# Patient Record
Sex: FEMALE | Race: WHITE
[De-identification: names, ages, dates, MRNs, and addresses within clinical notes are randomized per-mention and may not be internally consistent; named-entity substitution may affect disease eponyms.]

---

## 2021-02-01 ENCOUNTER — HOSPITAL ENCOUNTER (EMERGENCY)
Dept: HOSPITAL 97 - ER | Age: 76
Discharge: HOME | End: 2021-02-01
Payer: COMMERCIAL

## 2021-02-01 VITALS — TEMPERATURE: 99.6 F

## 2021-02-01 VITALS — OXYGEN SATURATION: 100 % | DIASTOLIC BLOOD PRESSURE: 95 MMHG | SYSTOLIC BLOOD PRESSURE: 160 MMHG

## 2021-02-01 DIAGNOSIS — M54.32: Primary | ICD-10-CM

## 2021-02-01 DIAGNOSIS — M54.31: ICD-10-CM

## 2021-02-01 PROCEDURE — 99283 EMERGENCY DEPT VISIT LOW MDM: CPT

## 2021-02-01 NOTE — ER
Nurse's Notes                                                                                     

 [facility]                                                                 

Name: Sydney Smith                                                                             

Age: 75 yrs                                                                                       

Sex: Female                                                                                       

: 1945                                                                                   

MRN: [id number]                                                                                   

Arrival Date: 2021                                                                          

Time: 10:47                                                                                       

Account#: [id number]                                                                            

Bed 25                                                                                            

Private MD:                                                                                       

Diagnosis: Sciatica, left side;Sciatica, right side                                               

                                                                                                  

Presentation:                                                                                     

                                                                                             

11:31 Chief complaint: Patient states: I got sciatica, both sides on lower back. Haven't      ca1 

      slept for days, hurt so back. Had this problem for a long time, but worse in the past       

      week. [employer]. lower back radiating to the side of the legs sometimes. Bilateral knee pain,     

      swelling on the R knee, been that way for quite sometime, had knee shots before. Denies     

      injury. Denies urinary S/S. Coronavirus screen: Client denies travel out of the [place] in     

      the last 14 days. At this time, the client does not indicate any symptoms associated        

      with coronavirus-19. Ebola Screen: Patient negative for fever greater than or equal to      

      101.5 degrees Fahrenheit, and additional compatible Ebola Virus Disease symptoms            

      Patient denies exposure to infectious person. Patient denies travel to an                   

      Ebola-affected area in the 21 days before illness onset. No symptoms or risks               

      identified at this time. Initial Sepsis Screen: Does the patient meet any 2 criteria?       

      No. Patient's initial sepsis screen is negative. Does the patient have a suspected          

      source of infection? No. Patient's initial sepsis screen is negative. Risk Assessment:      

      Do you want to hurt yourself or someone else? Patient reports no desire to harm self or     

      others. Onset of symptoms was 2021.                                            

11:31 Method Of Arrival: Wheelchair                                                           ca1 

11:31 Acuity: AYLA 3                                                                           ca1 

                                                                                                  

Historical:                                                                                       

- Allergies:                                                                                      

11:36 No Known Allergies;                                                                     ca1 

- PMHx:                                                                                           

11:36 Hypertension;                                                                           ca1 

- PSHx:                                                                                           

11:36 Knee surgery;                                                                           ca1 

                                                                                                  

- Immunization history:: Pneumococcal vaccine is up to date, Flu vaccine is not up to             

  date. COVID 1st shot.                                                                           

- Social history:: Smoking status: Patient denies any tobacco usage or history of.                

                                                                                                  

                                                                                                  

Screenin:08 Abuse screen: Denies threats or abuse. Nutritional screening: No deficits noted.        tw2 

      Tuberculosis screening: No symptoms or risk factors identified. Fall Risk None              

      identified.                                                                                 

                                                                                                  

Assessment:                                                                                       

13:35 General: Appears in no apparent distress. uncomfortable, obese, well groomed, Behavior  tw2 

      is calm, cooperative, appropriate for age. Pain: Complains of pain in left leg and          

      right leg and right low back and left low back. Neuro: Level of Consciousness is awake,     

      alert, obeys commands, Oriented to person, place, time, situation. Cardiovascular:          

      Patient's skin is warm and dry. Respiratory: Airway is patent Respiratory effort is         

      even, unlabored, Respiratory pattern is regular, symmetrical. Musculoskeletal:              

      Circulation, motion, and sensation intact. Range of motion: intact in all extremities,      

      Reports pain in back, right leg and left leg.                                               

14:07 Reassessment: Patient appears in no apparent distress at this time. No changes from     tw2 

      previously documented assessment. Patient and/or family updated on plan of care and         

      expected duration. Pain level reassessed. Patient is alert, oriented x 3, equal             

      unlabored respirations, skin warm/dry/pink.                                                 

                                                                                                  

Vital Signs:                                                                                      

11:31  / 61; Pulse 94; Resp 16 S; Temp 99.6(TE); Pulse Ox 98% on R/A; Weight 100.7 kg   ca1 

      (R); Height 5 ft. 4 in. (162.56 cm) (R); Pain 10/10;                                        

14:05  / 95; Pulse 82; Resp 17; Pulse Ox 100% on R/A;                                   tw2 

11:31 Body Mass Index 38.11 (100.70 kg, 162.56 cm)                                            ca1 

                                                                                                  

ED Course:                                                                                        

10:47 Patient arrived in ED.                                                                  ag5 

11:35 Triage completed.                                                                       ca1 

11:36 Arm band placed on right wrist.                                                         ca1 

13:30 Agustina Solorzano FNP-C is PHCP.                                                        kb  

13:31 Fadia Cruz MD is Attending Physician.                                           kb  

13:32 Bed in low position. Call light in reach. Pulse ox on. NIBP on.                         tw2 

13:36 Agustina Solorzano FNP-C is PHCP.                                                        kb  

13:36 Fadia Cruz MD is Attending Physician.                                           kb  

13:54 Asya White, RN is Primary Nurse.                                                        tw2 

14:08 No provider procedures requiring assistance completed. Patient did not have IV access   tw2 

      during this emergency room visit.                                                           

                                                                                                  

Administered Medications:                                                                         

14:00 Drug: Norco 10 mg-325 mg 1 tabs {Note: RASS 0.} Route: PO;                              tw2 

14:07 Follow up: Response: No adverse reaction; RASS: Alert and Calm (0)                      tw2 

14:00 Drug: predniSONE 40 mg Route: PO;                                                       tw2 

14:09 Follow up: Response: No adverse reaction                                                tw2 

                                                                                                  

                                                                                                  

Outcome:                                                                                          

13:50 Discharge ordered by MD.                                                                sandhya  

14:06 Discharged to home via wheelchair.                                                      tw2 

14:06 Condition: stable                                                                           

14:06 Discharge instructions given to patient, Instructed on discharge instructions, follow       

      up and referral plans. no drinking with medication, no driving heavy equipment,             

      medication usage, Demonstrated understanding of instructions, follow-up care,               

      medications, Prescriptions given X 2.                                                       

14:08 Patient left the ED.                                                                    tw2 

                                                                                                  

Signatures:                                                                                       

Agustina Solorzano, HOLLIP-C                 JESSICA-Asya Lindsay RN RN   tw2                                                  

Genesis Quinn RN                        RN   ca1                                                  

Ginger Crabtree                                5                                                  

                                                                                                  

**************************************************************************************************

## 2021-02-01 NOTE — EDPHYS
Physician Documentation                                                                           

 [facility]                                                                 

Name: Sydney Smith                                                                             

Age: 75 yrs                                                                                       

Sex: Female                                                                                       

: 1945                                                                                   

MRN: [id number]                                                                                   

Arrival Date: 2021                                                                          

Time: 10:47                                                                                       

Account#: [id number]                                                                            

Bed 25                                                                                            

Private MD:                                                                                       

ED Physician Fadia Cruz                                                                    

HPI:                                                                                              

                                                                                             

13:47 This 75 yrs old  Female presents to ER via Wheelchair with complaints of Low   kb  

      Back Pain, Knee Pain.                                                                       

13:47 The patient presents with pain that is acute. The symptoms are located in the left low  kb  

      back and right low back. The pain radiates to the right leg and left leg. The problem       

      was sustained from a chronic condition. Onset: The symptoms/episode began/occurred 4        

      day(s) ago. Modifying factors: The patient symptoms are alleviated by nothing, the          

      patient symptoms are aggravated by any movement. Associated signs and symptoms:             

      Pertinent positives: none. Severity of symptoms: At their worst the symptoms were           

      moderate, in the emergency department the symptoms are unchanged. The patient has           

      experienced similar episodes in the past, several times. The patient has not recently       

      seen a physician. Pt states "I've been having sciatic pain since Friday and it keeps        

      getting worse. I can't lay down." Pt reports she has had this pain several times in the     

      past. States pain sometimes travels down legs. No urinary problems, incontinence,           

      numbness, tingling. Denies injury or trauma.                                                

                                                                                                  

Historical:                                                                                       

- Allergies:                                                                                      

11:36 No Known Allergies;                                                                     ca1 

- PMHx:                                                                                           

11:36 Hypertension;                                                                           ca1 

- PSHx:                                                                                           

11:36 Knee surgery;                                                                           ca1 

                                                                                                  

- Immunization history:: Pneumococcal vaccine is up to date, Flu vaccine is not up to             

  date. COVID 1st shot.                                                                           

- Social history:: Smoking status: Patient denies any tobacco usage or history of.                

                                                                                                  

                                                                                                  

ROS:                                                                                              

13:49 Constitutional: Negative for fever, chills, and weight loss, Cardiovascular: Negative   kb  

      for chest pain, palpitations, and edema, Respiratory: Negative for shortness of breath,     

      cough, wheezing, and pleuritic chest pain, Abdomen/GI: Negative for abdominal pain,         

      nausea, vomiting, diarrhea, and constipation, MS/Extremity: Negative for injury and         

      deformity, Skin: Negative for injury, rash, and discoloration, Neuro: Negative for          

      headache, weakness, numbness, tingling, and seizure.                                        

13:49 Back: Positive for pain at rest, pain with movement, radiated pain, of the left low         

      back and right low back.                                                                    

                                                                                                  

Exam:                                                                                             

13:49 Constitutional:  This is a well developed, well nourished patient who is awake, alert,  kb  

      and in no acute distress. Head/Face:  Normocephalic, atraumatic. Chest/axilla:  Normal      

      chest wall appearance and motion.  Nontender with no deformity.  No lesions are             

      appreciated. Cardiovascular:  Regular rate and rhythm with a normal S1 and S2.  No          

      gallops, murmurs, or rubs.  Normal PMI, no JVD.  No pulse deficits. Respiratory:  Lungs     

      have equal breath sounds bilaterally, clear to auscultation and percussion.  No rales,      

      rhonchi or wheezes noted.  No increased work of breathing, no retractions or nasal          

      flaring. Abdomen/GI:  Soft, non-tender, with normal bowel sounds.  No distension or         

      tympany.  No guarding or rebound.  No evidence of tenderness throughout. Skin:  Warm,       

      dry with normal turgor.  Normal color with no rashes, no lesions, and no evidence of        

      cellulitis. MS/ Extremity:  Pulses equal, no cyanosis.  Neurovascular intact.  Full,        

      normal range of motion. Neuro:  Awake and alert, GCS 15, oriented to person, place,         

      time, and situation.  Cranial nerves II-XII grossly intact.  Motor strength 5/5 in all      

      extremities.  Sensory grossly intact.  Cerebellar exam normal.  Normal gait.                

13:49 Back: pain, that is moderate, of the  left low back and right low back, ROM is painful,     

      normal spinal alignment noted, CVA tenderness, is absent, vertebral tenderness, is not      

      appreciated.                                                                                

                                                                                                  

Vital Signs:                                                                                      

11:31  / 61; Pulse 94; Resp 16 S; Temp 99.6(TE); Pulse Ox 98% on R/A; Weight 100.7 kg   ca1 

      (R); Height 5 ft. 4 in. (162.56 cm) (R); Pain 10/10;                                        

14:05  / 95; Pulse 82; Resp 17; Pulse Ox 100% on R/A;                                   tw2 

11:31 Body Mass Index 38.11 (100.70 kg, 162.56 cm)                                            ca1 

                                                                                                  

MDM:                                                                                              

13:37 Patient medically screened.                                                             kb  

13:47 Data reviewed: vital signs, nurses notes. Data interpreted: Pulse oximetry: on room air kb  

      is 98 %. Interpretation: normal. Counseling: I had a detailed discussion with the           

      patient and/or guardian regarding: the historical points, exam findings, and any            

      diagnostic results supporting the discharge/admit diagnosis, the need for outpatient        

      follow up, a family practitioner, to return to the emergency department if symptoms         

      worsen or persist or if there are any questions or concerns that arise at home.             

                                                                                                  

Administered Medications:                                                                         

14:00 Drug: Norco 10 mg-325 mg 1 tabs {Note: RASS 0.} Route: PO;                              tw2 

14:07 Follow up: Response: No adverse reaction; RASS: Alert and Calm (0)                      tw2 

14:00 Drug: predniSONE 40 mg Route: PO;                                                       tw2 

14:09 Follow up: Response: No adverse reaction                                                tw2 

                                                                                                  

                                                                                                  

Disposition:                                                                                      

18:18 Co-signature as Attending Physician, Fadia Cruz MD.                               [facility] 

                                                                                                  

Disposition:                                                                                      

21 13:50 Discharged to Home. Impression: Sciatica, left side, Sciatica, right side.         

- Condition is Stable.                                                                            

- Discharge Instructions: Sciatica, Easy-to-Read, Back Exercises, Easy-to-Read.                   

- Prescriptions for Prednisone 20 mg Oral Tablet - take 1 tablet by ORAL route once               

  daily for 5 days; 5 tablet. Cyclobenzaprine 10 mg Oral Tablet - take 1 tablet by ORAL           

  route every 8 hours As needed; 21 tablet.                                                       

- Medication Reconciliation Form, Thank You Letter, Antibiotic Education, Prescription            

  Opioid Use form.                                                                                

- Follow up: Emergency Department; When: As needed; Reason: Worsening of condition.               

  Follow up: Private Physician; When: 2 - 3 days; Reason: Recheck today's complaints,             

  Continuance of care, Re-evaluation by your physician.                                           

                                                                                                  

                                                                                                  

                                                                                                  

Signatures:                                                                                       

Agustina Solorzano, JESSICA-C                 JESSICA-Asya Lindsay, KIM                          RN   tw2                                                  

Fadia Cruz MD MD   ma2                                                  

Genesis Quinn RN                        RN   ca1                                                  

                                                                                                  

Corrections: (The following items were deleted from the chart)                                    

13:51 13:47 Pt states "I've been having sciatic pain since Friday and it keeps getting worse. kb  

      I can't lay down." Pt reports she has had this pain several times in the past. States       

      pain sometimes travels down legs. No urinary problems, incontinence, numbness,              

      tingling. kb                                                                                

14:08 13:50 2021 13:50 Discharged to Home. Impression: Sciatica, left side; Sciatica,   tw2 

      right side. Condition is Stable. Forms are Medication Reconciliation Form, Thank You        

      Letter, Antibiotic Education, Prescription Opioid Use. Follow up: Emergency Department;     

      When: As needed; Reason: Worsening of condition. Follow up: Private Physician; When: 2      

      - 3 days; Reason: Recheck today's complaints, Continuance of care, Re-evaluation by         

      your physician. kb                                                                          

                                                                                                  

**************************************************************************************************

## 2021-02-17 ENCOUNTER — HOSPITAL ENCOUNTER (INPATIENT)
Dept: HOSPITAL 97 - ER | Age: 76
LOS: 9 days | Discharge: HOME | DRG: 871 | End: 2021-02-26
Attending: HOSPITALIST | Admitting: HOSPITALIST
Payer: COMMERCIAL

## 2021-02-17 VITALS — BODY MASS INDEX: 37 KG/M2

## 2021-02-17 DIAGNOSIS — E87.2: ICD-10-CM

## 2021-02-17 DIAGNOSIS — N30.00: ICD-10-CM

## 2021-02-17 DIAGNOSIS — K21.9: ICD-10-CM

## 2021-02-17 DIAGNOSIS — E87.6: ICD-10-CM

## 2021-02-17 DIAGNOSIS — E03.9: ICD-10-CM

## 2021-02-17 DIAGNOSIS — Z79.899: ICD-10-CM

## 2021-02-17 DIAGNOSIS — E86.9: ICD-10-CM

## 2021-02-17 DIAGNOSIS — Z79.890: ICD-10-CM

## 2021-02-17 DIAGNOSIS — D50.9: ICD-10-CM

## 2021-02-17 DIAGNOSIS — K80.10: ICD-10-CM

## 2021-02-17 DIAGNOSIS — E21.1: ICD-10-CM

## 2021-02-17 DIAGNOSIS — Z90.710: ICD-10-CM

## 2021-02-17 DIAGNOSIS — N17.9: ICD-10-CM

## 2021-02-17 DIAGNOSIS — E78.5: ICD-10-CM

## 2021-02-17 DIAGNOSIS — A41.51: Primary | ICD-10-CM

## 2021-02-17 DIAGNOSIS — E87.5: ICD-10-CM

## 2021-02-17 DIAGNOSIS — R73.9: ICD-10-CM

## 2021-02-17 DIAGNOSIS — N18.6: ICD-10-CM

## 2021-02-17 DIAGNOSIS — Z99.2: ICD-10-CM

## 2021-02-17 DIAGNOSIS — Z20.822: ICD-10-CM

## 2021-02-17 DIAGNOSIS — I25.10: ICD-10-CM

## 2021-02-17 DIAGNOSIS — Z88.5: ICD-10-CM

## 2021-02-17 DIAGNOSIS — M05.9: ICD-10-CM

## 2021-02-17 DIAGNOSIS — D63.8: ICD-10-CM

## 2021-02-17 DIAGNOSIS — D63.1: ICD-10-CM

## 2021-02-17 DIAGNOSIS — I12.0: ICD-10-CM

## 2021-02-17 DIAGNOSIS — T39.395A: ICD-10-CM

## 2021-02-17 LAB
ALBUMIN SERPL BCP-MCNC: 2.3 G/DL (ref 3.4–5)
ALP SERPL-CCNC: 1150 U/L (ref 45–117)
ALT SERPL W P-5'-P-CCNC: 20 U/L (ref 12–78)
AST SERPL W P-5'-P-CCNC: 22 U/L (ref 15–37)
BUN BLD-MCNC: 157 MG/DL (ref 7–18)
COHGB MFR BLDA: 0.8 % (ref 0–1.5)
GLUCOSE SERPLBLD-MCNC: 172 MG/DL (ref 74–106)
HCT VFR BLD CALC: 33.3 % (ref 36–45)
INR BLD: 1.25
LIPASE SERPL-CCNC: 545 U/L (ref 73–393)
LYMPHOCYTES # SPEC AUTO: 0.2 K/UL (ref 0.7–4.9)
MAGNESIUM SERPL-MCNC: 3.6 MG/DL (ref 1.8–2.4)
MORPHOLOGY BLD-IMP: (no result)
NT-PROBNP SERPL-MCNC: 4662 PG/ML (ref ?–450)
OXYHGB MFR BLDA: 95.7 % (ref 94–97)
PMV BLD: 9 FL (ref 7.6–11.3)
POTASSIUM SERPL-SCNC: 4.4 MMOL/L (ref 3.5–5.1)
RBC # BLD: 4.27 M/UL (ref 3.86–4.86)
SAO2 % BLDA: 97.7 % (ref 92–98.5)
TROPONIN (EMERG DEPT USE ONLY): < 0.02 NG/ML (ref 0–0.04)

## 2021-02-17 PROCEDURE — 88300 SURGICAL PATH GROSS: CPT

## 2021-02-17 PROCEDURE — 36415 COLL VENOUS BLD VENIPUNCTURE: CPT

## 2021-02-17 PROCEDURE — 97161 PT EVAL LOW COMPLEX 20 MIN: CPT

## 2021-02-17 PROCEDURE — 80048 BASIC METABOLIC PNL TOTAL CA: CPT

## 2021-02-17 PROCEDURE — 99285 EMERGENCY DEPT VISIT HI MDM: CPT

## 2021-02-17 PROCEDURE — 87340 HEPATITIS B SURFACE AG IA: CPT

## 2021-02-17 PROCEDURE — 82652 VIT D 1 25-DIHYDROXY: CPT

## 2021-02-17 PROCEDURE — 83520 IMMUNOASSAY QUANT NOS NONAB: CPT

## 2021-02-17 PROCEDURE — 86317 IMMUNOASSAY INFECTIOUS AGENT: CPT

## 2021-02-17 PROCEDURE — 80069 RENAL FUNCTION PANEL: CPT

## 2021-02-17 PROCEDURE — 87389 HIV-1 AG W/HIV-1&-2 AB AG IA: CPT

## 2021-02-17 PROCEDURE — 80076 HEPATIC FUNCTION PANEL: CPT

## 2021-02-17 PROCEDURE — 87088 URINE BACTERIA CULTURE: CPT

## 2021-02-17 PROCEDURE — 87205 SMEAR GRAM STAIN: CPT

## 2021-02-17 PROCEDURE — 83540 ASSAY OF IRON: CPT

## 2021-02-17 PROCEDURE — 86430 RHEUMATOID FACTOR TEST QUAL: CPT

## 2021-02-17 PROCEDURE — 86160 COMPLEMENT ANTIGEN: CPT

## 2021-02-17 PROCEDURE — 83605 ASSAY OF LACTIC ACID: CPT

## 2021-02-17 PROCEDURE — 84165 PROTEIN E-PHORESIS SERUM: CPT

## 2021-02-17 PROCEDURE — 82728 ASSAY OF FERRITIN: CPT

## 2021-02-17 PROCEDURE — 87077 CULTURE AEROBIC IDENTIFY: CPT

## 2021-02-17 PROCEDURE — 87086 URINE CULTURE/COLONY COUNT: CPT

## 2021-02-17 PROCEDURE — 81003 URINALYSIS AUTO W/O SCOPE: CPT

## 2021-02-17 PROCEDURE — 84100 ASSAY OF PHOSPHORUS: CPT

## 2021-02-17 PROCEDURE — 71250 CT THORAX DX C-: CPT

## 2021-02-17 PROCEDURE — 81001 URINALYSIS AUTO W/SCOPE: CPT

## 2021-02-17 PROCEDURE — 83880 ASSAY OF NATRIURETIC PEPTIDE: CPT

## 2021-02-17 PROCEDURE — 71045 X-RAY EXAM CHEST 1 VIEW: CPT

## 2021-02-17 PROCEDURE — 84550 ASSAY OF BLOOD/URIC ACID: CPT

## 2021-02-17 PROCEDURE — 85730 THROMBOPLASTIN TIME PARTIAL: CPT

## 2021-02-17 PROCEDURE — 90935 HEMODIALYSIS ONE EVALUATION: CPT

## 2021-02-17 PROCEDURE — 76000 FLUOROSCOPY <1 HR PHYS/QHP: CPT

## 2021-02-17 PROCEDURE — 86706 HEP B SURFACE ANTIBODY: CPT

## 2021-02-17 PROCEDURE — 97116 GAIT TRAINING THERAPY: CPT

## 2021-02-17 PROCEDURE — 84145 PROCALCITONIN (PCT): CPT

## 2021-02-17 PROCEDURE — 84156 ASSAY OF PROTEIN URINE: CPT

## 2021-02-17 PROCEDURE — 83970 ASSAY OF PARATHORMONE: CPT

## 2021-02-17 PROCEDURE — 84484 ASSAY OF TROPONIN QUANT: CPT

## 2021-02-17 PROCEDURE — 94660 CPAP INITIATION&MGMT: CPT

## 2021-02-17 PROCEDURE — 81015 MICROSCOPIC EXAM OF URINE: CPT

## 2021-02-17 PROCEDURE — 82746 ASSAY OF FOLIC ACID SERUM: CPT

## 2021-02-17 PROCEDURE — 87040 BLOOD CULTURE FOR BACTERIA: CPT

## 2021-02-17 PROCEDURE — 87522 HEPATITIS C REVRS TRNSCRPJ: CPT

## 2021-02-17 PROCEDURE — 84443 ASSAY THYROID STIM HORMONE: CPT

## 2021-02-17 PROCEDURE — 93005 ELECTROCARDIOGRAM TRACING: CPT

## 2021-02-17 PROCEDURE — 74176 CT ABD & PELVIS W/O CONTRAST: CPT

## 2021-02-17 PROCEDURE — 82607 VITAMIN B-12: CPT

## 2021-02-17 PROCEDURE — 84466 ASSAY OF TRANSFERRIN: CPT

## 2021-02-17 PROCEDURE — 85025 COMPLETE CBC W/AUTO DIFF WBC: CPT

## 2021-02-17 PROCEDURE — 51702 INSERT TEMP BLADDER CATH: CPT

## 2021-02-17 PROCEDURE — 88108 CYTOPATH CONCENTRATE TECH: CPT

## 2021-02-17 PROCEDURE — 82947 ASSAY GLUCOSE BLOOD QUANT: CPT

## 2021-02-17 PROCEDURE — 86038 ANTINUCLEAR ANTIBODIES: CPT

## 2021-02-17 PROCEDURE — 86704 HEP B CORE ANTIBODY TOTAL: CPT

## 2021-02-17 PROCEDURE — 82550 ASSAY OF CK (CPK): CPT

## 2021-02-17 PROCEDURE — 85610 PROTHROMBIN TIME: CPT

## 2021-02-17 PROCEDURE — 87186 SC STD MICRODIL/AGAR DIL: CPT

## 2021-02-17 PROCEDURE — 76705 ECHO EXAM OF ABDOMEN: CPT

## 2021-02-17 PROCEDURE — 83735 ASSAY OF MAGNESIUM: CPT

## 2021-02-17 PROCEDURE — 86225 DNA ANTIBODY NATIVE: CPT

## 2021-02-17 PROCEDURE — 80053 COMPREHEN METABOLIC PANEL: CPT

## 2021-02-17 PROCEDURE — 82805 BLOOD GASES W/O2 SATURATION: CPT

## 2021-02-17 PROCEDURE — 86021 WBC ANTIBODY IDENTIFICATION: CPT

## 2021-02-17 PROCEDURE — 82570 ASSAY OF URINE CREATININE: CPT

## 2021-02-17 PROCEDURE — 76770 US EXAM ABDO BACK WALL COMP: CPT

## 2021-02-17 PROCEDURE — 83690 ASSAY OF LIPASE: CPT

## 2021-02-17 RX ADMIN — HEPARIN SODIUM SCH UNIT: 5000 INJECTION, SOLUTION INTRAVENOUS; SUBCUTANEOUS at 17:00

## 2021-02-17 RX ADMIN — Medication SCH ML: at 21:00

## 2021-02-17 RX ADMIN — ONDANSETRON PRN MG: 2 INJECTION INTRAMUSCULAR; INTRAVENOUS at 20:36

## 2021-02-17 RX ADMIN — SODIUM CHLORIDE SCH MLS: 0.45 INJECTION, SOLUTION INTRAVENOUS at 18:00

## 2021-02-17 NOTE — ER
Nurse's Notes                                                                                     

 [facility]                                                                 

Name: Sydney Smith                                                                             

Age: 75 yrs                                                                                       

Sex: Female                                                                                       

: 1945                                                                                   

MRN: [id number]                                                                                   

Arrival Date: 2021                                                                          

Time: 11:32                                                                                       

Account#: [id number]                                                                            

Bed 5                                                                                             

Private MD:                                                                                       

Diagnosis: Acute kidney failure;Other sepsis;Urinary tract infection, site not                    

  specified;Dehydration                                                                           

                                                                                                  

Presentation:                                                                                     

                                                                                             

11:47 Chief complaint: Patient states: 2 weeks of N/V when trying to eat. Feels like          ss  

      something is stuck in lower throat area. Vomits all liquids she tries to drink. No          

      fever. Coronavirus screen: Client denies travel out of the [place] in the last 14 days. At     

      this time, the client does not indicate any symptoms associated with coronavirus-19.        

      Ebola Screen: Patient denies travel to an Ebola-affected area in the 21 days before         

      illness onset. Initial Sepsis Screen: Does the patient meet any 2 criteria? HR > 90         

      bpm. No. Patient's initial sepsis screen is negative. Does the patient have a suspected     

      source of infection? Yes: Other: throat pain/possible blockage. Risk Assessment: Do you     

      want to hurt yourself or someone else? Patient reports no desire to harm self or            

      others. Onset of symptoms was 2021.                                            

11:47 Method Of Arrival: Wheelchair                                                           ss  

11:47 Acuity: AYLA 3                                                                           ss  

                                                                                                  

Historical:                                                                                       

- Allergies:                                                                                      

11:46 Codeine;                                                                                ss  

- PMHx:                                                                                           

11:46 Hypertension;                                                                           ss  

- PSHx:                                                                                           

11:46 Knee surgery; Hysterectomy;                                                             ss  

                                                                                                  

- Immunization history:: Flu vaccine is up to date.                                               

- Social history:: Smoking status: Patient denies any tobacco usage or history of.                

                                                                                                  

                                                                                                  

Screenin:34 Abuse screen: Denies threats or abuse. Denies injuries from another. Nutritional        sv  

      screening: No deficits noted. Tuberculosis screening: No symptoms or risk factors           

      identified. Fall Risk None identified.                                                      

                                                                                                  

Assessment:                                                                                       

13:40 General: Appears uncomfortable, obese, well groomed, well developed, well nourished,    sv  

      Behavior is cooperative, appropriate for age, fussy, restless. Pain: Complains of pain      

      in back Pain currently is 10 out of 10 on a pain scale. Quality of pain is described as     

      throbbing, Pain began 2-3 days ago. Is continuous, Aggravated by increased activity,        

      repositioning, Noted to be moaning, restless, grunting. Neuro: Level of Consciousness       

      is awake, alert, obeys commands, Oriented to person, place, time, situation, Moves all      

      extremities. Full function Speech is normal. Cardiovascular: Patient's skin is warm and     

      dry. Rhythm is sinus tachycardia. Respiratory: Reports shortness of breath Airway is        

      patent Respiratory effort is even, unlabored, Respiratory pattern is symmetrical,           

      tachypnea. GI: Abdomen is obese. Derm: Skin is normal. Musculoskeletal: Range of            

      motion: intact in all extremities.                                                          

14:15 Reassessment: Patient appears in no apparent distress at this time. No changes from     sv  

      previously documented assessment. Patient and/or family updated on plan of care and         

      expected duration. Pain level reassessed. Patient is alert, oriented x 3, equal             

      unlabored respirations, skin warm/dry/pink.                                                 

15:27 Reassessment: Patient appears in no apparent distress at this time. No changes from     sv  

      previously documented assessment. Patient and/or family updated on plan of care and         

      expected duration. Pain level reassessed. Patient is alert, oriented x 3, equal             

      unlabored respirations, skin warm/dry/pink.                                                 

16:04 Reassessment: Patient appears in no apparent distress at this time. Patient and/or      sv  

      family updated on plan of care and expected duration. Pain level reassessed. Patient is     

      alert, oriented x 3, equal unlabored respirations, skin warm/dry/pink.                      

16:35 Reassessment: Pt c/o nausea. Informed Magdi LASSITER, medication order received.              sv  

16:46 Reassessment: Ultrasound at the bedside.                                                sv  

17:50 Reassessment: Merle and Ash from RT attempted to place pt on BIPAP. Pt not able to  sv  

      tolerate it. Magdi PA at bedside and said ok to not place it. ABG drawn prior to            

      placement.                                                                                  

18:03 Reassessment: Patient appears in no apparent distress at this time. No changes from     sv  

      previously documented assessment. Patient and/or family updated on plan of care and         

      expected duration. Pain level reassessed. Patient is alert, oriented x 3, equal             

      unlabored respirations, skin warm/dry/pink.                                                 

19:15 Reassessment: Patient appears in no apparent distress at this time. Patient and/or      wh  

      family updated on plan of care and expected duration. Pain level reassessed. Patient is     

      alert, oriented x 3, equal unlabored respirations, skin warm/dry/pink.                      

                                                                                                  

Vital Signs:                                                                                      

11:47  / 56; Pulse 100; Resp 20; Temp 97.6; Pulse Ox 100% ; Weight 98.88 kg; Height 5   ss  

      ft. 4 in. (162.56 cm); Pain 10/10;                                                          

14:00 BP 97 / 70; Pulse 100; Resp 24; Pulse Ox 100% on R/A;                                   sv  

15:19  / 60; Pulse 106; Resp 28; Pulse Ox 98% on 2 lpm NC;                              sv  

16:00  / 37; Pulse 98; Resp 19; Pulse Ox 98% on 2 lpm NC;                               sv  

16:05 Pain 7/10;                                                                              sv  

17:19  / 45; Pulse 102 MON; Resp 21; Pulse Ox 100% on 2 lpm NC;                         sv  

18:04  / 52; Pulse 109; Resp 17; Pulse Ox 100% on R/A;                                  sv  

19:00  / 96; Pulse 104; Resp 19; Pulse Ox 100% on R/A;                                  wh  

11:47 Body Mass Index 37.42 (98.88 kg, 162.56 cm)                                             ss  

17:19 Sinus tachycardia                                                                       sv  

15:19 Magdi LASSITER wanted pt on O2.                                                               sv  

                                                                                                  

ED Course:                                                                                        

11:32 Patient arrived in ED.                                                                  rg4 

11:46 Arm band placed on.                                                                     ss  

11:50 Triage completed.                                                                       ss  

13:27 Fadia Cruz MD is Attending Physician.                                           ma2 

13:31 Magdi Bentley PA is PHCP.                                                                cp  

13:34 Leti Ayon, RN is Primary Nurse.                                                  sv  

13:34 Patient has correct armband on for positive identification. Bed in low position. Call   sv  

      light in reach. Door closed. Head of bed elevated.                                          

13:45 Inserted saline lock: 22 gauge in right antecubital area, using aseptic technique.      sv  

      ,using aseptic technique. diffusics Blood collected. Flushed right antecubital with 5       

      ml normal saline.                                                                           

14:08 Basic Metabolic Panel Sent.                                                             sv  

14:08 CBC with Diff Sent.                                                                     sv  

14:08 LFT's Sent.                                                                             sv  

14:08 Magnesium Sent.                                                                         sv  

14:08 XRAY Chest (1 view) Sent.                                                               sv  

14:14 XRAY Chest (1 view) In Process Unspecified.                                             EDMS

14:23 Fields cath inserted, using sterile technique, 16 Fr., by me, balloon inflated, to       sv  

      gravity drainage, urine specimen collected. returned cloudy urine. Patient tolerated        

      well.                                                                                       

14:40 Lactate Sent.                                                                           sv  

14:40 Procalcitonin Sent.                                                                     sv  

14:40 Blood Culture Adult (2) Sent.                                                           sv  

15:04 CT Chest Abdomen Pelvis W/O Contrast In Process Unspecified.                            EDMS

15:19 Awaiting lab results, Awaiting radiology results.                                       sv  

16:45 Miller Pool DO is Hospitalizing Provider.                                           cp  

16:52 US Abdomen Limited In Process Unspecified.                                              EDMS

17:50 Inserted saline lock: 22 gauge in right hand, using aseptic technique. ,using aseptic   sv  

      technique. diffusics.                                                                       

17:58 ABG Sent.                                                                               ss  

18:01 No provider procedures requiring assistance completed. Patient admitted, IV remains in  sv  

      place. intact.                                                                              

19:19 Primary Nurse role handed off by Leti Ayon RN                                   sv  

19:38 Heidi Carlin RN is Primary Nurse.                                                       

                                                                                             

07:25 Primary Nurse role handed off by Heidi Carlin RN                                      em1 

                                                                                                  

Administered Medications:                                                                         

                                                                                             

13:49 Drug: Zofran (Ondansetron) 4 mg Route: IVP; Site: right antecubital;                    hb  

14:08 Follow up: Response: No adverse reaction                                                sv  

13:49 Drug: Pepcid 20 mg Route: IVP; Site: right antecubital;                                 hb  

14:08 Follow up: Response: No adverse reaction                                                sv  

14:15 Drug: fentaNYL (PF) 25 mcg Route: IVP; Site: left antecubital;                          sv  

15:20 Follow up: Response: No adverse reaction; No change in condition; Pain is unchanged,    sv  

      physician notified; RASS: Agitated (+2)                                                     

14:30 CANCELLED (Physician Discretion): NS 0.9% 500 ml IV at 500 ml/hr continuous             cp  

14:40 Drug: NS 0.9% (30 ml/kg) 30 ml/kg Route: IV; Rate: bolus; Site: right antecubital;      sv  

19:40 Follow up: Response: No adverse reaction; IV Status: Completed infusion                   

14:40 Drug: Rocephin 1 grams Route: IV; Rate: calculated rate; Site: right antecubital;       sv  

14:42 Follow up: Response: No adverse reaction; IV Status: Completed infusion; IV Intake: 10mlsv  

15:27 Drug: fentaNYL (PF) 50 mcg {Note: rass2.} Route: IVP; Site: right antecubital;          sv  

16:05 Follow up: Pain 7/10 Adult; Response: No adverse reaction; Pain is decreased; RASS:     sv  

      Restless (+1)                                                                               

16:04 Drug: Flagyl 500 mg Volume: 100 ml; Route: IVPB; Rate: 200 ml/hr; Infused Over: 30      sv  

      mins; Site: right antecubital;                                                              

17:00 Follow up: Response: No adverse reaction; IV Status: Completed infusion; IV Intake:     sv  

      100ml                                                                                       

16:40 Drug: Zofran (Ondansetron) 4 mg Route: IVP; Site: right antecubital;                    sv  

17:00 Follow up: Response: No adverse reaction; No change in condition                        sv  

16:58 CANCELLED (Physician Discretion): morphine 2 mg IM once; RASS on ADMIN: Combtv4, Very   sv  

      Agttd3, Agttd2, Rstlss1, AlertClm0, Drwsy-1, Lt Sdtn-2, Mod Sdtn-3, Dp Sdtn-4,              

      UnArsble-5                                                                                  

16:59 Drug: Phenergan 25 mg Route: IVP; Site: right antecubital;                              sv  

17:30 Follow up: Response: No adverse reaction; Marked relief of symptoms; Nausea is decreasedsv  

16:59 Drug: morphine 2 mg {Note: rass2.} Route: IVP; Site: right antecubital;                 sv  

17:30 Follow up: Response: No adverse reaction; Pain is decreased; RASS: Restless (+1)        sv  

17:58 Drug: Zosyn 3.375 grams Route: IVPB; Infused Over: 60 mins; Site: right hand;           ss  

19:40 Follow up: Response: No adverse reaction; IV Status: Completed infusion                 wh  

17:58 Drug: Sodium Bicarbonate 1 amp Route: IVP; Site: right hand;                            ss  

18:40 Follow up: Response: No adverse reaction                                                sv  

17:58 Drug: Sodium Bicarbonate 50 mEq Route: IVP; Site: right hand;                           ss  

18:40 Follow up: Response: No adverse reaction                                                sv  

21:18 CANCELLED (Physician Discretion): Reglan 10 mg IVP once; over 1 to 2 minutes            cp  

                                                                                                  

                                                                                                  

Intake:                                                                                           

14:42 IV: 10ml; Total: 10ml.                                                                  sv  

17:00 IV: 100ml; Total: 110ml.                                                                sv  

                                                                                                  

Outcome:                                                                                          

16:46 Decision to Hospitalize by Provider.                                                    cp  

18:02 Admitted to ER Hold.  Please see [facility] for further documentation.                    sv  

18:02 Condition: stable                                                                           

18:02 Instructed on the need for admit.                                                           

                                                                                             

13:31 Patient left the ED.                                                                    sv  

                                                                                                  

Signatures:                                                                                       

Dispatcher [employer]                           Leti Hickey RN RN   sv                                                   

Ozzy Tello                               em1                                                  

Adilia Loyola RN RN   ss                                                   

Magdi Bentley PA PA   cp                                                   

Carmita Gao RN RN hb Garcia, Rubi                                 rg4                                                  

Heidi Carlin RN RN                                                      

Fadia Cruz MD MD   ma2                                                  

                                                                                                  

**************************************************************************************************

## 2021-02-17 NOTE — RAD REPORT
EXAM DESCRIPTION:  CT - Chest Abd Pelvis Wo Con - 2/17/2021 3:05 pm

 

CLINICAL HISTORY:  N/V, acute kidney failure, chest pain, abdominal pain

 

COMPARISON:  Chest Single View dated 2/17/2021

 

TECHNIQUE:  During dynamic enhancement using 100 milliliters nonionic IV contrast, axial 5 millimeter
 thick images of the chest, abdomen and pelvis were obtained. Biphasic technique was utilized through
 the abdomen.  Oral contrast was administered.

 

All CT scans are performed using dose optimization technique as appropriate and may include automated
 exposure control or mA/KV adjustment according to patient size.

 

FINDINGS:  The lungs are clear of mass and infiltrate. Patient has minimal scarring or linear atelect
asis in the inferior lingula and right middle lobe base. No pneumothorax or pleural effusion.  No yana
st wall mass or abnormal axillary lymphadenopathy seen.  Mediastinal and hilar regions show no mass o
r lymphadenopathy.  No significant cardiac finding.

 

The liver, spleen and pancreas show no significant findings.  Gallbladder is well filled but not dila
shahriar. Gallstones can be occult. No pericholecystic fluid or definitive gallbladder wall thickening. No
 biliary tree dilatation.

 

No hydronephrosis of either kidney. No obstructing or nonobstructing calculi. No gross evidence for a
 renal mass. Isodense masses and pyelonephritis are not excluded on a noncontrast study. No adrenal a
bnormalities.  Urinary bladder is fully contracted around a Fields catheter. Uterus is absent. Ovaries
 are absent or atrophic. No adnexal mass. Pelvic floor laxity is present.

 

No dilated bowel loops or focal ball bowel wall thickening.  No free air, free fluid or inflammatory 
stranding.  No hernia, mass or bulky lymphadenopathy.

 

No significant bone or vascular finding.  Patient does have disc and bony degenerative changes spanni
ng L2-L5.

 

IMPRESSION:  CT chest imaging shows no mass, lymphadenopathy or other significant finding.

 

CT abdomen and pelvis imaging shows no acute or emergent finding.

 

Isodense masses and pyelonephritis cannot be excluded on a noncontrast study.

 

Gallbladder is distended but not dilated. Stones can be occult on CT imaging. Biliary tree is normal 
size. Concerns for an acute gallbladder process can be addressed with follow-up sonography.

## 2021-02-17 NOTE — RAD REPORT
EXAM DESCRIPTION:  RAD - Chest Single View - 2/17/2021 2:14 pm

 

CLINICAL HISTORY:  difficulty swallowing

 

COMPARISON:  None

 

TECHNIQUE:  AP portable chest image was obtained 2/17/2021 2:14 pm .

 

FINDINGS:  Lung volumes are low. Under penetrated technique and body habitus affects further limit th
e examination. Lung base interstitial opacification is present. In the absence of a comparison early 
right lung base infiltrate cannot be excluded. No significant failure or volume overload.

 

 Heart and vasculature are normal. No measurable pleural effusion and no pneumothorax. No acute bony 
abnormality seen. No acute aortic findings suspected.

 

IMPRESSION:  Right base infiltrate verse is shallow inspiration linear atelectasis.

## 2021-02-17 NOTE — P.HP
Certification for Inpatient


With expected LOS: >2 Midnights


Practitioner: I am a practitioner with admitting privileges, knowledge of 

patient current condition, hospital course, and medical plan of care.


Services: Services provided to patient in accordance with Admission requirements

found in Title 42 Section 412.3 of the Code of Federal Regulations





Patient History


Date of Service: 02/17/21


Reason for admission: ENRIQUE, sepsis, volume depletion, suspected cholecystitis, 

metabolic acidemia.


History of Present Illness: 





75 y o female pt who was evaluated in the Ed for episode of n/v and abd pain for

the past 2 weeks. she has been unable to keep food and water down for the past 2

weeks and has had severe and diffuse abd pain. she attested to fever chills and 

malaise.


work up in the Ed revealed severe ENRIQUE with creatinine of 8.4, metabolic acidemia

with bicarb of 7 and she also had elevated WBC of 35 and mod anemia.


she was deemed to be septic and she was started on empiric antibiotics and IV 

fluid as per sepsis protocol. she was admitted for inpt care. Imaging done did 

show sludge and small stones in the gallbladder depicting gallstone 

cholecystitis.





Review of Systems


General: Fever, Chills, Malaise


Eyes: Unremarkable


ENT: Unremarkable


Respiratory: Unremarkable


Cardiovascular: Unremarkable


Gastrointestinal: Nausea, Vomiting, Abdominal Pain


Genitourinary: Unremarkable


Musculoskeletal: Unremarkable


Neurological: Unremarkable





Physical Examination





- Physical Exam


General: Alert, Oriented x3, Moderate distress


HEENT: Atraumatic, Normocephalic, PERRLA


Neck: Supple


Respiratory: Clear to auscultation bilaterally


Cardiovascular: No edema, Regular rate/rhythm, Normal S1 S2


Gastrointestinal: Tenderness (generalized but marked in right upper quadrant.)


Musculoskeletal: No clubbing


Neurological: Normal speech, Normal strength at 5/5 x4 extr, Sensation intact, 

Cranial nerves 3-12 intact





- Studies


Laboratory Data (last 24 hrs)





02/17/21 13:46: PT 14.4 H, INR 1.25


02/17/21 13:46: WBC 35.40 H*, Hgb 10.8 L, Hct 33.3 L, Plt Count 626 H


02/17/21 13:46: Sodium 134 L, Potassium 4.4,  H, Creatinine 8.48 H*, 

Glucose 172 H, Magnesium 3.6 H*, Total Bilirubin 1.0, AST 22, ALT 20, Alkaline 

Phosphatase 1150 H, Lipase 545 H








Assessment and Plan





- Plan


1.Abd pain/cholecystitis-imaging with US did show gallstone/sludge in gall 

bladder. she has been started on empiric antibiotic of flagyl and cefepime. we 

will also start pain control with morphine. we will monitor her symptomatology. 

surgeon to be consulted. 





2.Metabolic acidemia-bicarb is low at 7. this episode is deemed due to poor 

bicarb generation from enrique eopisode and loss from n/v episode. we will start 

sodium bicarb IV and monitor level in am.





3.Sepsis-deemed due to cholecystitis episode. we will continue empiric 

antibiotics and monitor her sepsis parameters.





4.ENRIQUE-severe stage 3 enrique noted on labs. we do not have prior labs.w chuck will 

hydrate as we think she has ischemic atn/sepsis associated atn. we will contuld 

nephrology for input. we willd ose meds for eGFR and avoid nephrotoxin exposure.





5.N/V-due to sepsis/cholecystitis episode. we will have prn antiemetics on 

board.





6.Volume depletion-due to sepsis/cholcystitis. we will hydrate with IV fluid and

monitor.





- Advance Directives


Does patient have a Living Will: No


Does patient have a Durable POA for Healthcare: No

## 2021-02-17 NOTE — EDPHYS
Physician Documentation                                                                           

 [facility]                                                                 

Name: Sydney Smith                                                                             

Age: 75 yrs                                                                                       

Sex: Female                                                                                       

: 1945                                                                                   

MRN: [id number]                                                                                   

Arrival Date: 2021                                                                          

Time: 11:32                                                                                       

Account#: [id number]                                                                            

Bed 5                                                                                             

Private MD:                                                                                       

ED Physician Fadia Cruz                                                                    

HPI:                                                                                              

                                                                                             

13:40 This 75 yrs old  Female presents to ER via Wheelchair with complaints of       cp  

      Difficulty Swallowing.                                                                      

13:40 The patient presents to the emergency department with nausea, with "dry heaves",        cp  

      vomiting, that is continuous, described as bilious. Onset: The symptoms/episode             

      began/occurred 2 week(s) ago. Possible causes: unknown. Associated signs and symptoms:      

      Pertinent positives: anorexia, Pertinent negatives: constipation, diarrhea, fever, GI       

      bleeding. Severity of symptoms: in the emergency department the symptoms are unchanged      

      despite home interventions.                                                                 

                                                                                                  

Historical:                                                                                       

- Allergies:                                                                                      

11:46 Codeine;                                                                                ss  

- PMHx:                                                                                           

11:46 Hypertension;                                                                           ss  

- PSHx:                                                                                           

11:46 Knee surgery; Hysterectomy;                                                             ss  

                                                                                                  

- Immunization history:: Flu vaccine is up to date.                                               

- Social history:: Smoking status: Patient denies any tobacco usage or history of.                

                                                                                                  

                                                                                                  

ROS:                                                                                              

13:50 Constitutional: Positive for poor PO intake, Negative for body aches, chills, fever.    cp  

13:50 Eyes: Negative for injury, pain, redness, and discharge.                                cp  

13:50 ENT: Positive for difficulty swallowing, Negative for ear pain, sore throat, difficulty     

      handling secretions.                                                                        

13:50 Cardiovascular: Negative for chest pain, edema, palpitations.                               

13:50 Respiratory: Negative for cough, shortness of breath, wheezing.                             

13:50 Abdomen/GI: Positive for abdominal pain, nausea and vomiting, anorexia, Negative for        

      diarrhea, constipation, hematemesis, black/tarry stool, rectal bleeding.                    

13:50 Back: Positive for radiated pain.                                                           

13:50 : Negative for urinary symptoms.                                                          

13:50 Neuro: Negative for altered mental status, headache.                                        

13:50 All other systems are negative.                                                             

                                                                                                  

Exam:                                                                                             

13:54 ECG was reviewed by the Attending Physician.                                            cp  

13:57 Constitutional: The patient appears in no acute distress, alert, awake,                 cp  

      non-diaphoretic, non-toxic, well developed, well nourished, uncomfortable.                  

13:57 Head/Face:  Normocephalic, atraumatic.                                                  cp  

13:57 Eyes: Periorbital structures: appear normal, Conjunctiva: normal, no exudate, no            

      injection, Sclera: no appreciated abnormality, Lids and lashes: appear normal,              

      bilaterally.                                                                                

13:57 ENT: External ear(s): are unremarkable, Nose: is normal, Mouth: Lips: moist, Oral           

      mucosa: moist, Posterior pharynx: Airway: no evidence of obstruction, patent.               

13:57 Neck: ROM/movement: is normal, is supple, without pain, no range of motions limitations.    

13:57 Chest/axilla: Inspection: normal, Palpation: is normal, no crepitus, no tenderness.         

13:57 Cardiovascular: Rate: tachycardic, Rhythm: regular, Edema: is not appreciated, JVD: is      

      not appreciated.                                                                            

13:57 Respiratory: mild respiratory distress is noted,  Respirations: labored breathing, that     

      is mild, intercostal retractions, are absent, Breath sounds: are clear throughout, no       

      decreased breath sounds, no stridor, no wheezing.                                           

13:57 Abdomen/GI: Inspection: abdomen appears normal, Bowel sounds: active, all quadrants,        

      Palpation: soft, in all quadrants, moderate abdominal tenderness, in the right upper        

      quadrant, voluntary guarding, is elicited in the right upper quadrant.                      

13:57 Back: pain, that is moderate, ROM is painful, with all movement.                            

13:57 Skin: cellulitis, is not appreciated, no rash present.                                      

13:57 Neuro: Orientation: to person, place \T\ time. Mentation: is normal, Cerebellar function:   

      is grossly normal, Motor: moves all fours, strength is normal, Sensation: is normal.        

                                                                                                  

Vital Signs:                                                                                      

11:47  / 56; Pulse 100; Resp 20; Temp 97.6; Pulse Ox 100% ; Weight 98.88 kg; Height 5   ss  

      ft. 4 in. (162.56 cm); Pain 10/10;                                                          

14:00 BP 97 / 70; Pulse 100; Resp 24; Pulse Ox 100% on R/A;                                   sv  

15:19  / 60; Pulse 106; Resp 28; Pulse Ox 98% on 2 lpm NC;                              sv  

16:00  / 37; Pulse 98; Resp 19; Pulse Ox 98% on 2 lpm NC;                               sv  

16:05 Pain 7/10;                                                                              sv  

17:19  / 45; Pulse 102 MON; Resp 21; Pulse Ox 100% on 2 lpm NC;                         sv  

18:04  / 52; Pulse 109; Resp 17; Pulse Ox 100% on R/A;                                  sv  

19:00  / 96; Pulse 104; Resp 19; Pulse Ox 100% on R/A;                                  wh  

11:47 Body Mass Index 37.42 (98.88 kg, 162.56 cm)                                             ss  

17:19 Sinus tachycardia                                                                       sv  

15:19 Magdi PA wanted pt on O2.                                                               sv  

                                                                                                  

MDM:                                                                                              

13:27 Patient medically screened.                                                             ma2 

14:00 Differential diagnosis: Nonspecific abd pain, gastritis, cholecystitis, pancreatitis,   cp  

      viral gastroenteritis, gastroenteritis.                                                     

17:25 Data reviewed: vital signs, nurses notes, lab test result(s), EKG, radiologic studies,  cp  

      CT scan, plain films, ultrasound.                                                           

17:25 Test interpretation: by ED physician or midlevel provider: ECG, plain radiologic        cp  

      studies.                                                                                    

17:45 Physician consultation: Brandin Tello MD was contacted at 17:45, regarding consult,    cp  

      patient's condition, would like admission per Dr. Miller Pool DO.                         

                                                                                                  

                                                                                             

13:40 Order name: Basic Metabolic Panel                                                         

                                                                                             

13:40 Order name: CBC with Diff                                                               cp  

                                                                                             

13:40 Order name: LFT's                                                                       cp  

                                                                                             

13:40 Order name: Magnesium                                                                   cp  

                                                                                             

13:40 Order name: NT PRO-BNP; Complete Time: 15:22                                            cp  

                                                                                             

14:39 Interpretation: NT PRO-BNP 4662; Reviewed.                                                

                                                                                             

13:40 Order name: PT-INR; Complete Time: 14:30                                                cp  

                                                                                             

13:40 Order name: Troponin (emerg Dept Use Only); Complete Time: 15:22                        cp  

                                                                                             

13:40 Order name: XRAY Chest (1 view); Complete Time: 15:22                                   cp  

                                                                                             

13:40 Order name: EKG; Complete Time: 13:40                                                   cp  

                                                                                             

13:40 Order name: Cardiac monitoring; Complete Time: 14:08                                    cp  

                                                                                             

13:40 Order name: EKG - Nurse/Tech; Complete Time: 13:48                                      cp  

                                                                                             

13:40 Order name: IV Saline Lock; Complete Time: 13:49                                        cp  

                                                                                             

13:40 Order name: Labs collected and sent; Complete Time: 13:49                               cp  

                                                                                             

13:40 Order name: O2 Per Protocol; Complete Time: 13:49                                         

                                                                                             

13:40 Order name: O2 Sat Monitoring; Complete Time: 13:49                                       

                                                                                             

13:40 Order name: Lipase; Complete Time: 15:22                                                  

                                                                                             

17:23 Interpretation: Abnormal: .                                                        

                                                                                             

13:40 Order name: Basic Metabolic Panel; Complete Time: 15:22                                 EDMS

                                                                                             

15:22 Interpretation: Normal except: ; CO2 7; GLUC 172; CRE 8.48; GFR 5; CA 7.5; BUN    cp  

      157.                                                                                        

                                                                                             

13:40 Order name: CBC with Automated Diff                                                     EDMS

                                                                                             

14:31 Interpretation: Normal except: WBC 35.40; HGB 10.8; HCT 33.3; MCV 77.9; MCH 25.2; PLT   cp  

      626; RDW 16.0; KYLEIGH% 95.0; LYM% 0.7; NEUT A 33.6; LYMA 0.2.                                  

                                                                                             

13:40 Order name: Liver (Hepatic) Function; Complete Time: 15:22                              [facility]

                                                                                             

16:32 Interpretation: Normal except: BILID 0.7; TP 8.8; ALB 2.3; GLOB 6.5; A/G 0.4; ALK 1150.   

                                                                                             

13:40 Order name: Magnesium; Complete Time: 15:22                                             [facility]

                                                                                             

14:11 Order name: Urine Dipstick-Ancillary (obtain specimen); Complete Time: 14:23              

                                                                                             

14:11 Order name: Urine Microscopic Only; Complete Time: 15:22                                  

                                                                                             

15:23 Interpretation: Normal except: UBACT LOADED; URBC 5-10; UWBC TNTC.                        

                                                                                             

14:14 Order name: Manual Differential                                                         [facility]

                                                                                             

14:25 Order name: Blood Culture Adult (2)                                                       

                                                                                             

14:25 Order name: Procalcitonin                                                                 

                                                                                             

14:25 Order name: Lactate                                                                       

                                                                                             

14:25 Order name: Blood Culture                                                               [facility]

                                                                                             

14:25 Order name: Procalcitonin; Complete Time: 15:55                                         EDMS

                                                                                             

15:55 Interpretation: Abnormal: Procalcitonin 5.92.                                             

                                                                                             

14:25 Order name: Lactate; Complete Time: 15:22                                               EDMS

                                                                                             

15:23 Interpretation: Within normal limits: LAC 1.5.                                          cp  

                                                                                             

14:31 Order name: Urine Dipstick--Ancillary (enter results); Complete Time: 15:24             bd  

                                                                                             

15:24 Interpretation: Normal except: USPGR >1.030; UKET 1+; UBLD 2+; UPROT 3+; UESTR 3+.        

                                                                                             

14:38 Order name: Fields; Complete Time: 14:39                                                 cp  

                                                                                             

14:41 Order name: CT Chest Abdomen Pelvis W/O Contrast; Complete Time: 15:55                  cp  

                                                                                             

17:40 Interpretation: Report reviewed.                                                          

                                                                                             

15:12 Order name: Urine Culture                                                               EDMS

                                                                                             

16:01 Order name: US Abdomen Limited; Complete Time: 17:22                                    cp  

                                                                                             

16:01 Order name: NPO; Complete Time: 16:05                                                     

                                                                                             

16:44 Order name: COVID-19 : Document "Date of Symptom Onset" if Symptomatic.                   

                                                                                             

17:31 Order name: ABG                                                                           

                                                                                             

18:03 Order name: ABG Arterial Blood Gas                                                      EDMS

                                                                                             

18:18 Order name: SARS-COV-2 RT PCR                                                           EDMS

                                                                                             

04:13 Order name: Glucose, Ancillary Testing                                                  EDMS

                                                                                             

05:06 Order name: CBC with Automated Diff                                                     EDMS

                                                                                             

05:46 Order name: Comprehensive Metabolic Panel                                               EDMS

                                                                                             

05:46 Order name: Phosphorus                                                                  EDMS

                                                                                             

07:01 Order name: Gram Stain--Aerobic Bottle                                                  EDMS

                                                                                             

07:01 Order name: Gram Stain--Anaerobic Bottle                                                EDMS

                                                                                             

07:05 Order name: Gram Stain--Aerobic Bottle                                                  EDMS

                                                                                             

07:05 Order name: Gram Stain--Anaerobic Bottle                                                EDMS

                                                                                             

12:07 Order name: Glucose, Ancillary Testing                                                  EDMS

                                                                                                  

EC:54 Rhythm is regular. [place] interval is normal. QRS interval is prolonged at 136 msec. QT     cp  

      interval is normal. Interpreted by me. Reviewed by me.                                      

                                                                                                  

Administered Medications:                                                                         

13:49 Drug: Zofran (Ondansetron) 4 mg Route: IVP; Site: right antecubital;                    hb  

14:08 Follow up: Response: No adverse reaction                                                sv  

13:49 Drug: Pepcid 20 mg Route: IVP; Site: right antecubital;                                 hb  

14:08 Follow up: Response: No adverse reaction                                                sv  

14:15 Drug: fentaNYL (PF) 25 mcg Route: IVP; Site: left antecubital;                          sv  

15:20 Follow up: Response: No adverse reaction; No change in condition; Pain is unchanged,    sv  

      physician notified; RASS: Agitated (+2)                                                     

14:30 CANCELLED (Physician Discretion): NS 0.9% 500 ml IV at 500 ml/hr continuous             cp  

14:40 Drug: NS 0.9% (30 ml/kg) 30 ml/kg Route: IV; Rate: bolus; Site: right antecubital;      sv  

19:40 Follow up: Response: No adverse reaction; IV Status: Completed infusion                 wh  

14:40 Drug: Rocephin 1 grams Route: IV; Rate: calculated rate; Site: right antecubital;       sv  

14:42 Follow up: Response: No adverse reaction; IV Status: Completed infusion; IV Intake: 10mlsv  

15:27 Drug: fentaNYL (PF) 50 mcg {Note: rass2.} Route: IVP; Site: right antecubital;          sv  

16:05 Follow up: Pain 7/10 Adult; Response: No adverse reaction; Pain is decreased; RASS:     sv  

      Restless (+1)                                                                               

16:04 Drug: Flagyl 500 mg Volume: 100 ml; Route: IVPB; Rate: 200 ml/hr; Infused Over: 30      sv  

      mins; Site: right antecubital;                                                              

17:00 Follow up: Response: No adverse reaction; IV Status: Completed infusion; IV Intake:     sv  

      100ml                                                                                       

16:40 Drug: Zofran (Ondansetron) 4 mg Route: IVP; Site: right antecubital;                    sv  

17:00 Follow up: Response: No adverse reaction; No change in condition                        sv  

16:58 CANCELLED (Physician Discretion): morphine 2 mg IM once; RASS on ADMIN: Combtv4, Very   sv  

      Agttd3, Agttd2, Rstlss1, AlertClm0, Drwsy-1, Lt Sdtn-2, Mod Sdtn-3, Dp Sdtn-4,              

      UnArsble-5                                                                                  

16:59 Drug: Phenergan 25 mg Route: IVP; Site: right antecubital;                              sv  

17:30 Follow up: Response: No adverse reaction; Marked relief of symptoms; Nausea is decreasedsv  

16:59 Drug: morphine 2 mg {Note: rass2.} Route: IVP; Site: right antecubital;                 sv  

17:30 Follow up: Response: No adverse reaction; Pain is decreased; RASS: Restless (+1)        sv  

17:58 Drug: Zosyn 3.375 grams Route: IVPB; Infused Over: 60 mins; Site: right hand;           ss  

19:40 Follow up: Response: No adverse reaction; IV Status: Completed infusion                 wh  

17:58 Drug: Sodium Bicarbonate 1 amp Route: IVP; Site: right hand;                            ss  

18:40 Follow up: Response: No adverse reaction                                                sv  

17:58 Drug: Sodium Bicarbonate 50 mEq Route: IVP; Site: right hand;                           ss  

18:40 Follow up: Response: No adverse reaction                                                sv  

21:18 CANCELLED (Physician Discretion): Reglan 10 mg IVP once; over 1 to 2 minutes            cp  

                                                                                                  

                                                                                                  

Disposition:                                                                                      

21 16:46 Hospitalization ordered by Miller Pool for Inpatient Admission. Preliminary     

  diagnosis are Acute kidney failure, Other sepsis, Urinary tract infection,                      

  site not specified, Dehydration.                                                                

- Bed requested for Telemetry/MedSurg (Inpatient).                                                

- Status is Inpatient Admission.                                                              sv  

- Condition is Fair.                                                                              

- Problem is new.                                                                                 

- Symptoms have improved.                                                                         

                                                                                                  

                                                                                                  

                                                                                                  

Addendum:                                                                                         

2021                                                                                        

     02:12 Co-signature as Attending Physician, Fadia Cruz MD.                               m
a2

                                                                                                  

Signatures:                                                                                       

Dispatcher [employer]                           EDMS                                                 

Roseanne Gee Stephanie, RN RN                                                      

Adilia Loyola RN RN   ss                                                   

Magdi Bentley PA                         PA   cp                                                   

Carmita Gao RN RN                                                      

Michael Vergara RN RN ja1                                                  

Fadia Cruz MD MD   ma2                                                  

Heidi Carlin RN                                                      

                                                                                                  

Corrections: (The following items were deleted from the chart)                                    

                                                                                             

14:30 14:11 NS 0.9% 500 ml IV at 500 ml/hr continuous ordered. cp                             cp  

14:31 14:30 Normal except: WBC 35.40; HGB 10.8; HCT 33.3; MCV 77.9; MCH 25.2; ; RDW    cp  

      16.0; KYLEIGH% 95.0; LYM% 0.7. cp                                                               

14:31 14:31 Normal except: WBC 35.40; HGB 10.8; HCT 33.3; MCV 77.9; MCH 25.2; ; RDW    cp  

      16.0; KYLEIGH% 95.0; LYM% 0.7; NEUT A 33.6. cp                                                  

14:39 14:39 Normal except: ; CO2 7; GLUC 172; CRE 8.48; GFR 5. cp                       cp  

15:22 14:39 Normal except: ; CO2 7; GLUC 172; CRE 8.48; GFR 5; CA 7.5. cp               cp  

16:32 14:40 Normal except: BILID 0.7; TP 8.8; ALB 2.3; GLOB 6.5; A/G 0.4. cp                  cp  

16:58 16:58 morphine 2 mg IM once; RASS on ADMIN: Combtv4, Very Agttd3, Agttd2, Rstlss1,      sv  

      AlertClm0, Drwsy-1, Lt Sdtn-2, Mod Sdtn-3, Dp Sdtn-4, UnArsble-5 ordered. sv                

17:30 16:46 Hospitalization Ordered by Miller Pool DO for Inpatient Admission. Preliminary  bd  

      diagnosis is Acute kidney failure; Other sepsis; Urinary tract infection, site not          

      specified; Dehydration. Bed requested for Telemetry/MedSurg (Inpatient). Status is          

      Inpatient Admission. Condition is Fair. Problem is new. Symptoms have improved. cp          

21:18 21:09 Reglan 10 mg IVP once; over 1 to 2 minutes ordered. cp                            cp  

                                                                                             

11:45  17:30 2021 16:46 Hospitalization Ordered by Miller Pool DO for Inpatient  ja1 

      Admission. Preliminary diagnosis is Acute kidney failure; Other sepsis; Urinary tract       

      infection, site not specified; Dehydration. Bed requested for [facility] ER HOLD. Status is       

      Inpatient Admission. Condition is Fair. Problem is new. Symptoms have improved. bd          

                                                                                             

13:31 11:45 2021 16:46 Hospitalization Ordered by Miller Pool DO for Inpatient        sv  

      Admission. Preliminary diagnosis is Acute kidney failure; Other sepsis; Urinary tract       

      infection, site not specified; Dehydration. Bed requested for Telemetry/MedSurg             

      (Inpatient). Status is Inpatient Admission. Condition is Fair. Problem is new. Symptoms     

      have improved. ja1                                                                          

                                                                                                  

**************************************************************************************************

## 2021-02-17 NOTE — RAD REPORT
EXAM DESCRIPTION:  US - Abdomen Exam Limited - 2/17/2021 4:52 pm

 

CLINICAL HISTORY:  ABD PAIN

 

COMPARISON:  Chest Abd Pelvis Wo Con dated 2/17/2021

 

FINDINGS:  Well filled gallbladder shows small amount of layering sludge and small gallstones. Wall t
hickness is upper normal. No pericholecystic fluid.

 

No common duct stone or biliary tree dilatation identified.

 

IMPRESSION:  Sludge and small gallstones are seen in the distended but nondilated gallbladder.

 

Wall thickness is upper normal. No pericholecystic fluid.

 

Findings are not definitive for, but are suggestive of, acute cholecystitis. Correlation is needed wi
th clinical presentation.

## 2021-02-18 LAB
ALBUMIN SERPL BCP-MCNC: 1.8 G/DL (ref 3.4–5)
ALP SERPL-CCNC: 898 U/L (ref 45–117)
ALT SERPL W P-5'-P-CCNC: 17 U/L (ref 12–78)
AST SERPL W P-5'-P-CCNC: 28 U/L (ref 15–37)
BUN BLD-MCNC: 142 MG/DL (ref 7–18)
GLUCOSE SERPLBLD-MCNC: 128 MG/DL (ref 74–106)
HCT VFR BLD CALC: 25.9 % (ref 36–45)
LYMPHOCYTES # SPEC AUTO: 0.4 K/UL (ref 0.7–4.9)
PMV BLD: 8.8 FL (ref 7.6–11.3)
POTASSIUM SERPL-SCNC: 3.1 MMOL/L (ref 3.5–5.1)
RBC # BLD: 3.41 M/UL (ref 3.86–4.86)

## 2021-02-18 RX ADMIN — METRONIDAZOLE SCH MLS: 500 INJECTION, SOLUTION INTRAVENOUS at 08:19

## 2021-02-18 RX ADMIN — MORPHINE SULFATE PRN MG: 2 INJECTION, SOLUTION INTRAMUSCULAR; INTRAVENOUS at 17:02

## 2021-02-18 RX ADMIN — METRONIDAZOLE SCH MLS: 500 INJECTION, SOLUTION INTRAVENOUS at 17:00

## 2021-02-18 RX ADMIN — Medication SCH ML: at 20:22

## 2021-02-18 RX ADMIN — SODIUM CHLORIDE SCH: 0.45 INJECTION, SOLUTION INTRAVENOUS at 17:00

## 2021-02-18 RX ADMIN — SODIUM CHLORIDE SCH MLS: 0.45 INJECTION, SOLUTION INTRAVENOUS at 23:05

## 2021-02-18 RX ADMIN — HEPARIN SODIUM SCH UNIT: 5000 INJECTION, SOLUTION INTRAVENOUS; SUBCUTANEOUS at 09:00

## 2021-02-18 RX ADMIN — Medication SCH ML: at 08:19

## 2021-02-18 RX ADMIN — HEPARIN SODIUM SCH UNIT: 5000 INJECTION, SOLUTION INTRAVENOUS; SUBCUTANEOUS at 01:00

## 2021-02-18 RX ADMIN — SODIUM CHLORIDE SCH MLS: 0.45 INJECTION, SOLUTION INTRAVENOUS at 05:30

## 2021-02-18 RX ADMIN — MORPHINE SULFATE PRN MG: 2 INJECTION, SOLUTION INTRAMUSCULAR; INTRAVENOUS at 02:05

## 2021-02-18 RX ADMIN — HEPARIN SODIUM SCH UNIT: 5000 INJECTION, SOLUTION INTRAVENOUS; SUBCUTANEOUS at 17:00

## 2021-02-18 RX ADMIN — ONDANSETRON PRN MG: 2 INJECTION INTRAMUSCULAR; INTRAVENOUS at 12:45

## 2021-02-18 RX ADMIN — METRONIDAZOLE SCH MLS: 500 INJECTION, SOLUTION INTRAVENOUS at 01:00

## 2021-02-18 NOTE — P.PN
Subjective


Date of Service: 02/18/21


Chief Complaint: ENRIQUE, sepsis, volume depletion, suspected cholecystitis, 

metabolic acidemia.


Subjective: Improving





Physical Examination





- Vital Signs


Temperature: 97.6 F


Blood Pressure: 114/54


Pulse: 105


Respirations: 16


Pulse Ox (%): 100





- Physical Exam


General: Alert, Oriented x3, Moderate distress


HEENT: Atraumatic, Normocephalic


Neck: Supple


Respiratory: Clear to auscultation bilaterally


Cardiovascular: Regular rate/rhythm, Normal S1 S2


Gastrointestinal: Tenderness (globally.)


Musculoskeletal: No clubbing, No swelling


Neurological: Normal speech, Normal strength at 5/5 x4 extr, Cranial nerves 3-12

intact





- Studies


Laboratory Data (last 24 hrs)





02/17/21 13:46: PT 14.4 H, INR 1.25


02/17/21 13:46: WBC 35.40 H*, Hgb 10.8 L, Hct 33.3 L, Plt Count 626 H


02/17/21 13:46: Sodium 134 L, Potassium 4.4,  H, Creatinine 8.48 H*, 

Glucose 172 H, Magnesium 3.6 H*, Total Bilirubin 1.0, AST 22, ALT 20, Alkaline 

Phosphatase 1150 H, Lipase 545 H








Assessment And Plan





- Plan


1.Abd pain/cholecystitis-imaging with US did show gallstone/sludge in gall 

bladder. 


she has been started on empiric antibiotic of flagyl and cefepime. 


we will also start pain control with morphine. 


we will monitor her symptomatology. 


surgeon consulted for possible surgical intervention. 





2.Metabolic acidemia-bicarb is better at 13 today. 


this episode is deemed due to poor bicarb generation from enrique episode and loss 

from n/v episode. 


we will continue sodium bicarb IV and monitor closely.





3.Sepsis-deemed due to cholecystitis episode. 


improving clinical parameters.


we will continue empiric antibiotics and monitor her sepsis parameters.





4.ENRIQUE-severe stage 3 enrique noted on labs. 


creatinine improved to 7.5 from 8.3


we will hydrate as we think she has ischemic atn/sepsis associated atn. 


nephrology consulted for input, recommendations noted. 


we will continue  to dose meds for eGFR and avoid nephrotoxin exposure.





5.N/V-Improved. deemed due to sepsis/cholecystitis episode. 


we will continue prn antiemetics.





6.Volume depletion-due to sepsis/cholecystitis. we will continue to hydrate with

IV fluid and monitor volume status closely.





awaiting surgeon's input as per possible surgical intervention.


Discharge Plan: Home

## 2021-02-18 NOTE — CON
Date of Consultation:  02/18/2021



Reason For Consultation:  Electrolyte imbalance, acidosis.



History Of Present Illness:  This is a pleasant 75-year-old female with 
significant past medical history of hypertension, hyperlipidemia, coronary 
artery disease status post cardiac cath, peripheral vascular disease, the 
patient was in her regular state of health till 2 weeks ago when started having 
nausea and vomiting and chills.  For that reason reported to the hospital.  Upon
arrival to the hospital, the patient was found to have severe acidosis with 
elevation in creatinine and low bicarb with severe leukocytosis.  For that 
reason, the patient was admitted.  The patient admits that she has been taking 
ibuprofen __________ tablet daily for the last 2 to 3 years.  There is no IV 
contrast.  Upon arrival to hospital, the patient was found to have leukocytosis,
WBC of 22, and has creatinine of 8.4 with bicarb of 7.  Marginal hyperglycemia. 
For that reason patient was admitted.  The workup showed also hyperphosphatemia.
 The patient was started on IV hydration.  The patient had urine output of 400 
but milky urine.



Past Medical History:  Includes,

1.   Hypertension since 1998.

2.   Coronary artery disease status post cardiac cath x2.  Follow up with Dr. Juares.  

3.   PAD status post angioplasty.  Reviewing the record for the patient, the 
patient back in 2016 creatinine 0.7.  Again, at that time, GFR within normal 
limit.  According to the patient had lab 1 month ago at [facility].  
There is no mention for any kidney disease seen by Cardiology for that.

4.   CKD 

5.   Peripheral vascular disease.



Allergies:  CODEINE.



Family History:  Positive for hypertension.



Surgical History:  Positive for coronary artery, cardiac cath and angiogram.



Social History:  Denies smoking.  Denies drinking.  Denies drugs abuse.



Home Medications:  Include omeprazole, levothyroxine, amlodipine, and 
hydrochlorothiazide.



Current Medications:  Include cefepime, metronidazole, heparin, IV fluid.



Review of Systems:

Head and Neck:  No red eye.  No ear pain. 

GI:  Has nausea, vomiting.  

:  No polyuria, no dysuria, no hematuria. 

GYN:  No vaginal discharge. 

Respiratory:  No shortness of breath. 

Cardiovascular:  No chest pain. 

Endocrine:  No polydipsia. 

Skin:  No rash. 

Neuro:  Has low back pain. 

Musculoskeletal:  Has right knee pain and low back pain.



Physical Examination:

Vital Signs:  When I saw the patient, blood pressure 146/66, pulse of 109 
afebrile.  The patient had urine output of 400.  

Chest:  Clear to auscultation. 

Heart:  S1, S2 regular. 

Abdomen:  Soft, nontender. 

Extremities:  No edema. 

Neurologic:  Alert and oriented x3.  No focal.  No tremor.



Laboratory Data:  For the patient, sodium 143, potassium 3.1, bicarb 13.  BUN 
142, creatinine 7.5, GFR of 5.  Calcium 6.4, phosphorous 8.6.  Urinalysis; wbc's
packed, +3 protein.  ABG; pH 7.16, CO2 14, O2 139, base access -22.  CT of 
abdomen and pelvis were done yesterday without contrast showing no 
hydronephrosis, no obstruction.  Urinary bladder fully contracted.



Assessment And Plan:  

1.   Renal failure, look to me more chronic supported with the hyperphosphatemia
and acidosis and anemia and the severe high BUN that did not improve 
significantly with the IV hydration, possible patient being uremic symptoms.

2.   I had long discussion with the patient regarding the etiology of the 
disease with the absence of any diabetes, mostly the acute kidney injury 
secondary to progression of disease secondary to contrast-induced nephropathy, 
secondary to her nonsteroid intake.  

3.   I am going to go ahead and consult surgery for placement of PermCath to 
initiate renal replacement therapy.  Patient verbalized understanding and agreed
if it is needed.  We will follow up the lab tomorrow.

4.   I am going to start the patient on IV hydration.  We will start the patient
on bicarb drip and we will follow up.

5.   I going to send for full serology including serum protein electrophoresis 
to evaluate if the patient had any other causes.

6.   We will monitor the patient closely.

7.   Hold hydrochlorothiazide.  Hold PPI.

8.   Acidosis secondary to renal failure.  We will start the patient on IV 
bicarb and we will follow up.

9.   Anemia of chronic kidney disease with presence of acute kidney injury.  We 
will send for the workup.  We will consider start GISELL or IV iron depending on 
the lab evaluation.

10.   Hypocalcemia secondary most to hyperparathyroidism secondary.  I am going 
to go ahead and start the patient on calcium carbonate and we will monitor.  We 
will send for PTH.

11.   Cholecystitis.  Continue antibiotic.  We will follow up with Surgery and 
Primary.

12.   Hypertension, controlled, optimal.  Keep holding any hydrochlorothiazide. 


Thank you Dr. Melendez for allowing us to participate in the care of your 
patient.



Time spent discussing with the patient, examining the patient, exam 
face-to-face, placing order, discussing with staff and other consulting include 
including Cardiology and Primary 75 minutes.

JONY

DD:  02/18/2021 14:46:57   Voice ID:  [id number]

DT:  02/18/2021 18:24:20   Report ID:  [id number]

GABINO

## 2021-02-19 LAB
ALBUMIN SERPL BCP-MCNC: 1.8 G/DL (ref 3.4–5)
ALP SERPL-CCNC: 779 U/L (ref 45–117)
ALT SERPL W P-5'-P-CCNC: 16 U/L (ref 12–78)
AST SERPL W P-5'-P-CCNC: 23 U/L (ref 15–37)
BUN BLD-MCNC: 146 MG/DL (ref 7–18)
CREAT UR-SCNC: 106 MG/DL (ref 20–320)
FERRITIN SERPL-MCNC: 321.3 NG/ML (ref 8–388)
GLUCOSE SERPLBLD-MCNC: 183 MG/DL (ref 74–106)
HCT VFR BLD CALC: 25.1 % (ref 36–45)
IRON SERPL-MCNC: 17 UG/DL (ref 50–170)
LYMPHOCYTES # SPEC AUTO: 0.5 K/UL (ref 0.7–4.9)
PMV BLD: 8.7 FL (ref 7.6–11.3)
POTASSIUM SERPL-SCNC: 2.9 MMOL/L (ref 3.5–5.1)
PROT UR-MCNC: 958 MG/DL (ref ?–11.9)
RBC # BLD: 3.36 M/UL (ref 3.86–4.86)
TRANSFERRIN SERPL-MCNC: 107 MG/DL (ref 200–360)
TSH SERPL DL<=0.05 MIU/L-ACNC: 0.93 UIU/ML (ref 0.36–3.74)
URATE SERPL-MCNC: 18.5 MG/DL (ref 2.6–6)

## 2021-02-19 PROCEDURE — BW4FZZZ ULTRASONOGRAPHY OF NECK: ICD-10-PCS

## 2021-02-19 PROCEDURE — 02HV33Z INSERTION OF INFUSION DEVICE INTO SUPERIOR VENA CAVA, PERCUTANEOUS APPROACH: ICD-10-PCS

## 2021-02-19 PROCEDURE — 0JH63XZ INSERTION OF TUNNELED VASCULAR ACCESS DEVICE INTO CHEST SUBCUTANEOUS TISSUE AND FASCIA, PERCUTANEOUS APPROACH: ICD-10-PCS

## 2021-02-19 PROCEDURE — 5A1D70Z PERFORMANCE OF URINARY FILTRATION, INTERMITTENT, LESS THAN 6 HOURS PER DAY: ICD-10-PCS

## 2021-02-19 RX ADMIN — ANTACID TABLETS SCH: 500 TABLET, CHEWABLE ORAL at 07:30

## 2021-02-19 RX ADMIN — HEPARIN SODIUM PRN UNIT: 1000 INJECTION, SOLUTION INTRAVENOUS; SUBCUTANEOUS at 17:58

## 2021-02-19 RX ADMIN — ONDANSETRON PRN MG: 2 INJECTION INTRAMUSCULAR; INTRAVENOUS at 15:08

## 2021-02-19 RX ADMIN — CEFEPIME SCH MLS: 1 INJECTION, POWDER, FOR SOLUTION INTRAMUSCULAR; INTRAVENOUS at 09:00

## 2021-02-19 RX ADMIN — Medication SCH ML: at 20:45

## 2021-02-19 RX ADMIN — METRONIDAZOLE SCH MLS: 500 INJECTION, SOLUTION INTRAVENOUS at 00:22

## 2021-02-19 RX ADMIN — SODIUM CHLORIDE SCH: 0.45 INJECTION, SOLUTION INTRAVENOUS at 03:34

## 2021-02-19 RX ADMIN — METRONIDAZOLE SCH MLS: 500 INJECTION, SOLUTION INTRAVENOUS at 17:00

## 2021-02-19 RX ADMIN — ONDANSETRON PRN MG: 2 INJECTION INTRAMUSCULAR; INTRAVENOUS at 02:17

## 2021-02-19 RX ADMIN — SODIUM CHLORIDE SCH: 0.45 INJECTION, SOLUTION INTRAVENOUS at 16:00

## 2021-02-19 RX ADMIN — HEPARIN SODIUM SCH: 5000 INJECTION, SOLUTION INTRAVENOUS; SUBCUTANEOUS at 09:00

## 2021-02-19 RX ADMIN — ONDANSETRON PRN MG: 2 INJECTION INTRAMUSCULAR; INTRAVENOUS at 08:26

## 2021-02-19 RX ADMIN — ANTACID TABLETS SCH: 500 TABLET, CHEWABLE ORAL at 16:30

## 2021-02-19 RX ADMIN — METRONIDAZOLE SCH: 500 INJECTION, SOLUTION INTRAVENOUS at 09:00

## 2021-02-19 RX ADMIN — HEPARIN SODIUM SCH UNIT: 5000 INJECTION, SOLUTION INTRAVENOUS; SUBCUTANEOUS at 00:23

## 2021-02-19 RX ADMIN — Medication SCH ML: at 09:00

## 2021-02-19 RX ADMIN — ANTACID TABLETS SCH: 500 TABLET, CHEWABLE ORAL at 11:30

## 2021-02-19 RX ADMIN — HEPARIN SODIUM SCH UNIT: 5000 INJECTION, SOLUTION INTRAVENOUS; SUBCUTANEOUS at 17:00

## 2021-02-19 NOTE — P.PN
Subjective


Date of Service: 02/19/21


Chief Complaint: ENRIQUE, sepsis, volume depletion, suspected cholecystitis, 

metabolic acidemia.


Subjective: Improving





Physical Examination





- Vital Signs


Temperature: 97.8 F


Blood Pressure: 160/59


Pulse: 95


Respirations: 18


Pulse Ox (%): 95





- Physical Exam


General: Alert, Oriented x3


HEENT: Atraumatic, Normocephalic


Neck: Supple


Respiratory: Clear to auscultation bilaterally


Cardiovascular: Regular rate/rhythm, Normal S1 S2


Gastrointestinal: Soft and benign


Neurological: Normal speech, Normal strength at 5/5 x4 extr, Cranial nerves 3-12

intact





- Studies


Microbiology Data (last 24 hrs): 








02/17/21 14:27   Clean Catch Urine   [place] Count - Final


                            >100,000 CFU/ML.


02/17/21 14:27   Clean Catch Urine    - Final


                            Escherichia Coli








Assessment And Plan





- Plan


1.Abd pain/cholecystitis-imaging with US did show gallstone/sludge in gall 

bladder. 


she has been started on empiric antibiotic of flagyl and cefepime. 


we will continue pain control with morphine. 


we will monitor her symptomatology. 


surgeon consulted for possible surgical intervention. 





2.Metabolic acidemia-bicarb is better at 26 today. 


this episode is deemed due to poor bicarb generation from enrique episode and loss 

from n/v episode. 


we will continue sodium bicarb IV and monitor closely.





3.Sepsis-deemed due to cholecystitis episode. 


improving clinical parameters.


we will continue empiric antibiotics and monitor her sepsis parameters.





4.ENRIQUE-severe stage 3 enrique noted on labs. 


creatinine is stuck at 7.5.


nephrology consulted for input, recommendations noted.


may need dialysis. 


we will continue  to dose meds for eGFR and avoid nephrotoxin exposure.





5.N/V-Improved. deemed due to sepsis/cholecystitis episode. 


we will continue prn antiemetics.





6.Volume depletion-due to sepsis/cholecystitis. we will continue to hydrate with

IV fluid and monitor volume status closely.





7. Hypokalemia:


Potassium is very low at 2.9 this am.


repletion ordered. we will follow levels closely.





awaiting surgeon's input as per possible surgical intervention.

## 2021-02-19 NOTE — P.BOP
Preoperative diagnosis: Acute renal failure


Postoperative diagnosis: same


Primary procedure: 1. placement of tunneled hemodialysis catheter


Secondary procedure: 2. interpretation of fluoroscopy


Other procedure(s): 3. right neck ultrasound


Estimated blood loss: <10cc


Specimen: none


Findings: as above


Anesthesia: General


Complications: None


Transferred to: Recovery Room


Condition: Good

## 2021-02-19 NOTE — OP
Date of Procedure:  02/19/2021



Surgeon:  Brandin Tello MD



Preoperative Diagnosis:  Acute renal failure.



Postoperative Diagnosis:  Acute renal failure.



Procedures:  

1.Placement of a tunneled hemodialysis catheter.

2.Interpretation of fluoroscopy.

3.Right neck ultrasound.



Estimated Blood Loss:  Less than 10 cc.



Implant:  HemoSplit hemodialysis catheter on the right jugular vein.



Anesthesia:  MAC plus local.



Findings:  A compressible right jugular vein.



Indications:  This is a case of a 75-year-old patient, comes to us with multiple problems, but she ne
eded immediate dialysis, so they asked for a hemodialysis catheter right away, so benefits, alternati
ves and risks of hemodialysis catheter placement were discussed with the patient with benefit, altern
atives and risks including, but not limited to infection, bleeding, damage to adjacent structures, an
esthesia complication, DVTs, PEs, pericarditis, breaking of the catheter, pneumothorax, hemothorax, M
I and even death.  She also understands this may not relieve symptoms.  She might need more than one 
surgical intervention.  She understood, signed a consent.



Description Of Proecdure:  The patient was brought to the operating room, placed in supine position. 
 Anesthesia was done without complication.  A time-out was called.  Right neck and chest were prepped
 and draped in sterile fashion.  We proceeded to do an ultrasound of the right neck area.  We noticed
 the patient to have a compressible jugular vein.  So, using that as a guidance, we proceeded to intr
oduce an 18-gauge needle in that jugular vein.  Guidewire was passed through guiding the fluoroscopy 
into the superior vena cava.  Needle was removed.  We proceeded then to make an incision in the upper
 chest, tunneled the catheter through that area to meet the new incision in the neck region.  Under f
luoroscopy, we put dilators and introducer sheath, peeled the introducer sheath after the catheter wa
s in and the guidewire was out.  We checked the area.  Excellent backflow and inflow.  The line was f
lushed with heparinized solution.  The subcutaneous tissue was closed with 3-0 chromic.  The catheter
 was fixated to the skin using 3-0 nylon.  The patient tolerated the procedure well.  The patient bro
ught back from Trendelenburg position to normal position.  The patient was in stable condition.  She 
is on her way to recovery and a chest x-ray will be ordered stat.





HM/MODL

DD:  02/19/2021 11:29:30Voice ID:  [id number]

DT:  02/19/2021 11:47:13Report ID:  [id number]

## 2021-02-19 NOTE — RAD REPORT
EXAM DESCRIPTION:  US - Renal Ultrasound-Complete - 2/19/2021 9:19 am

 

CLINICAL HISTORY:  ENRIQUE

Flank pain

 

COMPARISON:  Abdomen Exam Limited dated 2/17/2021

 

FINDINGS:  Both kidneys are normal in size, shape and echotexture.

 

The right kidney measures 11.3 x 6.8 x 5.2 cm. No hydronephrosis, focal mass or perinephric fluid.

 

The left kidney measures 10.7 x 7.2 x 5.1 cm. No hydronephrosis, focal mass or perinephric fluid.

 

The urinary bladder is incompletely distended without gross abnormality seen.

 

IMPRESSION:  Unremarkable renal sonogram.

## 2021-02-19 NOTE — CON
Date of Consultation:  02/18/2021



Diagnosis:  Acute kidney failure. 



The patient was seen in the ER on 02/18/2021.



History Of Present Illness:  This is the case of a 75-year-old patient, comes today to the hospital w
ith malaise, nausea, vomiting.  She was seen in the ER, diagnosed with an acute renal disease and als
o leukocytosis.  Also an ultrasound of the abdomen was done and shows gallstones.  Surgical consult w
as obtained for gallstones.  The patient does state she has been like that for about 2 weeks.  She ha
s been nauseous too.  She never had any problem with the kidneys before.  She denies any hematuria, a
lthough she has a history of recurrent UTIs in the past.  She denies any jaundice.  Denies any recent
 traveling out of the country.  Denies any family member sick at home.  Denies eating anything out of
 the usual.



Past Medical History:  Includes hypertension; coronary artery disease, status post angioplasty in 201
6.



Allergies:  CODEINE.



Family History:  Hypertension.



Social History:  She does not smoke.  She does not drink alcohol.



Review of Systems:

See H and P.



Physical Examination:

General:  The patient is awake, alert, not vomiting at this moment. 

HEENT:  Pupils are equal and reactive.  No jaundice. 

Neck:  Supple. 

Chest:  Clear. 

Abdomen:  Soft and depressible.  No guarding or rebound.  Mild generalized tenderness, mainly upper a
bdomen and lower.  No Brown signs. 

Rectal:  Deferred. 

Pelvis:  Deferred. 

Breasts:  Deferred. 

Extremities:  Good capillary refill.



Laboratory Data:  Blood work shows a WBC count of 22.7, hemoglobin of 8.6.  Platelets of 457.  INR is
 1.25, potassium 3.1, bicarb is 13.1, creatinine is 7.54, BUN is 142, glucose 128.  Total bili is nor
mal.  AST, ALT normal.  Alkaline phosphate 898.  Ultrasound of the abdomen interpreted by Dr. Sapp
 as sludge, small gallstones.  No pericholecystic fluid.  CAT scan of the abdomen and pelvis interpre
shahriar by Dr. Sapp as pyelonephritis cannot be excluded.  The gallbladder is distended.  Biliary tree
 normal size.



Assessment:  

1.A 75-year-old patient comes to us with nausea, malaise, diagnosed with acute renal failure.  Renal
 doctor has not seen the patient yet.  I assume that they will call me for placement of a hemodialysi
s catheter if they believe it is needed.  So, I went ahead and explained to the patient the benefits,
 alternatives, and risks of hemodialysis catheter placement in case the renal doctor decides to ask f
or it with benefits, alternatives, and risks including, but not limited to infection, bleeding, damag
e to adjacent structures, anesthesia complication, pneumothorax, hemothorax, DVTs, PE, MI, and even d
eath.  She also understands this is a temporary catheter, it should be removed as soon as the renal d
octor asks them to do so or find a permanent access with vascular surgeons in [place] of they decide 
to continue hemodialysis thus to number problem.

2.Although she has no Brown sign, this fluid overload also may change the appearance of the gallbla
dder.  We did not see any pericholecystic fluid, but at the same time, it is hard to rule out cholecy
stitis at the same time at this moment, so we going to keep the patient n.p.o., stabilize the patient
 clinically and then depending on what the renal doctor state trying to establish some priorities if 
we are going to the help with the kidneys first and 

then eventually may have to address the issue of the gallbladder.  The patient will be in antibiotics
 and nothing to eat.





CADEN/MODL

DD:  02/19/2021 10:41:49Voice ID:  [id number]

DT:  02/19/2021 11:31:41Report ID:  [id number]

## 2021-02-19 NOTE — RAD REPORT
EXAM DESCRIPTION:  RAD - Fluoroscopy <1 Hour - 2/19/2021 12:05 pm

 

CLINICAL HISTORY:  Venous catheter insertion.

DIALYSIS CATH PLACMENT

 

COMPARISON:  BREAST/AXILLA, LIMITED dated 12/6/2017

 

FINDINGS:  Fluoroscopic imaging is submitted from placement of a venous catheter.  Details of the pro
cedure not available.

 

Fluoroscopy time: 0.4 minutes

## 2021-02-19 NOTE — RAD REPORT
EXAM DESCRIPTION:  RAD - Chest Single View - 2/19/2021 12:19 pm

 

CLINICAL HISTORY:  s/p HD cath

Chest pain.

 

COMPARISON:  Chest Single View dated 2/17/2021

 

FINDINGS:  Portable technique limits examination quality.

 

Right-sided venous catheter has tip in the SVC. No postprocedure pneumothorax. The heart is upper holliday
it normal in size. Mild interstitial pulmonary edema.

 

IMPRESSION:  No postprocedure pneumothorax is evident.

## 2021-02-20 LAB
ALBUMIN SERPL BCP-MCNC: 1.8 G/DL (ref 3.4–5)
BUN BLD-MCNC: 74 MG/DL (ref 7–18)
GLUCOSE SERPLBLD-MCNC: 131 MG/DL (ref 74–106)
HCT VFR BLD CALC: 29.4 % (ref 36–45)
LYMPHOCYTES # SPEC AUTO: 0.7 K/UL (ref 0.7–4.9)
MAGNESIUM SERPL-MCNC: 2.5 MG/DL (ref 1.8–2.4)
PMV BLD: 8.8 FL (ref 7.6–11.3)
POTASSIUM SERPL-SCNC: 3.1 MMOL/L (ref 3.5–5.1)
RBC # BLD: 3.85 M/UL (ref 3.86–4.86)

## 2021-02-20 RX ADMIN — ONDANSETRON PRN MG: 2 INJECTION INTRAMUSCULAR; INTRAVENOUS at 15:14

## 2021-02-20 RX ADMIN — HEPARIN SODIUM PRN UNIT: 1000 INJECTION, SOLUTION INTRAVENOUS; SUBCUTANEOUS at 13:45

## 2021-02-20 RX ADMIN — HEPARIN SODIUM SCH UNIT: 5000 INJECTION, SOLUTION INTRAVENOUS; SUBCUTANEOUS at 16:52

## 2021-02-20 RX ADMIN — TRAMADOL HYDROCHLORIDE PRN MG: 50 TABLET, COATED ORAL at 15:04

## 2021-02-20 RX ADMIN — ANTACID TABLETS SCH: 500 TABLET, CHEWABLE ORAL at 07:30

## 2021-02-20 RX ADMIN — ANTACID TABLETS SCH: 500 TABLET, CHEWABLE ORAL at 11:30

## 2021-02-20 RX ADMIN — CEFEPIME SCH: 1 INJECTION, POWDER, FOR SOLUTION INTRAMUSCULAR; INTRAVENOUS at 09:00

## 2021-02-20 RX ADMIN — MORPHINE SULFATE PRN MG: 2 INJECTION, SOLUTION INTRAMUSCULAR; INTRAVENOUS at 21:42

## 2021-02-20 RX ADMIN — Medication SCH ML: at 09:00

## 2021-02-20 RX ADMIN — HEPARIN SODIUM SCH UNIT: 5000 INJECTION, SOLUTION INTRAVENOUS; SUBCUTANEOUS at 00:05

## 2021-02-20 RX ADMIN — CALCITRIOL CAPSULES 0.25 MCG SCH MCG: 0.25 CAPSULE ORAL at 15:04

## 2021-02-20 RX ADMIN — Medication SCH ML: at 21:00

## 2021-02-20 RX ADMIN — ONDANSETRON PRN MG: 2 INJECTION INTRAMUSCULAR; INTRAVENOUS at 08:36

## 2021-02-20 RX ADMIN — ANTACID TABLETS SCH: 500 TABLET, CHEWABLE ORAL at 16:30

## 2021-02-20 RX ADMIN — METRONIDAZOLE SCH MLS: 500 INJECTION, SOLUTION INTRAVENOUS at 08:36

## 2021-02-20 RX ADMIN — METRONIDAZOLE SCH MLS: 500 INJECTION, SOLUTION INTRAVENOUS at 00:06

## 2021-02-20 RX ADMIN — HEPARIN SODIUM SCH UNIT: 5000 INJECTION, SOLUTION INTRAVENOUS; SUBCUTANEOUS at 08:37

## 2021-02-20 NOTE — PN
Date of Progress Note:  02/20/2021



Subjective:  The patient was admitted with acute kidney injury, severe acidosis.
 The patient was initiated on dialysis.  The patient had dialysis yesterday.  No
event.  Scheduled for another dialysis today.



Physical Examination:

Vital Signs:  Blood pressure 145/67, pulse of 105, afebrile.  The patient is 
oliguric, urine output only 250.  

Chest:  Clear to auscultation. 

Heart:  S1, S2.  Systolic murmur. 

Abdomen:  Soft, nontender. 

Extremities:  Trace edema. 

Neurologic:  Alert, no focality.



Laboratory Data:  WBC 15.5, H and H 9.8/29.4, platelet 426.  Sodium 144, 
potassium 3.1, bicarb 24, BUN down to 74, creatinine 4.7, calcium 8.6, 
phosphorus 5.7, magnesium 2.5, albumin 1.8.  Corrected calcium is 10.2.  PTH 
437.  Current urinalysis, PC ratio of 9 g.  Rheumatoid factor is positive.  Rest
of serology is still pending.  Serum protein electrophoresis is still pending.  
Iron saturation of 11, ferritin 322.



Current Medications:  Include:

1.   Ceftriaxone.

2.   Heparin.

3.   Calcium carbonate 1000 with each meal.

4.   Carvedilol.

5.   Zofran.

6.   Pantoprazole.

7.   Tramadol.



Assessment And Plan:  

1.   Acute kidney injury, nephrotic range of proteinuria, normal size kidney, 
possible secondary to nonsteroidal use to rule out any autoimmune disease given 
the positive rheumatoid factor, positive history of lupus.  I am going to go 
ahead and discontinue aspirin and we will proceed with the kidney biopsy next 
week.  Discussed with the patient risks, benefits, alternative.  I agree.  We 
will follow up serology.

2.   Anemia of iron deficiency anemia/chronic kidney disease with the presence 
of nephrotic range of proteinuria.  Light chain disease needs to be ruled out.  
I am going to go ahead and start the patient on IV iron and we will start the 
patient on GISELL.  We will monitor the patient.

3.   Acidosis secondary to renal failure, recovered, resolved.  Discontinue 
bicarb drip.

4.   Secondary hyperparathyroidism with the presence of marginal hypercalcemia, 
decreased calcium carbonate.  Start the patient on calcitriol and we will follow
up.

5.   Nephrotic range of proteinuria.  Waiting for the workup.

6.   Urinary tract infection secondary to Escherichia coli complicated with 
bacteremia.  We will follow up repeated blood culture before we proceed with the
kidney biopsy.



Time spent discussing with the patient, examining the patient, exam 
face-to-face, placing order, discussing with staff and other consulting include 
Primary 45 minutes.

JONY

DD:  02/20/2021 11:23:48   Voice ID:  [id number]

DT:  02/20/2021 12:28:27   Report ID:  [id number]

MTDD

## 2021-02-20 NOTE — P.PN
Subjective


Date of Service: 02/20/21


Chief Complaint: ENRIQUE, sepsis, volume depletion, suspected cholecystitis, 

metabolic acidemia.


Subjective: Improving, Doing well





Physical Examination





- Vital Signs


Temperature: 99.4 F


Blood Pressure: 145/67


Pulse: 105


Respirations: 20


Pulse Ox (%): 95





- Studies


Microbiology Data (last 24 hrs): 








02/17/21 14:30   Blood  - Blood   Aerobic Blood Culture - Final


                            Escherichia Coli


02/17/21 14:30   Blood  - Blood   Blood Culture Gram Stain - Final


02/17/21 14:30   Blood  - Blood   Anaerobic Blood Culture - Final


                            Escherichia Coli


02/17/21 14:30   Blood  - Blood   Gram Stain - Final


02/17/21 14:15   Blood  - Blood   Aerobic Blood Culture - Final


                            Escherichia Coli


02/17/21 14:15   Blood  - Blood   Blood Culture Gram Stain - Final


02/17/21 14:15   Blood  - Blood   Anaerobic Blood Culture - Final


                            Escherichia Coli


02/17/21 14:15   Blood  - Blood   Gram Stain - Final


02/17/21 14:27   Clean Catch Urine   [place] Count - Final


                            >100,000 CFU/ML.


02/17/21 14:27   Clean Catch Urine    - Final


                            Escherichia Coli








Assessment & Plan


Discharge Plan: Home


Plan to discharge in: Greater than 2 days


Physician Review Additional Text: 





Physical exam:  


Patient alert, cooperative.  No significant distress noted.


Heart:  Regular rate rhythm


Lungs:  clear


Abdomen:  No significant abdominal pain. 


Extremities:  Good aeration motion to the upper lower extremities.  No focal 

deficits noted.





Impression:


Sepsis secondary to UTI and bacteremia, urine/blood cultures positive for E coli


Nausea, vomiting secondary to acute renal failure with metabolic acidosis now 

end-stage renal disease on hemodialysis 


Cholelithiasis with possible cholecystitis


CAD 


Hypertension


GERD


Anemia of chronic disease





Plan:


Sepsis secondary to UTI and bacteremia, urine/blood cultures positive for E 

coli:  Sepsis resolved.  Will deescalate antibiotics.  Discontinue Flagyl.  Will

change cefepime to Rocephin.  Obtain repeat blood and urine culture results.  If

negative will consider D escalating to oral medication.


Nausea, vomiting secondary to acute renal failure with metabolic acidosis now 

end-stage renal disease on hemodialysis:  Hemodialysis catheter now in place.  

Patient received dialysis yesterday.  Anticipate dialysis again today.  Nausea 

improved.  Will discuss with nephrology.  Will start with clear liquid diet 

today then advance as tolerated.  Anticipate no need for surgical intervention 

of questionable cholecystitis.  This was discussed in detail with surgery.  

Patient will likely require long-term dialysis.


Cholelithiasis with possible cholecystitis:  Will start clear liquid diet then 

advance as tolerated.  Case discussed with surgery.  Anticipate no need for 

surgical intervention.


CAD:  Continue aspirin.  Patient on DVT prophylaxis.


Hypertension:  Will start carvedilol with parameters in place.  Patient previous

ly on hydrochlorothiazide and Norvasc.


GERD:  Will provide Protonix.


Anemia chronic disease:  Will monitor closely.


Time Spent Managing Pts Care (In Minutes): 55

## 2021-02-21 LAB
ALBUMIN SERPL BCP-MCNC: 2 G/DL (ref 3.4–5)
BUN BLD-MCNC: 45 MG/DL (ref 7–18)
GLUCOSE SERPLBLD-MCNC: 143 MG/DL (ref 74–106)
HCT VFR BLD CALC: 29.5 % (ref 36–45)
HCV RNA SPEC NAA+PROBE-LOG#: <1.18 LOG IU/ML
LYMPHOCYTES # SPEC AUTO: 0.6 K/UL (ref 0.7–4.9)
MAGNESIUM SERPL-MCNC: 2.5 MG/DL (ref 1.8–2.4)
PMV BLD: 8.3 FL (ref 7.6–11.3)
POTASSIUM SERPL-SCNC: 3.1 MMOL/L (ref 3.5–5.1)
RBC # BLD: 3.84 M/UL (ref 3.86–4.86)

## 2021-02-21 RX ADMIN — HEPARIN SODIUM SCH UNIT: 5000 INJECTION, SOLUTION INTRAVENOUS; SUBCUTANEOUS at 09:00

## 2021-02-21 RX ADMIN — HEPARIN SODIUM SCH UNIT: 5000 INJECTION, SOLUTION INTRAVENOUS; SUBCUTANEOUS at 18:29

## 2021-02-21 RX ADMIN — ANTACID TABLETS SCH MG: 500 TABLET, CHEWABLE ORAL at 16:30

## 2021-02-21 RX ADMIN — ANTACID TABLETS SCH MG: 500 TABLET, CHEWABLE ORAL at 13:07

## 2021-02-21 RX ADMIN — MORPHINE SULFATE PRN MG: 2 INJECTION, SOLUTION INTRAMUSCULAR; INTRAVENOUS at 04:21

## 2021-02-21 RX ADMIN — ANTACID TABLETS SCH MG: 500 TABLET, CHEWABLE ORAL at 10:35

## 2021-02-21 RX ADMIN — PANTOPRAZOLE SODIUM SCH MG: 40 TABLET, DELAYED RELEASE ORAL at 06:34

## 2021-02-21 RX ADMIN — Medication SCH ML: at 09:00

## 2021-02-21 RX ADMIN — LEVOTHYROXINE SODIUM SCH MG: 75 TABLET ORAL at 10:35

## 2021-02-21 RX ADMIN — HEPARIN SODIUM SCH UNIT: 5000 INJECTION, SOLUTION INTRAVENOUS; SUBCUTANEOUS at 00:35

## 2021-02-21 RX ADMIN — TRAMADOL HYDROCHLORIDE PRN MG: 50 TABLET, COATED ORAL at 02:36

## 2021-02-21 RX ADMIN — Medication SCH ML: at 21:27

## 2021-02-21 NOTE — PN
Date of Progress Note:  02/21/2021



Subjective:  The patient was admitted with acute kidney injury.  The patient had
normal kidney function last month.  The patient has severe acidosis, was started
on dialysis, tolerated very well.  Serology showing possibility of rheumatoid 
arthritis, positive rheumatoid factor.  The patient was started on dialysis.



Physical Examination:

Vital Signs:  When I saw the patient, blood pressure of 132/78, pulse of 88, 
afebrile. 

Chest:  Clear to auscultation. 

Heart:  S1, S2.  Systolic murmur. 

Abdomen:  Soft, nontender. 

Extremities:  No edema. 

Neurologic:  Alert and oriented x3.  No focality.



Laboratory Data:  WBC 13, H and H 9.9/29.5.  Sodium 142, potassium 3.1, bicarb 
25, BUN 45, creatinine 3.4 after dialysis.



Current Medications:  

1.   Ceftriaxone.

2.   Heparin.

3.   Calcium carbonate.

4.   Carvedilol.

5.   Zofran.

6.   Pantoprazole.

7.   Tramadol.



Assessment And Plan:  

1.   Acute kidney injury secondary to nonsteroidal use, nonoliguric with severe 
acidosis.  Continue dialysis 3 times a week.  We will monitor the patient.  Plan
for kidney biopsy given the finding of nephrotic range of proteinuria and 
positive rheumatoid factor.  Plan for biopsy this week as the patient is being 
on aspirin.  We stopped aspirin on Friday.  We will follow up.

2.   Hypertension, not controlled.  Switch the patient on nifedipine.  Adjust 
other blood pressure medications and we will monitor after dialysis.

3.   Acidosis secondary to renal failure, recovered, resolved.  Discontinue 
bicarb.

4.   Secondary hyperparathyroidism.  We will start the patient on calcitriol.

5.   Hyperkalemia, recovered, resolved after dialysis.

6.   Urinary tract infection.  Continue current antibiotic.



Time spent discussing with the patient, examining the patient, exam 
face-to-face, placing order, discussing with staff and other consulting include 
Primary 45 minutes.

MA/TODD

DD:  02/21/2021 19:41:40   Voice ID:  [id number]

DT:  02/21/2021 22:06:16   Report ID:  [id number]

GABINO

## 2021-02-21 NOTE — P.PN
Subjective


Date of Service: 02/21/21


Primary Care Provider: none, Nephrology-Dr. Walls


Chief Complaint: ENRIQUE, sepsis, volume depletion, suspected cholecystitis, 

metabolic acidemia.


Subjective: Improving, Doing well (No significant nausea or vomiting.  Tolerated

clear liquid diet.)





Physical Examination





- Vital Signs


Temperature: 98.3 F


Blood Pressure: 157/70


Pulse: 93


Respirations: 18


Pulse Ox (%): 95





- Studies


Microbiology Data (last 24 hrs): 








02/17/21 14:30   Blood  - Blood   Aerobic Blood Culture - Final


                            Escherichia Coli


02/17/21 14:30   Blood  - Blood   Blood Culture Gram Stain - Final


02/17/21 14:30   Blood  - Blood   Anaerobic Blood Culture - Final


                            Escherichia Coli


02/17/21 14:30   Blood  - Blood   Gram Stain - Final


02/17/21 14:15   Blood  - Blood   Aerobic Blood Culture - Final


                            Escherichia Coli


02/17/21 14:15   Blood  - Blood   Blood Culture Gram Stain - Final


02/17/21 14:15   Blood  - Blood   Anaerobic Blood Culture - Final


                            Escherichia Coli


02/17/21 14:15   Blood  - Blood   Gram Stain - Final








Assessment & Plan


Discharge Plan: Home


Plan to discharge in: Greater than 2 days


Physician Review Additional Text: 





Initial chief complaint:


75-year-old  female presented with nausea, vomiting secondary to sepsis-

UTI/bacteremia and acute renal failure with metabolic acidosis/nephrotic range 

proteinuria now end-stage renal disease.





Physical exam:  


Patient alert, cooperative.  No significant distress noted.


Chest:  Dialysis catheter in place


Heart:  Regular rate rhythm


Lungs:  Clear to auscultation


Abdomen:  No significant abdominal pain. 


Extremities:  Good aeration motion to the upper lower extremities.  No focal 

deficits noted.





Impression:


Sepsis secondary to UTI and bacteremia, urine/blood cultures positive for E coli


Nausea, vomiting secondary to acute renal failure with metabolic acidosis, 

nephrotic range proteinuria, and possible injury related to nonsteroidal anti-

inflammatory use now end-stage renal disease on hemodialysis 


Cholelithiasis with possible cholecystitis


CAD 


Hypertension


GERD


Positive rheumatoid factor


Anemia of chronic disease with iron deficiency


Hypothyroidism





Plan:


Sepsis secondary to UTI and bacteremia, urine/blood cultures positive for E 

coli:  Sepsis appears resolved.  White count improved.  Antibiotics de escalated

yesterday.  Now on IV Rocephin.  If blood culture and urine culture negative by 

tomorrow then can transition to oral medication.  Patient will need 14 day 

treatment of antibiotic therapy.  Patient now with end-stage renal disease on 

hemodialysis.  Nephrotic range proteinuria.  Continue dialysis.  Nephrology 

plans for renal biopsy on Wednesday to rule out other etiology.  Case management

to work on outpatient dialysis.  Patient tolerate advance as tolerated.  

Anticipate no further surgical intervention.  Possible discharge later this week

after biopsy(Wednesday).  I will turn the service over to the hospitalist team 

tomorrow.  I will go plan of care with him.


Nausea, vomiting secondary to acute renal failure with metabolic acidosis, 

nephrotic range proteinuria, and possible injury related to nonsteroidal anti-

inflammatory use now end-stage renal disease on hemodialysis :  Hemodialysis 

catheter now in place. Continue dialysis.  Spoke with nephrology at length.  

Acute renal injury may be multifactorial.  Patient did use some nonsteroidal 

anti-inflammatories.  Rheumatoid factor positive.  Nephrology plans for renal 

biopsy on Wednesday.  Hold aspirin until that time.  Continue outpatient 

dialysis process.  Anticipate discharge after Wednesday.  Continue with 

Nephrology recommendations.


Cholelithiasis with possible cholecystitis:  Doubt cholecystitis at this time.  

Patient tolerated clear liquid diet.  Will advance diet to full liquid then GI 

soft.  Case discussed with surgery.  Anticipate no need for surgical 

intervention.  This can likely be addressed as an outpatient.


CAD:  Hold aspirin in anticipation for renal biopsy on Wednesday.  Patient on 

DVT prophylaxis.


Hypertension:  Carvedilol started.  Increase carvedilol for better blood 

pressure control.  Patient previously on hydrochlorothiazide and Norvasc.


GERD:  Continue Protonix.


Anemia chronic disease:  Will monitor closely.


Positive rheumatoid factor:  Titer 1-16.  Workup pending.  Patient to have renal

biopsy.


Anemia of chronic disease with iron deficiency:  Patient receiving IV iron.  

Maintain hemoglobin above 7.0


Hypothyroidism:  Continue medication


Time Spent Managing Pts Care (In Minutes): 55

## 2021-02-22 LAB
1,25(OH)2D SERPL-MCNC: 20 PG/ML (ref 18–72)
1,25(OH)2D2 SERPL-MCNC: <8 PG/ML
ALBUMIN SERPL BCP-MCNC: 1.7 G/DL (ref 3.4–5)
ALBUMIN SERPL ELPH-MCNC: 2.2 G/DL (ref 3.8–4.8)
BUN BLD-MCNC: 51 MG/DL (ref 7–18)
GLUCOSE SERPLBLD-MCNC: 165 MG/DL (ref 74–106)
HCT VFR BLD CALC: 27.1 % (ref 36–45)
LYMPHOCYTES # SPEC AUTO: 1.2 K/UL (ref 0.7–4.9)
MAGNESIUM SERPL-MCNC: 2.3 MG/DL (ref 1.8–2.4)
MORPHOLOGY BLD-IMP: (no result)
PMV BLD: 8.9 FL (ref 7.6–11.3)
POTASSIUM SERPL-SCNC: 3.9 MMOL/L (ref 3.5–5.1)
PROT PATTERN SERPL ELPH-IMP: (no result)
RBC # BLD: 3.49 M/UL (ref 3.86–4.86)

## 2021-02-22 RX ADMIN — HEPARIN SODIUM SCH UNIT: 5000 INJECTION, SOLUTION INTRAVENOUS; SUBCUTANEOUS at 09:24

## 2021-02-22 RX ADMIN — Medication SCH ML: at 09:00

## 2021-02-22 RX ADMIN — PANTOPRAZOLE SODIUM SCH MG: 40 TABLET, DELAYED RELEASE ORAL at 05:38

## 2021-02-22 RX ADMIN — Medication SCH ML: at 21:00

## 2021-02-22 RX ADMIN — TRAMADOL HYDROCHLORIDE PRN MG: 50 TABLET, COATED ORAL at 11:38

## 2021-02-22 RX ADMIN — CALCITRIOL CAPSULES 0.25 MCG SCH MCG: 0.25 CAPSULE ORAL at 14:45

## 2021-02-22 RX ADMIN — HEPARIN SODIUM SCH UNIT: 5000 INJECTION, SOLUTION INTRAVENOUS; SUBCUTANEOUS at 17:06

## 2021-02-22 RX ADMIN — LEVOTHYROXINE SODIUM SCH MG: 75 TABLET ORAL at 09:25

## 2021-02-22 RX ADMIN — ANTACID TABLETS SCH MG: 500 TABLET, CHEWABLE ORAL at 17:05

## 2021-02-22 RX ADMIN — ANTACID TABLETS SCH MG: 500 TABLET, CHEWABLE ORAL at 09:24

## 2021-02-22 RX ADMIN — AMOXICILLIN AND CLAVULANATE POTASSIUM SCH MG: 500; 125 TABLET, FILM COATED ORAL at 17:05

## 2021-02-22 RX ADMIN — HEPARIN SODIUM SCH UNIT: 5000 INJECTION, SOLUTION INTRAVENOUS; SUBCUTANEOUS at 01:12

## 2021-02-22 RX ADMIN — HEPARIN SODIUM PRN UNIT: 1000 INJECTION, SOLUTION INTRAVENOUS; SUBCUTANEOUS at 12:40

## 2021-02-22 RX ADMIN — ANTACID TABLETS SCH MG: 500 TABLET, CHEWABLE ORAL at 14:45

## 2021-02-22 RX ADMIN — AMOXICILLIN AND CLAVULANATE POTASSIUM SCH MG: 500; 125 TABLET, FILM COATED ORAL at 09:23

## 2021-02-22 NOTE — PN
Date of Progress Note:  02/22/2021



Chief Complaint:  Acute kidney injury, severe.



Subjective:  The patient developed new onset of acute kidney injury.  On previous occasions, she was 
found to have normal kidney function.  The patient is undergoing workup for possible glomerulonephrit
is.  She was found to have positive rheumatoid factor.  The patient is started on dialysis to control
 severe metabolic changes including hyperazotemia and severe acidosis.



Review of Systems:

Denies PND or orthopnea.



Physical Examination:

Lungs:  Diminished breath sounds at bases. 

Heart:  S1, S2. 

Abdomen:  Soft, benign. 

Extremities:  No edema.



Impression And Plan:  

1.Acute on chronic kidney injury.  The patient likely developed acute kidney injury due to nonsteroi
david anti-inflammatory medication.  The patient has severe acidosis.  Dialysis is started 3 times per 
week.  My plan is to arrange for renal biopsy.  The patient was found to have nephrotic range protein
uria, positive rheumatoid factor.  The patient needs to be rule out for glomerulonephritis and due to
 the fact that she was taking nonsteroidal anti-inflammatory medications, differential diagnosis will
 include interstitial acute allergic nephritis and possibly minimal change disease.

2.Hypertension, is not well controlled.  The patient was started on nifedipine.  Adjust medication a
ccording to blood pressure.

3.Acidosis secondary to renal failure, resolving.  The patient was started on bicarbonate tablet.  C
urrently, bicarbonate level has improved in response to dialysis.  Continue dialysis and tablets were
 stopped.

4.Secondary hyperparathyroidism.  The patient will start calcitriol.  

Re-evaluate vitamin D level.

5.Urinary tract infection.  Continue antibiotics.





KENNETH/TODD

DD:  02/22/2021 22:14:28Voice ID:  [id number]

DT:  02/22/2021 23:23:59Report ID:  [id number]

## 2021-02-22 NOTE — PN
Date of Progress Note:  02/22/2021



Reason For Consult:  

1.Renal failure.

2.Gallbladder disease.



Subjective:  Patient is doing well.  No complaint.  No vomiting.  Tolerating liquid diet.  No abdomin
al pain at this moment.  From the renal standpoint, patient is receiving dialysis and the catheter is
 functional and working well.



Objective:  CHEST:  Clear. 

ABDOMEN:  Soft and depressible.  No Brown signs. 

EXTREMITIES:  Good capillary refill.



Laboratory Data:  WBC count is still elevated but lower than before.



Plan:  Continue the diet.  Continue medical optimization.  She understands the pros and cons of lapar
oscopic possible open cholecystectomy.  This was explained to her before, although obviously she pref
erred, this medical treatment is done first before proceeding with the gallbladder.  We will try to d
o so as long as clinically she continues improving.





HM/MODL

DD:  02/22/2021 12:47:22Voice ID:  [id number]

DT:  02/22/2021 18:55:35Report ID:  [id number]

## 2021-02-22 NOTE — P.PN
Subjective


Date of Service: 02/22/21


Primary Care Provider: none, Nephrology-Dr. Walls


Chief Complaint: ENRIQUE, sepsis, volume depletion, suspected cholecystitis, 

metabolic acidemia.


Subjective: No new changes, Improving, Doing well





Physical Examination





- Vital Signs


Temperature: 99.3 F


Blood Pressure: 193/80


Pulse: 91


Respirations: 18


Pulse Ox (%): 94





Assessment & Plan


Discharge Plan: Home


Plan to discharge in: Greater than 2 days


Physician Review Additional Text: 





Initial chief complaint:


75-year-old  female presented with nausea, vomiting secondary to 

sepsis-UTI/bacteremia and acute renal failure with metabolic acidosis/nephrotic 

range proteinuria now end-stage renal disease.





Physical exam:  


Patient alert, cooperative.  No significant distress noted.


Chest:  Dialysis catheter in place


Heart:  Regular rate rhythm


Lungs:  Clear to auscultation


Abdomen:  No significant abdominal pain. 


Extremities:  Good aeration motion to the upper lower extremities.  No focal 

deficits noted.





Impression:


Sepsis secondary to UTI and bacteremia, urine/blood cultures positive for E coli


Nausea, vomiting secondary to acute renal failure with metabolic acidosis, 

nephrotic range proteinuria, and possible injury related to nonsteroidal anti-

inflammatory use now end-stage renal disease on hemodialysis 


Cholelithiasis with possible cholecystitis


CAD 


Hypertension


GERD


Positive rheumatoid factor


Anemia of chronic disease with iron deficiency


Hypothyroidism





Plan:


Sepsis secondary to UTI and bacteremia, urine/blood cultures positive for E 

coli:  Sepsis resolved.  Repeat blood culture an urine culture negative.  Will 

discontinue Rocephin.  Change to Augmentin.  Will continue with Augmentin for 14

day treatment. Patient now with end-stage renal disease on hemodialysis.  

Nephrotic range proteinuria noted.  Continue dialysis.  Nephrology plans for 

renal biopsy on Wednesday to rule out other etiology.  Case management to work 

on outpatient dialysis.  Continue to advance diet.  No evidence of 

cholecystitis.  Anticipate no further surgical intervention.  Possible discharge

as early as Wednesday after biopsy and if outpatient dialysis can be arranged.  

I will turn the service over to the hospitalist team tomorrow.  I will go plan 

of care with him.


Nausea, vomiting secondary to acute renal failure with metabolic acidosis, 

nephrotic range proteinuria, and possible injury related to nonsteroidal anti-

inflammatory use now end-stage renal disease on hemodialysis :  Hemodialysis 

catheter now in place. Continue dialysis.  Spoke with nephrology at length 

yesterday.  Acute renal injury may be multifactorial.  Patient did use some 

nonsteroidal anti-inflammatories.  Rheumatoid factor positive.  There is a 

family history of lupus.  Nephrology plans for renal biopsy on Wednesday.  Hold 

aspirin until that time.  Continue outpatient dialysis process.  Anticipate 

discharge Wednesday after biopsy and if outpatient dialysis arranged.  Continue 

with Nephrology recommendations.


Cholelithiasis with possible cholecystitis:  Doubt cholecystitis at this time.  

Advanced diet to GI soft. Case discussed with surgery.  Anticipate no need for 

surgical intervention.  This can likely be addressed as an outpatient.


CAD:  Hold aspirin in anticipation for renal biopsy on Wednesday.  Patient on 

DVT prophylaxis.


Hypertension:  Will increase carvedilol for better blood pressure control.  Will

consider adding Norvasc if blood pressure still not well controlled. 


GERD:  Continue Protonix.


Anemia chronic disease:  Will monitor closely.


Positive rheumatoid factor:  Titer 1-16.  Family history of lupus.  Workup 

pending.  Patient to have renal biopsy.


Anemia of chronic disease with iron deficiency:  Patient receiving IV iron.  

Maintain hemoglobin above 7.0


Hypothyroidism:  Continue medication


Time Spent Managing Pts Care (In Minutes): 55

## 2021-02-23 LAB
ALBUMIN SERPL BCP-MCNC: 2 G/DL (ref 3.4–5)
BUN BLD-MCNC: 31 MG/DL (ref 7–18)
GLUCOSE SERPLBLD-MCNC: 125 MG/DL (ref 74–106)
HCT VFR BLD CALC: 33.6 % (ref 36–45)
LYMPHOCYTES # SPEC AUTO: 0.8 K/UL (ref 0.7–4.9)
MAGNESIUM SERPL-MCNC: 2.3 MG/DL (ref 1.8–2.4)
PMV BLD: 8.6 FL (ref 7.6–11.3)
POTASSIUM SERPL-SCNC: 3.5 MMOL/L (ref 3.5–5.1)
RBC # BLD: 4.24 M/UL (ref 3.86–4.86)

## 2021-02-23 RX ADMIN — HEPARIN SODIUM SCH: 5000 INJECTION, SOLUTION INTRAVENOUS; SUBCUTANEOUS at 09:00

## 2021-02-23 RX ADMIN — LEVOTHYROXINE SODIUM SCH MG: 75 TABLET ORAL at 10:38

## 2021-02-23 RX ADMIN — Medication SCH: at 21:00

## 2021-02-23 RX ADMIN — HEPARIN SODIUM SCH: 5000 INJECTION, SOLUTION INTRAVENOUS; SUBCUTANEOUS at 16:01

## 2021-02-23 RX ADMIN — AMOXICILLIN AND CLAVULANATE POTASSIUM SCH MG: 500; 125 TABLET, FILM COATED ORAL at 10:35

## 2021-02-23 RX ADMIN — ANTACID TABLETS SCH MG: 500 TABLET, CHEWABLE ORAL at 16:00

## 2021-02-23 RX ADMIN — AMOXICILLIN AND CLAVULANATE POTASSIUM SCH MG: 500; 125 TABLET, FILM COATED ORAL at 16:01

## 2021-02-23 RX ADMIN — ANTACID TABLETS SCH MG: 500 TABLET, CHEWABLE ORAL at 10:35

## 2021-02-23 RX ADMIN — HEPARIN SODIUM SCH UNIT: 5000 INJECTION, SOLUTION INTRAVENOUS; SUBCUTANEOUS at 01:38

## 2021-02-23 RX ADMIN — PANTOPRAZOLE SODIUM SCH MG: 40 TABLET, DELAYED RELEASE ORAL at 10:34

## 2021-02-23 RX ADMIN — ANTACID TABLETS SCH MG: 500 TABLET, CHEWABLE ORAL at 14:23

## 2021-02-23 RX ADMIN — Medication SCH ML: at 09:00

## 2021-02-23 RX ADMIN — Medication SCH ML: at 01:38

## 2021-02-23 NOTE — PN
Date of Progress Note:  02/23/2021



Diagnosis:  Acute cholecystitis.



History Of Present Illness:  This is a case of a 75-year-old patient with multiple medical problems i
ncluding also renal failure, cholecystitis.  For the renal failure, she received a catheter.  For cho
lecystitis she has been receiving IV antibiotics and she is doing better.  No nausea, no vomiting.  N
o abdominal pain at this time.



Objective:  Chest:  Clear.  

Abdomen:  Soft and depressible.  No Brown signs. 

Neck:  Catheter intact with no ecchymosis.



Plan:  Continue diet.  They are still working on the medical workup, so this gallbladder may be done 
electively or unless clinically she deteriorates.





HM/MODL

DD:  02/23/2021 09:52:41Voice ID:  [id number]

DT:  02/23/2021 10:59:45Report ID:  [id number]

## 2021-02-23 NOTE — P.PN
Subjective


Date of Service: 02/23/21


Chief Complaint: ENRIQUE, sepsis, volume depletion, suspected cholecystitis, 

metabolic acidemia.


Subjective: No new changes, Improving





Continues to have mild abdominal pain. Is passing gas, having BM, and urinating 

regularly. Otherwise in no pain and feeling ready for biopsy tomorrow. 





Review of Systems


Gastrointestinal: Abdominal Pain, As per HPI





Physical Examination





- Vital Signs


Temperature: 97.0 F


Blood Pressure: 170/71


Pulse: 91


Respirations: 18


Pulse Ox (%): 92





- Physical Exam


General: Alert, In no apparent distress, Oriented x3, Cooperative


HEENT: Atraumatic, Normocephalic, PERRLA, Mucous membr. moist/pink, EOMI


Neck: Supple, 2+ carotid pulse no bruit, JVD not distended, No Thyromegaly, No 

LAD


Respiratory: Clear to auscultation bilaterally, Normal air movement


Cardiovascular: No edema, Normal pulses, Regular rate/rhythm, Normal S1 S2, No 

gallops, No rubs, No murmurs


Capillary refill: <2 Seconds


Gastrointestinal: Normal bowel sounds, Hypoactive, Soft and benign, Non-

distended, No ascites, No masses, No rebound, No guarding


Musculoskeletal: No clubbing, No swelling, No contractures, No erythema, No 

tenderness, No warmth


Integumentary: No rashes, No breakdown, No significant lesion, No tendernes

s/swelling, No erythema, No warmth, No cyanosis


Neurological: Normal speech, Sensation intact, Normal affect


Lymphatics: No axilla or inguinal lymphadenopathy





Assessment & Plan





- Plan





Sepsis secondary to UTI and bacteremia:  Sepsis resolved.  Continue with 

Augmentin for 14 day treatment. 


ESRD on hemodialysis with nephrotic range proteinuria: Continue dialysis.  

Nephrology plans for renal biopsy on Wednesday to rule out other etiology.  

Positive rheumatoid factor found. FHx of lupus. Case management to work on 

outpatient dialysis.  Anticipate discharge Wednesday after biopsy and if 

outpatient dialysis arranged.  Continue with Nephrology recommendations.


Cholelithiasis with possible cholecystitis:  Doubt cholecystitis at this time.  

Advanced diet to GI soft. Case discussed with surgery.  Anticipate no need for 

surgical intervention.  This can likely be addressed as an outpatient.


CAD:  Hold aspirin in anticipation for renal biopsy on Wednesday.  Patient on 

DVT prophylaxis.


Hypertension:  Continue medications. Add norvasc if not well controlled. 


GERD:  Continue Protonix.


Anemia chronic disease:  Will monitor closely.


Anemia of chronic disease with iron deficiency:  Patient received IV iron.  

Maintain hemoglobin above 7.0


Hypothyroidism:  Continue medication


Discharge plan: Social work consult placed for home care needs. Physical therapy

consult placed for ambulation assessment. 


Discharge Plan: Home


Plan to discharge in: 48 Hours





- Advance Directives


Does patient have a Living Will: No


Does patient have a Durable POA for Healthcare: No


Critical Care: No


Time Spent Managing PTS Care (In Minutes): 55

## 2021-02-24 LAB
ALBUMIN SERPL BCP-MCNC: 2.1 G/DL (ref 3.4–5)
BUN BLD-MCNC: 40 MG/DL (ref 7–18)
GLUCOSE SERPLBLD-MCNC: 127 MG/DL (ref 74–106)
POTASSIUM SERPL-SCNC: 4.2 MMOL/L (ref 3.5–5.1)

## 2021-02-24 RX ADMIN — ANTACID TABLETS SCH: 500 TABLET, CHEWABLE ORAL at 07:30

## 2021-02-24 RX ADMIN — Medication SCH ML: at 21:00

## 2021-02-24 RX ADMIN — PANTOPRAZOLE SODIUM SCH: 40 TABLET, DELAYED RELEASE ORAL at 06:30

## 2021-02-24 RX ADMIN — LEVOTHYROXINE SODIUM SCH MG: 75 TABLET ORAL at 12:28

## 2021-02-24 RX ADMIN — AMOXICILLIN AND CLAVULANATE POTASSIUM SCH MG: 500; 125 TABLET, FILM COATED ORAL at 18:20

## 2021-02-24 RX ADMIN — HEPARIN SODIUM SCH: 5000 INJECTION, SOLUTION INTRAVENOUS; SUBCUTANEOUS at 01:00

## 2021-02-24 RX ADMIN — Medication SCH: at 09:00

## 2021-02-24 RX ADMIN — HEPARIN SODIUM SCH: 5000 INJECTION, SOLUTION INTRAVENOUS; SUBCUTANEOUS at 09:00

## 2021-02-24 RX ADMIN — TRAMADOL HYDROCHLORIDE PRN MG: 50 TABLET, COATED ORAL at 13:13

## 2021-02-24 RX ADMIN — CALCITRIOL CAPSULES 0.25 MCG SCH MCG: 0.25 CAPSULE ORAL at 12:28

## 2021-02-24 RX ADMIN — HEPARIN SODIUM SCH: 5000 INJECTION, SOLUTION INTRAVENOUS; SUBCUTANEOUS at 17:00

## 2021-02-24 RX ADMIN — ANTACID TABLETS SCH MG: 500 TABLET, CHEWABLE ORAL at 18:21

## 2021-02-24 RX ADMIN — ANTACID TABLETS SCH: 500 TABLET, CHEWABLE ORAL at 11:30

## 2021-02-24 RX ADMIN — AMOXICILLIN AND CLAVULANATE POTASSIUM SCH: 500; 125 TABLET, FILM COATED ORAL at 08:00

## 2021-02-24 NOTE — PN
Date of Progress Note:  02/24/2021



Subjective:  The patient was admitted with acute kidney injury, unknown 
etiology, positive rheumatoid factor, nephrotic range of proteinuria, only 
insulting factor.  The patient has been on nonsteroid, which does not explain 
the nephrotic range of proteinuria.  The patient was initiated on dialysis.  The
patient tolerated the dialysis, acidosis resolved, hyperkalemia resolved.  The 
patient is still nonoliguric.  The patient's last aspirin dose was Saturday, 
scheduled for kidney biopsy this Friday.



Physical Examination:

Vital Signs:  Blood pressure 134/61, pulse of 91, afebrile. 

Chest:  Clear to auscultation. 

Heart:  S1, S2.  Systolic murmur. 

Abdomen:  Soft, nontender. 

Extremity:  No edema. 

Neurological:  Alert and oriented x3.  No focal.  No tremor.



Laboratory Data:  WBC 11.2, H and H of 10.6/33.6.  Sodium 138, potassium 4.2, 
bicarb 26, BUN 40, creatinine 3.1, GFR of 40, calcium 8.2, phosphorus 4, albumin
2.1, corrected calcium is 8.8.



Current Medications:  The patient on include;

1.   Augmentin.

2.   Heparin.

3.   IV iron.

4.   Epogen.

5.   Carvedilol 12.5 b.i.d.

6.   Calcium carbonate.

7.   Pantoprazole.

8.   Levothyroxine 75 mcg daily.

9.   Tramadol.



Assessment And Plan:  

1.   Acute kidney injury, unknown etiology.  I am going to continue the patient 
on dialysis 3 times a week.  Waiting for kidney biopsy.

2.   Hypertension, controlled, optimal.

3.   Acidosis, resolved.  No need for sodium bicarb.

4.   Secondary hyperparathyroidism with hypocalcemia.  Continue calcium 
carbonate.  Calcium normalized.

5.   Anemia of chronic kidney disease/iron deficiency anemia.  Continue IV iron.
 Continue GISELL.

6.   Nephrotic range of proteinuria, positive for rheumatoid factor.  Waiting 
for kidney biopsy.  We will follow up.



Time spent discussing with the patient, examining the patient, exam 
face-to-face, placing order, discussing with staff and other consulting include 
Primary 45 minutes.

JONY

DD:  02/24/2021 15:20:18   Voice ID:  [id number]

DT:  02/24/2021 15:33:46   Report ID:  [id number]

[facility]DALTON

## 2021-02-24 NOTE — P.PN
Subjective


Date of Service: 02/24/21


Chief Complaint: ENRIQUE, sepsis, volume depletion, suspected cholecystitis, 

metabolic acidemia.


Subjective: No new changes, Improving, Doing well





Abdominal pain has resolved. Sleeping well, using restroom, able to ambulate a 

little with assistance. NPO today for biopsy. 





Physical Examination





- Vital Signs


Temperature: 97.7 F


Blood Pressure: 134/61


Pulse: 91


Respirations: 18


Pulse Ox (%): 94





- Physical Exam


General: Alert, In no apparent distress, Oriented x3, Cooperative


HEENT: Atraumatic, Normocephalic, PERRLA, Mucous membr. moist/pink, EOMI


Neck: Supple, 2+ carotid pulse no bruit, JVD not distended, No Thyromegaly, No 

LAD


Respiratory: Clear to auscultation bilaterally, Normal air movement


Cardiovascular: Normal pulses, Regular rate/rhythm, Normal S1 S2, No gallops, No

rubs, No murmurs


Capillary refill: <2 Seconds


Gastrointestinal: Normal bowel sounds, Soft and benign, Non-distended, No ascite

s, No tenderness, No masses, No rebound, No guarding


Musculoskeletal: No clubbing, No swelling, No contractures, No erythema, No 

tenderness, No warmth


Integumentary: No rashes, No breakdown, No significant lesion, No 

tenderness/swelling, No erythema, No warmth, No cyanosis


Neurological: Normal speech, Normal tone, Sensation intact, Cranial nerves 3-12 

intact, Normal affect, Abnormal gait, Abnormal strength


Lymphatics: No axilla or inguinal lymphadenopathy





Assessment & Plan





- Plan





Sepsis secondary to UTI and bacteremia:  Sepsis resolved.  Continue with 

Augmentin for 14 day treatment. 


ESRD on hemodialysis with nephrotic range proteinuria:  Dialysis and renal 

biopsy planned for today.  Positive rheumatoid factor found. FHx of lupus. Case 

management arranging outpatient dialysis.  Discharge dependent on biopsy and 

arrangement of outpatient dialysis.  Continue with Nephrology recommendations.


Cholelithiasis:  Case discussed with surgery.  Anticipate no need for surgical 

intervention.  


CAD:  Hold aspirin in anticipation for renal biopsy on Wednesday.  Patient on 

DVT prophylaxis.


Hypertension:  Continue medications. 


GERD:  Continue Protonix.


Anemia of chronic disease with iron deficiency:  Patient started on GISELL and will

receive IV iron.  Continue with nephrology recommendations. 


Hypothyroidism:  Continue medication


Discharge plan: Social work consult placed for home care needs. Physical therapy

consult placed for ambulation assessment.


Discharge Plan: Home


Plan to discharge in: 24 Hours





- Advance Directives


Does patient have a Living Will: No


Does patient have a Durable POA for Healthcare: No





- Code Status/Comfort Care


Code Status Assessed: Yes (full code)


Critical Care: No


Time Spent Managing PTS Care (In Minutes): 55

## 2021-02-24 NOTE — PN
Date of Progress Note:  02/23/2021



Subjective:  The patient was admitted with acute kidney injury secondary to 
nonsteroidal, but found to have nephrotic range of proteinuria.  The patient's 
serology is still pending.  The patient's last aspirin was Saturday.  The 
patient is going to be scheduled for biopsy on Friday.  The patient denied any 
nausea, any vomiting, still has good urine output.



Physical Examination:

Vital Signs:  Blood pressure 135/61, pulse of 84.  Earlier today, blood pressure
was up to 170. 

Chest:  Clear to auscultation. 

Heart:  S1, S2.  Systolic murmur. 

Abdomen:  Soft, nontender. 

Extremities:  No edema.



Laboratory Data:  WBC 11.2, H and H 10.6/33.6.  Sodium 139, potassium 3.5, 
bicarb 25, BUN 31, creatinine 2.62 and this is after dialysis, calcium 8.5, 
phosphorus 3.1, magnesium 2.3, albumin of 2.  Serum protein electrophoresis 
negative for monoclonal .  TSH 0.9.  Vitamin D of 20.  PC ratio of 9.  
Serology positive for rheumatoid factor.  The rest of the serology was negative.
 Complement is still pending.  Hepatitis was negative.  HIV was negative.



Current Medications:  The patient on include:

1.   Amoxicillin.

2.   Epogen.

3.   Heparin.

4.   Calcium carbonate.

5.   Carvedilol.

6.   Pantoprazole.

7.   Levothyroxine.

8.   Tramadol.

9.   Calcitriol.



Assessment And Plan:  

1.   Acute kidney injury, possible secondary to nonsteroidal use, nonoliguric, 
no hyperkalemia.  Currently was uremic.  Tolerated dialysis.  Given that, I am 
going to continue the patient on dialysis.  We will arrange for dialysis 
tomorrow and we will monitor the patient.

2.   Hyperkalemia, resolved.

3.   Urinary tract infection with bacteremia secondary to Escherichia coli, 
sensitive to Augmentin.  Agree with Augmentin.

4.   Nephrotic range of proteinuria, possible secondary to focal segmental 
glomerulosclerosis/nonsteroidal use.  Plan for biopsy.  Light chain disease has 
been ruled out.

5.   Anemia of chronic kidney disease with iron deficiency anemia.  Started the 
patient on GISELL.  We will start the patient also on IV iron and we will follow up
the patient closely.

6.   Hypertension, controlled, optimal.  Continue current medication.  Keep 
holding any ACE inhibitor or ARB given that the patient still in acute state.

7.   Secondary hyperparathyroidism with hypocalcemia, corrected.  Continue 
calcitriol.

8.   Acidosis secondary to renal failure, recovered, resolved.



Time spent discussing with the patient, examining the patient, exam 
face-to-face, placing order, discussing with staff and other consulting include 
Primary 45 minutes.

JONY

DD:  02/23/2021 21:54:26   Voice ID:  [id number]

DT:  02/24/2021 01:49:49   Report ID:  [id number]

[facility]

## 2021-02-25 LAB
BUN BLD-MCNC: 26 MG/DL (ref 7–18)
GLUCOSE SERPLBLD-MCNC: 109 MG/DL (ref 74–106)
HCT VFR BLD CALC: 29.2 % (ref 36–45)
LYMPHOCYTES # SPEC AUTO: 0.8 K/UL (ref 0.7–4.9)
PMV BLD: 9.2 FL (ref 7.6–11.3)
POTASSIUM SERPL-SCNC: 4 MMOL/L (ref 3.5–5.1)
RBC # BLD: 3.68 M/UL (ref 3.86–4.86)

## 2021-02-25 RX ADMIN — LEVOTHYROXINE SODIUM SCH MG: 75 TABLET ORAL at 11:38

## 2021-02-25 RX ADMIN — PANTOPRAZOLE SODIUM SCH MG: 40 TABLET, DELAYED RELEASE ORAL at 06:00

## 2021-02-25 RX ADMIN — ANTACID TABLETS SCH MG: 500 TABLET, CHEWABLE ORAL at 17:17

## 2021-02-25 RX ADMIN — ANTACID TABLETS SCH: 500 TABLET, CHEWABLE ORAL at 07:30

## 2021-02-25 RX ADMIN — Medication SCH ML: at 20:37

## 2021-02-25 RX ADMIN — ANTACID TABLETS SCH MG: 500 TABLET, CHEWABLE ORAL at 11:39

## 2021-02-25 RX ADMIN — Medication SCH ML: at 09:00

## 2021-02-25 RX ADMIN — AMOXICILLIN AND CLAVULANATE POTASSIUM SCH MG: 500; 125 TABLET, FILM COATED ORAL at 17:16

## 2021-02-25 RX ADMIN — ONDANSETRON PRN MG: 2 INJECTION INTRAMUSCULAR; INTRAVENOUS at 17:53

## 2021-02-25 RX ADMIN — HEPARIN SODIUM SCH: 5000 INJECTION, SOLUTION INTRAVENOUS; SUBCUTANEOUS at 09:00

## 2021-02-25 RX ADMIN — AMOXICILLIN AND CLAVULANATE POTASSIUM SCH MG: 500; 125 TABLET, FILM COATED ORAL at 11:38

## 2021-02-25 RX ADMIN — HEPARIN SODIUM SCH UNIT: 5000 INJECTION, SOLUTION INTRAVENOUS; SUBCUTANEOUS at 01:58

## 2021-02-25 RX ADMIN — ONDANSETRON PRN MG: 2 INJECTION INTRAMUSCULAR; INTRAVENOUS at 08:13

## 2021-02-25 RX ADMIN — MORPHINE SULFATE PRN MG: 2 INJECTION, SOLUTION INTRAMUSCULAR; INTRAVENOUS at 20:36

## 2021-02-25 NOTE — PN
Date of Progress Note:  02/25/2021



Subjective:  The patient was admitted with acute kidney injury, unknown 
etiology.  The patient found to be nephrotic range proteinuria.  The patient was
initiated on dialysis.  The patient is scheduled for kidney biopsy tomorrow as 
an explain nephrotic range of proteinuria with acute kidney injury.



Objective:  Vital Signs:  Blood pressure 140/64, pulse of 96, afebrile. 

Chest:  Clear to auscultation. 

Heart:  S1 and S2.  Systolic murmur. 

Abdomen:  Soft, nontender. 

Extremities:  No edema. 

Neurologic:  Alert, oriented x3.  No focal.



Laboratory Data:  WBC 9.8, H and H 9.5/29.2, platelet of 253.  Sodium 136, 
potassium 4, bicarb 25, BUN 26, creatinine 2.8 this is after dialysis yesterday.
 Calcium 8.5, PC ratio of 9 g.  Rheumatoid factor was positive.  The rest of the
serology and immunology was negative.



Current Medications:  The patient on it's include:

1.   Cetirizine.

2.   IV iron.

3.   Epogen.

4.   Carvedilol 12.5 b.i.d.

5.   Zofran.

6.   Pantoprazole.

7.   Levothyroxine.

8.   Calcitriol.



Assessment/plan:  

1.   Acute kidney injury with nephrotic range of proteinuria, normal sized 
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 
nonsteroidal use.  Again, the patient had nephrotic range proteinuria with acute
kidney injury.  We going to proceed with a biopsy tomorrow.  The patient being 
off aspirin since last Saturday.  We will follow up the biopsy.  The patient is 
going to be okay from the renal standpoint to be discharged after biopsy if 
there is no hematuria for the next 4 hours after biopsy.

2.   Secondary hyperparathyroidism.  We will continue calcitriol.

3.   Acidosis secondary to renal failure, resolved.

4.   Hypertension, controlled, optimal.  Continue current medication.



Time spent discussing with the patient, examining the patient, exam 
face-to-face, placing order, discussing with staff and other consulting include 
Primary 45 minutes.

JONY

DD:  02/25/2021 12:26:46   Voice ID:  [id number]

DT:  02/25/2021 12:47:14   Report ID:  [id number]

GABINO

## 2021-02-25 NOTE — P.PN
Subjective


Date of Service: 02/25/21


Chief Complaint: ENRIQUE, sepsis, volume depletion, suspected cholecystitis, 

metabolic acidemia.


Subjective: No new changes, Doing well


Patient reporting 8/10 left flank pain. Also having sinus drainage with a lot of

phlegm. Also reports nausea. 





Review of Systems


ENT: Nose Discharge, Nose Congestion, As per HPI


Gastrointestinal: Nausea


Genitourinary: Other (flank pain )





Physical Examination





- Vital Signs


Temperature: 97.1 F


Blood Pressure: 161/78


Pulse: 96


Respirations: 16


Pulse Ox (%): 95





- Physical Exam


General: Alert, In no apparent distress, Oriented x3, Cooperative


HEENT: Atraumatic, Normocephalic, PERRLA, Mucous membr. moist/pink, EOMI


Neck: Supple, 2+ carotid pulse no bruit, JVD not distended, No Thyromegaly, No 

LAD


Respiratory: Clear to auscultation bilaterally, Normal air movement


Cardiovascular: No edema, Normal pulses, Regular rate/rhythm, Normal S1 S2, No 

gallops, No rubs, No murmurs


Capillary refill: <2 Seconds


Gastrointestinal: Normal bowel sounds, Soft and benign, Non-distended, No ascite

s, No tenderness, No masses, No rebound, No guarding, Other (tenderness over 

left flank )


Musculoskeletal: No clubbing, No swelling, No contractures, No erythema, No 

tenderness, No warmth


Integumentary: No rashes, No breakdown, No significant lesion, No 

tenderness/swelling, No erythema, No warmth, No cyanosis


Neurological: Normal speech, Normal tone, Cranial nerves 3-12 intact, Normal 

affect





Assessment & Plan





- Plan





Sepsis secondary to UTI and bacteremia:  Sepsis resolved.  Continue with 

Augmentin for 14 day treatment. 


ESRD on hemodialysis with nephrotic range proteinuria:  Dialysis was yesterday, 

renal biopsy planned for Friday.  Positive rheumatoid factor found. FHx of 

lupus. Case management arranging outpatient dialysis for Friday at 3:45pm. 

Continue with Nephrology recommendations.


Cholelithiasis:  Case discussed with surgery.  Anticipate no need for surgical 

intervention.  


CAD:  Hold aspirin in anticipation for renal biopsy on Friday.  Patient on DVT 

prophylaxis.


Hypertension:  Continue medications. 


GERD:  Continue Protonix.


Anemia of chronic disease with iron deficiency:  Patient started on GISELL and will

receive IV iron.  Continue with nephrology recommendations. 


Hypothyroidism:  Continue medication


Discharge Plan: Home


Plan to discharge in: 24 Hours





- Advance Directives


Does patient have a Living Will: No


Does patient have a Durable POA for Healthcare: No





- Code Status/Comfort Care


Code Status Assessed: Yes (full code )


Critical Care: No


Time Spent Managing PTS Care (In Minutes): 55

## 2021-02-26 VITALS — DIASTOLIC BLOOD PRESSURE: 56 MMHG | SYSTOLIC BLOOD PRESSURE: 110 MMHG

## 2021-02-26 VITALS — OXYGEN SATURATION: 94 %

## 2021-02-26 VITALS — TEMPERATURE: 97 F

## 2021-02-26 LAB — INR BLD: 1.08

## 2021-02-26 PROCEDURE — 0T903ZX DRAINAGE OF RIGHT KIDNEY, PERCUTANEOUS APPROACH, DIAGNOSTIC: ICD-10-PCS

## 2021-02-26 RX ADMIN — ANTACID TABLETS SCH: 500 TABLET, CHEWABLE ORAL at 11:30

## 2021-02-26 RX ADMIN — ANTACID TABLETS SCH MG: 500 TABLET, CHEWABLE ORAL at 10:47

## 2021-02-26 RX ADMIN — MORPHINE SULFATE PRN MG: 2 INJECTION, SOLUTION INTRAMUSCULAR; INTRAVENOUS at 14:56

## 2021-02-26 RX ADMIN — PANTOPRAZOLE SODIUM SCH MG: 40 TABLET, DELAYED RELEASE ORAL at 05:46

## 2021-02-26 RX ADMIN — AMOXICILLIN AND CLAVULANATE POTASSIUM SCH MG: 500; 125 TABLET, FILM COATED ORAL at 10:47

## 2021-02-26 RX ADMIN — AMOXICILLIN AND CLAVULANATE POTASSIUM SCH MG: 500; 125 TABLET, FILM COATED ORAL at 17:08

## 2021-02-26 RX ADMIN — HEPARIN SODIUM PRN UNIT: 1000 INJECTION, SOLUTION INTRAVENOUS; SUBCUTANEOUS at 14:32

## 2021-02-26 RX ADMIN — Medication SCH ML: at 10:48

## 2021-02-26 RX ADMIN — Medication SCH ML: at 09:00

## 2021-02-26 RX ADMIN — LEVOTHYROXINE SODIUM SCH MG: 75 TABLET ORAL at 10:47

## 2021-02-26 RX ADMIN — ANTACID TABLETS SCH: 500 TABLET, CHEWABLE ORAL at 16:30

## 2021-02-26 RX ADMIN — CALCITRIOL CAPSULES 0.25 MCG SCH MCG: 0.25 CAPSULE ORAL at 17:07

## 2021-02-26 NOTE — P.DS
Admission Date: 02/17/21


Discharge Date: 02/26/21


Disposition: ROUTINE DISCHARGE


Reason for Admission: ENRIQUE, sepsis, volume depletion, suspected cholecystitis, 

metabolic acidemia.


Consultations: 


nephrology, surgery 


Procedures: 


renal biopsy, dialysis catheter placement 





- Problems


(1) Sepsis secondary to UTI


Current Visit: Yes   Status: Resolved   





(2) UTI (urinary tract infection)


Current Visit: Yes   Status: Acute   


Qualifiers: 


   Urinary tract infection type: acute cystitis   Hematuria presence: without 

hematuria   Qualified Code(s): N30.00 - Acute cystitis without hematuria   





(3) Hypothyroidism


Current Visit: Yes   Status: Chronic   


Qualifiers: 


   Hypothyroidism type: acquired   Qualified Code(s): E03.9 - Hypothyroidism, 

unspecified   





(4) ESRD (end stage renal disease) on dialysis


Current Visit: Yes   Status: Acute   





(5) Cholelithiasis


Current Visit: Yes   Status: Acute   


Qualifiers: 


   Cholelithiasis location: gallbladder   Cholecystitis presence: with 

cholecystitis   Cholecystitis acuity: unspecified acuity   Biliary obstruction: 

without biliary obstruction   Qualified Code(s): K80.10 - Calculus of 

gallbladder with chronic cholecystitis without obstruction   





(6) CAD (coronary artery disease)


Current Visit: Yes   Status: Chronic   


Qualifiers: 


   Coronary Disease-Associated Artery/Lesion type: native artery   Native vs. 

transplanted heart: native heart   Associated angina: without angina   Qualified

Code(s): I25.10 - Atherosclerotic heart disease of native coronary artery 

without angina pectoris   





(7) Hypertension


Current Visit: Yes   Status: Chronic   


Qualifiers: 


   Hypertension type: essential hypertension   Qualified Code(s): I10 - 

Essential (primary) hypertension   





(8) GERD (gastroesophageal reflux disease)


Current Visit: Yes   Status: Chronic   


Qualifiers: 


   Esophagitis presence: esophagitis presence not specified   Qualified Code(s):

K21.9 - Gastro-esophageal reflux disease without esophagitis   





(9) Rheumatoid factor positive


Current Visit: Yes   Status: Acute   





(10) Anemia of chronic disease


Current Visit: Yes   Status: Chronic   


Brief History of Present Illness: 





76 yo female pt with CAD, HTN, GERd, and hypothyroidism here for nausea vomiting

and severe diffuse abdominal pain and unable to tolerate food and water for the 

past 2 weeks. she reported fever chills and malaise. Labs revealed severe ENRIQUE 

with creatinine of 8.4, metabolic acidemia with bicarb of 7, elevated WBC of 35 

and anemia. she was deemed to be septic and she was started on empiric 

antibiotics and IV fluid as per sepsis protocol. she was admitted for inpt care.

Imaging showed sludge and small stones in the gallbladder depicting gallstone 

cholecystitis.


Hospital Course: 


Sepsis secondary to UTI and bacteremia, urine/blood cultures positive for E 

coli:  Sepsis resolved and repeat blood cultures and urine cultures were negat

naveed. Patient is on day 5 of 14 days of Augmentin and will go home with Augmentin


ESRD on hemodialysis, nephrotic range proteinuria, positive rheumatoid factor: 

Patient was noted to have nephrotic range proteinuria. Also found to have 

positive rheumatoid factor and family history of lupus. Workup pending.  

Nephrology was consulted and patient had a renal biopsy done on 2/26. Results 

will be discussed on an outpatient basis. Dialysis catheter was placed by 

surgery and patient has been receiving dialysis while admitted. Case management 

has set up outpatient dialysis on a MW schedule. Patient also started on 

calcitriol for secondary hyperparathyroidism. 


Cholelithiasis: Saw surgery. Will wait to do anything surgically until patient 

is medically stable. Will be addressed outpatient with possible elective 

cholecystectomy. 


CAD:  Aspirin was held for renal biopsy and  can be resumed outpatient. 


Hypertension:  Patient's blood pressure currently stable on new carvedilol dose.

Will discontinue norvasc. Will discontinue hydrochlorothiazide per nephrology 

recommendations. 


GERD:  Protonix was held per nephrology recommendations. Can restart outpatient.


Anemia of chronic disease with iron deficiency:  Patient received IV iron and 

GISELL while admitted. 


Hypothyroidism:  Continue medication


Time Spent Managing Pts Care (In Minutes): 55





Vital Signs/Physical Exam: 














Temp Pulse Resp BP Pulse Ox


 


 96.9 F   81   16   120/56 L  97 


 


 02/26/21 08:00  02/26/21 08:00  02/26/21 08:00  02/26/21 08:00  02/26/21 08:00








General: Alert, In no apparent distress, Oriented x3, Cooperative


HEENT: Atraumatic, Normocephalic, PERRLA, Mucous membr. moist/pink, EOMI, 

Sclerae nonicteric


Neck: Supple, 2+ carotid pulse no bruit, JVD not distended, No Thyromegaly, No 

LAD


Respiratory: Clear to auscultation bilaterally, Normal air movement


Cardiovascular: No edema, Normal pulses, Regular rate/rhythm, Normal S1 S2, No 

gallops, No rubs, No murmurs


Capillary refill: <2 Seconds


Gastrointestinal: Normal bowel sounds, Soft and benign, Non-distended, No 

ascites, No tenderness, No masses, No rebound, No guarding


Musculoskeletal: No clubbing, No swelling, No contractures, No erythema, No 

tenderness, No warmth


Integumentary: No rashes, No breakdown, No significant lesion, No 

tenderness/swelling, No erythema, No warmth, No cyanosis


Neurological: Normal speech, Normal tone, Sensation intact, Cranial nerves 3-12 

intact, Normal affect


Urinary: Dialysis catheter


Laboratory Data at Discharge: 














WBC  9.80 K/uL (4.3-10.9)   02/25/21  05:10    


 


Hgb  9.5 g/dL (12.0-15.0)  L  02/25/21  05:10    


 


Hct  29.2 % (36.0-45.0)  L  02/25/21  05:10    


 


Plt Count  253 K/uL (152-406)   02/25/21  05:10    


 


PT  12.4 SECONDS (9.5-12.5)   02/26/21  08:43    


 


INR  1.08   02/26/21  08:43    


 


APTT  23.7 SECONDS (24.3-36.9)  L  02/26/21  08:43    


 


Sodium  136 mmol/L (136-145)   02/25/21  05:10    


 


Potassium  4.0 mmol/L (3.5-5.1)   02/25/21  05:10    


 


BUN  26 mg/dL (7-18)  H  02/25/21  05:10    


 


Creatinine  2.89 mg/dL (0.55-1.3)  H  02/25/21  05:10    


 


Glucose  109 mg/dL ()  H  02/25/21  05:10    


 


Uric Acid  18.5 mg/dL (2.6-6.0)  H  02/19/21  05:29    


 


Phosphorus  4.0 mg/dL (2.5-4.9)   02/24/21  05:57    


 


Magnesium  2.3 mg/dL (1.8-2.4)   02/23/21  07:40    


 


Total Bilirubin  0.7 mg/dL (0.2-1.0)   02/19/21  05:29    


 


AST  23 U/L (15-37)   02/19/21  05:29    


 


ALT  16 U/L (12-78)   02/19/21  05:29    


 


Alkaline Phosphatase  779 U/L ()  H  02/19/21  05:29    


 


Lipase  545 U/L ()  H  02/17/21  13:46    








Home Medications: 








Levothyroxine Sodium [Unithroid] 75 mcg PO DAILY 02/17/21 


Omeprazole [Prilosec] 40 mg PO DAILY 02/17/21 


Amox/Clavulanate [Augmentin 500-125 mg Tab*] 250 mg PO BIDWM #18 tab 02/26/21 


Calcitrol [Rocaltrol*] 0.25 mcg PO Q48H #15 cap 02/26/21 


Calcium Carbonate [Tums Regular*] 500 mg PO AC #30 tab 02/26/21 


Epoetin [Retacrit] 4,000 unit IV EVERY HD  vial 02/26/21 


Pantoprazole [Protonix Tab*] 40 mg PO DAILYAC  tab 02/26/21 


carvediloL [Coreg*] 12.5 mg PO BID 6AM 6PM 30 Days  tab 02/26/21 





New Medications: 


Amox/Clavulanate [Augmentin 500-125 mg Tab*] 250 mg PO BIDWM #18 tab


carvediloL [Coreg*] 12.5 mg PO BID 6AM 6PM 30 Days  tab


Calcitrol [Rocaltrol*] 0.25 mcg PO Q48H #15 cap


Calcium Carbonate [Tums Regular*] 500 mg PO AC #30 tab


Physician Discharge Instructions: 


STOP taking hydrochlorthiazide and norvasc. Continue to monitor blood pressure 

at home. Follow up with cardiologist.


Follow up with nephrology outpatient for renal biopsy results. Hemodialysis 

scheduled for Monday, Wednesday, and Fridays.


Follow up with surgery outpatient for possible elective removal of gallbladder.


Take all doses of Augmentin (the antibiotic) even if feeling better. 


Diet: Regular


Followup: 


NONE,NONE [Primary Care Provider] - 


Time spent managing pt's care (in minutes): 70

## 2021-02-26 NOTE — RAD REPORT
EXAM DESCRIPTION:  CT - Renal Biopsy CT - 2/26/2021 10:21 am

 

CLINICAL HISTORY:  ENRIQUE

 

COMPARISON:  Abdomen Exam Limited dated 2/17/2021; Chest Abd Pelvis Wo Con dated 2/17/2021

 

TECHNIQUE:  Patient presents for image guided renal biopsy due to abnormal renal function.

 

The procedure, risks and alternatives to the procedure were discussed with the patient in detail. Aft
er answering all questions both oral and written consent were obtained. Time out procedure was perfor
med.

 

The patient had no contraindicated allergy or medication history. Patient had been sufficiently off a
nticoagulation to allow the procedure to proceed.

 

IV access and physiologic monitors were in place. Prior imaging studies were reviewed. The patient wa
s placed prone on the CT table. Preliminary images were obtained in identified lower pole right kidne
y access site.

 

The patient was pre-medicated with 1 milligram Versed and 100 micrograms fentanyl.

 

Posterior right flank skin was prepped and draped in the usual sterile fashion. Skin and deeper tissu
es were anesthetized with 1% lidocaine. Under CT guidance a 17 gauge introducer needle was advanced. 
Tip was placed at the posteroinferior margin of the right kidney. An 18 gauge 2 centimeter long biops
y needle was advanced through through the introducer needle. There were two 2 centimeter long core bi
opsies obtained. All obtained material was given to pathology for histologic assessment. Introducer n
eedle was removed. Direct pressure was applied to the puncture site. Post biopsy imaging showed only 
minimal amounts of hemorrhage along the posterior margin of the kidney.

 

Vital signs were monitored throughout the procedure. Patient was stable throughout the procedure.

 

Conscious sedation time was 45 minutes.

 

IMPRESSION:  CT-guided biopsy of the right kidney was performed as detailed.

 

Patient tolerated procedure well and there were no immediate complications. Patient was transferred b
[facility] to the floor for continued care.

## 2021-03-03 ENCOUNTER — HOSPITAL ENCOUNTER (EMERGENCY)
Dept: HOSPITAL 97 - ER | Age: 76
Discharge: HOME | End: 2021-03-03
Payer: COMMERCIAL

## 2021-03-03 DIAGNOSIS — Z88.5: ICD-10-CM

## 2021-03-03 DIAGNOSIS — R11.2: Primary | ICD-10-CM

## 2021-03-03 DIAGNOSIS — I12.0: ICD-10-CM

## 2021-03-03 DIAGNOSIS — Z99.2: ICD-10-CM

## 2021-03-03 DIAGNOSIS — N18.6: ICD-10-CM

## 2021-03-03 LAB
ALBUMIN SERPL BCP-MCNC: 2.7 G/DL (ref 3.4–5)
ALP SERPL-CCNC: 326 U/L (ref 45–117)
ALT SERPL W P-5'-P-CCNC: 17 U/L (ref 12–78)
AST SERPL W P-5'-P-CCNC: 23 U/L (ref 15–37)
BUN BLD-MCNC: 31 MG/DL (ref 7–18)
GLUCOSE SERPLBLD-MCNC: 93 MG/DL (ref 74–106)
HCT VFR BLD CALC: 29 % (ref 36–45)
LIPASE SERPL-CCNC: 898 U/L (ref 73–393)
LYMPHOCYTES # SPEC AUTO: 0.9 K/UL (ref 0.7–4.9)
PMV BLD: 9.6 FL (ref 7.6–11.3)
POTASSIUM SERPL-SCNC: 5.7 MMOL/L (ref 3.5–5.1)
RBC # BLD: 3.67 M/UL (ref 3.86–4.86)

## 2021-03-03 PROCEDURE — 85025 COMPLETE CBC W/AUTO DIFF WBC: CPT

## 2021-03-03 PROCEDURE — 96365 THER/PROPH/DIAG IV INF INIT: CPT

## 2021-03-03 PROCEDURE — 87088 URINE BACTERIA CULTURE: CPT

## 2021-03-03 PROCEDURE — 36415 COLL VENOUS BLD VENIPUNCTURE: CPT

## 2021-03-03 PROCEDURE — 87086 URINE CULTURE/COLONY COUNT: CPT

## 2021-03-03 PROCEDURE — 96375 TX/PRO/DX INJ NEW DRUG ADDON: CPT

## 2021-03-03 PROCEDURE — 99284 EMERGENCY DEPT VISIT MOD MDM: CPT

## 2021-03-03 PROCEDURE — 74177 CT ABD & PELVIS W/CONTRAST: CPT

## 2021-03-03 PROCEDURE — 83690 ASSAY OF LIPASE: CPT

## 2021-03-03 PROCEDURE — 96366 THER/PROPH/DIAG IV INF ADDON: CPT

## 2021-03-03 PROCEDURE — 80076 HEPATIC FUNCTION PANEL: CPT

## 2021-03-03 PROCEDURE — 93005 ELECTROCARDIOGRAM TRACING: CPT

## 2021-03-03 PROCEDURE — 81003 URINALYSIS AUTO W/O SCOPE: CPT

## 2021-03-03 PROCEDURE — 80048 BASIC METABOLIC PNL TOTAL CA: CPT

## 2021-03-03 PROCEDURE — 81015 MICROSCOPIC EXAM OF URINE: CPT

## 2021-03-03 NOTE — RAD REPORT
EXAM DESCRIPTION:  CT - Abdomen   Pelvis W Contrast - 3/3/2021 12:36 pm

 

CLINICAL HISTORY:   Abdominal pain.

 

COMPARISON:  2/17/2021

 

TECHNIQUE:  Computed axial tomography of the abdomen and pelvis was obtained. 100 cc Isovue-300 is ad
ministered intravenously. Oral contrast was given.

 

All CT scans are performed using dose optimization technique as appropriate and may include automated
 exposure control or mA/KV adjustment according to patient size.

 

FINDINGS:

The liver, spleen, pancreas, adrenals and left kidney appear grossly normal.

 

3 x 2 centimeter subacute hematoma abuts the inferior aspect of the right kidney.

 

Borderline gallbladder distention

 

Hysterectomy

 

The appendix is normal caliber. There is no evidence of diverticulitis

 

Small hiatal hernia

 

IMPRESSION:  3 x 2 centimeter subacute hematoma abuts the inferior aspect of the right kidney. Patien
t is status post renal biopsy.

 

Borderline gallbladder distention

## 2021-03-04 VITALS — SYSTOLIC BLOOD PRESSURE: 137 MMHG | DIASTOLIC BLOOD PRESSURE: 50 MMHG | OXYGEN SATURATION: 99 % | TEMPERATURE: 98 F

## 2021-03-28 ENCOUNTER — HOSPITAL ENCOUNTER (INPATIENT)
Dept: HOSPITAL 97 - ER | Age: 76
LOS: 2 days | Discharge: HOME | DRG: 689 | End: 2021-03-30
Attending: FAMILY MEDICINE | Admitting: INTERNAL MEDICINE
Payer: COMMERCIAL

## 2021-03-28 VITALS — BODY MASS INDEX: 28.4 KG/M2

## 2021-03-28 DIAGNOSIS — Z88.5: ICD-10-CM

## 2021-03-28 DIAGNOSIS — Z99.2: ICD-10-CM

## 2021-03-28 DIAGNOSIS — D63.1: ICD-10-CM

## 2021-03-28 DIAGNOSIS — E87.6: ICD-10-CM

## 2021-03-28 DIAGNOSIS — K44.9: ICD-10-CM

## 2021-03-28 DIAGNOSIS — I25.10: ICD-10-CM

## 2021-03-28 DIAGNOSIS — Z90.710: ICD-10-CM

## 2021-03-28 DIAGNOSIS — K52.9: ICD-10-CM

## 2021-03-28 DIAGNOSIS — B96.89: ICD-10-CM

## 2021-03-28 DIAGNOSIS — D50.9: ICD-10-CM

## 2021-03-28 DIAGNOSIS — I12.0: ICD-10-CM

## 2021-03-28 DIAGNOSIS — Z20.822: ICD-10-CM

## 2021-03-28 DIAGNOSIS — N18.6: ICD-10-CM

## 2021-03-28 DIAGNOSIS — E44.0: ICD-10-CM

## 2021-03-28 DIAGNOSIS — K21.9: ICD-10-CM

## 2021-03-28 DIAGNOSIS — N17.9: ICD-10-CM

## 2021-03-28 DIAGNOSIS — Z79.890: ICD-10-CM

## 2021-03-28 DIAGNOSIS — Z79.899: ICD-10-CM

## 2021-03-28 DIAGNOSIS — N39.0: Primary | ICD-10-CM

## 2021-03-28 LAB
ALBUMIN SERPL BCP-MCNC: 2.1 G/DL (ref 3.4–5)
ALP SERPL-CCNC: 295 U/L (ref 45–117)
ALT SERPL W P-5'-P-CCNC: 10 U/L (ref 12–78)
AST SERPL W P-5'-P-CCNC: 14 U/L (ref 15–37)
BLD SMEAR INTERP: (no result)
BUN BLD-MCNC: 12 MG/DL (ref 7–18)
GLUCOSE SERPLBLD-MCNC: 80 MG/DL (ref 74–106)
HCT VFR BLD CALC: 24.9 % (ref 36–45)
LIPASE SERPL-CCNC: 110 U/L (ref 73–393)
LYMPHOCYTES # SPEC AUTO: 0.5 K/UL (ref 0.7–4.9)
MORPHOLOGY BLD-IMP: (no result)
PMV BLD: 7.8 FL (ref 7.6–11.3)
POTASSIUM SERPL-SCNC: 3.4 MMOL/L (ref 3.5–5.1)
RBC # BLD: 3.08 M/UL (ref 3.86–4.86)

## 2021-03-28 PROCEDURE — 81003 URINALYSIS AUTO W/O SCOPE: CPT

## 2021-03-28 PROCEDURE — 96375 TX/PRO/DX INJ NEW DRUG ADDON: CPT

## 2021-03-28 PROCEDURE — 99285 EMERGENCY DEPT VISIT HI MDM: CPT

## 2021-03-28 PROCEDURE — 80076 HEPATIC FUNCTION PANEL: CPT

## 2021-03-28 PROCEDURE — 36415 COLL VENOUS BLD VENIPUNCTURE: CPT

## 2021-03-28 PROCEDURE — 80048 BASIC METABOLIC PNL TOTAL CA: CPT

## 2021-03-28 PROCEDURE — 87077 CULTURE AEROBIC IDENTIFY: CPT

## 2021-03-28 PROCEDURE — 80053 COMPREHEN METABOLIC PANEL: CPT

## 2021-03-28 PROCEDURE — 74176 CT ABD & PELVIS W/O CONTRAST: CPT

## 2021-03-28 PROCEDURE — 85025 COMPLETE CBC W/AUTO DIFF WBC: CPT

## 2021-03-28 PROCEDURE — 87186 SC STD MICRODIL/AGAR DIL: CPT

## 2021-03-28 PROCEDURE — 83690 ASSAY OF LIPASE: CPT

## 2021-03-28 PROCEDURE — 71045 X-RAY EXAM CHEST 1 VIEW: CPT

## 2021-03-28 PROCEDURE — 87086 URINE CULTURE/COLONY COUNT: CPT

## 2021-03-28 PROCEDURE — 96365 THER/PROPH/DIAG IV INF INIT: CPT

## 2021-03-28 PROCEDURE — 87088 URINE BACTERIA CULTURE: CPT

## 2021-03-28 PROCEDURE — 83735 ASSAY OF MAGNESIUM: CPT

## 2021-03-28 PROCEDURE — 81015 MICROSCOPIC EXAM OF URINE: CPT

## 2021-03-28 PROCEDURE — 76377 3D RENDER W/INTRP POSTPROCES: CPT

## 2021-03-28 RX ADMIN — HEPARIN SODIUM SCH UNIT: 5000 INJECTION, SOLUTION INTRAVENOUS; SUBCUTANEOUS at 21:19

## 2021-03-28 RX ADMIN — MORPHINE SULFATE PRN MG: 2 INJECTION, SOLUTION INTRAMUSCULAR; INTRAVENOUS at 19:48

## 2021-03-28 RX ADMIN — ONDANSETRON PRN MG: 2 INJECTION INTRAMUSCULAR; INTRAVENOUS at 19:49

## 2021-03-28 RX ADMIN — Medication SCH ML: at 20:29

## 2021-03-28 RX ADMIN — FAMOTIDINE SCH MG: 20 TABLET, FILM COATED ORAL at 22:20

## 2021-03-28 NOTE — EDPHYS
Physician Documentation                                                                           

 [facility]                                                                 

Name: Sydney Smith                                                                             

Age: 75 yrs                                                                                       

Sex: Female                                                                                       

: 1945                                                                                   

MRN: [id number]                                                                                   

Arrival Date: 2021                                                                          

Time: 13:21                                                                                       

Account#: [id number]                                                                            

Bed 18                                                                                            

Private MD:                                                                                       

ED Physician Kevon Herr                                                                         

HPI:                                                                                              

                                                                                             

13:24 This 75 yrs old  Female presents to ER via Un[facility] with complaints of left  rn  

      flank pain and abd pain.                                                                    

13:24 The patient complains of pain in the left mid back. The pain radiates to the abdomen.   rn  

      Onset: The symptoms/episode began/occurred yesterday. Modifying factors: The symptoms       

      are alleviated by nothing. the symptoms are aggravated by nothing. Associated signs and     

      symptoms: Pertinent positives: dysuria, urinary frequency, nausea, Pertinent negatives:     

      fever. Severity of pain: At its worst the pain was moderate in the emergency department     

      the pain is unchanged. The patient has experienced similar episodes in the past. The        

      patient has not recently seen a physician.                                                  

                                                                                                  

Historical:                                                                                       

- Allergies:                                                                                      

13:26 Codeine;                                                                                bw  

13:26 Morphine;                                                                               bw  

- PMHx:                                                                                           

13:26 Dialysis, M,W,F; ESRD; GALLSTONES; Hypertension; Kidney stones;                         bw  

                                                                                                  

- Immunization history:: Adult Immunizations up to date.                                          

- Social history:: Smoking status: Patient denies any tobacco usage or history of.                

- Family history:: not pertinent.                                                                 

- Hospitalizations: : No recent hospitalization is reported.                                      

                                                                                                  

                                                                                                  

ROS:                                                                                              

13:24 Constitutional: Negative for fever, chills, and weight loss, Eyes: Negative for injury, rn  

      pain, redness, and discharge, Neck: Negative for injury, pain, and swelling,                

      Cardiovascular: Negative for chest pain, palpitations, and edema, Respiratory: Negative     

      for shortness of breath, cough, wheezing, and pleuritic chest pain, Abdomen/GI: + lower     

      abd pain and nausea Back: + left flank pain : + dysuria and increased frequency           

      MS/Extremity: Negative for injury and deformity, Skin: Negative for injury, rash, and       

      discoloration, Neuro: Negative for headache, weakness, numbness, tingling, and seizure.     

                                                                                                  

Exam:                                                                                             

13:24 Constitutional:  Overweight woman, moaning Head/Face:  Normocephalic, atraumatic. Eyes: rn  

       Periorbital areas with no swelling, redness, or edema. Chest/axilla:  + left chest         

      with dialysis catheter Cardiovascular:  Regular rate and rhythm.  No pulse deficits.        

      Respiratory:  + tachypnea Abdomen/GI:  soft, + suprapubic and LLQ tenderness, no            

      rebound or masses Skin:  Warm, dry MS/ Extremity:  Pulses equal, no cyanosis. Neuro:        

      Awake and alert, GCS 15                                                                     

                                                                                                  

Vital Signs:                                                                                      

13:21  / 61; Pulse 86; Resp 24; Temp 97.8; Pulse Ox 100% on R/A;                        bw  

15:01  / 62; Pulse 72; Resp 18; Pulse Ox 97% ;                                          bw  

16:30  / 41; Pulse 82; Resp 18; Pulse Ox 98% ;                                          bw  

19:57  / 43; Pulse 89; Resp 17; Temp 98; Pulse Ox 100% on R/A;                          rv  

                                                                                                  

MDM:                                                                                              

13:21 Patient medically screened.                                                             rn  

16:10 Differential diagnosis: nephrolithiasis, pyelonephritis, UTI, diverticulitis.           rn  

      Differential diagnosis: pancreatitis, ruptured AAA, dissecting AAA. Data reviewed:          

      vital signs, nurses notes. Counseling: I had a detailed discussion with the patient         

      and/or guardian regarding: the historical points, exam findings, and any diagnostic         

      results supporting the discharge/admit diagnosis, lab results, radiology results, the       

      need for further work-up and treatment in the hospital. Response to treatment: the          

      patient's symptoms have mildly improved after treatment, and as a result, I will admit      

      patient. Admission orders: after a detailed discussion of the patient's condition and       

      case, the admit orders are written by me. ED course: Pt improved after pain medication,     

      ct shows cystitis, urine confirms that, vitals stable, still with nausea and concerned      

      she would bounce back,  and patient do not feel good about going home, will          

      admit to Dr. Krueger for IV abx and is supposed to get dialysis tomorrow. .                

                                                                                                  

                                                                                             

13:22 Order name: Basic Metabolic Panel                                                       rn  

                                                                                             

13:22 Order name: CBC with Diff                                                               rn  

                                                                                             

13:22 Order name: Hepatic Function                                                            rn  

                                                                                             

13:22 Order name: Lipase                                                                      rn  

                                                                                             

13:22 Order name: Urine Culture                                                               rn  

                                                                                             

13:22 Order name: Urine Microscopic Only; Complete Time: 16:00                                rn  

                                                                                             

13:23 Order name: Basic Metabolic Panel; Complete Time: 16:00                                 EDMS

                                                                                             

13:23 Order name: CBC with Automated Diff                                                     EDMS

                                                                                             

13:23 Order name: Liver (Hepatic) Function; Complete Time: 16:00                              EDMS

                                                                                             

13:23 Order name: Lipase; Complete Time: 16:00                                                EDMS

                                                                                             

14:58 Order name: Urine Dipstick--Ancillary (enter results); Complete Time: 15:29             eb  

                                                                                             

16:20 Order name: COVID-19 : Document "Date of Symptom Onset" if Symptomatic.                 sv  

                                                                                             

16:56 Order name: CBC Smear Scan                                                              EDMS

                                                                                             

13:22 Order name: IV Saline Lock; Complete Time: 13:57                                        rn  

                                                                                             

13:22 Order name: Labs collected and sent; Complete Time: 15:47                               rn  

                                                                                             

13:22 Order name: CT Stone Protocol; Complete Time: 14:12                                     rn  

                                                                                             

13:22 Order name: Urine Dipstick-Ancillary (obtain specimen); Complete Time: 15:41            rn  

                                                                                             

17:18 Order name: SARS-COV-2 RT PCR                                                           EDMS

                                                                                             

18:06 Order name: CONS Physician Consult                                                      EDMS

                                                                                             

18:06 Order name: Renal                                                                       EDMS

                                                                                             

18:06 Order name: CBC with Automated Diff                                                     EDMS

                                                                                             

18:06 Order name: CBC with Automated Diff                                                     [facility]

                                                                                             

18:06 Order name: Comprehensive Metabolic Panel                                               EDMS

                                                                                             

18:06 Order name: Comprehensive Metabolic Panel                                               [facility]

                                                                                                  

Administered Medications:                                                                         

13:55 Drug: Demerol 25 mg Route: IVP; Site: right antecubital;                                bw  

16:48 Follow up: Response: No adverse reaction                                                bw  

13:56 Drug: Zofran (Ondansetron) 4 mg Route: IVP; Site: right antecubital;                    bw  

16:48 Follow up: Response: No adverse reaction                                                bw  

16:00 Drug: Rocephin (cefTRIAXone) 1 grams Route: IV; Rate: calculated rate; Site: right      bw  

      antecubital;                                                                                

16:32 Follow up: IV Status: Completed infusion                                                bw  

16:47 Follow up: Response: No adverse reaction                                                bw  

                                                                                                  

                                                                                                  

Disposition:                                                                                      

21 16:12 Hospitalization ordered by Anjana Krueger for Observation. Preliminary            

  diagnosis are Urinary tract infection, site not specified, End stage renal                      

  disease.                                                                                        

- Bed requested for Telemetry/MedSurg (observation).                                              

- Status is Observation.                                                                      rv  

- Condition is Stable.                                                                            

- Problem is new.                                                                                 

- Symptoms have improved.                                                                         

                                                                                                  

                                                                                                  

                                                                                                  

Signatures:                                                                                       

Dispatcher [employer]                           EDMS                                                 

Meka Jimenes, RN                        Kevon Resendiz MD MD rn Vicente, Ronaldo, RN RN   rv                                                   

Jalil, KIM Rocha RN                                                      

                                                                                                  

Corrections: (The following items were deleted from the chart)                                    

16:36 16:20 CORONAVIRUS ordered. EDMS                                                         EDMS

19:06 16:12 Hospitalization Ordered by Anjana Krueger MD for Observation. Preliminary           

      diagnosis is Urinary tract infection, site not specified; End stage renal disease. Bed      

      requested for Telemetry/MedSurg (observation). Status is Observation. Condition is          

      Stable. Problem is new. Symptoms have improved. rn                                          

19:58 19:06 2021 16:12 Hospitalization Ordered by Anjana Krueger MD for Observation.    rv  

      Preliminary diagnosis is Urinary tract infection, site not specified; End stage renal       

      disease. Bed requested for Telemetry/MedSurg (observation). Status is Observation.          

      Condition is Stable. Problem is new. Symptoms have improved. mw                             

                                                                                                  

**************************************************************************************************

## 2021-03-28 NOTE — RAD REPORT
EXAM DESCRIPTION:  CT - Stone Protocol - 3/28/2021 1:57 pm

 

CLINICAL HISTORY:  Abdominal pain.

 

COMPARISON:  March 3, 2021

 

TECHNIQUE:  Computed axial tomography of the abdomen pelvis was obtained without oral or IV contrast.
 Lack of IV and oral contrast limits evaluation of solid organs, bowel, and vessels. Coronal reformat
shahriar images were obtained and reviewed.

 

All CT scans are performed using dose optimization technique as appropriate and may include automated
 exposure control or mA/KV adjustment according to patient size.

 

FINDINGS:  A renal calculus is not seen. An ureteral calculus is not noted. A bladder calculus is not
 present. No hydronephrosis. The small right perirenal hematoma has mostly resolved. Air is present w
ithin the bladder.

 

The liver, spleen, pancreas and adrenals appear grossly normal

 

There is no evidence of diverticulitis.

 

Mild gallbladder distention. Small umbilical hernia. Trace amount of pelvic ascites

 

. Small hiatal hernia

 

IMPRESSION:  Negative for a genitourinary calculus

 

Air within the bladder may be secondary to recent instrumentation. Infection can also result this aria
earance.

 

Mild gallbladder distention

## 2021-03-28 NOTE — ER
Nurse's Notes                                                                                     

 [facility]                                                                 

Name: Sydney Smith                                                                             

Age: 75 yrs                                                                                       

Sex: Female                                                                                       

: 1945                                                                                   

MRN: [id number]                                                                                   

Arrival Date: 2021                                                                          

Time: 13:21                                                                                       

Account#: [id number]                                                                            

Bed 18                                                                                            

Private MD:                                                                                       

Diagnosis: Urinary tract infection, site not specified;End stage renal disease                    

                                                                                                  

Presentation:                                                                                     

                                                                                             

13:21 Chief complaint: EMS states: pt presents with severe abd pain beginning this AM.        bw  

      Urinary pain with upper and lower abd pain. N/V. Coronavirus screen: At this time,          

      unable to obtain information related to travel outside the [place] At this time, the           

      client does not indicate any symptoms associated with coronavirus-19. Ebola Screen: No      

      symptoms or risks identified at this time. Initial Sepsis Screen: Does the patient meet     

      any 2 criteria? No. Patient's initial sepsis screen is negative. Does the patient have      

      a suspected source of infection? No. Patient's initial sepsis screen is negative. Risk      

      Assessment: Do you want to hurt yourself or someone else? Patient reports no desire to      

      harm self or others. Onset of symptoms was 2021 at 09:00.                         

13:21 Method Of Arrival: EMS: [facility]                                                      

13:21 Acuity: AYLA 3                                                                           bw  

                                                                                                  

Triage Assessment:                                                                                

13:26 General: Appears distressed, uncomfortable, Behavior is cooperative, appropriate for    bw  

      age. Pain: Complains of pain in abdomen and left mid back. EENT: No signs and/or            

      symptoms were reported regarding the EENT system. Neuro: No deficits noted.                 

      Cardiovascular: No deficits noted. Respiratory: No deficits noted. GI: Abdomen is flat,     

      non-distended, Bowel sounds present X 4 quads. Abd is soft Abdomen is tender to             

      palpation. : Reports inability to void, pain urgency. Derm: No signs and/or symptoms      

      reported regarding the dermatologic system.                                                 

                                                                                                  

Historical:                                                                                       

- Allergies:                                                                                      

13:26 Codeine;                                                                                bw  

13:26 Morphine;                                                                               bw  

- PMHx:                                                                                           

13:26 Dialysis, M,W,F; ESRD; GALLSTONES; Hypertension; Kidney stones;                         bw  

                                                                                                  

- Immunization history:: Adult Immunizations up to date.                                          

- Social history:: Smoking status: Patient denies any tobacco usage or history of.                

- Family history:: not pertinent.                                                                 

- Hospitalizations: : No recent hospitalization is reported.                                      

                                                                                                  

                                                                                                  

Screening:                                                                                        

15:01 Abuse screen: Denies threats or abuse. Nutritional screening: No deficits noted.        bw  

      Tuberculosis screening: No symptoms or risk factors identified. Fall Risk None              

      identified.                                                                                 

                                                                                                  

Assessment:                                                                                       

13:30 GI:.                                                                                    bw  

15:01 Reassessment: Patient appears in no apparent distress at this time. Patient and/or      bw  

      family updated on plan of care and expected duration. Pain level reassessed. Patient is     

      alert, oriented x 3, equal unlabored respirations, skin warm/dry/pink. see Triage           

      Assessment.                                                                                 

16:30 Reassessment: Patient appears in no apparent distress at this time. Patient and/or      bw  

      family updated on plan of care and expected duration. Pain level reassessed. Patient is     

      alert, oriented x 3, equal unlabored respirations, skin warm/dry/pink. no needs or          

      concerns voiced at this time.                                                               

                                                                                                  

Vital Signs:                                                                                      

13:21  / 61; Pulse 86; Resp 24; Temp 97.8; Pulse Ox 100% on R/A;                        bw  

15:01  / 62; Pulse 72; Resp 18; Pulse Ox 97% ;                                          bw  

16:30  / 41; Pulse 82; Resp 18; Pulse Ox 98% ;                                          bw  

19:57  / 43; Pulse 89; Resp 17; Temp 98; Pulse Ox 100% on R/A;                          rv  

                                                                                                  

ED Course:                                                                                        

13:21 Patient arrived in ED.                                                                  bw  

13:21 Kevon Herr MD is Attending Physician.                                                rn  

13:26 Triage completed.                                                                       bw  

13:47 Arm band placed on right wrist.                                                         bw  

13:52 Josefina Jimenes, RN is Primary Nurse.                                                     bw  

13:57 CT Stone Protocol In Process Unspecified.                                               EDMS

15:01 Patient has correct armband on for positive identification. Bed in low position. Call   bw  

      light in reach. Side rails up X2. Pulse ox on. NIBP on. Warm blanket given.                 

15:01 No provider procedures requiring assistance completed. Inserted saline lock: 20 gauge   bw  

      in right antecubital area, using aseptic technique.                                         

15:47 Basic Metabolic Panel Sent.                                                             bw  

15:47 CBC with Diff Sent.                                                                     bw  

15:47 Hepatic Function Sent.                                                                  bw  

15:47 Lipase Sent.                                                                            bw  

16:11 Anjana Krueger MD is Hospitalizing Provider.                                          rn  

16:28 COVID-19 : Document "Date of Symptom Onset" if Symptomatic. Sent.                       bw  

16:35 COVID swab sent to lab.                                                                 jp3 

19:58 IV is patent, with fluids infusing freely, with good blood return, Patient admitted, IV rv  

      remains in place.                                                                           

                                                                                                  

Administered Medications:                                                                         

13:55 Drug: Demerol 25 mg Route: IVP; Site: right antecubital;                                bw  

16:48 Follow up: Response: No adverse reaction                                                bw  

13:56 Drug: Zofran (Ondansetron) 4 mg Route: IVP; Site: right antecubital;                    bw  

16:48 Follow up: Response: No adverse reaction                                                bw  

16:00 Drug: Rocephin (cefTRIAXone) 1 grams Route: IV; Rate: calculated rate; Site: right      bw  

      antecubital;                                                                                

16:32 Follow up: IV Status: Completed infusion                                                bw  

16:47 Follow up: Response: No adverse reaction                                                  

                                                                                                  

                                                                                                  

Outcome:                                                                                          

16:12 Decision to Hospitalize by Provider.                                                    rn  

19:58 Admitted to Med/surg accompanied by tech, via stretcher, room 229, Other sbar Report    rv  

      called to  ASH ALVAREZ                                                                        

19:58 Condition: good                                                                             

19:58 Instructed on the need for admit.                                                           

19:58 Patient left the ED.                                                                    rv  

                                                                                                  

Signatures:                                                                                       

Dispatcher [employer]                           EDKevon Oliver MD MD rn Vicente, Ronaldo, RN                    RN   Luis Hansen                              3                                                  

Josefina Jimenes RN RN                                                      

                                                                                                  

**************************************************************************************************

## 2021-03-28 NOTE — P.HP
Certification for Inpatient


With expected LOS: >2 Midnights


Patient will require the following post-hospital care: None


Practitioner: I am a practitioner with admitting privileges, knowledge of 

patient current condition, hospital course, and medical plan of care.


Services: Services provided to patient in accordance with Admission requirements

found in Title 42 Section 412.3 of the Code of Federal Regulations





Patient History


Date of Service: 03/28/21


Reason for admission: Abdominal pain 


History of Present Illness: 





75 yr old female with hx of HTN , , Recent ARF on CKD intiated on dialysis since

2 months , on HD MWF , last dialysis was 2 days ago , follows with [facility] , 

Anemia of CKD, presented for worsneing right lower quadrant abdominal pain , 

with nausea and vomiting , 


+ flank pain , no dysuria or hematuria . Denies any recent instrumentation or 

blanca placement except for hospitalization more than  1 month ago  


+fever and chills , no cough 


Noted with cystitis from presumed recent instrumentation on CT abdomen . she is 

being admitted for cystitis with intractable vomiting  


Allergies





codeine Allergy (Verified 02/17/21 18:12)


   Hives





Home Medications: 








Levothyroxine Sodium [Unithroid] 75 mcg PO DAILY 02/17/21 


Omeprazole [Prilosec] 40 mg PO DAILY 02/17/21 


Amox/Clavulanate [Augmentin 500-125 mg Tab*] 250 mg PO BIDWM #18 tab 02/26/21 


Calcitrol [Rocaltrol*] 0.25 mcg PO Q48H #15 cap 02/26/21 


Calcium Carbonate [Tums Regular*] 500 mg PO AC #30 tab 02/26/21 


Epoetin [Retacrit] 4,000 unit IV EVERY HD  vial 02/26/21 


Pantoprazole [Protonix Tab*] 40 mg PO DAILYAC  tab 02/26/21 


carvediloL [Coreg*] 12.5 mg PO BID 6AM 6PM 30 Days  tab 02/26/21 








- Past Medical/Surgical History


-: HTN


-: Knee surgery


-: Hysterectomy





Physical Examination





- Physical Exam


General: Alert, In no apparent distress, Oriented x3


HEENT: Atraumatic, Normocephalic, PERRLA


Respiratory: Clear to auscultation bilaterally, Diminished


Cardiovascular: No edema, Normal pulses, Regular rate/rhythm, Normal S1 S2


Gastrointestinal: Normal bowel sounds, Soft and benign, Non-distended, 

Tenderness (RLQ, no suprapubic tenderness)


Musculoskeletal: No clubbing, No swelling


Integumentary: No rashes, No breakdown


Neurological: Normal gait, Normal speech, Cranial nerves 3-12 intact


External genitalia: No edema, No lesions





- Studies


Laboratory Data (last 24 hrs)





03/28/21 15:20: WBC 7.30, Hgb 8.5 L, Hct 24.9 L, Plt Count 262


03/28/21 15:20: Sodium 137, Potassium 3.4 L, BUN 12, Creatinine 1.94 H, Glucose 

80, Total Bilirubin 0.4, AST 14 L, ALT 10 L, Alkaline Phosphatase 295 H, Lipase 

110





Imagings Data: 


All CT scans are performed using dose optimization technique as appropriate and 

may include automated exposure control or mA/KV adjustment according to patient 

size.


 


FINDINGS:  A renal calculus is not seen. An ureteral calculus is not noted. A 

bladder calculus is not present. No hydronephrosis. The small right perirenal 

hematoma has mostly resolved. Air is present within the bladder.


 


The liver, spleen, pancreas and adrenals appear grossly normal


 


There is no evidence of diverticulitis.


 


Mild gallbladder distention. Small umbilical hernia. Trace amount of pelvic 

ascites


 


. Small hiatal hernia


 


IMPRESSION:  Negative for a genitourinary calculus


 


Air within the bladder may be secondary to recent instrumentation. Infection can

 also result this appearance.


 


Mild gallbladder distention





Assessment and Plan





- Problems (Diagnosis)


(1) ESRD (end stage renal disease) on dialysis


Current Visit: No   Status: Acute   





(2) UTI (urinary tract infection)


Current Visit: No   Status: Acute   


Qualifiers: 


 





(3) Anemia of chronic disease


Current Visit: No   Status: Chronic   





(4) CAD (coronary artery disease)


Current Visit: No   Status: Chronic   


Qualifiers: 


 





(5) Hypertension


Current Visit: No   Status: Chronic   


Qualifiers: 


 





- Advance Directives


Does patient have a Living Will: No


Does patient have a Durable POA for Healthcare: No


Physician Review: Patient Assessed, Agree with Above Assessment and Plan


Physician Review Additional Text: 





PLAN 


-Admit to observation 


-IV anti-emetics as needed 


-start empirical antibiotics with Rocephin


-follow urine cx 


-volume status controlled , 


-follow plan for weaning off dialysis per her renal team 


-Lay john today   


Time Spent Managing Pts Care (In Minutes): 55

## 2021-03-28 NOTE — XMS REPORT
Continuity of Care Document

                            Created on:2021



Patient:PHIL CALLOWAY

Sex:Female

:1945

External Reference #:[id number]





Demographics







                          Address                   [address]



                                                    [place] [address]

 

                          Home Phone                [phone]

 

                          Email Address             [email or web address]

 

                          Preferred Language        English

 

                          Marital Status            Unknown

 

                          Restorationist Affiliation     Unknown

 

                          Race                      Unknown

 

                          Additional Race(s)        Unavailable

 

                          Ethnic Group              Unknown









Author







                          Organization              [facility]

t

 

                          Address                   [address] [place] [address] Dr. Rivera [address] [place] [id number]

 

                          Phone                     [phone]









Care Team Providers







                    Name                Role                Phone

 

                    EZEQUIEL               Attending Clinician Unavailable

 

                    VENANCIO          Admitting Clinician Unavailable









Problems

This patient has no known problems.



Allergies, Adverse Reactions, Alerts

This patient has no known allergies or adverse reactions.



Medications

This patient has no known medications.



Procedures

This patient has no known procedures.



Encounters







        Start   End     Encounter Admission Attending Care    Care    Encounter 

Source



        Date/Time Date/Time Type    Type    Clinicians Facility Department ID   

   

 

        2021 Outpatient         RENATA NIEVES [facility]     [address]

[id number] [place]



        00:00:00 00:00:00                                         [id number]     [address]

i



                                                                        st







Results

This patient has no known results.

## 2021-03-29 VITALS — OXYGEN SATURATION: 93 %

## 2021-03-29 LAB
ALBUMIN SERPL BCP-MCNC: 1.8 G/DL (ref 3.4–5)
ALP SERPL-CCNC: 272 U/L (ref 45–117)
ALT SERPL W P-5'-P-CCNC: 8 U/L (ref 12–78)
AST SERPL W P-5'-P-CCNC: 13 U/L (ref 15–37)
BUN BLD-MCNC: 13 MG/DL (ref 7–18)
GLUCOSE SERPLBLD-MCNC: 77 MG/DL (ref 74–106)
HCT VFR BLD CALC: 22.9 % (ref 36–45)
LYMPHOCYTES # SPEC AUTO: 0.9 K/UL (ref 0.7–4.9)
PMV BLD: 8.1 FL (ref 7.6–11.3)
POTASSIUM SERPL-SCNC: 3.5 MMOL/L (ref 3.5–5.1)
RBC # BLD: 2.82 M/UL (ref 3.86–4.86)

## 2021-03-29 RX ADMIN — ASPIRIN SCH MG: 81 TABLET, COATED ORAL at 09:02

## 2021-03-29 RX ADMIN — ONDANSETRON PRN MG: 2 INJECTION INTRAMUSCULAR; INTRAVENOUS at 23:21

## 2021-03-29 RX ADMIN — MORPHINE SULFATE PRN MG: 2 INJECTION, SOLUTION INTRAMUSCULAR; INTRAVENOUS at 09:03

## 2021-03-29 RX ADMIN — Medication SCH ML: at 20:16

## 2021-03-29 RX ADMIN — PANTOPRAZOLE SODIUM SCH MG: 40 TABLET, DELAYED RELEASE ORAL at 05:36

## 2021-03-29 RX ADMIN — SODIUM CHLORIDE SCH MLS: 0.9 INJECTION, SOLUTION INTRAVENOUS at 23:05

## 2021-03-29 RX ADMIN — SODIUM CHLORIDE SCH MLS: 0.9 INJECTION, SOLUTION INTRAVENOUS at 09:11

## 2021-03-29 RX ADMIN — SODIUM CHLORIDE SCH: 0.9 INJECTION, SOLUTION INTRAVENOUS at 20:20

## 2021-03-29 RX ADMIN — HEPARIN SODIUM SCH UNIT: 5000 INJECTION, SOLUTION INTRAVENOUS; SUBCUTANEOUS at 20:16

## 2021-03-29 RX ADMIN — MORPHINE SULFATE PRN MG: 2 INJECTION, SOLUTION INTRAMUSCULAR; INTRAVENOUS at 23:12

## 2021-03-29 RX ADMIN — HEPARIN SODIUM SCH UNIT: 5000 INJECTION, SOLUTION INTRAVENOUS; SUBCUTANEOUS at 09:02

## 2021-03-29 RX ADMIN — Medication SCH ML: at 09:00

## 2021-03-29 RX ADMIN — CEFTRIAXONE SCH MLS: 1 INJECTION, SOLUTION INTRAVENOUS at 09:02

## 2021-03-29 RX ADMIN — FAMOTIDINE SCH MG: 20 TABLET, FILM COATED ORAL at 09:02

## 2021-03-29 NOTE — P.PN
Subjective


Date of Service: 03/29/21


Chief Complaint: Abdominal pain 


Subjective: Improving, Doing well





Physical Examination





- Vital Signs


Temperature: 97.7 F


Blood Pressure: 100/39


Pulse: 79


Respirations: 20


Pulse Ox (%): 93





- Studies


Laboratory Data (last 24 hrs)





03/28/21 15:20: WBC 7.30, Hgb 8.5 L, Hct 24.9 L, Plt Count 262


03/28/21 15:20: Sodium 137, Potassium 3.4 L, BUN 12, Creatinine 1.94 H, Glucose 

80, Total Bilirubin 0.4, AST 14 L, ALT 10 L, Alkaline Phosphatase 295 H, Lipase 

110








Assessment & Plan


Discharge Plan: Home


Plan to discharge in: 24 Hours


Physician Review Additional Text: 








Physical exam: 


Patient alert, cooperative.  No significant distress noted.  


Heart: Regular rate and rhythm 


Lungs: Clear to auscultation 


Abdomen: Soft nontender nondistended 


Extremities: Good range of motion 





Impression:


UTI, gram-negative rods noted on culture 


End-stage renal disease on hemodialysis 


Anemia of chronic disease 


CAD 





Plan: 


UTI, gram-negative rods noted on culture: Continue antibiotic therapy.  Await 

urine culture results.  Will monitor closely.  Nephrology plans for 12-hour 

urine collection.  Anticipate improvement over the next 24 hours.  Likely home 

tomorrow once culture results have finalized.


End-stage renal disease on hemodialysis: Patient to have dialysis today.  Spoke 

with nephrology.  Nephrology wants 12-hour urine collection.  We will start this

as soon as possible.  Likely home discharge tomorrow.


Anemia of chronic disease: We will monitor closely.


CAD : Continue medication.


Time Spent Managing Pts Care (In Minutes): 55

## 2021-03-29 NOTE — RAD REPORT
EXAM DESCRIPTION:  RAD - Chest Single View - 3/29/2021 10:03 am

 

CLINICAL HISTORY:  htn, shortness of breath

 

COMPARISON:  February 19

 

TECHNIQUE:  AP portable chest image was obtained 3/29/2021 10:03 am .

 

FINDINGS:  Lung volumes are low, similar to comparison. Stranding in each base could be atelectasis, 
scarring or a combination. Pattern is not substantially different. No measurable failure or volume ov
erload findings. Since the prior examination the right-sided dialysis catheter has been replaced with
 a left-sided catheter. Long and short arm of the catheter in the distal SVC.

 

 Heart and vasculature are normal. No measurable pleural effusion and no pneumothorax. No acute bony 
abnormality seen. No acute aortic findings suspected.

 

IMPRESSION:  Chronic lung parenchymal changes are present similar to February 19.

 

No acute cardiopulmonary finding seen.

## 2021-03-30 VITALS — DIASTOLIC BLOOD PRESSURE: 62 MMHG | SYSTOLIC BLOOD PRESSURE: 138 MMHG | TEMPERATURE: 97.3 F

## 2021-03-30 LAB
BUN BLD-MCNC: 15 MG/DL (ref 7–18)
GLUCOSE SERPLBLD-MCNC: 74 MG/DL (ref 74–106)
HCT VFR BLD CALC: 21.6 % (ref 36–45)
LYMPHOCYTES # SPEC AUTO: 0.7 K/UL (ref 0.7–4.9)
MAGNESIUM SERPL-MCNC: 1.7 MG/DL (ref 1.8–2.4)
PMV BLD: 7.8 FL (ref 7.6–11.3)
POTASSIUM SERPL-SCNC: 3.3 MMOL/L (ref 3.5–5.1)
RBC # BLD: 2.64 M/UL (ref 3.86–4.86)

## 2021-03-30 RX ADMIN — HEPARIN SODIUM SCH UNIT: 5000 INJECTION, SOLUTION INTRAVENOUS; SUBCUTANEOUS at 08:45

## 2021-03-30 RX ADMIN — FAMOTIDINE SCH MG: 20 TABLET, FILM COATED ORAL at 07:44

## 2021-03-30 RX ADMIN — PANTOPRAZOLE SODIUM SCH MG: 40 TABLET, DELAYED RELEASE ORAL at 05:59

## 2021-03-30 RX ADMIN — CEFTRIAXONE SCH MLS: 1 INJECTION, SOLUTION INTRAVENOUS at 08:44

## 2021-03-30 RX ADMIN — SODIUM CHLORIDE SCH: 0.9 INJECTION, SOLUTION INTRAVENOUS at 09:40

## 2021-03-30 RX ADMIN — Medication SCH: at 08:45

## 2021-03-30 RX ADMIN — ASPIRIN SCH MG: 81 TABLET, COATED ORAL at 08:44

## 2021-03-30 NOTE — P.DS
Admission Date: 03/30/21


Discharge Date: 03/30/21


Primary Care Provider: none; Nephrology-Dr. Hobson


Disposition: ROUTINE DISCHARGE


Discharge Condition: GOOD


Reason for Admission: Abdominal pain 


Consultations: 





Nephrology-Dr. Hobson





Procedures: 





COVID: 


Negative





CT AB:


FINDINGS:  A renal calculus is not seen. An ureteral calculus is not noted. A 

bladder calculus is not present. No hydronephrosis. The small right perirenal 

hematoma has mostly resolved. Air is present within the bladder. 


The liver, spleen, pancreas and adrenals appear grossly normal 


There is no evidence of diverticulitis. 


Mild gallbladder distention. Small umbilical hernia. Trace amount of pelvic 

ascites 


. Small hiatal hernia 


IMPRESSION:  Negative for a genitourinary calculus 


Air within the bladder may be secondary to recent instrumentation. Infection can

also result this appearance. 


Mild gallbladder distention





CXR: 


FINDINGS:  Lung volumes are low, similar to comparison. Stranding in each base 

could be atelectasis, scarring or a combination. Pattern is not substantially 

different. No measurable failure or volume overload findings. Since the prior 

examination the right-sided dialysis catheter has been replaced with a left-

sided catheter. Long and short arm of the catheter in the distal SVC. 


 Heart and vasculature are normal. No measurable pleural effusion and no 

pneumothorax. No acute bony abnormality seen. No acute aortic findings 

suspected. 


IMPRESSION:  Chronic lung parenchymal changes are present similar to February 19. 


No acute cardiopulmonary finding seen.





Medical Problem List: 


UTI, urine culture positive for Klebseilla oyxtoca


End-stage renal disease on hemodialysis 


Anemia of chronic disease with iron deficiency


CAD 


GERD with Hiatal hernia


Brief History of Present Illness: 





75-year-old  female with history of hypertension, end-stage renal 

disease on hemodialysis, anemia of chronic disease presented with right quadrant

abdominal pain with nausea and vomiting. Flank pain noted. Patient found to have

UTI. Patient was admitted for treatment.


Hospital Course: 





Patient presented with abdominal pain, nausea and vomiting. Patient found to 

have a UTI. Urine culture was positive for Klebsiella. Patient responded to IV 

antibiotic therapy. At discharge she is without significant abdominal pain, 

nausea and vomiting. This has been transitioned to oral medication. At discharge

she will continue with Augmentin 500 mg daily for 5 more days. UTI prevention 

will be provided.





Patient with end-stage renal disease on hemodialysis. 12-hour urine collection 

obtained. Patient will continue with hemodialysis as directed.





Patient with anemia of chronic disease. Iron deficiency also noted. At discharge

patient will continue with multivitamin with iron daily. Recommend to recheck 

CBC in 1 to 2 weeks to monitor her progress. Further adjustment in medication 

can be done by nephrology. At discharge she will continue with her current 

medications of Calcitrol 0.25 mcg daily.





Patient with GERD and hiatal hernia. At discharge she may continue with Prilosec

40 mg daily.





Patient with chronic pain. At discharge she may continue with tramadol 50 mg 3 

times a day as needed for pain.








Vital Signs/Physical Exam: 














Temp Pulse Resp BP Pulse Ox


 


 99.9 F   88   18   134/60   93 


 


 03/30/21 04:00  03/30/21 04:00  03/30/21 04:00  03/30/21 04:00  03/30/21 04:00








General: Alert, In no apparent distress, Oriented x3, Cooperative


HEENT: Atraumatic


Neck: Supple


Respiratory: Clear to auscultation bilaterally, Normal air movement


Cardiovascular: Normal pulses, Regular rate/rhythm


Gastrointestinal: Normal bowel sounds, Soft and benign, Non-distended, No 

tenderness, No masses, No rebound, No guarding


Musculoskeletal: No erythema, No tenderness, No warmth


Integumentary: No tenderness/swelling


Neurological: Normal speech, Normal strength at 5/5 x4 extr, Normal tone, Normal

affect


Laboratory Data at Discharge: 














WBC  8.10 K/uL (4.3-10.9)  D 03/30/21  05:36    


 


Hgb  7.3 g/dL (12.0-15.0)  L*  03/30/21  05:36    


 


Hct  21.6 % (36.0-45.0)  L  03/30/21  05:36    


 


Plt Count  283 K/uL (152-406)   03/30/21  05:36    


 


Sodium  133 mmol/L (136-145)  L  03/30/21  05:36    


 


Potassium  3.3 mmol/L (3.5-5.1)  L  03/30/21  05:36    


 


BUN  15 mg/dL (7-18)   03/30/21  05:36    


 


Creatinine  2.18 mg/dL (0.55-1.3)  H  03/30/21  05:36    


 


Glucose  74 mg/dL ()   03/30/21  05:36    


 


Magnesium  1.7 mg/dL (1.8-2.4)  L D 03/30/21  05:36    


 


Total Bilirubin  0.3 mg/dL (0.2-1.0)   03/29/21  05:40    


 


AST  13 U/L (15-37)  L  03/29/21  05:40    


 


ALT  8 U/L (12-78)  L  03/29/21  05:40    


 


Alkaline Phosphatase  272 U/L ()  H  03/29/21  05:40    


 


Lipase  110 U/L ()   03/28/21  15:20    








Home Medications: 








Omeprazole [Prilosec] 40 mg PO DAILY 03/28/21 


Promethazine HCl 25 mg PO Q6H PRN 03/28/21 


calcitrioL [Rocaltrol] 1 tab PO DAILY 03/28/21 


traMADol HCL [Ultram*] 50 mg PO Q8H PRN 03/28/21 


Amox/Clavulanate [Augmentin 500-125 mg Tab*] 500 mg PO DAILY #5 tab 03/30/21 


Multivitamin with Iron [Daily Vitamin + Iron] 1 each PO DAILY #90 tablet 

03/30/21 





New Medications: 


Amox/Clavulanate [Augmentin 500-125 mg Tab*] 500 mg PO DAILY #5 tab


Multivitamin with Iron [Daily Vitamin + Iron] 1 each PO DAILY #90 tablet


Physician Discharge Instructions: 


Patient presented with abdominal pain, nausea and vomiting. Patient found to 

have a UTI. Urine culture was positive for Klebsiella. Patient responded to IV 

antibiotic therapy. At discharge she is without significant abdominal pain, 

nausea and vomiting. This has been transitioned to oral medication. At discharge

she will continue with Augmentin 500 mg daily for 5 more days. UTI prevention 

will be provided.





Patient with end-stage renal disease on hemodialysis. 12-hour urine collection 

obtained. Patient will continue with hemodialysis as directed.





Patient with anemia of chronic disease. Iron deficiency also noted. At discharge

patient will continue with multivitamin with iron daily. Recommend to recheck 

CBC in 1 to 2 weeks to monitor her progress. Further adjustment in medication 

can be done by nephrology. At discharge she will continue with her current 

medications of Calcitrol 0.25 mcg daily.





Patient with GERD and hiatal hernia. At discharge she may continue with Prilosec

40 mg daily.





Patient with chronic pain. At discharge she may continue with tramadol 50 mg 3 

times a day as needed for pain.





Diet: Renal


Activity: Ad ryan


Followup: 


NONE,NONE [Primary Care Provider] - 


Time spent managing pt's care (in minutes): 55

## 2021-03-30 NOTE — CON
Date of Consultation:  03/29/2021



Chief Complaint:  Acute on chronic kidney injury.



History Of Present Illness:  The patient has been dialysis dependent and last 
dialysis was done on Friday.  The patient has had progressively worse weakness 
and urinary discomfort, dysuria.  She came to emergency room and was found to 
have urinary tract infection.  Creatinine level is ranging from 1.8 to 2.1.  The
patient is due for dialysis today, although lab work reveals some improvement of
renal function and new baseline creatinine at this point is ranging up to 2.2.  
Dialysis is on hold.  24-hours urine collection was ordered and plan is to check
12 hours urine collection as well as creatinine clearance. 



The patient is on antibiotics for urinary tract infection.  She denies 
hematuria.  She had episodes of dysuria and back pain associated with malaise 
and low-grade fever.  She denies cough or chills.



Review of Systems:

Constitutional:  Denies syncope. 

Eyes:  Denies vision changes. 

Ears, Nose, Mouth, and Throat:  Denies sore throat or earache. 

Respiratory:  Denies PND or orthopnea. 

Cardiovascular:  Denies chest pain or palpitation. 

GI:  Denies nausea or vomiting. 

:  She has some lower urinary tract symptoms.  Denies hematuria. 



All other systems reviewed and all are negative.



Past Medical History:  Acute kidney injury, chronic kidney disease stage 3, 
hypertension, osteoarthritis, knee surgery, hysterectomy, secondary 
hyperparathyroidism, hypothyroidism, anemia in CKD .



Physical Examination:

General:  The patient is awake, alert, follows commands. 

Eyes:  Anicteric sclerae.  EOMI. 

Ears, Nose, Mouth, and Throat:  Oral mucosa moist.  No pallor. 

Neck:  Supple.  No bruits. 

Lungs:  Diminished breath sounds at bases. 

Heart:  S1, S2. 

Abdomen:  Soft, benign. 

Extremities:  No edema.



Laboratory Data:  WBC 7.3, hemoglobin 8.5, platelet count 262.  Sodium 137, 
potassium 3.4, BUN is 12, creatinine 1.94, glucose 80.  CT scan was performed to
assess for obstructive uropathy.  Renal calculus is not seen and ureteral 
calculus is not noted.  Bladder calculus is not present.  There is no 
hydronephrosis.



Impression And Plan:  

1.   CT scan is negative for calculus.  Continue treatment for urinary tract 
infection according to urine culture.

2.   End-stage renal disease/ ENRIQUE on CKD.  Dialysis on hold.  The patient at 
this point is recovering from acute kidney injury.  Previously, she was 
diagnosed with end-stage renal disease, although renal function is improving and
acute kidney injury is resolving.  The patient will have blood work tomorrow 
morning and plan is to hold dialysis.  The patient may need to have procedure 
done to remove the hemodialysis catheter during this admission and to check 
blood culture for possible line infection.

3.   Coronary artery disease.  The patient is asymptomatic.  The patient has 
chronic coronary artery disease.  Continue cholesterol-lowering medications.  
Monitor blood pressure and adjust pressure medication accordingly.

4.   Anemia in chronic kidney disease.  Continue GISELL.  The patient may need to 
be screened for GI bleeding.





KENNETH/TODD

DD:  03/29/2021 21:33:52   Voice ID:  [id number]

DT:  03/30/2021 01:56:09   Report ID:  [id number]

GABINO

## 2021-03-30 NOTE — PN
Date of Progress Note:  03/30/2021



Subjective:  The patient was admitted with acute kidney injury, required dialysis.  The patient start
ed weaning of dialysis.  The patient did not receive dialysis on Monday.  The patient came with nause
a and vomiting, gastroenteritis.



Physical Examination:

Vital Signs:  Blood pressure 115/53, pulse of 89, afebrile.  The patient had good urine output of 500
. 

Chest:  Clear to auscultation. 

Heart:  S1, S2.  Regular. 

Abdomen:  Soft.  Mild tenderness.  No guarding or rebound. 

Extremities:  No edema. 

Neuro:  Alert.  No focality.



Laboratory Data:  WBC 8.1, H and H 7.3/21.6.  Sodium 133, potassium 3.3, bicarb 24, BUN 15, creatinin
e 2.1, GFR of 22, calcium 7.2, magnesium 1.7.



Current Medications:  The patient on include;

1.Aspirin.

2.Augmentin.

3.Epogen.

4.Heparin.

5.Hydralazine.

6.Tylenol.

7.Lorazepam.



Assessment And Plan:  

1.Acute kidney injury, on recovery.  We will keep holding dialysis, discontinue IV fluid.  The patie
nt cleared from the Renal standpoint for discharge planning.  We will hold dialysis even on Friday.  
We will repeat lab on Monday.  If kidney function continued to be stable, we will remove hemodialysis
 catheter.

2.Hypokalemia.  We will supplement.

3.Gastroenteritis.  Continue symptomatic treatment.

4.Hypertension, controlled, optimal.  Continue current medication.  

The patient cleared from the Renal standpoint for discharge planning.





MA/TODD

DD:  03/30/2021 12:14:51Voice ID:  [id number]

DT:  03/30/2021 15:18:10Report ID:  [id number]

## 2021-04-23 ENCOUNTER — HOSPITAL ENCOUNTER (OUTPATIENT)
Dept: HOSPITAL 97 - OR | Age: 76
Discharge: HOME | End: 2021-04-23
Attending: SURGERY
Payer: COMMERCIAL

## 2021-04-23 VITALS — TEMPERATURE: 97.2 F | SYSTOLIC BLOOD PRESSURE: 120 MMHG | DIASTOLIC BLOOD PRESSURE: 49 MMHG

## 2021-04-23 VITALS — OXYGEN SATURATION: 100 %

## 2021-04-23 DIAGNOSIS — Z20.822: ICD-10-CM

## 2021-04-23 DIAGNOSIS — Z45.2: Primary | ICD-10-CM

## 2021-04-23 PROCEDURE — 84132 ASSAY OF SERUM POTASSIUM: CPT

## 2021-04-23 PROCEDURE — 88300 SURGICAL PATH GROSS: CPT

## 2021-04-23 PROCEDURE — 05PYX3Z REMOVAL OF INFUSION DEVICE FROM UPPER VEIN, EXTERNAL APPROACH: ICD-10-PCS

## 2021-04-23 PROCEDURE — 36589 REMOVAL TUNNELED CV CATH: CPT

## 2021-04-23 PROCEDURE — 36415 COLL VENOUS BLD VENIPUNCTURE: CPT

## 2021-04-23 RX ADMIN — BUPIVACAINE HYDROCHLORIDE ONE ML: 5 INJECTION, SOLUTION EPIDURAL; INTRACAUDAL; PERINEURAL at 11:33

## 2021-04-23 RX ADMIN — BUPIVACAINE HYDROCHLORIDE ONE ML: 5 INJECTION, SOLUTION EPIDURAL; INTRACAUDAL; PERINEURAL at 10:48

## 2021-04-23 NOTE — P.OP
Preoperative diagnosis: Return of Renal Function


Postoperative diagnosis: Return of Renal Function


Primary procedure: Removal of LEFT IJ tunnelled hemodialysis catheter


Anesthesia: MAC + Local


Estimated blood loss: <1cc


Specimen: catheter for ID only


Findings: as above


Complications: None


Transferred to: Recovery Room


Condition: Good

## 2021-04-23 NOTE — OP
Date of Procedure:  04/23/2021



Surgeon:  Bennie Antonio MD, MD



Preoperative Diagnosis:  Return and restoration of renal function and no longer needing hemodialysis.




Postoperative Diagnosis:  Return and restoration of renal function and no longer needing hemodialysis
.



Procedure Performed:  Removal of a left internal jugular tunneled hemodialysis catheter.



Anesthesia:  MAC plus local with 1% lidocaine with epinephrine.



Estimated Blood Loss:  Less than 1 cc.



Specimen:  Catheter for ID.



Findings:  As above.



Complications:  None.



Disposition:  The patient was transferred to the recovery room in good condition.



Procedure In Detail:  After informed consent was obtained, the patient was brought to the operating r
oom, prepped and draped in the usual sterile fashion after adequate anesthesia was achieved.  All sut
ures were removed from previous insertion and at the insertion site of the previously placed left int
ernal jugular tunneled hemodialysis catheter.  Gentle traction was applied and I used an iris scissor
 to dissect the cuff circumferentially around the exit site of the catheter on the chest wall.  The c
atheter was then brought into the field and pressure was held.  Patient was placed in steep Trendelen
leo position.  Catheter was removed.  At this point, pressure was held for 3 minutes.  Patient was p
ositioned back in neutral position and a single interrupted suture was placed into the exit site afte
r irrigating the area copiously with warm saline, and then a sterile dressing was placed over top.  T
he patient was placed in the sitting up position.  Pressure was held for an additional 2 minutes.  No
 evidence of bleeding was appreciated.  Sterile dressing was inspected and found to have good hemosta
sis without additional pressure.  There were no hemostatic measures required.  The patient tolerated 
the procedure well without any evidence of complication and transferred to PACU in good condition.  A
ll counts were correct at the end of the case.





TK/MODL

DD:  04/23/2021 11:41:02Voice ID:  [id number]

DT:  04/23/2021 22:04:50Report ID:  [id number]

## 2021-05-01 ENCOUNTER — HOSPITAL ENCOUNTER (EMERGENCY)
Dept: HOSPITAL 97 - ER | Age: 76
Discharge: NURSING, REHAB OR LONG-TERM CARE | End: 2021-05-01
Payer: COMMERCIAL

## 2021-05-01 VITALS — OXYGEN SATURATION: 100 % | DIASTOLIC BLOOD PRESSURE: 56 MMHG | SYSTOLIC BLOOD PRESSURE: 133 MMHG

## 2021-05-01 VITALS — TEMPERATURE: 97.8 F

## 2021-05-01 DIAGNOSIS — Z87.442: ICD-10-CM

## 2021-05-01 DIAGNOSIS — I12.0: ICD-10-CM

## 2021-05-01 DIAGNOSIS — Z88.5: ICD-10-CM

## 2021-05-01 DIAGNOSIS — Z20.822: ICD-10-CM

## 2021-05-01 DIAGNOSIS — N18.6: ICD-10-CM

## 2021-05-01 DIAGNOSIS — N39.0: ICD-10-CM

## 2021-05-01 DIAGNOSIS — K85.10: Primary | ICD-10-CM

## 2021-05-01 LAB
ALBUMIN SERPL BCP-MCNC: 2.3 G/DL (ref 3.4–5)
ALP SERPL-CCNC: 311 U/L (ref 45–117)
ALT SERPL W P-5'-P-CCNC: 10 U/L (ref 12–78)
AST SERPL W P-5'-P-CCNC: 13 U/L (ref 15–37)
BUN BLD-MCNC: 18 MG/DL (ref 7–18)
GLUCOSE SERPLBLD-MCNC: 94 MG/DL (ref 74–106)
HCT VFR BLD CALC: 27 % (ref 36–45)
LIPASE SERPL-CCNC: 561 U/L (ref 73–393)
LYMPHOCYTES # SPEC AUTO: 0.8 K/UL (ref 0.7–4.9)
PMV BLD: 8.3 FL (ref 7.6–11.3)
POTASSIUM SERPL-SCNC: 3 MMOL/L (ref 3.5–5.1)
RBC # BLD: 3.08 M/UL (ref 3.86–4.86)

## 2021-05-01 PROCEDURE — 76377 3D RENDER W/INTRP POSTPROCES: CPT

## 2021-05-01 PROCEDURE — 87088 URINE BACTERIA CULTURE: CPT

## 2021-05-01 PROCEDURE — 99285 EMERGENCY DEPT VISIT HI MDM: CPT

## 2021-05-01 PROCEDURE — 80076 HEPATIC FUNCTION PANEL: CPT

## 2021-05-01 PROCEDURE — 83690 ASSAY OF LIPASE: CPT

## 2021-05-01 PROCEDURE — 36415 COLL VENOUS BLD VENIPUNCTURE: CPT

## 2021-05-01 PROCEDURE — 96365 THER/PROPH/DIAG IV INF INIT: CPT

## 2021-05-01 PROCEDURE — 51702 INSERT TEMP BLADDER CATH: CPT

## 2021-05-01 PROCEDURE — 76705 ECHO EXAM OF ABDOMEN: CPT

## 2021-05-01 PROCEDURE — 87086 URINE CULTURE/COLONY COUNT: CPT

## 2021-05-01 PROCEDURE — 87186 SC STD MICRODIL/AGAR DIL: CPT

## 2021-05-01 PROCEDURE — 80048 BASIC METABOLIC PNL TOTAL CA: CPT

## 2021-05-01 PROCEDURE — 96375 TX/PRO/DX INJ NEW DRUG ADDON: CPT

## 2021-05-01 PROCEDURE — 85025 COMPLETE CBC W/AUTO DIFF WBC: CPT

## 2021-05-01 PROCEDURE — 81003 URINALYSIS AUTO W/O SCOPE: CPT

## 2021-05-01 PROCEDURE — 81015 MICROSCOPIC EXAM OF URINE: CPT

## 2021-05-01 PROCEDURE — 87077 CULTURE AEROBIC IDENTIFY: CPT

## 2021-05-01 PROCEDURE — 96361 HYDRATE IV INFUSION ADD-ON: CPT

## 2021-05-01 PROCEDURE — 74176 CT ABD & PELVIS W/O CONTRAST: CPT

## 2021-05-01 NOTE — XMS REPORT
Continuity of Care Document

                             Created on:May 1, 2021



Patient:PHIL CALLOWAY

Sex:Female

:1945

External Reference #:[id number]





Demographics







                          Address                   [address]



                                                    [place] [address]

 

                          Home Phone                [phone]

 

                          Mobile Phone              [phone]

 

                          Email Address             [email or web address]

 

                          Preferred Language        English

 

                          Marital Status            Unknown

 

                          Orthodox Affiliation     Unknown

 

                          Race                      Unknown

 

                          Additional Race(s)        Unavailable

 

                          Ethnic Group              Unknown









Author







                          Organization              [facility]

t

 

                          Address                   [address]



                                                    [place] [address]

 

                          Phone                     [phone]









Support







                Name            Relationship    Address         Phone

 

                Luis        Spouse          Unavailable     [phone]









Care Team Providers







                    Name                Role                Phone

 

                    Odalys KELLER         Primary Care Physician [phone]

 

                    Llanes              Attending Clinician Unavailable

 

                    Saurabh KELLER,  T.     Attending Clinician [phone]

 

                    Venancio KELLER       Attending Clinician [phone]

 

                    Ed KELLER             Attending Clinician [phone]

 

                    Jae Brush  Attending Clinician [phone]

 

                    Irving KELLER            Attending Clinician [phone]

 

                    Davey KELLER,  V.       Attending Clinician [phone]

 

                    AYAKA May        Attending Clinician [phone]

 

                    Malu KELLER               Attending Clinician [phone]

 

                    VENANCIO          Admitting Clinician Unavailable









Payers







           Payer Name Policy Type Policy     Effective Date Expiration Date Sour

ce



                                 Number                           

 

           MEDICAREMEDICARE PART            [id number]            [place] [employer] [employer]                            00:00:00              [facility]



           [id number]                                             



           [place]                                             

 

           COMMERCIAL MISCMISC            [id number]  2018            [place]

on



           MEDICARE                         00:00:00              [facility]



           [id number]                                             



           [facility]                                             



           ial                                                    







Problems







       Condition Condition Condition Status Onset  Resolution Last   Treating Co

mments 

Source



       Name   Details Category        Date   Date   Treatment Clinician        



                                                 Date                 

 

       Complicati Complicati Disease Active -                             H

vivi



       on of  on of                3-05                               [facility]



       vascular vascular               00:00:                             st



       dialysis dialysis               00                                 



       catheter catheter                                                  

 

       Hyponatrem Hyponatrem Disease Active                              H

vivi



       ia     ia                   3-05                               Methodi



                                   00:00:                             st



                                   00                                 

 

       Mechanical Mechanical Disease Active                       Overview

: [place]



       complicati complicati               3-05                        [facility]

 Methodi



       on of  on of                00:00:                      g of this st



       vascular vascular               00                          note   



       dialysis dialysis                                           might be 



       catheter catheter                                           different 



                                                               from the 



                                                               original. 



                                                               Added  



                                                               automatic 



                                                               ally from 



                                                               request 



                                                               for    



                                                               surgery 



                                                               [id number] 







Allergies, Adverse Reactions, Alerts







       Allergy Allergy Status Severity Reaction(s) Onset  Inactive Treating Comm

ents 

Source



       Name   Type                        Date   Date   Clinician        

 

       Codeine Propensi Active        Itching                       [place]

n



              ty to                       3-05                        Methodi



              adverse                      00:00:                      st



              reaction                      00                          



              s to                                                    



              drug                                                    







Social History







           Social Habit Start Date Stop Date  Quantity   Comments   Source

 

           History SDOH                                             [place] [facility]

odist



           Alcohol Std Drinks                                             

 

           History SDOH                                             [place] [facility]

odist



           Alcohol Binge                                             

 

           Tobacco use and 2021 Never used            [facility]

ethodist



           exposure   00:00:00   00:00:00                         

 

           Alcohol intake 2021 Lifetime              [facility]

thodist



                      00:00:00   00:00:00   non-drinker            



                                            (finding)             

 

           History SDOH 2021 1                     [place] [facility]

odist



           Alcohol Frequency 00:00:00   00:00:00                         

 

           Sex Ass[birth year] At [birth year]                       [facility]

ethodist



           Birth      00:00:00   00:00:00                         









                Smoking Status  Start Date      Stop Date       Source

 

                Never smoker                                    [facility]

t







Medications







       Ordered Filled Start  Stop   Current Ordering Indication Dosage Frequency

 Signature

                    Comments            Components          Source



     Medication Medication Date Date Medication? Clinician                (SIG) 

          



     Name Name                                                   

 

     amLODIPine       No             10mg QD   Take 1           [facility]

ton



     (NORVASC)      3-09 04-08                          tablet (10           Met

hodi



     10 mg      00:00: 23:59                          mg total)           st



     tablet      00   :00                           by mouth           



                                                  daily for           



                                                  30 days.           

 

     ondansetron       No             4mg  Q12H Take 4 mg           

[facility]



     (ZOFRAN) 4      3-08 03-                          by mouth           Meth

gabriel



     MG tablet      20:27: 00:00                          every 12           st



               48   :00                           (twelve)           



                                                  hours as           



                                                  needed for           



                                                  nausea or           



                                                  vomiting.           

 

     carvediloL       No             12.5mg Q.5D Take 12.5          

 [facility]



     (COREG)      3-08 03-08                          mg by           Methodi



     12.5 MG      20:27: 00:00                          mouth 2           st



     tablet      48   :00                           (two)           



                                                  times a           



                                                  day with           



                                                  meals.           

 

     amoxicillin      -2021- No             1{tbl} Q.5D Take 1           H

[place]



     -pot      3-08 03-08                          tablet by           Methodi



     clavulanate      20:27: 00:00                          mouth 2           st



     (AUGMENTIN)      48   :00                           (two)           



     250-125 mg                                         times a           



     per tablet                                         day.           

 

     levothyroxi      0      Yes            75ug QD   Take 75           [facility]

ston



     ne        3-08                               mcg by           Methodi



     (SYNTHROID)      20:27:                               mouth           st



     75 mcg      44                                 daily.           



     tablet                                                        

 

     omeprazole            Yes            40mg QD   Take 40 mg           H

[facility]



     (PriLOSEC)      3-08                               by mouth           [facility]

di



     40 MG      20:27:                               daily.           st



     capsule      44                                                

 

     metoclopram            Yes            5mg  [id number] Take 5 mg      

     [facility]



     delgado       3-08                          [id number] by mouth 3           Met

hodi



     (REGLAN) 5      20:27:                          3D   (three)           st



     MG tablet      44                                 times a           



                                                  day.           

 

     meclizine      0      Yes            25mg Q.25D Take 25 mg           H

[place]



     (ANTIVERT)      3-08                               by mouth 4           Met

hodi



     25 mg      20:27:                               (four)           st



     tablet      44                                 times a           



                                                  day as           



                                                  needed for           



                                                  dizziness.           

 

     calcitrioL            Yes            .25ug QD   Take 0.25           H

[place]



     (ROCALTROL)      3-08                               mcg by           Method

i



     0.25 MCG      20:27:                               mouth           st



     capsule      44                                 daily.           

 

     traMADoL      0 - No        acute pain 50mg Q8H  Take 50 mg       

    [facility]



     (ULTRAM) 50      3-08 03-                          by mouth           Met

hodi



     mg tablet      17:49: 00:00                          every 8           st



               18   :00                           (eight)           



                                                  hours as           



                                                  needed for           



                                                  moderate           



                                                  pain           



                                                  .acute           



                                                  pain.           

 

     amLODIPine      -0 - No             5mg  QD   Take 5 mg           H

[place]



     (NORVASC) 5      3-08 03-05                          by mouth           Met

hodi



     mg tablet      11:50: 00:00                          daily.           st



               49   :00                                          

 

     amoxicillin      -0 - No             500mg Q.[id number] Take 500     

      [facility]



     (AMOXIL)      3-08 03-05                     [id number] mg by           Met

hodi



     500 MG      11:50: 00:00                     3D   mouth 3           st



     capsule      49   :00                           (three)           



                                                  times a           



                                                  day.           

 

     amoxicillin      2021- No             1{tbl} QD   Take 1           H

[place]



     -pot      3-08 03-05                          tablet by           Methodi



     clavulanate      11:50: 00:00                          mouth           st



     (AUGMENTIN)      49   :00                           daily.           



     500-125 mg                                         Disp           



     per tablet                                         21 18           



                                                  day supply           

 

     calcitrioL      2021- No             .25ug QD   Take 0.25           

[facility]



     (ROCALTROL)      3-08 03-05                          mcg by           Metho

di



     0.25 MCG      11:50: 00:00                          mouth           st



     capsule      49   :00                           daily.           

 

     carvediloL      2021- No             12.5mg Q.5D Take 12.5          

 [facility]



     (COREG)      3- 03-05                          mg by           Methodi



     12.5 MG      11:50: 00:00                          mouth 2           st



     tablet      49   :00                           (two)           



                                                  times a           



                                                  day with           



                                                  meals.           

 

     hydroCHLORO      2021- No             25mg QD   Take 25 mg          

 [facility]



     thiazide      3-08 03-05                          by mouth           Method

i



     (HYDRODIURI      11:50: 00:00                          daily.           st



     L) 25 MG      49   :00                                          



     tablet                                                        

 

     losartan      2021- No             100mg QD   Take 100           [facility]

[facility]



     (COZAAR)      3-08 03-05                          mg by           Methodi



     100 MG      11:50: 00:00                          mouth           st



     tablet      49   :00                           daily.           

 

     metoclopram      2021- No             5mg  Q.25D Take 5 mg          

 [facility]



     delgado       3-08 03-05                          by mouth 4           Method[facility]



     (REGLAN) 5      11:50: 00:00                          (four)           st



     MG tablet      49   :00                           times a           



                                                  day.           

 

     metoprolol      2021- No             100mg QD   Take 100           H

[place]



     succinate      3-08 03-05                          mg by           Methodi



     XL        11:50: 00:00                          mouth           st



     (TOPROL-XL)      49   :00                           daily.           



     100 mg 24                                                        



     hr tablet                                                        

 

     prochlorper      2021- No             10mg Q8H  Take 10 mg          

 [facility]



     azine      3-08 03-05                          by mouth           Methodi



     (COMPAZINE)      11:50: 00:00                          every 8           st



     10 MG      49   :00                           (eight)           



     tablet                                         hours as           



                                                  needed for           



                                                  nausea or           



                                                  vomiting.           

 

     carvediloL      -2021- No             25mg Q.5D Take 1           [facility]

[facility]



     (COREG) 25      3-08 04-07                          tablet (25           Me

thodi



     MG tablet      00:00: 23:59                          mg total)           st



               00   :00                           by mouth 2           



                                                  (two)           



                                                  times a           



                                                  day with           



                                                  meals for           



                                                  30 days.           

 

     traMADoL      2021- No        acute pain 50mg Q8H  Take 1           

[facility]



     (ULTRAM) 50      3-08 03-18                          tablet (50           M

ethodi



     mg tablet      00:00: 23:59                          mg total)           st



               00   :00                           by mouth           



                                                  every 8           



                                                  (eight)           



                                                  hours as           



                                                  needed for           



                                                  moderate           



                                                  pain for           



                                                  up to 10           



                                                  days           



                                                  .acute           



                                                  pain.           







Vital Signs







             Vital Name   Observation Time Observation Value Comments     Source

 

             Systolic blood 2021 17:45:00 133 mm[Hg]                [place]

n [facility]



             pressure                                            

 

             Diastolic blood 2021 17:45:00 64 mm[Hg]                 Sruthi Blum



             pressure                                            

 

             Heart rate   2021 17:45:00 79 /min                   [facility] Bl[facility]

 

             Respiratory rate 2021 17:45:00 24 /min                   Matt Blum

 

             Body temperature 2021 14:40:00 36.5 Camila                  Matt Blum

 

             Oxygen saturation in 2021 11:49:18 95 /min                   

[facility] Blum



             Arterial blood by                                        



             Pulse oximetry                                        

 

             Body weight  2021 07:00:00 83.915 kg                 [facility] Blum

 

             BMI          2021 07:00:00 31.76 kg/m2               [facility] Bl[facility]

 

             Body height  2021 21:00:00 162.6 cm                  [facility] Blum







Procedures







                Procedure       Date / Time     Performing Clinician Source



                                Performed                       

 

                OR FL < 1 HOUR  2021 10:36:00 Alexsander Toribio

 

                NH AN ELECTIVE  2021 10:20:38 Tez May

ethodist



                SUPRAGLOTTIC AIRWAY                                 

 

                INSERTION, TUNNELED 2021 09:37:00 Alexsander Toribio



                CENTRAL VENOUS CATHETER                                 



                WITH PORT, WITHOUT                                 



                FLUOROSCOPIC GUIDANCE                                 

 

                HEMODIALYSIS    2021 09:32:43 Alexsander Toribio

 

                PROTHROMBIN TIME WITH INR 2021 04:26:00 Alexsander Toribio

 

                PARTIAL THROMBOPLASTIN 2021 04:26:00 Alexsander Toribio [facility]



                TIME (PTT)                                      

 

                BASIC METABOLIC PANEL 2021 04:26:00 Ricky Brush [facility]



                                                Jae            

 

                HC COMPLETE BLD COUNT 2021 04:26:00 Ricky Brush [facility]



                W/AUTO DIFF                     Jae            

 

                ABO AND RH CONFIRMATION 2021 04:26:00 Alexsander Toribio [facility]

 

                ESTIMATED GFR   2021 04:26:00 Ricky Brush PRABHA

ethodist



                                                Jae            

 

                TYPE AND SCREEN 2021 14:17:00 Alexsander Toribio Meth

odist

 

                XR NECK SOFT TISSUE 2021 09:00:00 Ritu Walls 

[facility]

 

                BASIC METABOLIC PANEL 2021 04:05:00 Ricky Brush [facility]



                                                Jae            

 

                HC COMPLETE BLD COUNT 2021 04:05:00 Ricky Brush [facility]



                W/AUTO DIFF                     Jae            

 

                ESTIMATED GFR   2021 04:05:00 Ricky Brush PRABHA

ethodist



                                                Jae            

 

                HEPATITIS B SURFACE 2021 14:22:00 Melissa Springer

 [facility]



                ANTIGEN                                         

 

                HEPATITIS B SURFACE 2021 14:22:00 Melissa Springer

 [facility]



                ANTIBODY                                        

 

                HEMODIALYSIS    2021 13:48:38 Melissa Springer Met

hodist

 

                XR CHEST 1 VW PORTABLE 2021 23:27:43 Alexsander Toribio

on [facility]

 

                AMYLASE LEVEL   2021 21:54:00 Catarino Fabian

 [facility]

 

                LIPASE LEVEL    2021 21:54:00 Catarino Fabian

 [facility]

 

                COMPREHENSIVE METABOLIC 2021 21:54:00 Catarino Fabian 

[facility]



                PANEL                                           

 

                ESTIMATED GFR   2021 21:54:00 Catarino Fabian

 [facility]

 

                COVID-19 QUALITATIVE PCR 2021 19:00:00 James Wileyist

 

                HC COMPLETE BLD COUNT 2021 17:09:00 James Wiley [facility]



                W/AUTO DIFF                                     

 

                PROTHROMBIN TIME WITH INR 2021 17:09:00 James Wiley

 

                PARTIAL THROMBOPLASTIN 2021 17:09:00 James Wiley



                TIME (PTT)                                      

 

                BASIC METABOLIC PANEL 2021 17:09:00 James Wiley

 

                ESTIMATED GFR   2021 17:09:00 Jamse Wiley

ethodist

 

                ECG 12-LEAD     2021 17:05:40 James Wiley

ethodist

 

                ECG ED PRELIMINARY 2021 16:57:05 James Wiley



                INTERPRETATION                                  







Plan of Care







             Planned Activity Planned Date Details      Comments     Source

 

             Future Scheduled 2021   INFLUENZA VACCINE              [place] re[facility]



             Test         00:00:00     [code = INFLUENZA              



                                       VACCINE]                  

 

             Future Scheduled 1994-07-15   COLONOSCOPY SCREENING              [facility]if[facility] clancy [facility]



             Test         00:00:00     [code = COLONOSCOPY              



                                       SCREENING]                

 

             Future Scheduled 1994-07-15   SHINGLES VACCINES (#1)              H

[facility]



             Test         00:00:00     [code = SHINGLES              



                                       VACCINES (#1)]              

 

             Future Scheduled 1962-07-15   Hepatitis C screening              Clifton clancy [facility]



             Test         00:00:00     (procedure) [code =              



                                       731573405]                

 

             Future Scheduled 1960-07-15   COVID-19 VACCINE (1)              Joceline glez [facility]



             Test         00:00:00     [code = COVID-19              



                                       VACCINE (1)]              

 

             Future Scheduled 1950-07-15   65+ PNEUMOCOCCAL              [facility]

 [facility]



             Test         00:00:00     VACCINE (1 of 4 -              



                                       PCV13) [code = 65+              



                                       PNEUMOCOCCAL VACCINE              



                                       (1 of 4 - PCV13)]              







Encounters







        Start   End     Encounter Admission Attending Care    Care    Encounter 

Source



        Date/Time Date/Time Type    Type    Clinicians Facility Department ID   

   

 

        2021 Outpatient         RENATA NIEVES [facility]     [address]

[id number] [place]



        00:00:00 00:00:00                                         934     Method

i



                                                                        st







Results







           Test Description Test Time  Test Comments Results    Result     Sourc

e



                                                       Comments   

 

           OR FL < 1 Hour              [facility]



                      8                     Radiology Results            Methodi

st



                      13:51:00              2021            



                                            1:54 PM CSTFormatting            



                                            of this note might be            



                                            different from the            



                                            original.EXAMINATION:            



                                            OR FL < 1 HOURCLINICAL            



                                            HISTORY:              



                                            Intraoperative            



                                            fluoroscopyIMPRESSION:            



                                            Fluoroscopy was            



                                            provided. No            



                                            radiologist present.            



                                            Please see procedure            



                                            report for discussion            



                                            of procedure, findings            



                                            and fluoroscopic            



                                            time.[id number]            

 

           Airway                  Tez May

n



                      8                     3/8/2021 10:20            [facility]



                      10:20:38              AMAirway Location:  OR            



                                            Performed by:            



                                            CRNA/AAAnesthesiologis            



                                            t:  Nathaniel Mariscal,                   



                                            MDResident/CRNA/AA:            



                                            Tez May            



                                            RAuthorized by:            



                                            Nathaniel Mariscal MD Urgency:            



                                            ElectiveDifficult            



                                            Airway: No            



                                            Preoxygenated with            



                                            100% O2: Yes  C-spine            



                                            Precautions Maintained            



                                            Throughout: Yes  Mask            



                                            Ventilation:  Not            



                                            attemptedFinal Airway            



                                            Type:  Supraglottic            



                                            airwayFinal LMA:            



                                            UniqueLMA Size:            



                                            4Number of Attempts at            



                                            Approach:  1 Soft            



                                            Tissue Intact, able to            



                                            ventilate with no leak            



                                            and minimal peak            



                                            inspiratory pressures            

 

           XR Neck Soft               Interface,            [place]



           Tissue     7                     Radiology Results            Methodi

st



                      10:15:23              Incoming - 2021            



                                            10:18 AM CSTFormatting            



                                            of this note might be            



                                            different from the            



                                            original.EXAMINATION:            



                                            XR NECK SOFT            



                                            TISSUECLINICAL            



                                            HISTORY: location of            



                                            the dialysis            



                                            cathCOMPARISON:            



                                            NoneIMPRESSION:2 views            



                                            were obtained.Right            



                                            jugular dialysis            



                                            catheter with tips in            



                                            the superior vena            



                                            cava.No prevertebral            



                                            soft tissue            



                                            thickening.There is 1            



                                            to 2 mm degenerative            



                                            anterolisthesis of C3            



                                            on C4, C4 on C5, and            



                                            C5 on C6, with mild            



                                            disc height loss at            



                                            C4-5 and C5-6 with            



                                            small anterior            



                                            endplate              



                                            osteophytes.[id number]            



                                            2                     

 

           XR Chest 1 Vw               Interface,            [place]



           Portable   6                     Radiology Results            Methodi

st



                      00:02:34              Incoming - 2021            



                                            12:05 AM CSTFormatting            



                                            of this note might be            



                                            different from the            



                                            original.EXAMINATION:            



                                            XR CHEST 1             



                                            PORTABLEINDICATION:            



                                            ESRD evaluate for            



                                            fluid                 



                                            overloadCOMPARISON:            



                                            NoneIMPRESSION:Heart            



                                            is                    



                                            enlarged.Right-sided            



                                            central venous            



                                            catheter tips overlie            



                                            the SVC.Mild central            



                                            vascular congestion.No            



                                            visible effusion or            



                                            pneumothorax.[facility]            



                                            MAT                   









                    ECG 12 lead         2021 17:45:47 









                      Test Item  Value      Reference Range Interpretation Comme

nts









             Ventricular rate (test code = 253) 88                              

       

 

             Atrial rate (test code = 255) 88                                   

  

 

             NH interval (test code = 266) 120                                  

  

 

             QRSD interval (test code = 260) 142                                

    

 

             QT interval (test code = 264) 400                                  

  

 

             QTC interval (test code = 265) 484                                 

   

 

             P axis 1 (test code = 267) -50                                    

 

             QRS axis 1 (test code = 268) -15                                   

 

 

             T wave axis (test code = 270) 92                                   

  

 

             EKG impression (test code = 273) Unusual P axis, possible ectopic a

trial                           



                          rhythm-Left bundle branch block-No previous           

                



                          ECGs available-Electronically Signed By               

            



                          Smith MD, Toussaint () on 3/5/2021               

            



                          5:45:46 PM                             



[facility] Interpretation - Not an [id number] 
16:57:05





             Test Item    Value        Reference Range Interpretation Comments

 

             FREIDA (test code = FREIDA) James Wiley III, MD     3/6/2021                           



                          1:43 AMECG ED                           



                          Preliminary                            



                          Interpretation - Not                           



                          an OrderPerformed by:                           



                          James Wiley III, MDAuthorized by:                           



                          James Wiley III, MD  ECG reviewed                           



                          by ED Physician in the                           



                          absence of a                           



                          cardiologist: yes                           



                          Interpretation:                           



                          Interpretation:                           



                          abnormal  Quality:                           



                          Tracing quality:                           



                          Limited by                             



                          artifactRate:   ECG                           



                          rate:  88  ECG rate                           



                          assessment: normal                           



                          Rhythm:   Rhythm:                           



                          sinus rhythm  Ectopy:                           



                           Ectopy: none  QRS:                           



                          QRS axis:  Normal  QRS                           



                          intervals:                             



                          WideConduction:                           



                          Conduction: abnormal                           



                           Abnormal conduction:                           



                          complete LBBB  ST                           



                          segments:   ST                           



                          segments:                              



                          Non-specificT waves:                           



                          T waves: non-specific                           

 

             Lab Interpretation Abnormal                               



             (test code = 23052-0)                                        



[facility] Blum

## 2021-05-01 NOTE — ER
Nurse's Notes                                                                                     

 [facility]                                                                 

Name: Sydney Smith                                                                             

Age: 75 yrs                                                                                       

Sex: Female                                                                                       

: 1945                                                                                   

MRN: [id number]                                                                                   

Arrival Date: 2021                                                                          

Time: 08:06                                                                                       

Account#: [id number]                                                                            

Bed 17                                                                                            

Private MD:                                                                                       

Diagnosis: Other acute pancreatitis-Gallstone pancreatitis;Urinary tract infection, site not      

  specified                                                                                       

                                                                                                  

Presentation:                                                                                     

                                                                                             

08:13 Chief complaint: Patient states: burning with urination and urgency that began today at aa5 

      0400. Pt also c/o right low back pain radiating to RLQ.                                     

08:13 Coronavirus screen: At this time, the client does not indicate any symptoms associated  aa5 

      with coronavirus-19. Ebola Screen: Patient negative for fever greater than or equal to      

      101.5 degrees Fahrenheit, and additional compatible Ebola Virus Disease symptoms.           

      Initial Sepsis Screen: Does the patient meet any 2 criteria? No. Patient's initial          

      sepsis screen is negative. Does the patient have a suspected source of infection? No.       

      Patient's initial sepsis screen is negative. Risk Assessment: Do you want to hurt           

      yourself or someone else? Patient reports no desire to harm self or others. Onset of        

      symptoms was May 01, 2021.                                                                  

08:13 Acuity: AYLA 3                                                                           aa5 

08:13 Method Of Arrival: Wheelchair                                                           aa5 

                                                                                                  

Historical:                                                                                       

- Allergies:                                                                                      

08:13 Codeine;                                                                                aa5 

- PMHx:                                                                                           

08:13 ESRD; GALLSTONES; Hypertension; Kidney stones;                                          aa5 

08:17 Hx of Dialysis but no longer needs dialysis.;                                           aa5 

- PSHx:                                                                                           

08:19 Dialysis catheter placed and removed;                                                   aa5 

                                                                                                  

- Immunization history:: Adult Immunizations unknown.                                             

- Social history:: Smoking status: Patient denies any tobacco usage or history of.                

                                                                                                  

                                                                                                  

Screenin:07 Abuse screen: Denies threats or abuse. Nutritional screening: decreased appetite .      rb3 

      Tuberculosis screening: No symptoms or risk factors identified. Fall Risk None              

      identified.                                                                                 

                                                                                                  

Assessment:                                                                                       

09:07 General: Appears in no apparent distress. comfortable, Behavior is calm, cooperative,   rb3 

      Denies fever. Pain: Complains of pain in right low back Pain radiates to right lower        

      quadrant. Neuro: Level of Consciousness is awake, alert, obeys commands, Oriented to        

      person, place, time, situation. Cardiovascular: Patient's skin is warm and dry.             

      Respiratory: Airway is patent Respiratory effort is even, unlabored, Respiratory            

      pattern is regular, symmetrical. GI: Reports nausea, vomiting. : Reports burning with     

      urination, urgency.                                                                         

10:00 Reassessment: Patient appears in no apparent distress at this time. No changes from     rb3 

      previously documented assessment.                                                           

11:00 Reassessment: Patient appears in no apparent distress at this time. Patient and/or      rb3 

      family updated on plan of care and expected duration. Pain level reassessed. Patient is     

      alert, oriented x 3, equal unlabored respirations, skin warm/dry/pink.                      

11:40 Reassessment: Patient appears in no apparent distress at this time. Pt. resting with    rb3 

      eyes closed, respirations are even, unlabored.                                              

12:39 Reassessment: Patient appears in no apparent distress at this time. Patient and/or      rb3 

      family updated on plan of care and expected duration. Pain level reassessed. Patient is     

      alert, oriented x 3, equal unlabored respirations, skin warm/dry/pink. US is at the pt.     

      bedside. Patient states feeling better.                                                     

13:30 Reassessment: Patient appears in no apparent distress at this time. No changes from     rb3 

      previously documented assessment.                                                           

14:08 Reassessment: Assisted to the bedside commode.                                          rb3 

14:15 Reassessment: Pt. unable to urinate at this time. Was given water to drink. Provider    rb3 

      notified. No new orders received at this time.                                              

15:00 Reassessment: Patient appears in no apparent distress at this time. Asked the pt. to    rb3 

      try to urinate but she asked if she could have a little more time because she just          

      finished drinking water. Provider notified.                                                 

16:00 Reassessment: Patient appears in no apparent distress at this time. Patient and/or      rb3 

      family updated on plan of care and expected duration. Pain level reassessed. Patient is     

      alert, oriented x 3, equal unlabored respirations, skin warm/dry/pink.                      

16:30 Reassessment: Pt. requested pain medication. Provider notified. Received verbal order   rb3 

      for Morphine 4 mg IVP x once and Zofran 4 mg IVP x once. 100% verbal read back.             

17:30 Reassessment: Patient appears in no apparent distress at this time. Patient and/or      rb3 

      family updated on plan of care and expected duration. Pain level reassessed. Patient is     

      alert, oriented x 3, equal unlabored respirations, skin warm/dry/pink. Patient states       

      feeling better.                                                                             

18:29 Reassessment: Patient appears in no apparent distress at this time. No changes from     rb3 

      previously documented assessment.                                                           

21:22 Reassessment: REPORT GIVEN TO ALYSSA ALVAREZ OF [facility].                          rv  

                                                                                                  

Vital Signs:                                                                                      

08:13  / 94; Pulse 71; Resp 18 S; Temp 97.8(O); Pulse Ox 99% on R/A; Weight 74.84 kg    aa5 

      (R); Height 5 ft. 4 in. (162.56 cm) (R);                                                    

10:57  / 70; Pulse 96; Resp 16; Pulse Ox 99% ;                                          rb3 

11:41  / 58; Pulse 85; Resp 14; Pulse Ox 100% ;                                         rb3 

12:30  / 54; Pulse 83; Resp 15; Pulse Ox 100% ;                                         rb3 

13:30  / 68; Pulse 86; Resp 13; Pulse Ox 100% ;                                         rb3 

15:00  / 53; Pulse 92; Resp 15; Pulse Ox 100% ;                                         rb3 

16:00  / 51; Pulse 87; Resp 13; Pulse Ox 100% ;                                         rb3 

17:00  / 51; Pulse 89; Resp 13; Pulse Ox 100% ;                                         rb3 

18:00  / 67; Pulse 97; Resp 16; Pulse Ox 100% ;                                         rb3 

19:00  / 41; Pulse 96; Resp 14; Pulse Ox 97% on R/A;                                    rv  

20:00  / 64; Pulse 99; Resp 15; Pulse Ox 99% on R/A;                                    rv  

21:00  / 56; Pulse 97; Resp 16; Pulse Ox 100% on R/A;                                   rv  

08:13 Body Mass Index 28.32 (74.84 kg, 162.56 cm)                                             aa5 

                                                                                                  

ED Course:                                                                                        

08:06 Patient arrived in ED.                                                                  am2 

08:13 Arm band placed on.                                                                     aa5 

08:19 Triage completed.                                                                       aa5 

09:07 Patient has correct armband on for positive identification. Bed in low position. Call   rb3 

      light in reach. Side rails up X 1. Cardiac monitor on. Pulse ox on. NIBP on. Warm           

      blanket given.                                                                              

09:08 Wilton Nelson NP is PHCP.                                                           pm1 

09:08 Kevon Herr MD is Attending Physician.                                                pm1 

09:12 Aspen Vega is Primary Nurse.                                                       kg  

09:40 Missed attempt(s): 22 gauge in right antecubital area. Bleeding controlled, band aid    rb3 

      applied, catheter tip intact.                                                               

10:40 Inserted saline lock: 20 gauge in right forearm, using aseptic technique. ,using        rb3 

      aseptic technique. inserted by Aron Rodriguez Supervisor. Unable to collect the labs from      

      the IV, lab has been contacted to come.                                                     

10:52 Bladder scan completed. 162 ml noted. Provider notified, no new orders received at this rb3 

      time.                                                                                       

12:55 US Abdomen Limited In Process Unspecified.                                              EDMS

14:29 CT Stone Protocol In Process Unspecified.                                               EDMS

15:46 Straight cath inserted, using sterile technique, 16 Fr. Specimen obtained. Returned     rb3 

      cloudy urine. Patient tolerated well.                                                       

16:20 [facility] . [facility]  

      requests that we fax over patient clinical's over to [phone].                          

18:12 Dr. Urrutia called to do Doc to Doc with MAIKOL Lang, pt provider.                  tt3 

20:43 Followed up to check on admin approval information. Spoke with Joann and she stated tt3 

      she would look into it and call back.                                                       

20:55 [facility] Supervisor at [facility] called back and gave admin          tt3 

      approval. She stated it was okay to use [facility] as the approval provider. The pt is         

      going to [facility] Room 684. The accepting physician is Dr. Urrutia. Nurse to         

      call report to [phone]. Information was passed on to Ady Zamarripa RN, primary         

      nurse and JESSICA Lang, pt provider.                                                

21:23 No provider procedures requiring assistance completed. IV is patent, with fluids        rv  

      infusing freely, Patient transferred, IV remains in place.                                  

                                                                                                  

Administered Medications:                                                                         

10:47 Drug: morphine 4 mg Route: IVP; Site: right forearm;                                    rb3 

11:00 Follow up: Response: No adverse reaction                                                rb3 

10:47 Drug: Zofran (Ondansetron) 4 mg Route: IVP; Site: right forearm;                        rb3 

11:00 Follow up: Response: No adverse reaction                                                rb3 

15:30 Drug: NS 0.9% 1000 ml Route: IV; Rate: 100 ml/hr; Site: right forearm;                  rb3 

21:23 Follow up: IV Status: Infusion continued upon transfer                                  rv  

16:34 Drug: Zosyn (piperacillin-tazobactam) 3.375 grams Route: IVPB; Infused Over: 60 mins;   rb3 

      Site: right forearm;                                                                        

17:02 Follow up: Response: No adverse reaction; IV Status: Completed infusion                 rb3 

16:43 Drug: morphine 4 mg Route: IVP; Site: right forearm;                                    rb3 

17:02 Follow up: Response: No adverse reaction; Pain is decreased                             rb3 

16:43 Drug: Zofran (Ondansetron) 4 mg Route: IVP; Site: right forearm;                        rb3 

17:02 Follow up: Response: No adverse reaction                                                rb3 

                                                                                                  

                                                                                                  

Outcome:                                                                                          

16:10 ER care complete, transfer ordered by MD.                                               pm1 

21:23 Transferred by ground EMS to [facility], Transfer form completed. X-rays    rv  

      sent w/ patient.                                                                            

21:23 Condition: good                                                                             

21:23 Instructed on the need for transfer.                                                        

21:53 Patient left the ED.                                                                    rv  

                                                                                                  

Addendum:                                                                                         

05/10/2021                                                                                        

     11:25 Addendum: Culture Results: Positive urine culture. Pt was d/c'd from [facility] a
a5

           21, pt denies urinary s/s and reports she received antibiotics at [facility]. No need for prescription of antibiotics per NP.                              

                                                                                                  

Signatures:                                                                                       

Dispatcher [employer]                           EDMS                                                 

Macy Fiore RN                     RN   aa5                                                  

Wilton Nelson NP                    NP   pm1                                                  

Calli Cm                               am2                                                  

Jessica Larios Ronaldo, RN                    RN   rv                                                   

Jerry Ding                                  tt3                                                  

Lissette Orellana RN                     RN   rb3                                                  

Aspen Vega                              kg                                                   

                                                                                                  

Corrections: (The following items were deleted from the chart)                                    

                                                                                             

08:17 08:13 PMHx: Dialysis, M,W,F; aa5                                                        aa5 

08:19 08:13 Allergies: Morphine; aa5                                                          aa5 

                                                                                                  

**************************************************************************************************

## 2021-05-01 NOTE — RAD REPORT
EXAM DESCRIPTION:  CT - Stone Protocol - 5/1/2021 2:29 pm

 

CLINICAL HISTORY:  FLANK PAIN

 

COMPARISON:  Stone Protocol dated 3/28/2021; Abdomen   Pelvis W Contrast dated 3/3/2021

 

TECHNIQUE:  Axial 5 mm thick images were obtained without oral or IV contrast. The field-of-view span
s the entirety of the  system including uppermost abdomen and lung bases.

 

All CT scans are performed using dose optimization technique as appropriate and may include automated
 exposure control or mA/KV adjustment according to patient size.

 

FINDINGS:  No hydronephrosis is present and no obstructing ureteral calculi. No suspicious renal mass
es. Isodense masses and pyelonephritis are not excluded on a stone protocol CT scan. No significant a
drenal finding. Partially filled urinary bladder shows no wall thickening. No suspicion for mass. No 
abnormal perinephric stranding seen.

 

Liver and spleen show no suspicious findings. No gallbladder or biliary tree abnormality. No mass of 
the pancreas seen. There is a trace amount of stranding in the peripancreatic fat of the body and chacha
l.

 

No suspicious bowel findings. Atrophic uterus or uterine remnant shows no suspicious findings. Ovarie
s are atrophic or absent. No evidence for ovarian mass.

 

No hernia, mass or bulky lymphadenopathy noted. No free air, free fluid or inflammatory stranding.

 

No significant bony abnormality.

 

IMPRESSION:  No hydronephrosis, obstructing calculus or acute  abnormality seen.

 

Isodense masses, cystitis and pyelonephritis are not excluded on stone protocol technique.

 

There is a trace amount of stranding adjacent to the pancreatic body and tail suggesting mild pancrea
titis. Pancreatitis does not match the provided clinical history but correlation can be made with any
 relevant laboratory abnormalities and clinical findings.

## 2021-05-01 NOTE — RAD REPORT
EXAM DESCRIPTION:  US - Abdomen Exam Limited - 5/1/2021 12:55 pm

 

CLINICAL HISTORY:  RUQ pain

 

COMPARISON:  Stone Protocol dated 3/28/2021

 

FINDINGS:  No gallstones are identified. Patient has a small to moderate amount of sludge layering in
 the dependent portion of the gallbladder. Small punctate stones or polyps could be masked by the slu
dge. There is no wall thickening or pericholecystic fluid.

 

No common duct stone or biliary tree dilatation identified.

 

IMPRESSION:  Small to moderate amount of sludge layering in the gallbladder lumen. The sludge could p
otentially mask small stones or polyps.

 

No other gallbladder or biliary tree finding.

## 2021-05-01 NOTE — EDPHYS
Physician Documentation                                                                           

 [facility]                                                                 

Name: Sydney Smith                                                                             

Age: 75 yrs                                                                                       

Sex: Female                                                                                       

: 1945                                                                                   

MRN: [id number]                                                                                   

Arrival Date: 2021                                                                          

Time: 08:06                                                                                       

Account#: [id number]                                                                            

Bed 17                                                                                            

Private MD:                                                                                       

ED Physician Kevon Herr                                                                         

HPI:                                                                                              

                                                                                             

09:16 This 75 yrs old  Female presents to ER via Wheelchair with complaints of Flank pm1 

      Pain, Dysuria.                                                                              

09:16 The patient complains of pain in the right mid back. The pain radiates to the right     pm1 

      lower quadrant. Onset: The symptoms/episode began/occurred this morning, at 04:00.          

      Modifying factors: The symptoms are alleviated by nothing. the symptoms are aggravated      

      by nothing. Associated signs and symptoms: Pertinent positives: dysuria, nausea,            

      vomiting, Pertinent negatives: diarrhea, fever. Severity of pain: in the emergency          

      department the pain is actually worse. The patient has experienced similar episodes in      

      the past, a few times, today's symptoms are similar, to previous to pain with her           

      gallbladder and UTIs. The patient has not recently seen a physician.                        

                                                                                                  

Historical:                                                                                       

- Allergies:                                                                                      

08:13 Codeine;                                                                                aa5 

- PMHx:                                                                                           

08:13 ESRD; GALLSTONES; Hypertension; Kidney stones;                                          aa5 

08:17 Hx of Dialysis but no longer needs dialysis.;                                           aa5 

- PSHx:                                                                                           

08:19 Dialysis catheter placed and removed;                                                   aa5 

                                                                                                  

- Immunization history:: Adult Immunizations unknown.                                             

- Social history:: Smoking status: Patient denies any tobacco usage or history of.                

                                                                                                  

                                                                                                  

ROS:                                                                                              

09:16 Constitutional: Negative for fever, chills, and weight loss, Neck: Negative for injury, pm1 

      pain, and swelling, Cardiovascular: Negative for chest pain, palpitations, and edema,       

      Respiratory: Negative for shortness of breath, cough, wheezing, and pleuritic chest         

      pain.                                                                                       

09:16 MS/Extremity: Negative for injury and deformity, Skin: Negative for injury, rash, and       

      discoloration, Neuro: Negative for headache, weakness, numbness, tingling, and seizure.     

09:16 Abdomen/GI: Positive for abdominal pain, nausea and vomiting, of the right lower            

      quadrant, Negative for diarrhea, constipation.                                              

09:16 Back: Positive for flank pain, on the right.                                                

09:16 : Positive for burning with urination.                                                    

                                                                                                  

Exam:                                                                                             

09:16 Constitutional:  This is a well developed, well nourished patient who is awake, alert,  pm1 

      and in no acute distress. Head/Face:  Normocephalic, atraumatic.                            

09:16 Back:  No spinal tenderness.  No costovertebral tenderness.  Full range of motion.          

      Skin:  Warm, dry with normal turgor.  Normal color with no rashes, no lesions, and no       

      evidence of cellulitis. MS/ Extremity:  Pulses equal, no cyanosis.  Neurovascular           

      intact.  Full, normal range of motion.                                                      

09:16 Cardiovascular: Exam negative for  acute changes, Rate: normal, Rhythm: regular,            

      Pulses: no pulse deficits are appreciated, Edema: is not appreciated.                       

09:16 Respiratory: Exam negative for  acute changes, respiratory distress, shortness of           

      breath.                                                                                     

09:16 Abdomen/GI: Inspection: abdomen appears normal, Palpation: soft, in all quadrants, mild     

      abdominal tenderness, in the epigastric area.                                               

09:16 Neuro: Exam negative for acute changes, Orientation: is normal, Mentation: is normal,       

      Motor: is normal, moves all fours, Sensation: is normal, no obvious gross deficits.         

                                                                                                  

Vital Signs:                                                                                      

08:13  / 94; Pulse 71; Resp 18 S; Temp 97.8(O); Pulse Ox 99% on R/A; Weight 74.84 kg    aa5 

      (R); Height 5 ft. 4 in. (162.56 cm) (R);                                                    

10:57  / 70; Pulse 96; Resp 16; Pulse Ox 99% ;                                          rb3 

11:41  / 58; Pulse 85; Resp 14; Pulse Ox 100% ;                                         rb3 

12:30  / 54; Pulse 83; Resp 15; Pulse Ox 100% ;                                         rb3 

13:30  / 68; Pulse 86; Resp 13; Pulse Ox 100% ;                                         rb3 

15:00  / 53; Pulse 92; Resp 15; Pulse Ox 100% ;                                         rb3 

16:00  / 51; Pulse 87; Resp 13; Pulse Ox 100% ;                                         rb3 

17:00  / 51; Pulse 89; Resp 13; Pulse Ox 100% ;                                         rb3 

18:00  / 67; Pulse 97; Resp 16; Pulse Ox 100% ;                                         rb3 

19:00  / 41; Pulse 96; Resp 14; Pulse Ox 97% on R/A;                                    rv  

20:00  / 64; Pulse 99; Resp 15; Pulse Ox 99% on R/A;                                    rv  

21:00  / 56; Pulse 97; Resp 16; Pulse Ox 100% on R/A;                                   rv  

08:13 Body Mass Index 28.32 (74.84 kg, 162.56 cm)                                             aa5 

                                                                                                  

MDM:                                                                                              

09:09 Patient medically screened.                                                             pm1 

15:15 Counseling: I had a detailed discussion with the patient and/or guardian regarding: the pm1 

      historical points, exam findings, and any diagnostic results supporting the                 

      discharge/admit diagnosis, lab results, radiology results, the need to transfer to          

      another facility, [facility] does not immediately have the required       

      specialist, No GI present to address patient's gallbladder pancreatitis.                    

15:15 Data reviewed: vital signs.                                                             pm1 

18:22 Physician consultation: Eddie Urrutia was contacted at 18:23, regarding regarding          pm1 

      transfer, patient's condition, and will see patient.                                        

                                                                                                  

                                                                                             

08:37 Order name: Urine Culture                                                               rn  

                                                                                             

08:37 Order name: Urine Microscopic Only; Complete Time: 16:48                                rn  

                                                                                             

09:15 Order name: Basic Metabolic Panel; Complete Time: 11:48                                 pm1 

                                                                                             

09:15 Order name: CBC with Diff; Complete Time: 11:48                                         pm1 

                                                                                             

09:15 Order name: Hepatic Function; Complete Time: 11:48                                      pm1 

                                                                                             

09:15 Order name: Lipase; Complete Time: 11:48                                                pm1 

                                                                                             

11:51 Order name: US Abdomen Limited; Complete Time: 13:42                                    pm1 

                                                                                             

13:51 Order name: CT Stone Protocol; Complete Time: 15:02                                     pm1 

                                                                                             

15:45 Order name: Urine Dipstick-Ancillary; Complete Time: 15:56                              EDMS

                                                                                             

17:30 Order name: SARS-COV-2 RT PCR; Complete Time: 17:38                                     EDMS

                                                                                             

08:37 Order name: Urine Dipstick-Ancillary (obtain specimen); Complete Time: 15:49            rn  

                                                                                             

09:15 Order name: IV Saline Lock; Complete Time: 10:52                                        pm1 

                                                                                             

09:15 Order name: Labs collected and sent; Complete Time: 11:21                               pm1 

                                                                                             

09:21 Order name: Bladder Scanner; Complete Time: 10:51                                       pm1 

05/01                                                                                             

15:15 Order name: NPO; Complete Time: 15:49                                                   pm1 

                                                                                                  

Administered Medications:                                                                         

10:47 Drug: morphine 4 mg Route: IVP; Site: right forearm;                                    rb3 

11:00 Follow up: Response: No adverse reaction                                                rb3 

10:47 Drug: Zofran (Ondansetron) 4 mg Route: IVP; Site: right forearm;                        rb3 

11:00 Follow up: Response: No adverse reaction                                                rb3 

15:30 Drug: NS 0.9% 1000 ml Route: IV; Rate: 100 ml/hr; Site: right forearm;                  rb3 

21:23 Follow up: IV Status: Infusion continued upon transfer                                  rv  

16:34 Drug: Zosyn (piperacillin-tazobactam) 3.375 grams Route: IVPB; Infused Over: 60 mins;   rb3 

      Site: right forearm;                                                                        

17:02 Follow up: Response: No adverse reaction; IV Status: Completed infusion                 rb3 

16:43 Drug: morphine 4 mg Route: IVP; Site: right forearm;                                    rb3 

17:02 Follow up: Response: No adverse reaction; Pain is decreased                             rb3 

16:43 Drug: Zofran (Ondansetron) 4 mg Route: IVP; Site: right forearm;                        rb3 

17:02 Follow up: Response: No adverse reaction                                                rb3 

                                                                                                  

                                                                                                  

Disposition:                                                                                      

21 16:10 Transfer ordered to Other Acute Care Facility. Diagnosis are Other acute           

  pancreatitis - Gallstone pancreatitis, Urinary tract infection, site not                        

  specified.                                                                                      

- Reason for transfer: Specialty.                                                                 

- Accepting physician is MD.                                                                      

- Condition is Stable.                                                                            

- Problem is new.                                                                                 

- Symptoms have improved.                                                                         

                                                                                                  

                                                                                                  

                                                                                                  

Addendum:                                                                                         

2021                                                                                        

     19:58 Co-signature as Attending Physician, Kevon Herr MD.                                    r
n

                                                                                                  

Signatures:                                                                                       

Dispatcher [employer]                           EDMS                                                 

Kevon Herr MD MD rn Calderon, Audri, RN                     RN   aa5                                                  

Wilton Nelson, NP                    NP   pm1                                                  

Zhang Zamarripa RN                    RN   rv                                                   

Lissette Orellana, RN                     RN   rb3                                                  

                                                                                                  

Corrections: (The following items were deleted from the chart)                                    

                                                                                             

08:17 08:13 PMHx: Dialysis, M,W,F; aa5                                                        aa5 

08:19 08:13 Allergies: Morphine; aa5                                                          aa5 

09:40 09:15 Stone Protocol+CT.RAD.BRZ ordered. EDMS                                           EDMS

16:49 15:33 CORONAVIRUS+MR.LAB.BRZ ordered. EDMS                                              EDMS

16:50 16:10 2021 16:10 Transfer ordered to Other Acute Care Facility. Diagnosis is      pm1 

      Other acute pancreatitis - Gallstone pancreatitis. Reason for transfer: Specialty.          

      Accepting physician is MD. Condition is Stable. Problem is new. Symptoms have improved.     

      pm1                                                                                         

21:53 16:50 2021 16:10 Transfer ordered to Other Acute Care Facility. Diagnosis is      rv  

      Other acute pancreatitis - Gallstone pancreatitis; Urinary tract infection, site not        

      specified. Reason for transfer: Specialty. Accepting physician is MD. Condition is          

      Stable. Problem is new. Symptoms have improved. pm1                                         

                                                                                                  

**************************************************************************************************

## 2021-05-19 ENCOUNTER — HOSPITAL ENCOUNTER (EMERGENCY)
Dept: HOSPITAL 97 - ER | Age: 76
Discharge: HOME | End: 2021-05-19
Payer: COMMERCIAL

## 2021-05-19 VITALS — SYSTOLIC BLOOD PRESSURE: 150 MMHG | DIASTOLIC BLOOD PRESSURE: 77 MMHG

## 2021-05-19 VITALS — TEMPERATURE: 98.7 F

## 2021-05-19 VITALS — OXYGEN SATURATION: 100 %

## 2021-05-19 DIAGNOSIS — N18.6: ICD-10-CM

## 2021-05-19 DIAGNOSIS — Z88.5: ICD-10-CM

## 2021-05-19 DIAGNOSIS — I12.0: ICD-10-CM

## 2021-05-19 DIAGNOSIS — N13.30: ICD-10-CM

## 2021-05-19 DIAGNOSIS — N39.0: Primary | ICD-10-CM

## 2021-05-19 LAB
BUN BLD-MCNC: 10 MG/DL (ref 7–18)
GLUCOSE SERPLBLD-MCNC: 80 MG/DL (ref 74–106)
HCT VFR BLD CALC: 28.8 % (ref 36–45)
LYMPHOCYTES # SPEC AUTO: 1 K/UL (ref 0.7–4.9)
PMV BLD: 8.1 FL (ref 7.6–11.3)
POTASSIUM SERPL-SCNC: 2.7 MMOL/L (ref 3.5–5.1)
RBC # BLD: 3.34 M/UL (ref 3.86–4.86)

## 2021-05-19 PROCEDURE — 36415 COLL VENOUS BLD VENIPUNCTURE: CPT

## 2021-05-19 PROCEDURE — 87088 URINE BACTERIA CULTURE: CPT

## 2021-05-19 PROCEDURE — 81015 MICROSCOPIC EXAM OF URINE: CPT

## 2021-05-19 PROCEDURE — 51702 INSERT TEMP BLADDER CATH: CPT

## 2021-05-19 PROCEDURE — 81003 URINALYSIS AUTO W/O SCOPE: CPT

## 2021-05-19 PROCEDURE — 99284 EMERGENCY DEPT VISIT MOD MDM: CPT

## 2021-05-19 PROCEDURE — 96375 TX/PRO/DX INJ NEW DRUG ADDON: CPT

## 2021-05-19 PROCEDURE — 85025 COMPLETE CBC W/AUTO DIFF WBC: CPT

## 2021-05-19 PROCEDURE — 76377 3D RENDER W/INTRP POSTPROCES: CPT

## 2021-05-19 PROCEDURE — 87086 URINE CULTURE/COLONY COUNT: CPT

## 2021-05-19 PROCEDURE — 96374 THER/PROPH/DIAG INJ IV PUSH: CPT

## 2021-05-19 PROCEDURE — 74176 CT ABD & PELVIS W/O CONTRAST: CPT

## 2021-05-19 PROCEDURE — 80048 BASIC METABOLIC PNL TOTAL CA: CPT

## 2021-05-19 NOTE — XMS REPORT
Continuity of Care Document

                             Created on:May 19, 2021



Patient:PHIL CALLOWAY

Sex:Female

:1945

External Reference #:[id number]





Demographics







                          Address                   [address]



                                                    [place] [address]

 

                          Home Phone                [phone]

 

                          Mobile Phone              [phone]

 

                          Email Address             [email or web address]

 

                          Preferred Language        English

 

                          Marital Status            Unknown

 

                          Spiritism Affiliation     Unknown

 

                          Race                      Unknown

 

                          Additional Race(s)        Unavailable

 

                          Ethnic Group              Unknown









Author







                          Organization              [facility]

t

 

                          Address                   [address]



                                                    [place] [address]

 

                          Phone                     [phone]









Support







                Name            Relationship    Address         Phone

 

                Luis        Spouse          Unavailable     [phone]









Care Team Providers







                    Name                Role                Phone

 

                    Odalys KELLER         Primary Care Physician [phone]

 

                    Renan KELLER,  Jude      Attending Clinician [phone]

 

                    Romina Rivero MD Attending Clinician [phone]

 

                    Desirae Lam MD Attending Clinician [phone]

 

                    Abril KELLER,  ACony      Attending Clinician [phone]

 

                    Chris Schroeder CRNA Attending Clinician [phone]

 

                    Llanes              Attending Clinician Unavailable

 

                    Saurabh KELLER,  T.     Attending Clinician [phone]

 

                    Venancio KELLER       Attending Clinician [phone]

 

                    Ed KELLER             Attending Clinician [phone]

 

                    Jae Brush  Attending Clinician [phone]

 

                    Ivring KELLER            Attending Clinician [phone]

 

                    Davey KELLER,  VCony       Attending Clinician [phone]

 

                    AYAKA May        Attending Clinician [phone]

 

                    Malu KELLER               Attending Clinician [phone]

 

                    RENAN                 Admitting Clinician Unavailable

 

                    VENANCIO          Admitting Clinician Unavailable









Payers







           Payer Name Policy Type Policy     Effective Date Expiration Date Sour

ce



                                 Number                           

 

           MEDICAREMEDICARE PART            [id number] 2009            [place] [employer]                            00:00:00              [facility]



           [id number]                                             



           [place]                                             

 

           COMMERCIAL MISCMISC            [id number]  2018            [place]

on



           MEDICARE                         00:00:00              [place]



           [id number]                                             



           2018-PresentCommerc                                             



           ial                                                    







Problems







       Condition Condition Condition Status Onset  Resolution Last   Treating Co

mments 

Source



       Name   Details Category        Date   Date   Treatment Clinician        



                                                 Date                 

 

       Severe Severe Disease Active                              [facility]



       acute  acute                                               [facility]



       pancreatit pancreatit               00:00:                             st



       is     is                   00                                 

 

       Complicati Complicati Disease Active                              [facility]

[facility]



       on of  on of                3-05                               [facility]



       vascular vascular               00:00:                             st



       dialysis dialysis               00                                 



       catheter catheter                                                  

 

       Hyponatrem Hyponatrem Disease Active                              [facility]

[facility]



       ia     ia                   3-05                               [facility]



                                   00:00:                             st



                                   00                                 

 

       Mechanical Mechanical Disease Active                       Overview

: [facility]



       complicati complicati               3-05                        [facility]

 [facility]



       on of  on of                00:00:                      g of this st



       vascular vascular               00                          note   



       dialysis dialysis                                           might be 



       catheter catheter                                           different 



                                                               from the 



                                                               original. 



                                                               Added  



                                                               automatic 



                                                               ally from 



                                                               request 



                                                               for    



                                                               surgery 



                                                               [id number] 







Allergies, Adverse Reactions, Alerts







       Allergy Allergy Status Severity Reaction(s) Onset  Inactive Treating Comm

ents 

Source



       Name   Type                        Date   Date   Clinician        

 

       Codeine Propensi Active        Itching                       [facility]

n



              ty to                       3-05                        [facility]



              adverse                      00:00:                      st



              reaction                      00                          



              s to                                                    



              drug                                                    







Social History







           Social Habit Start Date Stop Date  Quantity   Comments   Source

 

           History SDOH                                             [facility]

odist



           Alcohol Std Drinks                                             

 

           History [facility]

odist



           Alcohol Binge                                             

 

           Exposure to                       Not sure              [facility]

dist



           SARS-CoV-2 (event)                                             

 

           Tobacco use and 2021 Never used            [facility]

[facility]ist



           exposure   00:00:00   00:00:00                         

 

           Alcohol intake 2021 Lifetime              [facility]

thodist



                      00:00:00   00:00:00   non-drinker            



                                            (finding)             

 

           History SDOH 2021 1                     [facility]

odist



           Alcohol Frequency 00:00:00   00:00:00                         

 

           Sex Ass[birth year] At [birth year]                       [facility]

ethodist



           Birth      00:00:00   00:00:00                         









                Smoking Status  Start Date      Stop Date       Source

 

                Never smoker                                    [facility]

t







Medications







       Ordered Filled Start  Stop   Current Ordering Indication Dosage Frequency

 Signature

                    Comments            Components          Source



     Medication Medication Date Date Medication? Clinician                (SIG) 

          



     Name Name                                                   

 

     levothyroxi            Yes            75ug QD   Take 75           [facility]

[facility]



     ne        5-06                               mcg by           [facility]



     (SYNTHROID)      13:57:                               mouth           st



     75 mcg      26                                 daily.           



     tablet                                                        

 

     omeprazole      0      Yes            40mg QD   Take 40 mg           [facility]

[facility]



     (PriLOSEC)      5-06                               by mouth           [facility]

di



     40 MG      13:57:                               daily.           st



     capsule      26                                                

 

     metoclopram      0      Yes            5mg  [id number] Take 5 mg      

     [facility]



     delgado       5-06                          [id number] by mouth 3           Met

hodi



     (REGLAN) 5      13:57:                          3D   (three)           st



     MG tablet      26                                 times a           



                                                  day.           

 

     meclizine      0      Yes            25mg Q.25D Take 25 mg           [facility]

[facility]



     (ANTIVERT)      5-06                               by mouth 4           Met

hodi



     25 mg      13:57:                               (four)           st



     tablet      26                                 times a           



                                                  day as           



                                                  needed for           



                                                  dizziness.           

 

     calcitrioL      0      Yes            .25ug Q48H Take 0.25           [facility]

[facility]



     (ROCALTROL)      5-06                               mcg by           Method

i



     0.25 MCG      13:57:                               mouth           st



     capsule      26                                 every           



                                                  other day.           

 

     traMADoL            Yes       acute pain 50mg Q6H  Take 50 mg        

   [facility]



     (ULTRAM) 50      5-06                               by mouth           [facility]

gabriel



     mg tablet      13:57:                               every 6           st



               26                                 (six)           



                                                  hours as           



                                                  needed for           



                                                  moderate           



                                                  pain           



                                                  .acute           



                                                  pain.           

 

     promethazin            Yes            25mg Q6H  Take 25 mg           

[facility]



     e         5-06                               by mouth           [facility]



     (PHENERGAN)      13:57:                               every 6           st



     25 MG      26                                 (six)           



     tablet                                         hours as           



                                                  needed for           



                                                  nausea or           



                                                  vomiting.           

 

     hydrALAZINE            Yes            100mg Q.5D Take 100           [facility]

[facility]



     (APRESOLINE      5-06                               mg by           [facility]



     ) 100 MG      13:57:                               mouth 2           st



     tablet      26                                 (two)           



                                                  times a           



                                                  day.           

 

     amLODIPine      2021- No             10mg QD   Take 1           [facility]

[facility]



     (NORVASC)      3- 04-08                          tablet (10           Met

hodi



     10 mg      00:00: 23:59                          mg total)           st



     tablet      00   :00                           by mouth           



                                                  daily for           



                                                  30 days.           

 

     ondansetron      0 - No             4mg  Q12H Take 4 mg           

[facility]



     (ZOFRAN) 4      3- 03-08                          by mouth           [facility]

gabriel



     MG tablet      20:27: 00:00                          every 12           st



               48   :00                           (twelve)           



                                                  hours as           



                                                  needed for           



                                                  nausea or           



                                                  vomiting.           

 

     carvediloL      2021- No             12.5mg Q.5D Take 12.5          

 [facility]



     (COREG)      3-08 03-08                          mg by           [facility]



     12.5 MG      20:27: 00:00                          mouth 2           st



     tablet      48   :00                           (two)           



                                                  times a           



                                                  day with           



                                                  meals.           

 

     amoxicillin      2021- No             1{tbl} Q.5D Take 1           [facility]

[facility]



     -pot      3--08                          tablet by           [facility]



     clavulanate      20:27: 00:00                          mouth 2           st



     (AUGMENTIN)      48   :00                           (two)           



     250-125 mg                                         times a           



     per tablet                                         day.           

 

     traMADoL      2021- No        acute pain 50mg Q8H  Take 50 mg       

    [facility]



     (ULTRAM) 50      3--08                          by mouth           Met

hodi



     mg tablet      17:49: 00:00                          every 8           st



               18   :00                           (eight)           



                                                  hours as           



                                                  needed for           



                                                  moderate           



                                                  pain           



                                                  .acute           



                                                  pain.           

 

     amLODIPine      2021- No             5mg  QD   Take 5 mg           H

[facility]



     (NORVASC) 5      3-08 03-05                          by mouth           Met

hodi



     mg tablet      11:50: 00:00                          daily.           st



               49   :00                                          

 

     amoxicillin      2021- No             500mg [id number] Take 500     

      [facility]



     (AMOXIL)      3- 03-05                     [id number] mg by           Met

hodi



     500 MG      11:50: 00:00                     3D   mouth 3           st



     capsule      49   :00                           (three)           



                                                  times a           



                                                  day.           

 

     amoxicillin      2021- No             1{tbl} QD   Take 1           [facility]

[facility]



     -pot      3--05                          tablet by           [facility]



     clavulanate      11:50: 00:00                          mouth           st



     (AUGMENTIN)      49   :00                           daily.           



     500-125 mg                                         Disp           



     per tablet                                         21 18           



                                                  day supply           

 

     calcitrioL      2021- No             .25ug QD   Take 0.25           

[facility]



     (ROCALTROL)      3-08 03-05                          mcg by           [facility]

di



     0.25 MCG      11:50: 00:00                          mouth           st



     capsule      49   :00                           daily.           

 

     carvediloL      2021- No             12.5mg Q.5D Take 12.5          

 [facility]



     (COREG)      3-08 03-05                          mg by           [facility]



     12.5 MG      11:50: 00:00                          mouth 2           st



     tablet      49   :00                           (two)           



                                                  times a           



                                                  day with           



                                                  meals.           

 

     hydroCHLORO      2021-0 2021- No             25mg QD   Take 25 mg          

 [facility]



     thiazide      3-08 03-05                          by mouth           Method

i



     (HYDRODIURI      11:50: 00:00                          daily.           st



     L) 25 MG      49   :00                                          



     tablet                                                        

 

     losartan       No             100mg QD   Take 100           [facility]

[facility]



     (COZAAR)      3-08 03-05                          mg by           [facility]



     100 MG      11:50: 00:00                          mouth           st



     tablet      49   :00                           daily.           

 

     metoclopram       No             5mg  Q.25D Take 5 mg          

 [facility]



     delgado       3-08 03-05                          by mouth 4           [facility]



     (REGLAN) 5      11:50: 00:00                          (four)           st



     MG tablet      49   :00                           times a           



                                                  day.           

 

     metoprolol       No             100mg QD   Take 100           [facility]

[facility]



     succinate      3-08 03-05                          mg by           [facility]



     XL        11:50: 00:00                          mouth           st



     (TOPROL-XL)      49   :00                           daily.           



     100 mg 24                                                        



     hr tablet                                                        

 

     prochlorper                   10mg Q8H  Take 10 mg          

 [facility]



     azine      3-08 03-05                          by mouth           [facility]



     (COMPAZINE)      11:50: 00:00                          every 8           st



     10 MG      49   :00                           (eight)           



     tablet                                         hours as           



                                                  needed for           



                                                  nausea or           



                                                  vomiting.           

 

     carvediloL       No             25mg Q.5D Take 1           [facility]

[facility]



     (COREG) 25      3-08 04-07                          tablet (25           Me

thodi



     MG tablet      00:00: 23:59                          mg total)           st



               00   :00                           by mouth 2           



                                                  (two)           



                                                  times a           



                                                  day with           



                                                  meals for           



                                                  30 days.           

 

     traMADoL              acute pain 50mg Q8H  Take 1           

[facility]



     (ULTRAM) 50      3-08 03-18                          tablet (50           M

ethodi



     mg tablet      00:00: 23:59                          mg total)           st



               00   :00                           by mouth           



                                                  every 8           



                                                  (eight)           



                                                  hours as           



                                                  needed for           



                                                  moderate           



                                                  pain for           



                                                  up to 10           



                                                  days           



                                                  .acute           



                                                  pain.           







Vital Signs







             Vital Name   Observation Time Observation Value Comments     Source

 

             Oxygen saturation in 2021 08:15:00 95 /min                   

[facility]



             Arterial blood by                                        



             Pulse oximetry                                        

 

             Systolic blood 2021 07:37:48 146 mm[Hg]                Indaina reyes [facility]



             pressure                                            

 

             Diastolic blood 2021 07:37:48 62 mm[Hg]                 [facility]

on [facility]



             pressure                                            

 

             Heart rate   2021 07:37:48 92 /min                   [facility]

 

             Body temperature 2021 07:37:48 36.5 Camila                  [facility]

[facility]

 

             Respiratory rate 2021 07:37:48 16 /min                   [facility]

darron [facility]

 

             Body weight  2021 05:48:00 75.932 kg                 [facility]

 

             BMI          2021 05:48:00 28.73 kg/m2               [facility]

 

             Body height  2021 00:05:44 162.6 cm                  [facility]







Procedures







                Procedure       Date / Time     Performing Clinician Source



                                Performed                       

 

                HC COMPLETE BLD COUNT 2021 05:35:00 Omar Lam



                W/AUTO DIFF                                     

 

                COMPREHENSIVE METABOLIC 2021 05:35:00 Omar Lam 

[facility]



                PANEL                                           

 

                ESTIMATED GFR   2021 05:35:00 Omar Lam

 

                BILIRUBIN DIRECT 2021 05:35:00 Omar Lam

on [facility]

 

                PHOSPHORUS LEVEL 2021 05:35:00 Claudine Tilley [facility]

hodscotty Humphreys        

 

                MAGNESIUM LEVEL 2021 05:35:00 Claudine Tilley [facility]

odist



                                                Petr        

 

                SURGICAL PATHOLOGY REQUEST 2021 12:50:00 Camila Riveroadenohemi      

 

                ND AN ELECTIVE  2021 10:51:43 Arash Schroeder [facility]



                ENDOTRACHEAL AIRWAY                                 

 

                CHOLECYSTECTOMY, 2021 10:02:00 Omar Lam

on [facility]



                LAPAROSCOPIC                                    

 

                BASIC METABOLIC PANEL 2021 04:21:00 Camila Rivero

on [facility]



                                                [place]      

 

                HEPATIC FUNCTION PANEL 2021 04:21:00 Omar Lam

 

                HC COMPLETE BLD COUNT 2021 04:21:00 Omar Lam



                W/AUTO DIFF                                     

 

                TYPE AND SCREEN 2021 04:21:00 Omar Lam

 

                ESTIMATED GFR   2021 04:21:00 EdyreaganCamila singer Met

hodscotty Vanegas      

 

                COVID-19 QUALITATIVE PCR 2021 22:20:00 Omar Lam 

Hindu

 

                BASIC METABOLIC PANEL 2021 04:01:00 Ritu Walls Hindu

 

                ESTIMATED GFR   2021 04:01:00 Ritu Walls Meth

odist

 

                PHOSPHORUS LEVEL 2021 04:01:00 Jarek, Claudine Cordon Met

hodist



                                                Gargollo        

 

                MAGNESIUM LEVEL 2021 04:01:00 Jarek, Claudine Cordon Meth

odist



                                                Gargollo        

 

                BASIC METABOLIC PANEL 2021 18:36:00 Jarek, Claudine reyes Hindu



                                                Gargollo        

 

                MAGNESIUM LEVEL 2021 18:36:00 Jarek, Claudine Cordon Meth

odist



                                                Gargollo        

 

                PHOSPHORUS LEVEL 2021 18:36:00 Jarek, Claudine Cordon Met

hodist



                                                Gargollo        

 

                ALBUMIN LEVEL   2021 18:36:00 [facility]

[facility]



                                                Gargollo        

 

                IONIZED CALCIUM 2021 18:36:00 [facility]

[facility]



                                                Gargollo        

 

                ESTIMATED GFR   2021 18:36:00 Jarek, Claudine Cordon Meth

odist



                                                Gargollo        

 

                VENIPUNC NEED PHYS 2021 14:03:00 Scotty Lopez

on Hindu



                SKILL,DX OR RX                                  

 

                BASIC METABOLIC PANEL 2021 04:32:00 Ritu Walls Hindu

 

                PHOSPHORUS LEVEL 2021 04:32:00 Ritu Walls Met

hodist

 

                PARATHYROID HORMONE 2021 04:32:00 Ritu Walls 

Hindu

 

                VITAMIN D 25 HYDROXY LEVEL 2021 04:32:00 Ritu Walls Hindu

 

                ESTIMATED GFR   2021 04:32:00 Ritu Walls Meth

odist

 

                PROTHROMBIN TIME WITH INR 2021 04:32:00 Eddie Urrutia 

Hindu

 

                MAGNESIUM LEVEL 2021 04:32:00 Claudine Tilley Giuseppe zapata



                                                Jerrygollo        

 

                XR CHEST 1 VW PORTABLE 2021 06:21:29 RenanEddie valente Joceline glez Hindu

 

                COMPREHENSIVE METABOLIC 2021 04:40:00 RenanEddie valente Clifton clancy Hindu



                PANEL                                           

 

                HC COMPLETE BLD COUNT 2021 04:40:00 Renan, Peteryoselyn Arizarenea rob Hindu



                W/AUTO DIFF                                     

 

                MAGNESIUM LEVEL 2021 04:40:00 RenanEddie valenterenea Cordon Me

thodist

 

                LIPASE LEVEL    2021 04:40:00 Renan, Peteryoselyn Jude Cordon Me

thodist

 

                AMYLASE LEVEL   2021 04:40:00 RenanEddie valentetanmariella Bravo Me

thodist

 

                ESTIMATED GFR   2021 04:40:00 Renan, Peteryoselyn Arizarenea Cordon Me

thodist

 

                HC COMPLETE BLD COUNT 2021 00:27:00 RenanEddie valenterenea rob Hindu



                W/AUTO DIFF                                     

 

                COMPREHENSIVE METABOLIC 2021 00:27:00 RenanEddie valenterenea clancy Hindu



                PANEL                                           

 

                MAGNESIUM LEVEL 2021 00:27:00 RenanEddie valentetanmariella Bravo Me

thodist

 

                AMYLASE LEVEL   2021 00:27:00 RenanEddie valentetanmariella Bravo Me

thodist

 

                LIPASE LEVEL    2021 00:27:00 RenanEddietanmariella Bravo Me

thodist

 

                LIPID PANEL     2021 00:27:00 RenanEddie valentetanmariella Bravo Me

thodist

 

                ESTIMATED GFR   2021 00:27:00 Renan Peteryoselyn Arizarenea Cordon Me

thodist

 

                HEMOGLOBIN A1C  2021 00:27:00 Eddie Urrutia Me

thodist

 

                OR FL < 1 HOUR  2021 10:36:00 Alexsander Toribio

odscotty

 

                ND AN ELECTIVE  2021 10:20:38 Tez May

ethodist



                SUPRAGLOTTIC AIRWAY                                 

 

                INSERTION, TUNNELED 2021 09:37:00 Alexsander Toribio



                CENTRAL VENOUS CATHETER                                 



                WITH PORT, WITHOUT                                 



                FLUOROSCOPIC GUIDANCE                                 

 

                HEMODIALYSIS    2021 09:32:43 Alexsander Toribio

odscotty

 

                PROTHROMBIN TIME WITH INR 2021 04:26:00 Alexsander Toribio

 

                PARTIAL THROMBOPLASTIN 2021 04:26:00 Alexsander Toribio Hindu



                TIME (PTT)                                      

 

                BASIC METABOLIC PANEL 2021 04:26:00 Ricky Brush Hindu



                                                Jae            

 

                HC COMPLETE BLD COUNT 2021 04:26:00 Ricky Brush



                W/AUTO DIFF                     Jae            

 

                ABO AND RH CONFIRMATION 2021 04:26:00 Alexsander Toribio

 

                ESTIMATED GFR   2021 04:26:00 Ricky Brush

ethodist



                                                Jae            

 

                TYPE AND SCREEN 2021 14:17:00 Alexsander Toribio

 

                XR NECK SOFT TISSUE 2021 09:00:00 Ritu Walls

 

                BASIC METABOLIC PANEL 2021 04:05:00 Ricky Brush Hindu



                                                Jae            

 

                HC COMPLETE BLD COUNT 2021 04:05:00 Ricky Brush



                W/AUTO DIFF                     Jae            

 

                ESTIMATED GFR   2021 04:05:00 Ricky Brush

ethodist



                                                Jae            

 

                HEPATITIS B SURFACE 2021 14:22:00 Melissa Springer



                ANTIGEN                                         

 

                HEPATITIS B SURFACE 2021 14:22:00 Melissa Springer



                ANTIBODY                                        

 

                HEMODIALYSIS    2021 13:48:38 Melissa Springer Met

hodist

 

                XR CHEST 1 VW PORTABLE 2021 23:27:43 Alexsander Toribio Hindu

 

                ESTIMATED GFR   2021 21:54:00 Catarino Fbaian

 

                AMYLASE LEVEL   2021 21:54:00 Catarino Fabianist

 

                LIPASE LEVEL    2021 21:54:00 Catarino Fabian

 

                COMPREHENSIVE METABOLIC 2021 21:54:00 Catarino Fabian



                PANEL                                           

 

                COVID-19 QUALITATIVE PCR 2021 19:00:00 James Wiley

 

                HC COMPLETE BLD COUNT 2021 17:09:00 James Wiley



                W/AUTO DIFF                                     

 

                PROTHROMBIN TIME WITH INR 2021 17:09:00 James Wiley

 

                PARTIAL THROMBOPLASTIN 2021 17:09:00 James Wiley



                TIME (PTT)                                      

 

                BASIC METABOLIC PANEL 2021 17:09:00 James Wiley

 

                ESTIMATED GFR   2021 17:09:00 James Wiley

ethodist

 

                ECG 12-LEAD     2021 17:05:40 James Wiley

ethodist

 

                ECG ED PRELIMINARY 2021 16:57:05 James Wiley



                INTERPRETATION                                  







Plan of Care







             Planned Activity Planned Date Details      Comments     Source

 

             Future Scheduled 2021   INFLUENZA VACCINE              [place] reyes Hindu



             Test         00:00:00     [code = INFLUENZA              



                                       VACCINE]                  

 

             Future Scheduled 1994-07-15   COLONOSCOPY SCREENING              Ho

aston Hindu



             Test         00:00:00     [code = COLONOSCOPY              



                                       SCREENING]                

 

             Future Scheduled 1994-07-15   SHINGLES VACCINES (#1)              H

ouamaris Hindu



             Test         00:00:00     [code = SHINGLES              



                                       VACCINES (#1)]              

 

             Future Scheduled 1962-07-15   Hepatitis C screening              Ho

aston Hindu



             Test         00:00:00     (procedure) [code =              



                                       833077542]                

 

             Future Scheduled 1956-07-15   COVID-19 VACCINE (1)              Joceline glez Hindu



             Test         00:00:00     [code = COVID-19              



                                       VACCINE (1)]              

 

             Future Scheduled 1950-07-15   65+ PNEUMOCOCCAL              Bravo

 Hindu



             Test         00:00:00     VACCINE (1 of 4 -              



                                       PCV13) [code = 65+              



                                       PNEUMOCOCCAL VACCINE              



                                       (1 of 4 - PCV13)]              







Encounters







        Start   End     Encounter Admission Attending Care    Care    Encounter 

Source



        Date/Time Date/Time Type    Type    Clinicians Facility Department ID   

   

 

        2021 Inpatient         EMILY [facility]     064     [id number]

06 [place]



        00:00:00 00:00:00                 CAMILA                 [address]

[address]



                                                                        st

 

        2021 Outpatient         RENATA NIEVES [facility]     012     [id number]

[id number] [place]



        00:00:00 00:00:00                                         [address]     Method

i



                                                                        st







Results







           Test Description Test Time  Test Comments Results    Result Comments 

Source









                    Surgical pathology request 2021 12:08:01 









                      Test Item  Value      Reference Range Interpretation Comme

nts









             Case number (test code = [id number]) [id number]                     

      

 

             Surgical pathology report (test code = See link below for PDF Lab R

eport                           



             4580)                                               

 

             Result status (test code = 7391933) This is Final Report for [id number]

1303-40                           



[place] [id number] 10:51:43Arash Schroeder CRNA     
2021 10:52 AMAirway Location:  OR Performed by:  CRNA/AAAuthorized by:  
Vivian Samuels MD Urgency:  ElectiveDifficult Airway: No  Preoxygenated 
with 100% O2: Yes  C-spine Precautions Maintained Throughout: Yes  Mask 
Ventilation:  Not attemptedFinal Airway Type: Endotracheal airwayFinal 
Endotracheal Airway:  ETTCuffed: Yes  Technique Used:  Direct laryngoscopyD
evices/Methods Used in Placement:  Intubating styletBlade Type:  
MillerLaryngoscope Blade/Videolaryngoscope Blade Size:  2ETT Size (mm):  7.0Cuff
at minimum occlusion pressure: Yes  Measured from:  GumsETT to Gums (cm):  
20Placement Verified by: CO2 detection, direct visualization and equal breath 
sounds  Laryngoscopic view:  Grade I - full view of glottisRapid Sequence 
Induction (RSI): Yes  ModifiedRSI: No  Number of Attempts at Approach:  1[place]
MethodistVENIPUNC NEED PHYS SKILL,DX OR [id number] 14:03:00Scotty Lopez RN     5/3/2021  2:15 PMMidline Insertion Date/Time: 5/3/2021 2:03 
PMPerformed by: Scotty Lopez, RNAuthorized by: Eddie Urrutia MD  
Consent:   Consent obtained:  Written  Consent given by:  Patient  Risks 
discussed:  Arterial puncture, incorrect placement, nerve damage, bleeding, 
infection, superficial thrombus and deep vein thrombus  Alternatives discussed: 
No treatment and delayed treatmentUniversal protocol:   Procedure explained and 
questions answered to patientor proxy's satisfaction: yes    Relevant documents 
present and verified: yes    Test results available and properly labeled: yes   
Imaging studies available: yes    Required blood products, implants, devices, 
and special equipment available: yes    Site/side marked: yes    Immediately 
prior to procedure, a time out was called: yes    Patient identity confirmed:  
Verbally with patient, arm band, provided demographic data and hospital-assigned
identification numberPre-procedure details:   Hand hygiene: Hand hygiene 
performed prior to insertion    Sterile barrier technique: All elements of 
maximal sterile technique followed    Skin preparation:  ChloraPrep  Skin 
preparation agent: Dried prior to procedure  Anesthesia (see MAR for exact 
dosages):   Anesthesia method:  Local infiltration  Local anesthetic:  Lidocaine
1% w/o epi  Route administered:  SubcutaneousMidLine Placement Details (Will 
createan LDA):   Extremity Circumference Upper (cm):  36  Extremity 
Circumference Site:  36  Patient position:  Flat  Vessel Size (mm):  3  
Indication:  Poor venous access  Location:  Left brachial  Device Type:  Non-
valved  Catheter Lumen(s):  Single lumen  Catheter size:  3 Fr  Catheter to vein
ratio:  36MidLine Characteristics:   Catheter Brand:  LeTV  External Catheter 
Length (cm):  0  Internal Catheter Length (cm):  12  Total Catheter Length (cm):
 12  Catheter Lot Number:  [id number]  Catheter Expiration Date:  2022  
Micro-Introducer Lot Number:   Procedure details:   Landmarks identified: yes 
Ultrasound guidance: yes    Sterile ultrasound techniques: Sterile gel and 
sterile probe covers were used    Number of attempts:  1  Number of MidLine kits
used during procedure:  1  Extra guide wire required?: No    Purpose of 
procedure:  Midline Placement  Patency/Placement:  Flushes without difficulty, 
flushed with 10 mL normal saline, positive blood return, injection cap placed 
and ultrasound  MidLine placed utilizing ultrasound-guided Modified Seldinger 
Technique: Yes    Dressing/Securement:  Antimicrobial dressing dry and intact, 
antimicrobial dressing applied and catheter securement deviceBlood Loss Amount: 
Less than 20 mLPost-procedure details:   Post-procedure:  Dressing applied  
Patient tolerance of procedure:  Tolerated well, no immediate complications
Cordon MethodistXR Chest 1 [facility] HospitalVewankjc4977-48-76 07:31:35Hm Interface, 
Radiology Results Incoming 2021  7:34 AM CDTFormatting of this note 
might be different from the original.Examination:  XR CHEST 1 VW 
PORTABLEClinical history:  pneumoniaComparison:  3/5/2021IMPRESSION: Lungs are 
clear and appear unchanged. Right IJ lines have been removed.No pneumothoraces 
are identified.  There are no apparent pleural effusions.The cardiomediastinal 
silhouette and the remainder of the chest appear essentially 
unchanged.[id number] MethodistOR [place] &lt; 1 [id number] 13:51:00Hm 
Interface, Radiology Results Incoming - 2021  1:54 PM CSTFormatting of 
this note might be different from the original.EXAMINATION:  OR FL &lt; 1 
HOURCLINICAL HISTORY: Intraoperative fluoroscopyIMPRESSION:Fluoroscopy was 
provided. No radiologist present.  Please see procedure report for discussion of
procedure, findings and fluoroscopic time.[facility] [place] CityL-[id number] MethodistAirway
2021 10:20:38Tez May     3/8/2021 10:20 AMAirway Location:  OR 
Performed by:  CRNA/AAAnesthesiologist: Nathaniel Mariscal MDResident/CRNA/AA:  Tez May RAuthorized by:  Nathaniel Mariscal MD 
Urgency:  ElectiveDifficult Airway: No  Preoxygenated with 100% O2: Yes  C-spine
Precautions Maintained Throughout: Yes  Mask Ventilation:  Not attemptedFinal 
Airway Type:  Supraglottic airwayFinal LMA:  UniqueLMA Size:  4Number of 
Attempts at Approach:  1 Soft Tissue Intact, able to ventilate with no leak and 
minimal peak inspiratory pressuresEleanor Slater Hospitalamaris MethodistXR Neck Soft [id number]
10:15:23Hm Interface, Radiology Results 2021 10:18 AM 
CSTFormatting of this note might be different from the original.EXAMINATION: XR 
NECK SOFT TISSUECLINICAL HISTORY: location of the dialysiscathCOMPARISON:  
NoneIMPRESSION:2 views were obtained.Right jugular dialysis catheter with tips 
in the superior vena cava.No prevertebral soft tissue thickening.There is 1 to 2
mm degenerative anterolisthesis of C3 on C4, C4 on C5, and C5 on C6, with mild 
disc height loss at C4-5 and C5-6 with small anterior endplate 
osteophytes.[id number] MethodistEC 12 [id number] 17:45:47





             Test Item    Value        Reference Range Interpretation Comments

 

             Ventricular rate (test 88                                     



             code = 253)                                         

 

             Atrial rate (test code = 88                                     



             255)                                                

 

             ND interval (test code = 120                                    



             266)                                                

 

             QRSD interval (test code 142                                    



             = 260)                                              

 

             QT interval (test code = 400                                    



             264)                                                

 

             QTC interval (test code 484                                    



             = 265)                                              

 

             P axis 1 (test code = -50                                    



             267)                                                

 

             QRS axis 1 (test code = -15                                    



             268)                                                

 

             T wave axis (test code = 92                                     



             270)                                                

 

             EKG impression (test Unusual P axis,                           



             code = 273)  possible ectopic                           



                          atrial rhythm-Left                           



                          bundle branch                           



                          block-No previous                           



                          ECGs                                   



                          available-Electronica                           



                          lly Signed By Smith MD, Toussaint ()                           



                          on 3/5/2021 5:45:46                           



                          PM                                     



[facility] ED Preliminary Interpretation - Not an [id number] 
16:57:05





             Test Item    Value        Reference Range Interpretation Comments

 

             FREIDA (test code = FREIDA) James Wiley III, MD     3/6/2021                           



                          1:43 [facility] ED                           



                          Preliminary                            



                          Interpretation - Not                           



                          an OrderPerformed by:                           



                          James Wiley III, MDAuthorized by:                           



                          James iWley III, MD  ECG reviewed                           



                          by ED Physician in the                           



                          absence of a                           



                          cardiologist: yes                           



                          Interpretation:                           



                          Interpretation:                           



                          abnormal  Quality:                           



                          Tracing quality:                           



                          Limited by                             



                          artifactRate:   ECG                           



                          rate:  88  ECG rate                           



                          assessment: normal                           



                          Rhythm:   Rhythm:                           



                          sinus rhythm  Ectopy:                           



                           Ectopy: none  QRS:                           



                          QRS axis:  Normal  QRS                           



                          intervals:                             



                          WideConduction:                           



                          Conduction: abnormal                           



                           Abnormal conduction:                           



                          complete LBBB  ST                           



                          segments:   ST                           



                          segments:                              



                          Non-specificT waves:                           



                          T waves: non-specific                           

 

             Lab Interpretation Abnormal                               



             (test code = 38663-5)                                        



Bravo Blum

## 2021-05-19 NOTE — EDPHYS
Physician Documentation                                                                           

 [facility]                                                                 

Name: Sydney Smith                                                                             

Age: 75 yrs                                                                                       

Sex: Female                                                                                       

: 1945                                                                                   

MRN: [id number]                                                                                   

Arrival Date: 2021                                                                          

Time: 07:03                                                                                       

Account#: [id number]                                                                            

Bed 4                                                                                             

Private MD:                                                                                       

ED Physician Kevon Herr                                                                         

HPI:                                                                                              

                                                                                             

07:30 This 75 yrs old  Female presents to ER via Wheelchair with complaints of       rn  

      Urinary Problem.                                                                            

07:30 The patient presents with urinary symptoms, dysuria, frequency, urgency.                rn  

07:31 Onset: The symptoms/episode began/occurred yesterday. Modifying factors: The symptoms   rn  

      are alleviated by nothing, the symptoms are aggravated by urinating. Associated signs       

      and symptoms: Pertinent positives: dysuria, Pertinent negatives: fever, vaginal             

      discharge. Severity of symptoms: At their worst the symptoms were moderate, in the          

      emergency department the symptoms are unchanged. The patient has experienced similar        

      episodes in the past. The patient has not recently seen a physician. Reports dysuria,       

      began yesterday, also with urgency and dribbling urine, similar to when had UTI in          

      past. NO trauma. Also not eating/drinking much since gallbladder surgery a couple of        

      weeks ago. No hematuria. No back pain, no hx of kidney stones, no fever. .                  

                                                                                                  

Historical:                                                                                       

- Allergies:                                                                                      

07:25 Codeine;                                                                                aa5 

- PMHx:                                                                                           

07:25 ESRD; GALLSTONES; Hx of Dialysis but no longer needs dialysis.; Hypertension; Kidney    aa5 

      stones;                                                                                     

- PSHx:                                                                                           

07:25 Dialysis catheter placed and removed;                                                   aa5 

                                                                                                  

- Immunization history:: Adult Immunizations unknown.                                             

- Social history:: Smoking status: Patient denies any tobacco usage or history of.                

- Family history:: not pertinent.                                                                 

- Hospitalizations: : Patient was recently seen at.                                               

                                                                                                  

                                                                                                  

ROS:                                                                                              

07:31 Constitutional: Negative for fever, chills, and weight loss, Eyes: Negative for injury, rn  

      pain, redness, and discharge, Neck: Negative for injury, pain, and swelling,                

      Cardiovascular: Negative for chest pain, palpitations, and edema, Respiratory: Negative     

      for shortness of breath, cough, wheezing, and pleuritic chest pain, Abdomen/GI:             

      Negative for nausea, vomiting, diarrhea, and constipation, Back: Negative for injury        

      and pain, : Negative for injury, bleeding, discharge, and swelling, MS/Extremity:         

      Negative for injury and deformity, Skin: Negative for injury, rash, and discoloration,      

      Neuro: Negative for headache, weakness, numbness, tingling, and seizure.                    

                                                                                                  

Exam:                                                                                             

07:31 Constitutional:  This is a well developed, well nourished patient who is awake, alert,  rn  

      and in no acute distress. Head/Face:  Normocephalic, atraumatic. ENT:  dry MM               

      Cardiovascular:  Regular rate and rhythm.  No pulse deficits. Respiratory:  No              

      increased work of breathing, no retractions or nasal flaring. Abdomen/GI:  soft, mild       

      suprapubic tenderness, no rebound, no masses Skin:  Warm, dry MS/ Extremity:  Pulses        

      equal, no cyanosis.  Neurovascular intact.  Full, normal range of motion.  Equal            

      circumference. Neuro:  Awake and alert, GCS 15, oriented to person, place, time, and        

      situation.  Cranial nerves II-XII grossly intact.  Motor strength 5/5 in all                

      extremities.  Sensory grossly intact.                                                       

                                                                                                  

Vital Signs:                                                                                      

07:19  / 72; Pulse 98; Resp 16 S; Temp 98.7(O); Pulse Ox 98% on R/A; Weight 74.84 kg    aa5 

      (R); Height 5 ft. 4 in. (162.56 cm) (R);                                                    

08:26  / 46 Supine; Pulse 100; Resp 17; Pulse Ox 100% ;                                 tw2 

09:20  / 77; Pulse 100; Resp 17; Pulse Ox 100% on R/A;                                  tw2 

07:19 Body Mass Index 28.32 (74.84 kg, 162.56 cm)                                             aa5 

                                                                                                  

MDM:                                                                                              

07:19 Patient medically screened.                                                             rn  

09:26 Differential diagnosis: nonspecific abdominal pain, urinary tract infection,            rn  

      hydronephrosis, kidney stone. Data reviewed: vital signs, nurses notes, lab test            

      result(s), radiologic studies, CT scan, and as a result, I will discharge patient.          

      Counseling: I had a detailed discussion with the patient and/or guardian regarding: the     

      historical points, exam findings, and any diagnostic results supporting the                 

      discharge/admit diagnosis, lab results, radiology results, the need for outpatient          

      follow up, to return to the emergency department if symptoms worsen or persist or if        

      there are any questions or concerns that arise at home. Response to treatment: the          

      patient's symptoms have markedly improved after treatment, and as a result, I will          

      discharge patient. Special discussion: I discussed with the patient/guardian in detail      

      that at this point there is no indication for admission to the hospital. It is              

      understood, however, that if the symptoms persist or worsen the patient needs to return     

      immediately for re-evaluation. Based on the history and exam findings, there is no          

      indication for further emergent testing or inpatient evaluation. I discussed with the       

      patient/guardian the need to see the urologist for further evaluation of the symptoms.      

      ED course: Pt feels better, new finding of left sided hydro, could be 2/2 infection or      

      stricture or recently passes stone. Will cove with abx and pain meds and have her f/u       

      with urology given normal renal function. Return precautions given and understood..         

                                                                                                  

                                                                                             

07:30 Order name: Urine Culture                                                               rn  

                                                                                             

07:30 Order name: Urine Microscopic Only                                                      rn  

                                                                                             

07:51 Order name: Urine Dipstick-Ancillary; Complete Time: 07:57                              [facility]MS

                                                                                             

07:58 Order name: Basic Metabolic Panel                                                       rn  

                                                                                             

07:58 Order name: CBC with Diff                                                               rn  

                                                                                             

08:34 Order name: CBC with Automated Diff; Complete Time: 09:04                               [facility]

                                                                                             

07:30 Order name: Urine Dipstick-Ancillary (obtain specimen); Complete Time: 08:06            rn  

                                                                                             

07:58 Order name: CT Stone Protocol                                                           rn  

                                                                                             

08:48 Order name: CT; Complete Time: 09:04                                                    [facility]

                                                                                             

09:05 Order name: Basic Metabolic Panel; Complete Time: 09:21                                 [facility]

                                                                                             

09:52 Order name: Urine Microscopic Only                                                      [facility]

                                                                                             

07:35 Order name: Straight Cath - Urine; Complete Time: 08:06                                 [facility] 

                                                                                             

07:58 Order name: IV Saline Lock; Complete Time: 08:25                                        rn  

                                                                                             

07:58 Order name: Labs collected and sent; Complete Time: 08:25                               rn  

                                                                                                  

Administered Medications:                                                                         

09:14 Drug: Zofran (Ondansetron) 4 mg Route: IVP; Site: left antecubital;                     tw2 

10:17 Follow up: Response: No adverse reaction                                                tw2 

09:16 Drug: Demerol (meperidine) 25 mg {Note: rass 0.} Route: IVP; Site: left antecubital;    tw2 

09:45 Follow up: Response: No adverse reaction; Pain is decreased; RASS: Alert and Calm (0)   tw2 

10:00 Drug: Potassium Chloride 40 mEq Route: PO;                                              tw2 

10:16 Follow up: Response: No adverse reaction                                                tw2 

                                                                                                  

                                                                                                  

Disposition:                                                                                      

21 09:29 Discharged to Home. Impression: Urinary tract infection, site not specified,       

  Hydronephrosis, left side.                                                                      

- Condition is Stable.                                                                            

- Discharge Instructions: Urinary Tract Infection, Adult, Hydronephrosis.                         

- Prescriptions for Tramadol 50 mg Oral Tablet - take 1 tablet by ORAL route every 8              

  hours as needed; 15 tablet. cefpodoxime 100 mg Oral Tablet - take 2 tablet by ORAL              

  route every 12 hours for 10 days take with food; 40 tablet.                                     

- Medication Reconciliation Form, Thank You Letter, Antibiotic Education, Prescription            

  Opioid Use form.                                                                                

- Follow up: Dio Mckeon MD; When: 2 - 3 days; Reason: Recheck today's complaints,           

  Re-evaluation by your physician.                                                                

- Problem is new.                                                                                 

- Symptoms have improved.                                                                         

                                                                                                  

                                                                                                  

                                                                                                  

Signatures:                                                                                       

Dispatcher [employer]                           EDMS                                                 

Kevon Herr MD MD rn Calderon, Audri RN                     RN   aa5                                                  

Asya White RN                          RN   tw2                                                  

                                                                                                  

Corrections: (The following items were deleted from the chart)                                    

09:29 09:29 2021 09:29 Discharged to Home. Impression: Urinary tract infection, site    rn  

      not specified; Hydronephrosis with ureteral stricture, not elsewhere classified.            

      Condition is Stable. Forms are Medication Reconciliation Form, Thank You Letter,            

      Antibiotic Education, Prescription Opioid Use. Follow up: Dio Mckeon; When: 2 - 3       

      days; Reason: Recheck today's complaints, Re-evaluation by your physician. Problem is       

      new. Symptoms have improved. rn                                                             

10:18 09:29 2021 09:29 Discharged to Home. Impression: Urinary tract infection, site    tw2 

      not specified; Hydronephrosis, left side. Condition is Stable. Forms are Medication         

      Reconciliation Form, Thank You Letter, Antibiotic Education, Prescription Opioid Use.       

      Follow up: Dio Mckeon; When: 2 - 3 days; Reason: Recheck today's complaints,            

      Re-evaluation by your physician. Problem is new. Symptoms have improved. rn                 

                                                                                                  

**************************************************************************************************

## 2021-05-19 NOTE — RAD REPORT
EXAM DESCRIPTION:  CT - Stone Protocol - 5/19/2021 8:11 am

 

CLINICAL HISTORY:  lower abd pain, dysuria

 

COMPARISON:  Stone Protocol dated 5/1/2021

 

TECHNIQUE:  Axial 3 mm thick images were obtained without oral or IV contrast. The field-of-view span
s the entirety of the  system including uppermost abdomen and lung bases.

 

All CT scans are performed using dose optimization technique as appropriate and may include automated
 exposure control or mA/KV adjustment according to patient size.

 

FINDINGS:  Mild hydronephrosis is present in the left collecting system to the UVJ level. There is no
 obstructing calculus identifiable. No obstructing or nonobstructing calculi seen in this patient. Th
ere is no right-sided hydronephrosis. Trace amount of perinephric stranding is present symmetric righ
t versus left. No suspicious renal masses. Isodense masses and pyelonephritis are not excluded on a s
tone protocol CT scan. No significant adrenal finding. Urinary bladder is fully contracted limiting a
ssessment. No bladder calculus is identifiable. Pelvic phleboliths are present. Uterus is absent or a
trophic. Ovaries are atrophic or obscured by adjacent bowel. No evidence for an ovarian process.

 

Imaged portions of the liver, spleen and pancreas show no suspicious findings on non-contrast imaging
. Cholecystectomy clips are present. No biliary tree dilatation.

 

No suspicious bowel findings. Patient has a very minimal hiatal hernia. Rare diverticula seen.

 

No hernia, mass or bulky lymphadenopathy noted. No free air, pneumatosis or focal inflammatory strand
ing. A trace amount of free fluid is seen in the dependent portion of the pelvis.

 

No acute bone finding. Patient has advanced degenerative change at L2-3 through L4-5.

 

IMPRESSION:  Mild left-sided hydronephrosis down to the UVJ level. There is no obstructing calculus. 
Patient has no nonobstructing calculi.

 

Hydronephrosis is new from the May 1 examination. Patient may have recently passed a stone. Blood or 
inflammatory debris within the ureter can cause hydronephrosis. A small obstructing mass is possible 
though would be lower in likelihood given normal appearance to the ureter 2 weeks earlier.

 

Isodense masses and pyelonephritis are not excluded on stone protocol technique.

## 2021-05-19 NOTE — ER
Nurse's Notes                                                                                     

 [facility]                                                                 

Name: Sydney Smith                                                                             

Age: 75 yrs                                                                                       

Sex: Female                                                                                       

: 1945                                                                                   

MRN: [id number]                                                                                   

Arrival Date: 2021                                                                          

Time: 07:03                                                                                       

Account#: [id number]                                                                            

Bed 4                                                                                             

Private MD:                                                                                       

Diagnosis: Urinary tract infection, site not specified;Hydronephrosis, left side                  

                                                                                                  

Presentation:                                                                                     

                                                                                             

07:19 Chief complaint: Patient states: "I feel like I can't pee and it hurts". Pt reports     aa5 

      symptoms began today at 0400.                                                               

07:19 Coronavirus screen: At this time, the client does not indicate any symptoms associated  aa5 

      with coronavirus-19. Ebola Screen: Patient negative for fever greater than or equal to      

      101.5 degrees Fahrenheit, and additional compatible Ebola Virus Disease symptoms.           

      Initial Sepsis Screen: Does the patient meet any 2 criteria? No. Patient's initial          

      sepsis screen is negative. Does the patient have a suspected source of infection? No.       

      Patient's initial sepsis screen is negative. Risk Assessment: Do you want to hurt           

      yourself or someone else? Patient reports no desire to harm self or others. Onset of        

      symptoms was May 19, 2021.                                                                  

07:19 Method Of Arrival: Wheelchair                                                           aa5 

07:19 Acuity: AYLA 3                                                                           aa5 

                                                                                                  

Historical:                                                                                       

- Allergies:                                                                                      

07:25 Codeine;                                                                                aa5 

- PMHx:                                                                                           

07:25 ESRD; GALLSTONES; Hx of Dialysis but no longer needs dialysis.; Hypertension; Kidney    aa5 

      stones;                                                                                     

- PSHx:                                                                                           

07:25 Dialysis catheter placed and removed;                                                   aa5 

                                                                                                  

- Immunization history:: Adult Immunizations unknown.                                             

- Social history:: Smoking status: Patient denies any tobacco usage or history of.                

- Family history:: not pertinent.                                                                 

- Hospitalizations: : Patient was recently seen at.                                               

                                                                                                  

                                                                                                  

Screenin:07 Abuse screen: Denies threats or abuse. Nutritional screening: No deficits noted.        tw2 

      Tuberculosis screening: No symptoms or risk factors identified. Fall Risk None              

      identified.                                                                                 

                                                                                                  

Assessment:                                                                                       

07:30 General: Appears in no apparent distress. Behavior is cooperative, appropriate for age, tw2 

      fussy. Pain: Complains of pain in urgency to urinate. Neuro: Level of Consciousness is      

      awake, alert, obeys commands, Oriented to person, place, time, situation.                   

      Cardiovascular: Patient's skin is warm and dry. Respiratory: Airway is patent               

      Respiratory effort is even, unlabored, Respiratory pattern is regular, symmetrical. GI:     

      No signs and/or symptoms were reported involving the gastrointestinal system. :           

      Reports burning with urination, since this morning inability to void, urgency.              

      Musculoskeletal: Range of motion: intact in all extremities.                                

08:26 Reassessment: No changes from previously documented assessment. Patient and/or family   tw2 

      updated on plan of care and expected duration. Pain level reassessed. Patient is alert,     

      oriented x 3, equal unlabored respirations, skin warm/dry/pink.                             

09:15 Reassessment: pt c/o of pain, provider notified. medicated as ordered.                  tw2 

10:17 Reassessment: Patient appears in no apparent distress at this time. Patient and/or      tw2 

      family updated on plan of care and expected duration. Pain level reassessed. Patient is     

      alert, oriented x 3, equal unlabored respirations, skin warm/dry/pink. Patient states       

      feeling better.                                                                             

                                                                                                  

Vital Signs:                                                                                      

07:19  / 72; Pulse 98; Resp 16 S; Temp 98.7(O); Pulse Ox 98% on R/A; Weight 74.84 kg    aa5 

      (R); Height 5 ft. 4 in. (162.56 cm) (R);                                                    

08:26  / 46 Supine; Pulse 100; Resp 17; Pulse Ox 100% ;                                 tw2 

09:20  / 77; Pulse 100; Resp 17; Pulse Ox 100% on R/A;                                  tw2 

07:19 Body Mass Index 28.32 (74.84 kg, 162.56 cm)                                             aa5 

                                                                                                  

ED Course:                                                                                        

07:03 Patient arrived in ED.                                                                  am4 

07:19 Kevon Herr MD is Attending Physician.                                                rn  

07:19 Arm band placed on.                                                                     aa5 

07:19 Bed in low position. Call light in reach. Adult w/ patient. Pulse ox on. NIBP on. Warm  tw2 

      blanket given.                                                                              

07:25 Triage completed.                                                                       aa5 

07:31 Darvin Lopez RN is Primary Nurse.                                                  jd3 

07:48 Straight cath inserted, using sterile technique, 18 Fr. Specimen obtained. KIM Cavazos     tw2 

      served as chaperone.                                                                        

08:06 Urine Microscopic Only Sent.                                                            tw2 

08:06 Urine Culture Sent.                                                                     tw2 

08:08 Primary Nurse role handed off by Darvin Lopez, KIM                                   tw2 

08:08 Asya White, RN is Primary Nurse.                                                        tw2 

08:23 Inserted saline lock: 20 gauge in left antecubital area, using aseptic technique. Blood tw2 

      collected.                                                                                  

09:28 Dio Mckeon MD is Referral Physician.                                              rn  

10:17 No provider procedures requiring assistance completed. IV discontinued, intact,         tw2 

      bleeding controlled, No redness/swelling at site. Pressure dressing applied.                

                                                                                                  

Administered Medications:                                                                         

09:14 Drug: Zofran (Ondansetron) 4 mg Route: IVP; Site: left antecubital;                     tw2 

10:17 Follow up: Response: No adverse reaction                                                tw2 

09:16 Drug: Demerol (meperidine) 25 mg {Note: rass 0.} Route: IVP; Site: left antecubital;    tw2 

09:45 Follow up: Response: No adverse reaction; Pain is decreased; RASS: Alert and Calm (0)   tw2 

10:00 Drug: Potassium Chloride 40 mEq Route: PO;                                              tw2 

10:16 Follow up: Response: No adverse reaction                                                tw2 

                                                                                                  

                                                                                                  

Outcome:                                                                                          

09:29 Discharge ordered by MD.                                                                rn  

10:17 Discharged to home via wheelchair, with friend.                                         tw2 

10:17 Condition: stable                                                                           

10:17 Discharge instructions given to patient, significant other, Instructed on medication        

      usage, Demonstrated understanding of instructions, follow-up care, medications,             

      Prescriptions given X 2.                                                                    

10:18 Patient left the ED.                                                                    tw2 

                                                                                                  

Signatures:                                                                                       

Kevon Herr MD MD rn Calderon, Audri, RN                     RN   aa5                                                  

Asya White RN                          RN   tw2                                                  

Darvin Lopez RN                    RN   jd3                                                  

Nola Tello                             [facility]                                                  

                                                                                                  

**************************************************************************************************

## 2021-10-26 ENCOUNTER — HOSPITAL ENCOUNTER (INPATIENT)
Dept: HOSPITAL 97 - ER | Age: 76
LOS: 5 days | Discharge: ANOTHER HOSPITAL | DRG: 308 | End: 2021-10-31
Payer: COMMERCIAL

## 2021-10-26 DIAGNOSIS — E83.39: ICD-10-CM

## 2021-10-26 DIAGNOSIS — N17.0: ICD-10-CM

## 2021-10-26 DIAGNOSIS — E87.2: ICD-10-CM

## 2021-10-26 DIAGNOSIS — Z20.822: ICD-10-CM

## 2021-10-26 DIAGNOSIS — K21.9: ICD-10-CM

## 2021-10-26 DIAGNOSIS — I48.91: Primary | ICD-10-CM

## 2021-10-26 DIAGNOSIS — R10.9: ICD-10-CM

## 2021-10-26 DIAGNOSIS — Z90.710: ICD-10-CM

## 2021-10-26 DIAGNOSIS — E03.9: ICD-10-CM

## 2021-10-26 DIAGNOSIS — E87.5: ICD-10-CM

## 2021-10-26 DIAGNOSIS — N25.81: ICD-10-CM

## 2021-10-26 DIAGNOSIS — I13.2: ICD-10-CM

## 2021-10-26 DIAGNOSIS — Z79.890: ICD-10-CM

## 2021-10-26 DIAGNOSIS — E87.1: ICD-10-CM

## 2021-10-26 DIAGNOSIS — K72.00: ICD-10-CM

## 2021-10-26 DIAGNOSIS — I95.9: ICD-10-CM

## 2021-10-26 DIAGNOSIS — R57.0: ICD-10-CM

## 2021-10-26 DIAGNOSIS — E78.5: ICD-10-CM

## 2021-10-26 DIAGNOSIS — I25.10: ICD-10-CM

## 2021-10-26 DIAGNOSIS — Z79.899: ICD-10-CM

## 2021-10-26 DIAGNOSIS — Z88.5: ICD-10-CM

## 2021-10-26 DIAGNOSIS — I31.3: ICD-10-CM

## 2021-10-26 DIAGNOSIS — G62.9: ICD-10-CM

## 2021-10-26 DIAGNOSIS — Z79.82: ICD-10-CM

## 2021-10-26 DIAGNOSIS — N18.6: ICD-10-CM

## 2021-10-26 DIAGNOSIS — I25.5: ICD-10-CM

## 2021-10-26 DIAGNOSIS — I50.23: ICD-10-CM

## 2021-10-26 LAB
ALBUMIN SERPL BCP-MCNC: 3.3 G/DL (ref 3.4–5)
ALP SERPL-CCNC: 963 U/L (ref 45–117)
ALT SERPL W P-5'-P-CCNC: 48 U/L (ref 12–78)
AST SERPL W P-5'-P-CCNC: 32 U/L (ref 15–37)
BUN BLD-MCNC: 102 MG/DL (ref 7–18)
GLUCOSE SERPLBLD-MCNC: 125 MG/DL (ref 74–106)
HCT VFR BLD CALC: 32 % (ref 36–45)
INR BLD: 1.28
LYMPHOCYTES # SPEC AUTO: 0.9 K/UL (ref 0.7–4.9)
MAGNESIUM SERPL-MCNC: 3 MG/DL (ref 1.8–2.4)
NT-PROBNP SERPL-MCNC: (no result) PG/ML (ref ?–450)
PMV BLD: 8.9 FL (ref 7.6–11.3)
POTASSIUM SERPL-SCNC: 4.9 MMOL/L (ref 3.5–5.1)
RBC # BLD: 3.91 M/UL (ref 3.86–4.86)
TROPONIN (EMERG DEPT USE ONLY): < 0.02 NG/ML (ref 0–0.04)
TSH SERPL DL<=0.05 MIU/L-ACNC: 1.33 UIU/ML (ref 0.36–3.74)

## 2021-10-26 PROCEDURE — 36415 COLL VENOUS BLD VENIPUNCTURE: CPT

## 2021-10-26 PROCEDURE — 82570 ASSAY OF URINE CREATININE: CPT

## 2021-10-26 PROCEDURE — 51702 INSERT TEMP BLADDER CATH: CPT

## 2021-10-26 PROCEDURE — 84439 ASSAY OF FREE THYROXINE: CPT

## 2021-10-26 PROCEDURE — 80048 BASIC METABOLIC PNL TOTAL CA: CPT

## 2021-10-26 PROCEDURE — 87040 BLOOD CULTURE FOR BACTERIA: CPT

## 2021-10-26 PROCEDURE — 87522 HEPATITIS C REVRS TRNSCRPJ: CPT

## 2021-10-26 PROCEDURE — 82947 ASSAY GLUCOSE BLOOD QUANT: CPT

## 2021-10-26 PROCEDURE — 83690 ASSAY OF LIPASE: CPT

## 2021-10-26 PROCEDURE — 83735 ASSAY OF MAGNESIUM: CPT

## 2021-10-26 PROCEDURE — 85610 PROTHROMBIN TIME: CPT

## 2021-10-26 PROCEDURE — 82805 BLOOD GASES W/O2 SATURATION: CPT

## 2021-10-26 PROCEDURE — 06HY33Z INSERTION OF INFUSION DEVICE INTO LOWER VEIN, PERCUTANEOUS APPROACH: ICD-10-PCS

## 2021-10-26 PROCEDURE — 5A1D70Z PERFORMANCE OF URINARY FILTRATION, INTERMITTENT, LESS THAN 6 HOURS PER DAY: ICD-10-PCS

## 2021-10-26 PROCEDURE — 86317 IMMUNOASSAY INFECTIOUS AGENT: CPT

## 2021-10-26 PROCEDURE — 74176 CT ABD & PELVIS W/O CONTRAST: CPT

## 2021-10-26 PROCEDURE — 84100 ASSAY OF PHOSPHORUS: CPT

## 2021-10-26 PROCEDURE — 94660 CPAP INITIATION&MGMT: CPT

## 2021-10-26 PROCEDURE — 87088 URINE BACTERIA CULTURE: CPT

## 2021-10-26 PROCEDURE — 86706 HEP B SURFACE ANTIBODY: CPT

## 2021-10-26 PROCEDURE — 90935 HEMODIALYSIS ONE EVALUATION: CPT

## 2021-10-26 PROCEDURE — 87340 HEPATITIS B SURFACE AG IA: CPT

## 2021-10-26 PROCEDURE — 5A09457 ASSISTANCE WITH RESPIRATORY VENTILATION, 24-96 CONSECUTIVE HOURS, CONTINUOUS POSITIVE AIRWAY PRESSURE: ICD-10-PCS

## 2021-10-26 PROCEDURE — 84145 PROCALCITONIN (PCT): CPT

## 2021-10-26 PROCEDURE — 94760 N-INVAS EAR/PLS OXIMETRY 1: CPT

## 2021-10-26 PROCEDURE — 99285 EMERGENCY DEPT VISIT HI MDM: CPT

## 2021-10-26 PROCEDURE — 93005 ELECTROCARDIOGRAM TRACING: CPT

## 2021-10-26 PROCEDURE — 86704 HEP B CORE ANTIBODY TOTAL: CPT

## 2021-10-26 PROCEDURE — 71045 X-RAY EXAM CHEST 1 VIEW: CPT

## 2021-10-26 PROCEDURE — 87086 URINE CULTURE/COLONY COUNT: CPT

## 2021-10-26 PROCEDURE — 76770 US EXAM ABDO BACK WALL COMP: CPT

## 2021-10-26 PROCEDURE — 84443 ASSAY THYROID STIM HORMONE: CPT

## 2021-10-26 PROCEDURE — 76705 ECHO EXAM OF ABDOMEN: CPT

## 2021-10-26 PROCEDURE — 76000 FLUOROSCOPY <1 HR PHYS/QHP: CPT

## 2021-10-26 PROCEDURE — 83970 ASSAY OF PARATHORMONE: CPT

## 2021-10-26 PROCEDURE — 81003 URINALYSIS AUTO W/O SCOPE: CPT

## 2021-10-26 PROCEDURE — 83605 ASSAY OF LACTIC ACID: CPT

## 2021-10-26 PROCEDURE — 84484 ASSAY OF TROPONIN QUANT: CPT

## 2021-10-26 PROCEDURE — 80053 COMPREHEN METABOLIC PANEL: CPT

## 2021-10-26 PROCEDURE — 80076 HEPATIC FUNCTION PANEL: CPT

## 2021-10-26 PROCEDURE — 80202 ASSAY OF VANCOMYCIN: CPT

## 2021-10-26 PROCEDURE — 83880 ASSAY OF NATRIURETIC PEPTIDE: CPT

## 2021-10-26 PROCEDURE — 93306 TTE W/DOPPLER COMPLETE: CPT

## 2021-10-26 PROCEDURE — 84156 ASSAY OF PROTEIN URINE: CPT

## 2021-10-26 PROCEDURE — 82150 ASSAY OF AMYLASE: CPT

## 2021-10-26 PROCEDURE — 80069 RENAL FUNCTION PANEL: CPT

## 2021-10-26 PROCEDURE — 85025 COMPLETE CBC W/AUTO DIFF WBC: CPT

## 2021-10-26 PROCEDURE — 81015 MICROSCOPIC EXAM OF URINE: CPT

## 2021-10-26 RX ADMIN — PROMETHAZINE HYDROCHLORIDE PRN MG: 25 INJECTION INTRAMUSCULAR; INTRAVENOUS at 22:00

## 2021-10-26 RX ADMIN — ONDANSETRON PRN MG: 2 INJECTION INTRAMUSCULAR; INTRAVENOUS at 20:21

## 2021-10-26 RX ADMIN — Medication SCH ML: at 21:00

## 2021-10-26 RX ADMIN — DEXTROSE SCH MLS: 5 SOLUTION INTRAVENOUS at 19:14

## 2021-10-26 NOTE — ER
Nurse's Notes                                                                                     

 [facility]                                                                 

Name: Sydney Smith                                                                             

Age: 76 yrs                                                                                       

Sex: Female                                                                                       

: 1945                                                                                   

MRN: [id number]                                                                                   

Arrival Date: 10/26/2021                                                                          

Time: 12:42                                                                                       

Account#: [id number]                                                                            

Bed 28                                                                                            

Private MD:                                                                                       

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            

  (congestive) and diastolic (congestive) heart failure;Acute kidney failure,                     

  unspecified                                                                                     

                                                                                                  

Presentation:                                                                                     

10/26                                                                                             

12:42 Chief complaint: EMS states: they were called to the patients home by her Home Health   ap3 

      nurse. Home Health nurse reported to EMS that the patient had a sudden onset of             

      tachycardia along with difficulty breathing. Upon arrival, EMS states that the patient      

      was 99% on room air and was not requiring O2. Coronavirus screen: Client presents with      

      at least one sign or symptom that may indicate coronavirus-19. Standard/surgical mask       

      placed on the client. Provider contacted for isolation considerations. Ebola Screen: No     

      symptoms or risks identified at this time. Initial Sepsis Screen: Does the patient meet     

      any 2 criteria? RR > 20 per min. HR > 90 bpm. Yes Does the patient have a suspected         

      source of infection? No. Patient's initial sepsis screen is negative. Risk Assessment:      

      Do you want to hurt yourself or someone else? Patient reports no desire to harm self or     

      others. Onset of symptoms was 2021.                                             

12:42 Method Of Arrival: EMS: [facility]                                                    ap3 

12:42 Acuity: AYLA 3                                                                           ap3 

                                                                                                  

Triage Assessment:                                                                                

12:52 General: Appears distressed, Behavior is anxious. Pain: Denies pain. EENT: No signs     ap3 

      and/or symptoms were reported regarding the EENT system. Neuro: Level of Consciousness      

      is awake, alert, obeys commands, Oriented to person, place, time, situation, Speech is      

      normal. Cardiovascular: Patient's skin is warm and dry. Respiratory: Airway is patent       

      Respiratory effort is even, unlabored, Respiratory pattern is regular, symmetrical.         

      Respiratory: Parent/caregiver reports the patient having shortness of breath.               

                                                                                                  

Historical:                                                                                       

- Allergies:                                                                                      

12:44 Codeine;                                                                                ap3 

- Home Meds:                                                                                      

12:44 aspirin 81 mg Oral tab 81 mg daily [Active]; levothyroxine 75 mcg tab 1 tab once daily  ap3 

      [Active]; gabapentin 600 mg oral tab 1 tab [Active]; omeprazole 40 mg Oral cpDR 1 cap       

      once daily [Active]; furosemide 40 mg Oral tab 1 tab once daily [Active]; furosemide 80     

      mg Oral tab 1 tab once daily [Active]; metoprolol tartrate 25 mg Oral tab 1 tab 2 times     

      per day [Active]; spironolactone 25 mg Oral tab 1 tab once daily [Active]; promethazine     

      25 mg Oral tab as needed [Active]; tramadol 50 mg Oral tab 1 tab as needed for pain         

      [Active]; acetaminophen 500 mg oral tab [Active]; Centrum Silver Women 8 mg iron-400        

      mcg-300 mcg oral tab [Active];                                                              

- PMHx:                                                                                           

12:44 ESRD; GALLSTONES; Hx of Dialysis but no longer needs dialysis.; Hypertension; Kidney    ap3 

      stones;                                                                                     

                                                                                                  

- Immunization history:: Client reports receiving the 2nd dose of the Covid vaccine.              

- Social history:: Smoking status: Patient denies any tobacco usage or history of.                

                                                                                                  

                                                                                                  

Screenin:44 Abuse screen: Denies threats or abuse. Nutritional screening: No deficits noted.        vg1 

      Tuberculosis screening: No symptoms or risk factors identified. Fall Risk No fall in        

      past 12 months (0 pts). No secondary diagnosis (0 pts). Ambulatory Aid- None/Bed            

      Rest/Nurse Assist (0 pts). Gait- Normal/Bed Rest/Wheelchair (0 pts) Mental Status-          

      Oriented to own ability (0 pts). Total Stack Fall Scale indicates No Risk (0-24 pts).       

                                                                                                  

Assessment:                                                                                       

12:43 General: Appears in no apparent distress. uncomfortable, Behavior is calm, cooperative. vg1 

      Pain: Denies pain. Neuro: Level of Consciousness is awake, alert, obeys commands,           

      Oriented to person, place, time, situation. Cardiovascular: Patient's skin is warm and      

      dry. Respiratory: Reports shortness of breath at rest labored breathing Airway is           

      patent Respiratory effort is even, unlabored, Breath sounds are clear bilaterally. GI:      

      Reports nausea. : No signs and/or symptoms were reported regarding the genitourinary      

      system. EENT: No signs and/or symptoms were reported regarding the EENT system. Derm:       

      Skin is intact, is healthy with good turgor. Musculoskeletal: Circulation, motion, and      

      sensation intact.                                                                           

13:00 Reassessment: pt has 'life vest' in place PTA.                                          vg1 

14:00 Reassessment: Patient appears in no apparent distress at this time. No changes from     vg1 

      previously documented assessment. Patient and/or family updated on plan of care and         

      expected duration. Pain level reassessed. Patient is alert, oriented x 3, equal             

      unlabored respirations, skin warm/dry/pink. Provider at bedside with Ultrasound.            

15:05 Reassessment: Patient appears in no apparent distress at this time. No changes from     vg1 

      previously documented assessment. Patient and/or family updated on plan of care and         

      expected duration. Pain level reassessed. Patient is alert, oriented x 3, equal             

      unlabored respirations, skin warm/dry/pink.                                                 

16:06 Reassessment: Patient appears in no apparent distress at this time. No changes from     vg1 

      previously documented assessment. Patient and/or family updated on plan of care and         

      expected duration. Pain level reassessed. Patient is alert, oriented x 3, equal             

      unlabored respirations, skin warm/dry/pink. Patient denies pain at this time.               

16:37 Reassessment: Dr Herr, hospitalist, at pt bedside.                                     vg1 

17:00 Reassessment: Patient appears in no apparent distress at this time. pt resting with     vg1 

      eyes closed.                                                                                

18:04 Reassessment: Patient appears in no apparent distress at this time. Patient and/or      vg1 

      family updated on plan of care and expected duration. Pain level reassessed. Patient is     

      alert, oriented x 3, equal unlabored respirations, skin warm/dry/pink. Pt states back       

      pain, rates 9/10. Provider notified. US at bedside.                                         

                                                                                                  

Vital Signs:                                                                                      

12:42  / 85; Pulse 130; Resp 22; Temp 98.8(O); Pulse Ox 99% on R/A; Weight 76.66 kg;    ap3 

      Height 5 ft. 4 in. (162.56 cm);                                                             

13:42  / 62; Pulse 120;                                                                 vg1 

13:47  / 73; Pulse 111;                                                                 vg1 

13:54  / 64; Pulse 114;                                                                 vg1 

14:02 BP 95 / 70; Pulse 113;                                                                  vg1 

14:05  / 74; Pulse 99; Resp 22; Pulse Ox 100% on R/A;                                   vg1 

14:30  / 91; Pulse 113; Resp 24; Pulse Ox 100% on R/A;                                  vg1 

15:30  / 87; Pulse 106; Resp 24; Pulse Ox 100% on 4 lpm NC;                             vg1 

16:00  / 72; Pulse 115; Resp 20; Pulse Ox 100% on 4 lpm NC;                             vg1 

16:42  / 76; Pulse 115; Resp 24; Pulse Ox 100% on 3 lpm NC;                             vg1 

17:00  / 97; Pulse 109; Resp 24; Pulse Ox 100% on 3 lpm NC;                             vg1 

17:30  / 65; Pulse 119; Resp 24; Pulse Ox 100% on 3 lpm NC;                             vg1 

18:00  / 65; Pulse 118; Resp 22; Pulse Ox 100% on 3 lpm NC;                             vg1 

12:42 Body Mass Index 29.01 (76.66 kg, 162.56 cm)                                             ap3 

                                                                                                  

ED Course:                                                                                        

12:42 Patient arrived in ED.                                                                  ap3 

12:43 Shanta Rick RN is Primary Nurse.                                                  vg1 

12:44 Triage completed.                                                                       ap3 

12:44 Patient has correct armband on for positive identification. Bed in low position. Call   vg1 

      light in reach. Side rails up X2.                                                           

12:44 Oxygen administration via nasal cannula \T\ 2L/min.                                       vg1 

12:45 Arm band placed on.                                                                     vg1 

12:48 Brock Cormier PA is PHCP.                                                               [id number] 

12:48 Kevon Herr MD is Attending Physician.                                                jr8 

13:10 Initial lab(s) drawn, by me, sent to lab. Inserted saline lock: 22 gauge in left        vg1 

      antecubital area, using aseptic technique. Blood collected.                                 

13:29 XRAY Chest (1 view) In Process Unspecified.                                             EDMS

14:40 First set of blood cultures drawn by me.                                                vg1 

14:45 initiated transfer to [facility].                                                   bd  

14:50 Fields cath inserted, using sterile technique, 16 Fr., by me, balloon inflated, to       vg1 

      gravity drainage, returned clear yellow urine. Patient tolerated well.                      

14:55 Second set of blood cultures drawn.                                                     vg1 

16:12 Norris Herr MD is Hospitalizing Provider.                                           [id number] 

16:16 transfer cancelled by Brock Cormier.                                                      bd  

19:17 Report given to Lashay ALVAREZ.                                                            vg1 

19:33 Primary Nurse role handed off by Shanta Rick RN                                   cs9 

10/27                                                                                             

06:34 Attending Physician role handed off by Kevon Herr MD                                 [facility] 

06:34 Magdi Rivers MD is Attending Physician.                                             abraham 

18:13 Contacted [facility] transfer center. Spoke to Reggie. Reggie advised transfer could not  mb4 

      be initiated due to insufficient reasoning.                                                 

                                                                                                  

Administered Medications:                                                                         

10/26                                                                                             

13:42 Drug: Metoprolol 5 mg Route: IVP; Site: left antecubital;                               vg1 

13:50 Drug: Metoprolol 5 mg Route: IVP; Site: left antecubital;                               vg1 

13:57 Drug: Metoprolol 5 mg Route: IVP; Site: left antecubital;                               vg1 

15:47 Follow up: Response: No adverse reaction; No change in condition                        vg1 

14:18 Drug: Lasix (furosemide) 60 mg Route: IVP; Site: left antecubital;                      vg1 

15:47 Follow up: Response: No adverse reaction                                                vg1 

16:18 Drug: Lasix (furosemide) 20 mg Route: IVP; Site: left antecubital;                      vg1 

18:12 Follow up: Response: No adverse reaction                                                vg1 

18:19 Drug: traMADol 50 mg Route: PO;                                                         vg1 

18:24 CANCELLED (Physician Discretion): Zofran (Ondansetron) 2 mg IVP once; over 2 minutes    vg1 

18:24 Drug: Zofran (Ondansetron) 4 mg Route: IVP; Site: left antecubital;                     vg1 

10/27                                                                                             

03:55 Drug: Sodium Bicarbonate 50 mEq Route: IVP; Site: left antecubital;                     em  

03:55 Drug: Sodium Bicarbonate 1 amp Route: IVP; Site: left antecubital;                      em  

04:00 Drug: D5W 1000 ml, Sodium Bicarbonate 150 mEq Route: IV; Rate: 75 ml/hr; Site: left     em  

      jugular;                                                                                    

04:03 Drug: D50W 50 ml Route: IVP; Site: left jugular;                                        em  

                                                                                                  

                                                                                                  

Outcome:                                                                                          

10/26                                                                                             

16:13 Decision to Hospitalize by Provider.                                                    jr8 

10/31                                                                                             

10:12 Patient left the ED.                                                                    ss  

                                                                                                  

Signatures:                                                                                       

Dispatcher [employer]                           EDMS                                                 

Roseanne Gee Corey, MD MD cha Munoz, Edgar, RN                        KIM   em                                                   

Adilia Loyola RN                      RN                                                      

Brock Cormier, MAIKOL LASSITER   jr8                                                  

Calli Morton RN                    RN   [email or web address]                                                  

Macie Gao                            mb4                                                  

Shanta Rick RN                    RN   vg1                                                  

Tracey Burk                          cs9                                                  

                                                                                                  

Corrections: (The following items were deleted from the chart)                                    

10/26                                                                                             

12:46 12:43 Pain: Denies pain. vg1                                                            vg1 

13:12 13:11 Reassessment: pt has 'life vest' in place PTA vg1                                 vg1 

14:09 13:42  / 62; Pulse 120bpm; ap3                                                    ap3 

14:09 13:47  / 73; Pulse 111bpm; ap3                                                    ap3 

14:09 13:54  / 64; Pulse 114bpm; ap3                                                    ap3 

14:09 14:02 BP 95 / 70; Pulse 113bpm; ap3                                                     ap3 

14:09 14:05  / 74; Pulse 120bpm; ap3                                                    ap3 

14:13 14:07 Metoprolol 5 mg IVP in left antecubital ap3                                       vg1 

14:13 14:12 Metoprolol 5 mg IVP in left antecubital vg1                                       vg1 

18:23 18:23 Zofran (Ondansetron) 2 mg IVP in left antecubital vg1                             vg1 

                                                                                                  

**************************************************************************************************

## 2021-10-26 NOTE — P.HP
Certification for Inpatient


Patient admitted to: Inpatient


With expected LOS: >2 Midnights


Practitioner: I am a practitioner with admitting privileges, knowledge of 

patient current condition, hospital course, and medical plan of care.


Services: Services provided to patient in accordance with Admission requirements

found in Title 42 Section 412.3 of the Code of Federal Regulations





Patient History


Date of Service: 10/26/21


Reason for admission: ENRIQUE, new onset A. fib


History of Present Illness: 


76-year-old female, PMH: CHF, CKD (previously needed dialysis for short period),

hypertension.


Presents to the ED due to not feeling well with cough, shortness of breath, 

nausea, and generalized weakness.  She was recently hospitalized for CHF 

exacerbation and placed on a LifeVest approximately 2 months ago.  She reports 

nonproductive cough, no fever/chills, associated with shortness of breath, and a

sense of panic when laying flat.  In the ED, she was noted to be in atrial fibri

llation with a heart rate [id number], chest x-ray with cardiomegaly and pulmonary 

edema, creatinine elevated above her baseline, and no leukocytosis.  ER provider

spoke with patient's cardiologist recommended transfer to hospital he stops, 

however, they did not have any availability.  Patient will be admitted here.





Allergies





codeine Allergy (Verified 04/21/21 09:32)


   Itching





Home Medications: 








Omeprazole [Prilosec] 40 mg PO DAILY 03/28/21 


Promethazine HCl 25 mg PO Q6H PRN 03/28/21 


calcitrioL [Rocaltrol] 1 tab PO DAILY 03/28/21 


traMADol HCL [Ultram*] 50 mg PO Q8H PRN 03/28/21 


Multivitamin with Iron [Daily Vitamin + Iron] 1 each PO DAILY #90 tablet 

03/30/21 


Cranberry 500 mg PO DAILY 04/21/21 


Hydralazine HCl 100 mg PO DAILY 04/21/21 


Levothyroxine Sodium [Unithroid] 75 mcg PO DAILY 04/21/21 








- Past Medical/Surgical History


Diabetic: No


-: HTN


-: CKD, temporarily needed dialysis


-: CHF, systolic


-: Knee surgery


-: Hysterectomy


-: HD placement





- Family History


  ** Brother


-: Heart disease





  ** Mother


-: Heart disease





  ** Sister


-: Heart disease





- Social History


Smoking Status: Never smoker


Alcohol use: No


CD- Drugs: No


Caffeine use: Yes


Place of Residence: Home





Review of Systems


10-point ROS is otherwise unremarkable





Physical Examination





- Physical Exam


General: Alert, In no apparent distress, Oriented x3


HEENT: PERRLA, Mucous membr. moist/pink, Sclerae nonicteric


Neck: Supple


Respiratory: Diminished, Crackles/rales


Cardiovascular: Edema (trace b/l pedal edema), Irregular heart rate/rhythm (HR: 

110)


Capillary refill: <2 Seconds


Gastrointestinal: Soft and benign, Non-distended, No tenderness


Musculoskeletal: No swelling, No tenderness


Integumentary: No rashes, No significant lesion


Neurological: Normal speech, Normal strength at 5/5 x4 extr, Normal affect


Urinary: Fields catheter (placed in ED)





- Studies


Laboratory Data (last 24 hrs)





10/26/21 13:07: PT 14.8 H, INR 1.28


10/26/21 13:07: WBC 16.70 H, Hgb 10.4 L, Hct 32.0 L, Plt Count 354


10/26/21 13:07: Sodium 132 L, Potassium 4.9,  H, Creatinine 3.25 H, 

Glucose 125 H, Magnesium 3.0 H D, Total Bilirubin 4.0 H, AST 32, ALT 48, 

Alkaline Phosphatase 963 H








Assessment and Plan





- Advance Directives


Does patient have a Living Will: No


Does patient have a Durable POA for Healthcare: No


Physician Review Additional Text: 





Problem list


Atrial fibrillation with RVR, new onset


Acute on chronic CHF exacerbation, systolic; with LifeVest


ENRIQUE on CKD.  Previously needed dialysis temporarily


Hypertension


Hypothyroidism


GERD


Neuropathy





Patient notes heart rate improved with metoprolol in the ED


Patient takes 25 mg metoprolol twice daily at home, will increase to 50 mg twice

 daily


Cardiology consulted


Blood pressure borderline, metoprolol with hold parameters


Start Eliquis


Chest x-ray concerning for volume overload, no significant lower extremity edema

 on exam


Nephrology consulted in the ER, recommended diuresis with Lasix, renal ultraso

und ordered


Suspect this is more cardiorenal syndrome


Trend labs


Telemetry





VTE: Eliquis


Code: Full


Dispo: Anticipate DC home in 2-3 days














Time Spent Managing Pts Care (In Minutes): 60

## 2021-10-26 NOTE — EDPHYS
Physician Documentation                                                                           

 [facility]                                                                 

Name: Sydney Smith                                                                             

Age: 76 yrs                                                                                       

Sex: Female                                                                                       

: 1945                                                                                   

MRN: [id number]                                                                                   

Arrival Date: 10/26/2021                                                                          

Time: 12:42                                                                                       

Account#: [id number]                                                                            

Bed 28                                                                                            

Private MD:                                                                                       

ED Physician Magdi Rivers                                                                      

HPI:                                                                                              

10/26                                                                                             

14:15 This 76 yrs old  Female presents to ER via EMS with complaints of              jr8 

      palpitations/shortness of breath.                                                           

14:15 The patient presents with a history of heart racing. Context: The symptoms occur at     jr8 

      rest. Onset: The symptoms/episode began/occurred acutely, today. Duration: The patient      

      or guardian reports a single episode, that is still ongoing. Modifying factors: The         

      symptoms are aggravated by light activity. Associated signs and symptoms: Pertinent         

      positives: SOB. Severity of symptoms: At their worst the symptoms were moderate in the      

      emergency department the symptoms are unchanged. It is unknown whether or not the           

      patient has had similar symptoms in the past. The patient has not recently seen a           

      physician.                                                                                  

                                                                                                  

Historical:                                                                                       

- Allergies:                                                                                      

12:44 Codeine;                                                                                ap3 

- Home Meds:                                                                                      

12:44 aspirin 81 mg Oral tab 81 mg daily [Active]; levothyroxine 75 mcg tab 1 tab once daily  ap3 

      [Active]; gabapentin 600 mg oral tab 1 tab [Active]; omeprazole 40 mg Oral cpDR 1 cap       

      once daily [Active]; furosemide 40 mg Oral tab 1 tab once daily [Active]; furosemide 80     

      mg Oral tab 1 tab once daily [Active]; metoprolol tartrate 25 mg Oral tab 1 tab 2 times     

      per day [Active]; spironolactone 25 mg Oral tab 1 tab once daily [Active]; promethazine     

      25 mg Oral tab as needed [Active]; tramadol 50 mg Oral tab 1 tab as needed for pain         

      [Active]; acetaminophen 500 mg oral tab [Active]; Centrum Silver Women 8 mg iron-400        

      mcg-300 mcg oral tab [Active];                                                              

- PMHx:                                                                                           

12:44 ESRD; GALLSTONES; Hx of Dialysis but no longer needs dialysis.; Hypertension; Kidney    ap3 

      stones;                                                                                     

                                                                                                  

- Immunization history:: Client reports receiving the 2nd dose of the Covid vaccine.              

- Social history:: Smoking status: Patient denies any tobacco usage or history of.                

                                                                                                  

                                                                                                  

ROS:                                                                                              

16:13 Eyes: Negative for injury, pain, redness, and discharge, ENT: Negative for injury,      jr8 

      pain, and discharge, Neck: Negative for injury, pain, and swelling, Abdomen/GI:             

      Negative for abdominal pain, nausea, vomiting, diarrhea, and constipation, Back:            

      Negative for injury and pain, MS/Extremity: Negative for injury and deformity, Skin:        

      Negative for injury, rash, and discoloration, Neuro: Negative for headache, weakness,       

      numbness, tingling, and seizure.                                                            

16:13 Cardiovascular: Positive for edema, orthopnea, palpitations.                                

16:13 Respiratory: Positive for shortness of breath.                                              

                                                                                                  

Exam:                                                                                             

16:13 Neck:  Trachea midline, no thyromegaly or masses palpated, and no cervical              jr8 

      lymphadenopathy.  Supple, full range of motion without nuchal rigidity, or vertebral        

      point tenderness.  No Meningismus.                                                          

16:13 Abdomen/GI:  Soft, non-tender, with normal bowel sounds.  No distension or tympany.  No     

      guarding or rebound.  No evidence of tenderness throughout. Back:  No spinal                

      tenderness.  No costovertebral tenderness.  Full range of motion. Skin:  Warm, dry with     

      normal turgor.  Normal color with no rashes, no lesions, and no evidence of cellulitis.     

      MS/ Extremity:  Pulses equal, no cyanosis.  Neurovascular intact.  Full, normal range       

      of motion. Neuro:  Awake and alert, GCS 15, oriented to person, place, time, and            

      situation.  Cranial nerves II-XII grossly intact.  Motor strength 5/5 in all                

      extremities.  Sensory grossly intact.                                                       

16:13 Cardiovascular: Rate: tachycardic, Rhythm: irregularly irregular, Pulses: Pulses are 2+     

      in right radial artery and left radial artery. Heart sounds: normal, normal S1and S2,       

      Edema: 1+ edema to level of left midcalf, left ankle, left foot, right midcalf, right       

      ankle and right foot.                                                                       

16:13 Respiratory: mild respiratory distress is noted,  Respirations: tachypnea, that is          

      mild, Breath sounds: rales, that are mild, are located in both bases.                       

                                                                                                  

Vital Signs:                                                                                      

12:42  / 85; Pulse 130; Resp 22; Temp 98.8(O); Pulse Ox 99% on R/A; Weight 76.66 kg;    ap3 

      Height 5 ft. 4 in. (162.56 cm);                                                             

13:42  / 62; Pulse 120;                                                                 vg1 

13:47  / 73; Pulse 111;                                                                 vg1 

13:54  / 64; Pulse 114;                                                                 vg1 

14:02 BP 95 / 70; Pulse 113;                                                                  vg1 

14:05  / 74; Pulse 99; Resp 22; Pulse Ox 100% on R/A;                                   vg1 

14:30  / 91; Pulse 113; Resp 24; Pulse Ox 100% on R/A;                                  vg1 

15:30  / 87; Pulse 106; Resp 24; Pulse Ox 100% on 4 lpm NC;                             vg1 

16:00  / 72; Pulse 115; Resp 20; Pulse Ox 100% on 4 lpm NC;                             vg1 

16:42  / 76; Pulse 115; Resp 24; Pulse Ox 100% on 3 lpm NC;                             vg1 

17:00  / 97; Pulse 109; Resp 24; Pulse Ox 100% on 3 lpm NC;                             vg1 

17:30  / 65; Pulse 119; Resp 24; Pulse Ox 100% on 3 lpm NC;                             vg1 

18:00  / 65; Pulse 118; Resp 22; Pulse Ox 100% on 3 lpm NC;                             vg1 

12:42 Body Mass Index 29.01 (76.66 kg, 162.56 cm)                                             ap3 

                                                                                                  

Procedures:                                                                                       

10/27                                                                                             

06:35 Central Line: the site was prepped with Betadine, in sterile fashion, a triple lumen    abraham 

      catheter was inserted, in the right femoral vein, in 4 attempts. placement was              

      verified, by blood return, the site was dressed with using sterile technique, the           

      patient tolerated the procedure, well.                                                      

                                                                                                  

MDM:                                                                                              

10/26                                                                                             

12:49 Patient medically screened.                                                             [facility], [place] 

16:11 Data reviewed: vital signs, nurses notes, lab test result(s), EKG, radiologic studies,  jr8 

      plain films. Data interpreted: Pulse oximetry: on 4L(s) per nasal canula, is 100 %.         

      Interpretation: normal. Counseling: I had a detailed discussion with the patient and/or     

      guardian regarding: the historical points, exam findings, and any diagnostic results        

      supporting the discharge/admit diagnosis, lab results, radiology results, the need for      

      further work-up and treatment in the hospital. ED course: Discussed case with patient's     

      cardiologist. Unfortunately we are unable to transfer to his location at this time as       

      their facility is on no transfer orders. Patient will remain at our facility and be         

      treated by cardiology, medicine and nephrology. Patient and family good with this at        

      this time..                                                                                 

                                                                                                  

10/26                                                                                             

12:49 Order name: Basic Metabolic Panel; Complete Time: 13:55                                 [facility], [place] 

10/26                                                                                             

12:49 Order name: CBC with Diff; Complete Time: 14:10                                         [facility] [place] 

10/26                                                                                             

12:49 Order name: LFT's; Complete Time: 13:55                                                 [facility] [place] 

10/26                                                                                             

12:49 Order name: Magnesium; Complete Time: 13:55                                             [facility] [place] 

10/26                                                                                             

12:49 Order name: NT PRO-BNP; Complete Time: 13:55                                            [facility] [place] 

10/26                                                                                             

12:49 Order name: PT-INR; Complete Time: 13:33                                                [facility] [place] 

10/26                                                                                             

12:49 Order name: Troponin (emerg Dept Use Only); Complete Time: 13:55                        [facility] [place] 

10/26                                                                                             

13:29 Order name: TSH; Complete Time: 14:18                                                   [facility] [place] 

10/26                                                                                             

13:29 Order name: T4 Free; Complete Time: 14:18                                               [facility] [place] 

10/26                                                                                             

14:18 Order name: COVID-19 SARS RT PCR (Document "Date of Onset" if Symptomatic): pt          bp  

      hospitalization,procedure; Complete Time: 15:51                                             

10/26                                                                                             

14:23 Order name: Blood Culture Adult (2)                                                     [facility] [place] 

10/26                                                                                             

19:16 Order name: Procalcitonin; Complete Time: 02:35                                         EDMS

10/27                                                                                             

03:58 Order name: CMP                                                                         em  

10/27                                                                                             

04:29 Order name: Glucose, Ancillary Testing; Complete Time: 07:02                            EDMS

10/27                                                                                             

04:33 Order name: Glucose, Ancillary Testing; Complete Time: 07:02                            EDMS

10/27                                                                                             

05:11 Order name: CBC with Automated Diff                                                     EDMS

10/27                                                                                             

05:31 Order name: Glucose, Ancillary Testing; Complete Time: 07:02                            EDMS

10/27                                                                                             

05:33 Order name: Comprehensive Metabolic Panel; Complete Time: 07:02                         EDMS

10/27                                                                                             

06:11 Order name: Renal Panel; Complete Time: 07:02                                           EDMS

10/27                                                                                             

06:11 Order name: Troponin I; Complete Time: 07:02                                            EDMS

10/27                                                                                             

06:11 Order name: Thyroid Stimulating Hormone; Complete Time: 07:02                           EDMS

10/27                                                                                             

07:02 Order name: ABG Arterial Blood Gas                                                      EDMS

10/27                                                                                             

08:07 Order name: Manual Differential                                                         EDMS

10/27                                                                                             

09:36 Order name: Glucose, Ancillary Testing                                                  EDMS

10/27                                                                                             

09:37 Order name: PTH Intact                                                                  EDMS

10/27                                                                                             

10:39 Order name: ABG Arterial Blood Gas                                                      EDMS

10/27                                                                                             

11:56 Order name: Urinalysis                                                                  EDMS

10/27                                                                                             

11:57 Order name: Urine Microscopic Only                                                      EDMS

10/27                                                                                             

12:35 Order name: Glucose, Ancillary Testing                                                  EDMS

10/27                                                                                             

13:31 Order name: Troponin I                                                                  EDMS

10/26                                                                                             

12:49 Order name: XRAY Chest (1 view); Complete Time: 13:55                                   jr8 

10/26                                                                                             

18:55 Order name: US; Complete Time: 02:35                                                    EDMS

10/27                                                                                             

02:58 Order name: Chest Single View XRAY                                                      la1 

10/27                                                                                             

17:17 Order name: ABG Arterial Blood Gas                                                      EDMS

10/27                                                                                             

19:03 Order name: Glucose, Ancillary Testing                                                  EDMS

10/27                                                                                             

19:35 Order name: Comprehensive Metabolic Panel                                               EDMS

10/28                                                                                             

05:46 Order name: CBC with Automated Diff                                                     EDMS

10/28                                                                                             

05:53 Order name: Renal Panel                                                                 EDMS

10/28                                                                                             

05:53 Order name: Liver (Hepatic) Function                                                    EDMS

10/28                                                                                             

05:53 Order name: Magnesium                                                                   EDMS

10/28                                                                                             

06:42 Order name: Manual Differential                                                         EDMS

10/28                                                                                             

06:56 Order name: Urine Culture                                                               EDMS

10/28                                                                                             

12:43 Order name: Glucose, Ancillary Testing                                                  EDMS

10/29                                                                                             

04:01 Order name: CBC with Automated Diff                                                     EDMS

10/29                                                                                             

04:06 Order name: Vancomycin Level Trough                                                     EDMS

10/29                                                                                             

04:08 Order name: Lactate                                                                     EDMS

10/29                                                                                             

04:28 Order name: Renal Panel                                                                 EDMS

10/29                                                                                             

04:28 Order name: Liver (Hepatic) Function                                                    EDMS

10/29                                                                                             

04:28 Order name: Magnesium                                                                   EDMS

10/29                                                                                             

12:40 Order name: Amylase                                                                     EDMS

10/29                                                                                             

12:40 Order name: Lipase                                                                      EDMS

10/30                                                                                             

05:51 Order name: Phosphorus                                                                  EDMS

10/30                                                                                             

06:01 Order name: CBC with Automated Diff                                                     EDMS

10/30                                                                                             

06:05 Order name: Renal Panel                                                                 EDMS

10/30                                                                                             

06:05 Order name: Liver (Hepatic) Function                                                    EDMS

10/30                                                                                             

06:05 Order name: Magnesium                                                                   EDMS

10/30                                                                                             

07:03 Order name: Lipase                                                                      EDMS

10/31                                                                                             

03:56 Order name: CBC with Automated Diff                                                     EDMS

10/31                                                                                             

04:06 Order name: Renal Panel                                                                 EDMS

10/31                                                                                             

04:06 Order name: Magnesium                                                                   EDMS

10/31                                                                                             

06:46 Order name: Ur Protein                                                                  EDMS

10/31                                                                                             

06:59 Order name: Liver (Hepatic) Function                                                    EDMS

10/26                                                                                             

12:49 Order name: EKG; Complete Time: 12:49                                                   8 

10/26                                                                                             

12:49 Order name: Cardiac monitoring; Complete Time: 12:58                                    [facility], [place] 

10/26                                                                                             

12:49 Order name: EKG - Nurse/Tech; Complete Time: 12:58                                      8 

10/26                                                                                             

12:49 Order name: IV Saline Lock; Complete Time: 13:10                                        8 

10/26                                                                                             

12:49 Order name: Labs collected and sent; Complete Time: 13:10                               [facility], [place] 

10/26                                                                                             

12:49 Order name: O2 Per Protocol; Complete Time: 12:49                                       8 

10/26                                                                                             

12:49 Order name: O2 Sat Monitoring; Complete Time: 12:49                                     [facility], [place] 

10/26                                                                                             

14:27 Order name: CONS Physician Consult; Complete Time: 15:55                                EDMS

10/27                                                                                             

07:09 Order name: RAD                                                                         MS

10/27                                                                                             

10:53 Order name: RAD                                                                         MS

10/27                                                                                             

12:40 Order name: RAD                                                                         EDMS

10/28                                                                                             

07:50 Order name: US                                                                          EDMS

10/28                                                                                             

16:42 Order name: CT                                                                          EDMS

10/30                                                                                             

10:38 Order name: RAD                                                                         MS

10/30                                                                                             

21:56 Order name: RAD                                                                         EDMS

                                                                                                  

Administered Medications:                                                                         

13:42 Drug: Metoprolol 5 mg Route: IVP; Site: left antecubital;                               vg1 

13:50 Drug: Metoprolol 5 mg Route: IVP; Site: left antecubital;                               vg1 

13:57 Drug: Metoprolol 5 mg Route: IVP; Site: left antecubital;                               vg1 

15:47 Follow up: Response: No adverse reaction; No change in condition                        vg1 

14:18 Drug: Lasix (furosemide) 60 mg Route: IVP; Site: left antecubital;                      vg1 

15:47 Follow up: Response: No adverse reaction                                                vg1 

16:18 Drug: Lasix (furosemide) 20 mg Route: IVP; Site: left antecubital;                      vg1 

18:12 Follow up: Response: No adverse reaction                                                vg1 

18:19 Drug: traMADol 50 mg Route: PO;                                                         vg1 

18:24 CANCELLED (Physician Discretion): Zofran (Ondansetron) 2 mg IVP once; over 2 minutes    vg1 

18:24 Drug: Zofran (Ondansetron) 4 mg Route: IVP; Site: left antecubital;                     vg1 

10/27                                                                                             

03:55 Drug: Sodium Bicarbonate 50 mEq Route: IVP; Site: left antecubital;                     em  

03:55 Drug: Sodium Bicarbonate 1 amp Route: IVP; Site: left antecubital;                      em  

04:00 Drug: D5W 1000 ml, Sodium Bicarbonate 150 mEq Route: IV; Rate: 75 ml/hr; Site: left     em  

      jugular;                                                                                    

04:03 Drug: D50W 50 ml Route: IVP; Site: left jugular;                                        em  

                                                                                                  

                                                                                                  

Disposition:                                                                                      

10/26                                                                                             

18:23 Co-signature as Attending Physician, Kevon Herr MD I agree with the assessment and     rn  

      plan of care. Attestation: The patient's history, exam findings, diagnostics, and a         

      summary of any interventions or procedures was reviewed in detail with Brock LASSITER.      

                                                                                                  

Disposition Summary:                                                                              

10/26/21 16:13                                                                                    

Hospitalization Ordered                                                                           

      Hospitalization Status: Inpatient Admission                                             jr8 

      Provider: Norris Herr 

      Condition: Fair                                                                         jr8 

      Problem: new                                                                            jr8 

      Symptoms: have improved                                                                 jr8 

      Bed/Room Type: Standard                                                                 [facility], [place] 

      Location: [facility] ER HOLD(10/26/21 19:38)                                                    

      Room Assignment: ERHOLD-(10/26/21 19:38)                                                  

      Diagnosis                                                                                   

        - Unspecified atrial fibrillation - with RVR                                          jr8 

        - Acute on chronic combined systolic (congestive) and diastolic (congestive) heart    jr8 

      failure                                                                                     

        - Acute kidney failure, unspecified                                                   jr8 

      Forms:                                                                                      

        - Medication Reconciliation Form                                                      jr8 

        - SBAR form                                                                           jr8 

Signatures:                                                                                       

Dispatcher [employer]                           EDRoseanne Leong Diana RN                        RN   Magdi Donaldson MD MD cha Munoz, Edgar RN                        RN                                                      

Kevon Herr MD MD rn Roszak, Josh, PA PA   jr8                                                  

Jose Carlos Oconnell, HOLLIP-C                      FNP-Cla1                                                  

Calli Morton RN                    RN   Shanta Rangel RN                    RN   vg1                                                  

                                                                                                  

Corrections: (The following items were deleted from the chart)                                    

17:54 16:13 jr8                                                                               bd  

18:24 18:20 Zofran (Ondansetron) 2 mg IVP once; over 2 minutes ordered. vg1                   vg1 

18:24 18:23 Zofran (Ondansetron) 2 mg IVP once; over 2 minutes given. vg1                     vg1 

18:24 18:23 Zofran (Ondansetron) 2 mg IVP once; over 2 minutes ordered. vg1                   vg1 

18:24 17:54 420 bd                                                                              

19:38 16:13 Telemetry/MedSurg (Inpatient) jr8                                                   

19:38 18:24 [facility]  

                                                                                                  

**************************************************************************************************

## 2021-10-26 NOTE — RAD REPORT
EXAM DESCRIPTION:  US - Renal Ultrasound-Complete - 10/26/2021 6:20 pm

 

CLINICAL HISTORY:  ENRIQUE

 

COMPARISON:  [facility] Protocol dated 5/19/2021

 

FINDINGS:  Both kidneys are normal in size, shape and echotexture.

 

The right kidney measures 8.3. No hydronephrosis, focal mass or perinephric fluid.

 

The left kidney measures 8.7. No hydronephrosis, focal mass or perinephric fluid.

 

The bladder is decompressed.

 

IMPRESSION:  Unremarkable renal sonogram.

 

No hydronephrosis.

## 2021-10-26 NOTE — CON
Date of Consultation:  10/26/2021



Reason For Consultation:  Elevated BUN and creatinine, fluid management.



History Of Present Illness:  This is a pleasant 76-year-old female, well known 
to me from the office with significant past medical history of hypertension, 
hyperlipidemia, CAD status post cardiac cath, PAD, congestive heart failure, 
chronic kidney disease with baseline creatinine 1.9, status post acute kidney 
injury required dialysis, weaned from dialysis back in March 2021, the patient 
had acute kidney injury, seen in the office back in September with creatinine 
jumped to 2.3 from 1.7.  At that time, Lasix has been decreased to 80/40 every 
other day.  The patient was doing well till a few days ago.  Patient developed 
chest tightness with shortness of breath.  Today has atrial fibrillation with 
RVR, approached to the hospital, found to have creatinine 3.2 with elevation in 
BUN and creatinine with over volume.  For that reason, we have been consulted.  
The patient denied taking any nonsteroidal, no IV contrast.



Past Medical History:  

1.   Hypertension since 1998, coronary artery disease status post cardiac cath 
x2 to follow up with Dr. Juares.

2.   CAD, status post angioplasty.

3.   Chronic kidney disease, baseline creatinine 1.7 to 1.9 secondary to 
hypertension, nephrosclerosis, cardiorenal, status post acute kidney injury, 
required dialysis, weaned from dialysis back in March 2021. 

4.   PAD.



Family History:  Positive for hypertension.



Social History:  Denies smoking.  Denies drinking.  Denies drugs abuse.



Past Surgical History:  Include angioplasty, cardiac cath.



Allergies:  TO CODEINE.



Current Medications:  At home include:

1.   Tramadol.

2.   Omeprazole.

3.   Metoprolol.

4.   Levothyroxine.

5.   Aspirin.

6.   Lasix 80/40.

7.   Calcitriol.

8.   Multivitamin.



Review of Systems:

Head and Neck:  No red eye.  No ear pain. 

GI:  No nausea, no vomiting. 

:  No polyuria, no dysuria, no hematuria. 

Gyn:  No vaginal discharge. 

Respiratory:  Has shortness of breath. 

Cardiovascular:  Has orthopnea, has palpitation. 

Endocrine:  No polydipsia. 

Skin:  No rash. 

Neuro:  Has neuropathy. 

Musculoskeletal:  Low back pain.



Physical Examination:

General:  When I saw the patient, the patient lying in bed. 

Vital Signs:  Blood pressure of 154/70, pulse of 122. 

Chest:  Crackles bilateral. 

Heart:  S1, S2.  Tachycardic. 

Abdomen:  Soft, nontender. 

Extremity:  Trace edema. 

Neurological:  Alert, oriented x3.  No focal.



Laboratory Data:  WBC 16.7, H and H 10.4/32.  Sodium 132, potassium 4.9, bicarb 
16, , creatinine 3.2, GFR of 14, calcium 9.7, magnesium of 3.  Albumin 
3.3.



Assessment And Plan:  

1.   Acute kidney injury secondary to cardiorenal over volume with hyponatremia.
 No hyperkalemia.  Nonoliguric.  I am going to start the patient on aggressive 
diuresis 80 mg t.i.d. and we will monitor the patient.  We will send for workup 
and we will follow up. 

if kidny function continue to decelin m=pt may need to start maykel on RRT with her
condtion it will be need CRRT // Slid  

2.   Acidosis non-anion gap metabolic acidosis secondary to renal failure.  No 
need for bicarb.  

3.   Hyponatremia dilutional secondary to above cardiorenal dilultional , we 
will diurese.

4.   Hypertension, we will utilize blood pressure for more diuresis.

5.   Atrial fibrillation with RVR by Cardiology.

6.   Coronary artery disease with congestive heart failure with exacerbation.  
We will optimize the fluid with the diuresis.  

f/u with cardiology possibke need cardiac cath mostly with doing that will 
require to place back on HD 

Thank you Dr. Herr for allowing us to participate in the care of your patient.

time spent exam the patient face to face placing order , reviewing the date lab 
and radiology , discussing with nursing stafe and discussing the case with other
consultant  ICU and hospitalist 65Min 



JONY

DD:  10/26/2021 15:37:49   Voice ID:  [id number]

DT:  10/26/2021 19:37:21   Report ID:  [id number]

MTDD

## 2021-10-26 NOTE — RAD REPORT
EXAM DESCRIPTION:  RAD - Chest Single View - 10/26/2021 1:26 pm

 

CLINICAL HISTORY:  DYSPNEA

 

COMPARISON:  Chest Single View dated 3/29/2021; Chest Single View dated 2/19/2021; Chest Single View 
dated 2/17/2021; [facility] Protocol dated 5/19/2021

 

FINDINGS:  Lines: None.

Lungs: Pulmonary vascular congestion. Left lung base is not well visualized.

Pleural: Cannot exclude a left-sided pleural effusion.

Cardiac: Cardiomegaly.

Bones: No acute fractures.

Other:

 

IMPRESSION:  Increased cardiomegaly. Consider correlating with echocardiography or CT to exclude sea
cardial effusion. Basilar opacities noted which could reflect atelectasis and/or pneumonia.

## 2021-10-27 LAB
ALBUMIN SERPL BCP-MCNC: 2.8 G/DL (ref 3.4–5)
ALBUMIN SERPL BCP-MCNC: 2.8 G/DL (ref 3.4–5)
ALBUMIN SERPL BCP-MCNC: 3.1 G/DL (ref 3.4–5)
ALP SERPL-CCNC: 811 U/L (ref 45–117)
ALP SERPL-CCNC: 869 U/L (ref 45–117)
ALT SERPL W P-5'-P-CCNC: 279 U/L (ref 12–78)
ANISOCYTOSIS BLD QL: SLIGHT
AST SERPL W P-5'-P-CCNC: 596 U/L (ref 15–37)
BUN BLD-MCNC: 109 MG/DL (ref 7–18)
BUN BLD-MCNC: 114 MG/DL (ref 7–18)
BUN BLD-MCNC: 60 MG/DL (ref 7–18)
COHGB MFR BLDA: 0.1 % (ref 0–1.5)
COHGB MFR BLDA: 0.6 % (ref 0–1.5)
COHGB MFR BLDA: 1 % (ref 0–1.5)
GLUCOSE SERPLBLD-MCNC: 127 MG/DL (ref 74–106)
GLUCOSE SERPLBLD-MCNC: 150 MG/DL (ref 74–106)
GLUCOSE SERPLBLD-MCNC: 17 MG/DL (ref 74–106)
HCT VFR BLD CALC: 31.9 % (ref 36–45)
LYMPHOCYTES # SPEC AUTO: 0.8 K/UL (ref 0.7–4.9)
MORPHOLOGY BLD-IMP: (no result)
OXYHGB MFR BLDA: 94.6 % (ref 94–97)
OXYHGB MFR BLDA: 96.7 % (ref 94–97)
OXYHGB MFR BLDA: 97 % (ref 94–97)
PMV BLD: 8.9 FL (ref 7.6–11.3)
POIKILOCYTOSIS BLD QL SMEAR: SLIGHT
POLYCHROMASIA BLD QL SMEAR: SLIGHT
POTASSIUM SERPL-SCNC: 3.9 MMOL/L (ref 3.5–5.1)
POTASSIUM SERPL-SCNC: 5.5 MMOL/L (ref 3.5–5.1)
POTASSIUM SERPL-SCNC: 5.6 MMOL/L (ref 3.5–5.1)
RBC # BLD: 3.69 M/UL (ref 3.86–4.86)
SAO2 % BLDA: 96.8 % (ref 92–98.5)
SAO2 % BLDA: 98.4 % (ref 92–98.5)
SAO2 % BLDA: 98.4 % (ref 92–98.5)
TROPONIN I: < 0.02 NG/ML (ref 0–0.04)
TSH SERPL DL<=0.05 MIU/L-ACNC: 0.99 UIU/ML (ref 0.36–3.74)
UA DIPSTICK W REFLEX MICRO PNL UR: (no result)

## 2021-10-27 PROCEDURE — 02H633Z INSERTION OF INFUSION DEVICE INTO RIGHT ATRIUM, PERCUTANEOUS APPROACH: ICD-10-PCS

## 2021-10-27 PROCEDURE — B5181ZA FLUOROSCOPY OF SUPERIOR VENA CAVA USING LOW OSMOLAR CONTRAST, GUIDANCE: ICD-10-PCS

## 2021-10-27 RX ADMIN — SODIUM CHLORIDE SCH MLS: 0.45 INJECTION, SOLUTION INTRAVENOUS at 11:00

## 2021-10-27 RX ADMIN — ALBUMIN (HUMAN) SCH MLS: 5 SOLUTION INTRAVENOUS at 17:07

## 2021-10-27 RX ADMIN — Medication SCH ML: at 09:00

## 2021-10-27 RX ADMIN — DEXTROSE MONOHYDRATE SCH MLS/HR: 50 INJECTION, SOLUTION INTRAVENOUS at 13:01

## 2021-10-27 RX ADMIN — DEXTROSE SCH MLS: 5 SOLUTION INTRAVENOUS at 23:37

## 2021-10-27 RX ADMIN — PROMETHAZINE HYDROCHLORIDE PRN MG: 25 INJECTION INTRAMUSCULAR; INTRAVENOUS at 22:09

## 2021-10-27 RX ADMIN — DEXTROSE MONOHYDRATE SCH MLS/HR: 50 INJECTION, SOLUTION INTRAVENOUS at 18:47

## 2021-10-27 RX ADMIN — DEXTROSE SCH MLS: 5 SOLUTION INTRAVENOUS at 15:04

## 2021-10-27 NOTE — OP
Date of Procedure:  10/27/2021



Surgeon:  Yves Ackerman MD



Assistant:  None.



Preoperative Diagnosis:  Acute renal failure, hyperkalemia, over volume.



Postoperative Diagnosis:  Acute renal failure, hyperkalemia, over volume.



Procedures:  

1.Right subclavian Az catheter placement.

2.Interpretation of fluoroscopy.



Estimated Blood Loss:  Minimal.



Specimen:  None.



Findings:  Normal anatomy.



Anesthesia:  MAC.



Complications:  None.



Disposition:  The patient tolerated the procedure in stable condition and taken to Recovery in good g
eneral condition.



Procedure In Detail:  The patient was brought to the OR and placed in supine position.  MAC anesthesi
a was begun.  The patient and was unable to lay flat and was prepped and draped in the standing up po
sition and then lidocaine 1% was infiltrated.  The patient had a very difficult anatomy to reach the 
IJ.  She had a superficial vein crossing the anterior neck and also the catheter we had __________wou
ld have been very uncomfortable for me to put the catheter in her neck __________ of her face, so the
refore I opted to use the subclavian approach, which was done easily.  An 18-gauge needle was used to
 access the subclavian vein __________ was confirmed with fluoroscopy.  Seldinger technique was used.
  Vein was dilated and then a Az catheter was placed and secured with 3-0 nylon.  Sterile dressi
ng applied.  Catheter was flushed with heparin and packed with heparin with good blood flow.  Sterile
 dressing applied.  The patient was awakened and taken to Recovery in good general condition.  Chest 
x-ray has been ordered __________.





AS/MODL

DD:  10/27/2021 10:36:22Voice ID:  [id number]

DT:  10/27/2021 11:40:36Report ID:  [id number]

## 2021-10-27 NOTE — P.OP
Assistant: NONE,NONE


Preoperative diagnosis: ARF, Hyperkalemia


Postoperative diagnosis: same


Primary procedure: Right Subclavian Az Catheter


Secondary procedure: Fluoroscopy


Anesthesia: MAC


Estimated blood loss: min


Specimen: none


Findings: as above


Complications: None


Transferred to: Recovery Room


Condition: Fair

## 2021-10-27 NOTE — RAD REPORT
EXAM DESCRIPTION:  RAD - Chest Single View - 10/27/2021 10:46 am

 

CLINICAL HISTORY:  POST OP

 

COMPARISON:  Chest Single View dated 10/27/2021; Chest Single View dated 10/26/2021; Chest Single Vie
w dated 3/29/2021; Chest Single View dated 2/19/2021; Fluoroscopy <1 Hour dated 10/27/2021

 

FINDINGS:  Lines: Interval placement of a right subclavian approach dialysis catheter. The tip overli
es right atrium.

Lungs: Increased vascular congestion with similar basilar opacities bilaterally.

Pleural: No significant pleural effusions or pneumothorax.

Cardiac: Cardiomegaly.

Bones: No acute fractures.

Other:

 

IMPRESSION:  Right subclavian approach dialysis catheter has been placed with tip overlying the right
 atrium. No pneumothorax. Increased vascular congestion with similar appearing basilar opacities that
 may represent a combination of atelectasis and/or pneumonia.

## 2021-10-27 NOTE — EKG
Test Date:    2021-10-26               Test Time:    12:54:09

Technician:   JU                                     

                                                     

MEASUREMENT RESULTS:                                       

Intervals:                                           

Rate:         125                                    

NE:                                                  

QRSD:         140                                    

QT:           366                                    

QTc:          528                                    

Axis:                                                

P:                                                   

NE:                                                  

QRS:          161                                    

T:            67                                     

                                                     

INTERPRETIVE STATEMENTS:                                       

                                                     

Atrial fibrillation with rapid ventricular response

Right axis deviation

Nonspecific intraventricular block

Abnormal ECG

Compared to ECG 03/03/2021 12:16:42

Right-axis deviation now present

Sinus rhythm no longer present

Left-axis deviation no longer present

Left bundle-branch block no longer present



Electronically Signed On 10-27-21 16:07:14 CDT by Yeyo Link

## 2021-10-27 NOTE — PN
Date of Progress Note:  10/27/2021



Subjective:  The patient was admitted with AFib with RVR, acute kidney injury secondary to cardiorena
l, over volume.  The patient had marginal acidosis, yesterday we started on diuresis.  Her acidosis h
as been worse.  The patient was waiting for transfer, unfortunately could not transfer for capacity. 
 The patient is still weak because of the worsening kidney function.  I discussed with the family reg
arding the need to resume dialysis, the family in agreement, the .



Physical Examination:

Vital Signs:  When I saw the patient; blood pressure 116/90, pulse of 100, afebrile. 

Chest:  Crackles bilateral. 

Heart:  S1, S2.  Irregular, tachy. 

Abdomen:  Soft, nontender. 

Extremity:  Trace edema. 

Neuro:  Alert.  No focality.



Laboratory Data:  WBC 17.9, H and H 9.9/31.9, platelet 335.  Sodium 135, potassium 5.6, bicarb 8, BUN
 114, creatinine 3.9, GFR of 11, phosphorus 9.9, calcium 8.8.



Assessment And Plan:  

1.Acute kidney injury on advanced chronic kidney disease, over volume, hyperkalemia with acidosis wi
th the start being uremic.  I am going to go ahead and proceed with dialysis.  Discussed with the fam
nazia, agreed.  We will place dialysis catheter and we will start the patient on dialysis today.  We wi
ll do dialysis today and tomorrow and we will follow up.

2.Hyperkalemia.  The patient is going to be dialyzed on low-potassium bath.

3.Acidosis.  We will switch bicarb to sterile water with 3 amps of bicarb and we will follow up.  Th
is bicarb can be discontinued after initiating dialysis.

4.Congestive heart failure with exacerbation.  We will try to establish better volume control with d
ialysis today and tomorrow.  Continue diuresis.

5.Secondary hyperparathyroidism with severe hyperphosphatemia.  We will follow up phosphorus after t
he daily dialysis.  The patient will be started on Tums.  No need for calcitriol for the time being.

6.Diabetes as by primary.





MA/MODL

DD:  10/27/2021 12:38:56Voice ID:  [id number]

DT:  10/27/2021 13:06:41Report ID:  [id number]

## 2021-10-27 NOTE — RAD REPORT
EXAM DESCRIPTION:  RAD - Fluoroscopy <1 Hour - 10/27/2021 10:36 am

 

CLINICAL HISTORY:  Venous catheter insertion.

ROBERT CATH INSERT

 

COMPARISON:  Fluoroscopy <1 Hour dated 2/19/2021

 

FINDINGS:  Fluoroscopic imaging is submitted from placement of a venous catheter.  Details of the pro
cedure not available.

 

Fluoroscopy time: 0.2 minutes

## 2021-10-27 NOTE — PREOPCON
Date of Consultation:  10/27/2021



This is a stat consultation for catheter placement.



Reason For Consultation:  The patient needs emergent dialysis.



History Of Present Illness:  The patient is a 76-year-old female, who came in with some chest tightne
ss and shortness of breath that started a few days ago and now has AFib with RVR with elevated BUN an
d creatinine with volume overload and the patient requires urgent dialysis and I was consulted.  She 
is awake, alert, on a Levophed drip which is being slowly weaned off.  She did drop her pressure in t
he middle of night and was coded.  Now, she is a little bit more stable.  She is still in the Levophe
d drip, however.  No sore throat, runny nose, cough, headaches, or dizziness.  No fever or chills.



Review of Systems:

Otherwise unremarkable.



Past Medical History:  Hypertension, coronary artery disease, chronic kidney disease.  Her baseline c
reatinine was 1.7 to 1.9 and she required temporary dialysis back in March 2021.  She has also had pe
ripheral arterial disease, congestive heart failure, and coronary artery disease, status post cardiac
 cath.



Past Surgical History:  Angioplasty and cardiac cath.



Allergies:  CODEINE.



Social History:  The patient does not smoke or drink.



Family History:  Significant for hypertension.



Physical Examination:

Vital Signs:  Currently pulse rate of 105, respiratory rate of 22, blood pressure 94/60 and O2 satura
tion are 98% on 40% inspired FiO2. 

General:  She is awake, alert. 

Head and Neck:  Somewhat distended.  JVD.  No neck masses.  Throat clear.  Neck is supple. 

Chest:  Clear. 

Heart:  S1 and S2. 

Abdomen:  Soft. 

Extremities:  Neurovascularly intact. 

Neuro:  Nonfocal.



Laboratory Data:  Potassium is 5.6, CO2 is 8, BUN is 114, creatinine is 3.97, and phosphorus is 9.9. 
 White count is 17.9, H and H are 9.9 and 31.9, platelets of 235.  INR is 1.28.  Blood gas reviewed.



Assessment:  A 76-year-old female with acute renal failure with multiple medical problems and hyperka
lemia and volume overload.



Recommendation:  We will proceed with Az catheter placement after discussion with Dr. Walls, 
and the patient and  understand the risks, benefits, and alternatives, and agreed to procedure
.





AS/MODL

DD:  10/27/2021 09:21:25Voice ID:  [id number]

DT:  10/27/2021 11:46:58Report ID:  [id number]

## 2021-10-27 NOTE — P.PN
Date of Service: 10/27/21


Subjective:


Patient worsened overnight, became hypotensive


Amiodarone discontinued, started on Levophed


Patient was hypoxic and tachypneic, ABG consistent with metabolic acidosis


Less responsive somewhat confused overnight as well


Patient feeling little bit better this morning








ROS: 


10 point ROS as noted above, otherwise negative








Physical exam


GEN: Alert, oriented x2, appears fatigued, ill


HEENT: Normal conjunctiva, sclera anicteric


CV: Irregularly irregular rhythm, HR: 100-110s, no edema may


Pulm: Slight tachypnea on BiPAP


ABD: Soft, nontender, nondistended


Integumentary: No rashes


Neuro: Normal speech, normal affect





Problem list


Atrial fibrillation with RVR, new onset


Acute on chronic CHF exacerbation, systolic; with LifeVest


ENRIQUE on CKD.  Previously needed dialysis temporarily


Hypertension


Hypothyroidism


GERD


Neuropathy





HR with some improvement after metoprolol in ER, however patient was borderline 

hypotensive.  Given her systolic CHF, likely EF of 15-20%, cardiology 

recommended amiodarone


Amiodarone discontinued overnight 10/26, due to patient becoming very acidotic 

and hypotensive.


Levophed started overnight on 10/26


Chest x-ray concerning for volume overload, no significant lower extremity edema

on exam


Suspect acute on chronic congestive heart failure exacerbation.  Patient with 

low reserve.


Cardiology consulted, recommends transfer to tertiary care center.  Patient with

recent severe systolic CHF diagnosis, cardiologist was considering cardiac 

catheterization


Nephrology consulted for acute renal failure, managed with worsening renal 

function overnight


General surgery consulted for temporary dialysis catheter, patient to have 

dialysis urgently today


Suspect her acute renal failure this more of a cardiorenal syndrome, but unclear


In further discussion, patient does report feeling like she could not completely

empty her bladder, and felt like she was having the beginning symptoms of a UTI


UA pending


Continue cefepime and vancomycin


Telemetry


Confirm home medications, restart as appropriate.





VTE: Eliquis


Code: Full


Dispo: Continue ICU level of care


Attempting to transfer to tertiary care center


 updated at bedside





Time Spent Managing Pts Care (In Minutes): 45

## 2021-10-28 LAB
ALBUMIN SERPL BCP-MCNC: 3 G/DL (ref 3.4–5)
ALP SERPL-CCNC: 834 U/L (ref 45–117)
ANISOCYTOSIS BLD QL: SLIGHT
BUN BLD-MCNC: 67 MG/DL (ref 7–18)
GIANT PLATELETS BLD QL SMEAR: (no result)
GLUCOSE SERPLBLD-MCNC: 143 MG/DL (ref 74–106)
HCT VFR BLD CALC: 28.8 % (ref 36–45)
LYMPHOCYTES # SPEC AUTO: 1 K/UL (ref 0.7–4.9)
MAGNESIUM SERPL-MCNC: 2.4 MG/DL (ref 1.8–2.4)
MORPHOLOGY BLD-IMP: (no result)
PMV BLD: 8.7 FL (ref 7.6–11.3)
POTASSIUM SERPL-SCNC: 4 MMOL/L (ref 3.5–5.1)
RBC # BLD: 3.49 M/UL (ref 3.86–4.86)

## 2021-10-28 RX ADMIN — DEXTROSE MONOHYDRATE SCH MLS/HR: 50 INJECTION, SOLUTION INTRAVENOUS at 08:46

## 2021-10-28 RX ADMIN — DOCUSATE SODIUM SCH: 100 CAPSULE, LIQUID FILLED ORAL at 21:00

## 2021-10-28 RX ADMIN — Medication SCH: at 21:00

## 2021-10-28 RX ADMIN — DEXTROSE MONOHYDRATE SCH MLS/HR: 50 INJECTION, SOLUTION INTRAVENOUS at 22:10

## 2021-10-28 RX ADMIN — DEXTROSE MONOHYDRATE SCH MLS/HR: 50 INJECTION, SOLUTION INTRAVENOUS at 01:39

## 2021-10-28 RX ADMIN — Medication SCH ML: at 08:32

## 2021-10-28 RX ADMIN — ALBUMIN (HUMAN) SCH MLS: 5 SOLUTION INTRAVENOUS at 08:31

## 2021-10-28 RX ADMIN — ONDANSETRON PRN MG: 2 INJECTION INTRAMUSCULAR; INTRAVENOUS at 08:31

## 2021-10-28 RX ADMIN — ENOXAPARIN SODIUM SCH MG: 30 INJECTION SUBCUTANEOUS at 08:32

## 2021-10-28 RX ADMIN — SODIUM CHLORIDE SCH: 0.45 INJECTION, SOLUTION INTRAVENOUS at 10:00

## 2021-10-28 RX ADMIN — SODIUM CHLORIDE SCH MLS: 9 INJECTION, SOLUTION INTRAVENOUS at 08:32

## 2021-10-28 NOTE — P.PN
Date of Service: 10/28/21


Subjective:


No acute events overnight.  Patient was restarted on amiodarone yesterday


Continues on Levophed.  Tolerated dialysis yesterday


No new complaints this morning, feels about the same








ROS: 


10 point ROS as noted above, otherwise negative








Physical exam


GEN: Alert, oriented x2, appears fatigued


HEENT: Normal conjunctiva, sclera anicteric


CV: Irregularly irregular rhythm, HR: 100-120s


Pulm: Slight tachypnea on BiPAP


ABD: Soft, nontender, nondistended


Integumentary: No rashes


Neuro: Normal speech, normal affect





Problem list


Atrial fibrillation with RVR, new onset


Acute on chronic CHF exacerbation, systolic; with LifeVest


ENRIQUE on CKD.  Requiring dialysis


Shock liver


Hypertension


Hypothyroidism


GERD


Neuropathy





HR with some improvement after metoprolol in ER, however patient was borderline 

hypotensive.  Given her systolic CHF, likely EF of 15-20%, cardiology 

recommended amiodarone


Amiodarone discontinued overnight 10/26, due to patient becoming very acidotic 

and hypotensive.


Levophed started overnight on 10/26. amio subsequently restarted on 10/27


CXR: Concern for volume overload, no significant lower extremity edema seen on 

exam


Suspect acute on chronic congestive heart failure exacerbation.  Patient with 

low reserve.  


Cardiology consulted, recommends transfer to tertiary care center.  Patient with

recent severe systolic CHF diagnosis, cardiologist was considering cardiac 

catheterization


Nephrology consulted for acute renal failure


General surgery consulted for temporary dialysis catheter, placed 10/27


Patient received urgent dialysis on 10/27, plan for dialysis today as well


Patient accepted for transfer to [facility], pending bed availability


Patient did report feeling like she was unable to completely empty her bladder, 

UA ordered


Continue cefepime and vancomycin for possible infection, unknown source


Telemetry


Confirm home medications, restart as appropriate.





VTE: Lovenox


Code: Full


Dispo: Continue ICU level of care


Accepted to [facility], pending bed availability


 updated at bedside





Time Spent Managing Pts Care (In Minutes): 45

## 2021-10-28 NOTE — ECHO
HEIGHT: 5 ft 4 in   WEIGHT: 140 lb 0 oz   DATE OF STUDY: 10/28/21   REFER DR: Yeyo Link MD

2-DIMENSIONAL: YES

     M.MODE: YES

 DOPPLER: YES

COLOR FLOW: YES



                    TDS:  NO

PORTABLE: NO

 DEFINITY:  NO

BUBBLE STUDY: NO





DIAGNOSIS:  CONGESTIVE HEART FAILURE



CARDIAC HISTORY:  

CATHERIZATION: 

SURGERY: 

PROSTHETIC VALVE: 

PACEMAKER: 





MEASUREMENTS (cm)

    DIASTOLIC (NORMALS)                 SYSTOLIC (NORMALS)

IVSd                 1.2 (0.6-1.2)                    LA Diam 5.0 (1.9-4.0)     LVEF       
  26%  

LVIDd               5.0 (3.5-5.7)                        LVIDs      4.4 (2.0-3.5)     %FS  
        12%

LVPWd             1.1 (0.6-1.2)

Ao Diam           2.5 (2.0-3.7)



2 DIMENSIONAL ASSESSMENT:

RIGHT ATRIUM:                   NORMAL

LEFT ATRIUM:       ENLARGED



RIGHT VENTRICLE:            NORMAL

LEFT VENTRICLE: DEPRESSED



TRICUSPID VALVE:             MILD TRICUSPID REGURGITATION

MITRAL VALVE:     MILD MITRAL REGURGITATION



PULMONIC VALVE:             NORMAL

AORTIC VALVE:     NORMAL



PERICARDIAL EFFUSION: SMALL PERICARDIAL EFFUSION

AORTIC ROOT:      NORMAL





LEFT VENTRICULAR WALL MOTION:     SEVERE GLOBAL HYPOKINESIS. 



DOPPLER/COLOR FLOW:     SEE BELOW.



COMMENTS:      SEVERELY DEPRESSED LEFT VENTRICULAR EJECTION FRACTION OF 25-30%. SEVERE 
GLOBAL HYPOKINESIS. MILD TRICUSPID REGURGITATION, MILD MITRAL REGURGITATION. SMALL TO 
MODERATE PERICARDIAL EFFUSION. PULMONARY HYPERTENSION WITH RIGHT VENTRICULAR SYSTOLIC 
PRESSURE 55-60%mmHg.



TECHNOLOGIST:   JOY GERBER

## 2021-10-28 NOTE — P.CNS
Date of Consult: 10/28/21


Reason for Consult: Acidosis


Chief Complaint: ENRIQUE, new onset A. fib


History of Present Illness: 


Patient is 76 years of age with chronic renal disease and hypertension scented 

to the hospital with cough shortness of breath generalized weakness found to be 

in severe acute renal failure severe metabolic acidosis seen by cardiologist 

only stable hemodialysis catheter


Allergies





codeine Allergy (Verified 04/21/21 09:32)


   Itching





Home Medications: 








Omeprazole [Prilosec] 40 mg PO DAILY 03/28/21 


Promethazine HCl 25 mg PO Q6H PRN 03/28/21 


calcitrioL [Rocaltrol] 1 tab PO DAILY 03/28/21 


traMADol HCL [Ultram*] 50 mg PO Q8H PRN 03/28/21 


Multivitamin with Iron [Daily Vitamin + Iron] 1 each PO DAILY #90 tablet 

03/30/21 


Cranberry 500 mg PO DAILY 04/21/21 


Hydralazine HCl 100 mg PO DAILY 04/21/21 


Levothyroxine Sodium [Unithroid] 75 mcg PO DAILY 04/21/21 








- Past Medical/Surgical History


Diabetic: No


-: HTN


-: CKD, temporarily needed dialysis


-: CHF, systolic


-: Knee surgery


-: Hysterectomy


-: HD placement





- Family History


  ** Brother


Medical History: Heart disease





  ** Mother


Medical History: Heart disease





  ** Sister


Medical History: Heart disease





- Social History


Alcohol use: No


CD- Drugs: No


Caffeine use: Yes


Place of Residence: Home





Review of Systems


General: Weakness


Respiratory: Shortness of Breath





Physical Examination














Temp Pulse Resp BP Pulse Ox


 


 99.0 F   85   20   118/84   95 


 


 10/28/21 07:00  10/28/21 10:00  10/28/21 10:00  10/28/21 10:00  10/28/21 10:00








General: Alert, Cooperative, Mild distress


Respiratory: Clear to auscultation bilaterally, Friction rub


Cardiovascular: Regular rate/rhythm





- Problems


(1) ESRD (end stage renal disease) on dialysis


Current Visit: No   Status: Acute   


Plan: 


Patient is 76 years of age admitted to the hospital with end-stage renal disease

 currently on dialysis severe acidosis which has been corrected white count is 

elevated hemoglobin has mildly decreased cultures are negative can DC vancomycin

 chest x-ray shows prominent cardiomegaly cannot exclude pneumonia changed to 

Rocephin oxygenation satisfactory

## 2021-10-28 NOTE — PN
Date of Progress Note:  10/28/2021



Subjective:  The patient was admitted with acute kidney injury secondary to 
cardiorenal, AFib with RVR.  The patient has severe congestive heart failure 
with ejection fraction of 15%.  The patient was placed on Levophed, currently on
16 mcg.  The patient complaining from abdominal pain.  The patient had dialysis 
yesterday, tolerated well with Levophed.



Physical Examination:

Vital Signs:  Blood pressure 118/84, pulse of 85. 

Chest:  Crackles bilateral base. 

Heart:  Tachycardic, irregular. 

Abdomen:  Tenderness.  No guarding or rebound. 

Extremities:  No edema. 

Neurologic:  Alert.  No focality.



Laboratory Data:  WBC 21.2, H and H 9.4/28.8.  Sodium 130, potassium 4, bicarb 
19, BUN 67, creatinine 3.3, calcium 8.4, phosphorus 5.7.  LFT, AST and ALT above
7000, alkaline phosphatase 834.



Current Medications:  The patient on include;

1.   Cefepime.

2.   Vancomycin.

3.   Promethazine.

4.   Levophed.

5.   Amiodarone.

6.   Zofran.



Assessment And Plan:  

1.   Acute kidney injury on advanced chronic kidney disease secondary to poor 
perfusion, ATN, cardiorenal, over volume.  I am going to do another session of 
dialysis today and we will follow up the patient.  The patient is schedule to be
transfer.  We will follow up after.

2.   Hyperkalemia, resolved.

3.   Acidosis high anion gap with the presence of abdominal pain.  Acidosis has 
been recovered on the dialysis, but the patient to rule out any intraabdominal 
ischemia, we will proceed with CT with angio.  Discussed with the hospitalist.  
The patient is already on Lovenox.  We will follow up if the patient is going to
need to be full anticoagulate with heparin drip.

4.   Cardiogenic shock and atrial fibrillation with rapid ventricular response. 
Continue Levophed.  We will follow up with Cardiology.

5.   Congestive heart failure exacerbation.  We will try to optimize fluid 
status for the patient.



time spent exam the patient face to face placing order , reviewing the date lab 
and radiology , discussing with nursing stafe and discussing the case with other
consultant  ICU and hospitalist 45Min 

MA/TODD

DD:  10/28/2021 12:18:03   Voice ID:  [id number]

DT:  10/28/2021 12:59:42   Report ID:  [id number]

[email or web address]

## 2021-10-28 NOTE — RAD REPORT
EXAM DESCRIPTION:  US - Liver Only - 10/28/2021 6:54 am

 

CLINICAL HISTORY:  evaluate Liver

 

COMPARISON:  [facility] Protocol dated 5/19/2021

 

TECHNIQUE:  Sonographic evaluation of the right upper quadrant was performed as a dedicated liver ult
rasound study.

 

FINDINGS:  Liver is 17 cm in maximum dimension. No focal liver lesion is identifiable. No capsular no
dularity confirmed. Echogenicity of the liver parenchyma is slightly increased. This is a borderline 
to mild fatty infiltration pattern. Doppler evaluation shows no portal vein abnormality.

 

No ascites seen in the upper abdomen. Patient was unable fully cooperate with the examination. Theref
ore, the spleen could not be visualized. No gross splenic abnormality seen on the May 2021 CT study.

 

IMPRESSION:  Borderline to mild fatty infiltration pattern of the liver. No focal liver lesions seen.

## 2021-10-28 NOTE — RAD REPORT
EXAM DESCRIPTION:  CTAbdomen   Pelvis Wo Contrast - 10/28/2021 4:33 pm

 

CLINICAL HISTORY:

pain

 

COMPARISON:  [facility] Protocol dated 5/19/2021; [facility] Protocol dated 5/1/2021; [facility] Protocol dated 3/28
/2021; Abdomen   Pelvis W Contrast dated 3/3/2021; Chest Single View dated 10/27/2021; Chest Single V
iew dated 10/27/2021; Chest Single View dated 10/26/2021

 

TECHNIQUE:  CT of the abdomen and pelvis was performed.

 

All CT scans are performed using dose optimization technique as appropriate and may include automated
 exposure control or mA/KV adjustment according to patient size.

 

FINDINGS:  Lower chest: Small bilateral effusions with underlying atelectasis and/or pneumonia. Moder
ate pericardial effusion. Cardiomegaly. Tip of the dialysis catheter identified. Coronary artery calc
ifications.

Liver: No acute abnormality or suspicious lesions.

Biliary: Cholecystectomy

Stomach: No significant focal abnormality.

Duodenum: No significant focal abnormality.

Pancreas: No significant abnormality.

Spleen: No significant abnormality.

Adrenal: No suspicious lesions.

Kidney/ureter: No hydronephrosis. Nonobstructing stones left kidney.

Retroperitoneum: No retroperitoneal adenopathy.

Vascular: No aneurysm.

Bowel: No significant focal abnormality.

Peritoneum: Small volume of ascites.

Bladder: Fields catheter in bladder.

Reproductive: No adnexal masses.

Bones: No acute fracture. Multilevel degenerative changes are present in the spine.

Other: Body wall edema. Right femoral vein catheter.

 

IMPRESSION:  No definite acute intra-abdominal abnormality. No pneumatosis or free air identified. No
 portal venous gas. Anasarca present including a moderate pericardial effusion. Basilar airspace dise
ase may represent atelectasis and/or pneumonia.

## 2021-10-29 LAB
ALBUMIN SERPL BCP-MCNC: 2.9 G/DL (ref 3.4–5)
ALP SERPL-CCNC: 788 U/L (ref 45–117)
ALT SERPL W P-5'-P-CCNC: 2568 U/L (ref 12–78)
AMYLASE SERPL-CCNC: 136 U/L (ref 25–115)
BUN BLD-MCNC: 41 MG/DL (ref 7–18)
GLUCOSE SERPLBLD-MCNC: 188 MG/DL (ref 74–106)
HCT VFR BLD CALC: 29 % (ref 36–45)
LIPASE SERPL-CCNC: 997 U/L (ref 73–393)
LYMPHOCYTES # SPEC AUTO: 0.6 K/UL (ref 0.7–4.9)
MAGNESIUM SERPL-MCNC: 2.2 MG/DL (ref 1.8–2.4)
PMV BLD: 8.5 FL (ref 7.6–11.3)
POTASSIUM SERPL-SCNC: 3.3 MMOL/L (ref 3.5–5.1)
RBC # BLD: 3.52 M/UL (ref 3.86–4.86)

## 2021-10-29 RX ADMIN — PROMETHAZINE HYDROCHLORIDE PRN MG: 25 INJECTION INTRAMUSCULAR; INTRAVENOUS at 20:53

## 2021-10-29 RX ADMIN — DOCUSATE SODIUM SCH: 100 CAPSULE, LIQUID FILLED ORAL at 21:00

## 2021-10-29 RX ADMIN — DEXTROSE MONOHYDRATE SCH MLS/HR: 50 INJECTION, SOLUTION INTRAVENOUS at 04:01

## 2021-10-29 RX ADMIN — Medication SCH ML: at 09:00

## 2021-10-29 RX ADMIN — DOCUSATE SODIUM SCH MG: 100 CAPSULE, LIQUID FILLED ORAL at 10:13

## 2021-10-29 RX ADMIN — DEXTROSE MONOHYDRATE SCH MLS/HR: 50 INJECTION, SOLUTION INTRAVENOUS at 17:42

## 2021-10-29 RX ADMIN — SODIUM CHLORIDE SCH MLS: 9 INJECTION, SOLUTION INTRAVENOUS at 09:39

## 2021-10-29 RX ADMIN — ENOXAPARIN SODIUM SCH MG: 30 INJECTION SUBCUTANEOUS at 09:40

## 2021-10-29 RX ADMIN — SODIUM CHLORIDE SCH: 9 INJECTION, SOLUTION INTRAVENOUS at 09:00

## 2021-10-29 RX ADMIN — Medication SCH ML: at 21:00

## 2021-10-29 RX ADMIN — DEXTROSE MONOHYDRATE SCH MLS/HR: 50 INJECTION, SOLUTION INTRAVENOUS at 09:35

## 2021-10-29 NOTE — P.PN
Subjective


Date of Service: 10/29/21


Chief Complaint: ENRIQUE, new onset A. fib





Physical Examination





- Vital Signs


Temperature: 98.3 F


Blood Pressure: 134/75


Pulse: 80


Respirations: 18


Pulse Ox (%): 97





Assessment And Plan





- Plan





# ENRIQUE 2/2 CRS1, on CKD


No sig renal recovery


HD again today


Strict I/O


Monitor renal panel





# Acute on chronic systolic HF c/b cardiogenic shock


LVEF 15-20%


On lifeVest


Vasopressor +/- inotrope support per Cardiology





# Ischemic hepatopathy


Monitor





# Abdominal pain


? mesenteric ischemia


No clear acute pathology on CT A/P non-contrast


Lactate wnl


Amylase & lipase elevated


On empiric abx





# Afib w/ RVR


Rate controlled


Per Cardiology





# Dispo


Transfer to [facility] when bed available, for further advanced HF therapies

## 2021-10-29 NOTE — P.PN
Date of Service: 10/29/21


Subjective:


No acute events overnight.


Yesterday patient had abdominal pain, concern for ischemic bowel given her 

episode of hypotension and metabolic acidosis.  Unable to obtain peripheral IV 

to perform CTA.


And recurrent abdominal pain, CT abdomen/pelvis without contrast performed, no 

significant findings in the abdomen.  Abdominal pain improved, but still remains

diffusely tender, most severe in the epigastrium.


This morning reports pain/discomfort all over, overall does not feel well, but 

does feel better than she did last day


Remains on amiodarone, sinus rhythm this morning, remains on Levophed








ROS: 


10 point ROS as noted above, otherwise negative








Physical exam


GEN: Alert, oriented x3, appears fatigued


HEENT: Normal conjunctiva, sclera anicteric


CV: Sinus rhythm, HR: 90s


Pulm: Slight tachypnea on NC


ABD: Soft, nondistended, tender diffusely, most in epigastrium


Integumentary: No rashes


Neuro: Normal speech, normal affect





Problem list


Atrial fibrillation with RVR, new onset


Acute on chronic CHF exacerbation, systolic; with LifeVest


Suspect cardiogenic shock


Abdominal pain


ENRIQUE on CKD.  Requiring dialysis


Shock liver


Hypertension


Hypothyroidism


GERD


Neuropathy





HR with some improvement after metoprolol in ER, however patient was borderline 

hypotensive.  Given her systolic CHF, likely EF of 15-20%, cardiology 

recommended amiodarone


Amiodarone discontinued overnight 10/26, due to patient becoming very acidotic 

and hypotensive.


Levophed started overnight on 10/26. amio subsequently restarted on 10/27


CXR: Concern for volume overload, no significant lower extremity edema seen on 

exam


Suspect acute on chronic congestive heart failure exacerbation.  Patient with 

low reserve.  


Cardiology consulted, recommends transfer to tertiary care center.  Patient with

recent severe systolic CHF diagnosis, cardiologist was considering cardiac 

catheterization


Echocardiogram and CT noted moderate pericardial effusion as well


Converted to sinus rhythm morning on 10/29, continue amiodarone


Nephrology consulted for acute renal failure


Dialysis catheter placed on 10/27 by general surgery, received urgent dialysis, 

and has been receiving this daily


Patient accepted for transfer to [facility], pending bed availability


Continue cefepime and vancomycin for possible infection, unknown source


Telemetry


Negative lactic acidosis this morning, do not suspect ischemic bowel at this 

point


Most tender in the epigastrium, will obtain lipase.  No obvious inflammation of 

the pancreas, however CT was without contrast.  Not visualized/commented on 

right upper quadrant ultrasound 





VTE: Lovenox


Code: Full


Dispo: Continue ICU level of care


Accepted to [facility], pending bed availability


 updated at bedside





Time Spent Managing Pts Care (In Minutes): 45

## 2021-10-30 LAB
ALBUMIN SERPL BCP-MCNC: 2.6 G/DL (ref 3.4–5)
ALP SERPL-CCNC: 702 U/L (ref 45–117)
ALT SERPL W P-5'-P-CCNC: 1624 U/L (ref 12–78)
AST SERPL W P-5'-P-CCNC: 1470 U/L (ref 15–37)
BUN BLD-MCNC: 28 MG/DL (ref 7–18)
GLUCOSE SERPLBLD-MCNC: 134 MG/DL (ref 74–106)
HCT VFR BLD CALC: 27.6 % (ref 36–45)
LYMPHOCYTES # SPEC AUTO: 0.4 K/UL (ref 0.7–4.9)
MAGNESIUM SERPL-MCNC: 2.1 MG/DL (ref 1.8–2.4)
PMV BLD: 8.4 FL (ref 7.6–11.3)
POTASSIUM SERPL-SCNC: 3.1 MMOL/L (ref 3.5–5.1)
RBC # BLD: 3.33 M/UL (ref 3.86–4.86)

## 2021-10-30 RX ADMIN — SODIUM CHLORIDE SCH MLS: 9 INJECTION, SOLUTION INTRAVENOUS at 09:09

## 2021-10-30 RX ADMIN — DEXTROSE SCH MLS: 5 SOLUTION INTRAVENOUS at 08:38

## 2021-10-30 RX ADMIN — MORPHINE SULFATE PRN MG: 2 INJECTION, SOLUTION INTRAMUSCULAR; INTRAVENOUS at 21:43

## 2021-10-30 RX ADMIN — ENOXAPARIN SODIUM SCH MG: 30 INJECTION SUBCUTANEOUS at 09:09

## 2021-10-30 RX ADMIN — DOCUSATE SODIUM SCH MG: 100 CAPSULE, LIQUID FILLED ORAL at 09:08

## 2021-10-30 RX ADMIN — GUAIFENESIN SCH MG: 600 TABLET, EXTENDED RELEASE ORAL at 12:21

## 2021-10-30 RX ADMIN — DOCUSATE SODIUM SCH MG: 100 CAPSULE, LIQUID FILLED ORAL at 20:43

## 2021-10-30 RX ADMIN — GUAIFENESIN SCH MG: 600 TABLET, EXTENDED RELEASE ORAL at 20:43

## 2021-10-30 RX ADMIN — Medication SCH ML: at 09:00

## 2021-10-30 RX ADMIN — Medication SCH ML: at 20:43

## 2021-10-30 RX ADMIN — PROMETHAZINE HYDROCHLORIDE PRN MG: 25 INJECTION INTRAMUSCULAR; INTRAVENOUS at 02:31

## 2021-10-30 RX ADMIN — ONDANSETRON PRN MG: 2 INJECTION INTRAMUSCULAR; INTRAVENOUS at 21:59

## 2021-10-30 NOTE — P.PN
Date of Service: 10/30/21


Subjective:


No acute events overnight.


seems to be improving


off levophed ~2-3 am 


sinus rhythm yesterday, seems to be flipping back and forth 


abdominal pain much improved





ROS: 


10 point ROS as noted above, otherwise negative








Physical exam


GEN: Alert, oriented x3, appears somewhat uncomfortable / fatigued


HEENT: Normal conjunctiva, sclera anicteric


CV: Sinus rhythm, HR: 90s


Pulm: Slight tachypnea on NC, +b/l crackles


ABD: Soft, nondistended, mild-mod tenderness diffusely


Neuro: Normal speech, normal affect





Problem list


Atrial fibrillation with RVR, new onset


Acute on chronic CHF exacerbation, systolic; with LifeVest


Suspect cardiogenic shock


small-moderate pericardial effusion


Abdominal pain


ENRIQUE on CKD.  Requiring dialysis


Shock liver


Hypertension


Hypothyroidism


GERD


Neuropathy





CXR: Concern for volume overload, no significant lower extremity edema seen on 

exam


HR with some improvement after metoprolol in ER, however patient was borderline 

hypotensive.  Given her systolic CHF, likely EF of 15-20%, cardiology 

recommended amiodarone


Amiodarone discontinued overnight 10/26, due to patient becoming very acidotic 

and hypotensive.


Levophed started overnight on 10/26. amio subsequently restarted on 10/27


overall improving, levophed off early am of 10/30


Suspect acute on chronic congestive heart failure exacerbation.  Patient with 

low reserve.  


Cardiology consulted, recommends transfer to tertiary care center.  Patient with

recent severe systolic CHF diagnosis, cardiologist was considering cardiac 

catheterization


Echocardiogram and CT noted moderate pericardial effusion as well


Converted to sinus rhythm morning on 10/29, continue amiodarone.  Having 

occasional short runs of ANTWON camargo, unsustained


Nephrology consulted for acute renal failure


Dialysis catheter placed on 10/27 by general surgery, received urgent dialysis, 

and has been receiving this daily


Patient accepted for transfer to [facility] on 10/27, pending bed 

availability


Continue cefepime and vancomycin for possible infection, unknown source


Telemetry


Negative lactic acidosis 10/29, do not suspect ischemic bowel at this point


Most tender in the epigastrium, will obtain lipase.  No obvious inflammation of 

the pancreas, however CT was without contrast.  Not visualized/commented on 

right upper quadrant ultrasound 


lipase was elevated and now improved


Discussed with nephrology and cardiology, if patient becomes hypotensive again, 

will start milrinone to maintain MAP> 65





VTE: Lovenox


Code: Full


Dispo: Continue ICU level of care


Accepted to [facility] on 10/27, pending ICU bed availability


 updated at bedside





Time Spent Managing Pts Care (In Minutes): 45

## 2021-10-30 NOTE — P.PN
Subjective


Date of Service: 10/30/21


Chief Complaint: ENRIQUE, new onset A. fib


Subjective: Other (she reports no increase in shortness of breath.  She received

dialysis yesterday.)





Physical Examination





- Vital Signs


Temperature: 98 F


Blood Pressure: 120/53


Pulse: 76


Respirations: 25


Pulse Ox (%): 97





- Physical Exam


General: Other (appears chronically ill)


HEENT: Atraumatic, Normocephalic


Neck: Supple


Respiratory: Other (symmetric chest expansion)


Cardiovascular: No rubs, No murmurs


Gastrointestinal: Soft and benign


Musculoskeletal: No clubbing, Swelling


Integumentary: Other (normal skin temperature)


Neurological: Normal speech, Normal tone


Urinary: Other (no bladder distention)


External genitalia: Other


Rectal: Other





Assessment And Plan





- Plan





# ENRIQUE 2/2 CRS1, on CKD


No sig renal recovery


HD received yesterday


No acute indication for HD today


Strict I/O


Monitor renal panel





# Acute on chronic systolic HF c/b cardiogenic shock


LVEF 15-20%; C pending


On lifeVest


Start inotrope via dobutamine or milrinone gtt if MAP drops below 65





# Ischemic hepatopathy


Monitor





# Abdominal pain


? mesenteric ischemia


No clear acute pathology on CT A/P non-contrast


Lactate wnl


Amylase & lipase elevated


On empiric abx





# Afib w/ RVR


Rate controlled


Per Cardiology





# Dispo


Transfer to [facility] when bed available, for further advanced HF therapies

## 2021-10-30 NOTE — RAD REPORT
EXAM DESCRIPTION:  [facility] Single View10/30/2021 6:56 am

 

CLINICAL HISTORY:  Shortness of breath

 

COMPARISON:  October 28, 2021

 

FINDINGS:   No significant change in mild to moderate bilateral pulmonary opacities, cardiomegaly and
 small pleural effusions.

 

Central venous line with its tip in the right atrium

## 2021-10-30 NOTE — RAD REPORT
EXAM DESCRIPTION:  [facility] Single View10/30/2021 9:42 pm

 

CLINICAL HISTORY:  Shortness of breath

 

COMPARISON:  October 28, 2021

 

FINDINGS:  Enlargement of the cardiac silhouette represents combination of cardiomegaly and pericardi
al effusion

 

Small pleural effusions.

 

Bilateral pulmonary opacities which represent pulmonary edema or pneumonia

## 2021-10-31 VITALS — OXYGEN SATURATION: 92 %

## 2021-10-31 VITALS — DIASTOLIC BLOOD PRESSURE: 79 MMHG | SYSTOLIC BLOOD PRESSURE: 119 MMHG

## 2021-10-31 VITALS — TEMPERATURE: 98 F

## 2021-10-31 LAB
ALBUMIN SERPL BCP-MCNC: 2.7 G/DL (ref 3.4–5)
ALBUMIN SERPL BCP-MCNC: 2.7 G/DL (ref 3.4–5)
ALP SERPL-CCNC: 658 U/L (ref 45–117)
ALT SERPL W P-5'-P-CCNC: 1203 U/L (ref 12–78)
AST SERPL W P-5'-P-CCNC: 760 U/L (ref 15–37)
BUN BLD-MCNC: 38 MG/DL (ref 7–18)
CREAT UR-SCNC: 136 MG/DL (ref 20–320)
GLUCOSE SERPLBLD-MCNC: 110 MG/DL (ref 74–106)
HCT VFR BLD CALC: 27.9 % (ref 36–45)
LYMPHOCYTES # SPEC AUTO: 0.6 K/UL (ref 0.7–4.9)
PMV BLD: 8.1 FL (ref 7.6–11.3)
POTASSIUM SERPL-SCNC: 3 MMOL/L (ref 3.5–5.1)
PROT UR-MCNC: 236.9 MG/DL (ref ?–11.9)
RBC # BLD: 3.36 M/UL (ref 3.86–4.86)

## 2021-10-31 RX ADMIN — SODIUM CHLORIDE SCH MLS: 9 INJECTION, SOLUTION INTRAVENOUS at 08:49

## 2021-10-31 RX ADMIN — ENOXAPARIN SODIUM SCH MG: 30 INJECTION SUBCUTANEOUS at 08:49

## 2021-10-31 RX ADMIN — DOCUSATE SODIUM SCH MG: 100 CAPSULE, LIQUID FILLED ORAL at 08:50

## 2021-10-31 RX ADMIN — MORPHINE SULFATE PRN MG: 2 INJECTION, SOLUTION INTRAMUSCULAR; INTRAVENOUS at 02:03

## 2021-10-31 RX ADMIN — Medication SCH ML: at 08:50

## 2021-10-31 NOTE — P.DS
Admission Date: 10/26/21


Discharge Date: 10/31/21


Disposition: TRANSFER TO [facility]


Discharge Condition: FAIR


Reason for Admission: ENRIQUE, new onset A. fib


Consultations: 


Cardiology - Dr. Link


Nephrology - Dr. Walls, Dr. Tilley


Pulmonology - Dr. Rose


General Surgery - Dr. Ackerman





Procedures: 


CXR (10/26):


IMPRESSION:  Increased cardiomegaly. Consider correlating with echocardiography 

or CT to exclude pericardial effusion. Basilar opacities noted which could 

reflect atelectasis and/or pneumonia.





Renal ultrasound (10/26):


FINDINGS:  Both kidneys are normal in size, shape and echotexture.


 The right kidney measures 8.3. No hydronephrosis, focal mass or perinephric 

fluid.


 The left kidney measures 8.7. No hydronephrosis, focal mass or perinephric 

fluid.


 The bladder is decompressed.


 IMPRESSION:  Unremarkable renal sonogram.





Temporary dialysis catheter placement (10/27):


Right subclavian Az catheter placed by Dr. Link





CXR (10/27):


Right subclavian approach dialysis catheter has been placed with tip overlying 

the right atrium. No pneumothorax. Increased vascular congestion with similar 

appearing basilar opacities that may represent a combination of atelectasis 

and/or pneumonia.





Liver ultrasound (10/28):


FINDINGS:  Liver is 17 cm in maximum dimension. No focal liver lesion is 

identifiable. No capsular nodularity confirmed. Echogenicity of the liver 

parenchyma is slightly increased. This is a borderline to mild fatty 

infiltration pattern. Doppler evaluation shows no portal vein abnormality.


 No ascites seen in the upper abdomen. Patient was unable fully cooperate with 

the examination. Therefore, the spleen could not be visualized. No gross splenic

abnormality seen on the May 2021 CT study.


 IMPRESSION:  Borderline to mild fatty infiltration pattern of the liver. No 

focal liver lesions seen.





CT abdomen/pelvis without contrast (10/20):


FINDINGS:  Lower chest: Small bilateral effusions with underlying atelectasis 

and/or pneumonia. Moderate pericardial effusion. Cardiomegaly. Tip of the 

dialysis catheter identified. Coronary artery calcifications.


Liver: No acute abnormality or suspicious lesions.


Biliary: Cholecystectomy


Stomach: No significant focal abnormality.


Duodenum: No significant focal abnormality.


Pancreas: No significant abnormality.


Spleen: No significant abnormality.


Adrenal: No suspicious lesions.


Kidney/ureter: No hydronephrosis. Nonobstructing stones left kidney.


Retroperitoneum: No retroperitoneal adenopathy.


Vascular: No aneurysm.


Bowel: No significant focal abnormality.


Peritoneum: Small volume of ascites.


Bladder: Fields catheter in bladder.


Reproductive: No adnexal masses.


Bones: No acute fracture. Multilevel degenerative changes are present in the 

spine.


Other: Body wall edema. Right femoral vein catheter.


 


IMPRESSION:  No definite acute intra-abdominal abnormality. No pneumatosis or 

free air identified. No portal venous gas. Anasarca present including a moderate

pericardial effusion. Basilar airspace disease may represent atelectasis and/or 

pneumonia.





CXR (10/30):


FINDINGS:   No significant change in mild to moderate bilateral pulmonary 

opacities, cardiomegaly and small pleural effusions.


 Central venous line with its tip in the right atrium








CXR (10/30):


FINDINGS:  Enlargement of the cardiac silhouette represents combination of 

cardiomegaly and pericardial effusion


 Small pleural effusions.


 Bilateral pulmonary opacities which represent pulmonary edema or pneumonia





Echo (10/28): 


Severely depressed LVEF (25-30%).  Severe global hypokinesis.


Mild tricuspid regurgitation, mild mitral regurgitation.


Small to moderate pericardial effusion.


Pulmonary hypertension with a RVSP 55-60 mmHg











Problem list


Atrial fibrillation with RVR, new onset


Acute on chronic CHF exacerbation, systolic; with LifeVest


Suspect cardiogenic shock


small-moderate pericardial effusion


Abdominal pain


ENRIQUE on CKD. now requiring dialysis


Shock liver


Hypertension


Hypothyroidism


GERD


Neuropathy





Brief History of Present Illness: 


76-year-old female, PMH: CHF, CKD (previously needed dialysis for short period),

hypertension.


Presents to the ED due to not feeling well with cough, shortness of breath, 

nausea, and generalized weakness.  She was recently hospitalized for CHF 

exacerbation and placed on a LifeVest approximately 2 months ago.  She reports 

nonproductive cough, no fever/chills, associated with shortness of breath, and a

sense of panic when laying flat.  In the ED, she was noted to be in atrial 

fibrillation with a heart rate [id number], chest x-ray with cardiomegaly and 

pulmonary edema, creatinine elevated above her baseline, and no leukocytosis.  

ER provider spoke with patient's cardiologist recommended transfer to [facility] - 

where he works; however, they did not have any bed availability.  Patient will 

be admitted here.





Hospital Course: 


By problem list


Atrial fibrillation, new onset


Patient was initially treated with metoprolol in the ER, and had moderate 

improvement of her rate, but and borderline low blood pressure, 110s/60s.  This 

was discussed with cardiology, and given her low EF of 15-20%, they recommended 

stopping the metoprolol and treating the patient with amiodarone.  Amiodarone 

was held on 10/27.  Discontinued due to hypotensive episode.  This was 

subsequently restarted the evening of 10/27 and patient cardioverted to sinus 

rhythm on 10/29.  She continued to have a few unsustained runs of atrial 

fibrillation, but seem to be more stable in sinus rhythm on 10/31.  She was 

anticoagulated with Lovenox.





Acute on chronic systolic CHF exacerbation (HFrEF)


-Patient with recent diagnosis of HFrEF approximately 2 months ago, and placed 

on LifeVest.  At that time her cardiologist was considering cardiac 

catheterization, however this was not done due to patient's renal function and 

prior history of requiring dialysis.  Plan was to reevaluate in November, and 

try to optimize the patient's renal function for possible cardiac 

catheterization.  There was concern for some ischemic cardiomyopathy.


Cardiology was consulted, and given her multiorgan involvement, and severity of

her EF, was recommended the patient be transferred to tertiary care center for 

further evaluation and treatment by heart failure specialist team and would have

backup if patient required further procedures.





Mildmoderate pericardial effusion


Noted on echocardiogram and CT.  Unable to obtain prior records, unclear if 

this has been ongoing for last 2 months.  She did report chest pressure/panic 

when laying flat, was more comfortable sitting up and leaning forward.  There is

no evidence of cardiac tamponade, no evidence of pericarditis.





ENRIQUE on CKD


-Patient has prior history of renal failure requiring dialysis temporarily 

earlier this year.  Nephrology was consulted, patient required dialysis catheter

placement by general surgery and subsequently underwent dialysis.  There was 

some difficulty to remove fluid due to low blood pressure.  It is suspected that

this worsening renal function was secondary to cardiorenal syndrome





On the evening after admission on 10/26.  Patient reported feeling very ill and 

was found to be significantly hypotensive.  ABG with significant acidosis (pH: 

6.98).  Patient was noted to be tachycardic, tachypneic.  Patient was started on

a bicarb drip, Levophed, BiPAP, and continued on vancomycin and cefepime 

empirically.  She had some improvement in her acidosis and her respirations.


Patient was on Levophed until 23 AM on 10/30.  Afterwards her blood pressure 

remained stable, with MAPs greater than 70





Abdominal pain


-On 10/28 patient reported significant/severe abdominal pain.  She was noted to 

have diffuse abdominal tenderness, severe in the upper abdomen (RUQ, epigastric,

LUQ).  She stated the pain occurred immediately after the liver ultrasound was 

performed.  There was initial concern for possible ischemic bowel due to her 

hypotensive episode the night prior to the severity of her pain.  Unfortunately 

peripheral IV was unable to be obtained and patient was unable to have a CTA 

done.  CT abdomen/pelvis was done without contrast in the interim, did not 

visualize any acute processes.  Her lipase was noted to be elevated to 900.  

Likely the results of her hypotensive episode.  She was also noted to have 

elevated LFTs after her hypotensive episode.  LFTs are secondary to shock liver 

and continue to improve daily.





Sputum, urine, blood cultures all remained negative.  She was maintained on bank

and cefepime empirically.





Patient was initially accepted at [facility] on 10/27, however an ICU 

bed was unavailable.  Patient eventually improved and was stable enough to be 

downgraded to telemetry bed.  She was eventually transferred to [facility] and on 10/31 when a telemetry bed was available.








Vital Signs/Physical Exam: 





Physical exam


GEN: Alert, oriented x3, appears uncomfortable / restless


HEENT: Normal conjunctiva, sclera anicteric


CV: Sinus rhythm, HR: 90s, distant heart sounds


Pulm: Slight tachypnea on 2L NC, +b/l crackles at bases


ABD: Soft, nondistended, mild-mod tenderness diffusely


Neuro: Normal speech, normal affect











Temp Pulse Resp BP Pulse Ox


 


 97.2 F   72   20   119/79   98 


 


 10/31/21 07:33  10/31/21 09:00  10/31/21 09:00  10/31/21 09:00  10/31/21 09:00








Laboratory Data at Discharge: 














WBC  10.80 K/uL (4.3-10.9)   10/31/21  03:42    


 


Hgb  9.0 g/dL (12.0-15.0)  L  10/31/21  03:42    


 


Hct  27.9 % (36.0-45.0)  L  10/31/21  03:42    


 


Plt Count  158 K/uL (152-406)   10/31/21  03:42    


 


PT  14.8 SECONDS (9.5-12.5)  H  10/26/21  13:07    


 


INR  1.28   10/26/21  13:07    


 


Sodium  136 mmol/L (136-145)   10/31/21  03:42    


 


Potassium  3.0 mmol/L (3.5-5.1)  L  10/31/21  03:42    


 


BUN  38 mg/dL (7-18)  H  10/31/21  03:42    


 


Creatinine  3.19 mg/dL (0.55-1.3)  H  10/31/21  03:42    


 


Glucose  110 mg/dL ()  H  10/31/21  03:42    


 


Phosphorus  2.8 mg/dL (2.5-4.9)   10/31/21  03:42    


 


Magnesium  2.1 mg/dL (1.8-2.4)   10/31/21  03:42    


 


Total Bilirubin  3.8 mg/dL (0.2-1.0)  H  10/31/21  03:42    


 


AST  760 U/L (15-37)  H* D 10/31/21  03:42    


 


ALT  1203 U/L (12-78)  H* D 10/31/21  03:42    


 


Alkaline Phosphatase  658 U/L ()  H  10/31/21  03:42    


 


Troponin I  < 0.02 ng/mL (0.0-0.045)   10/27/21  12:46    


 


Amylase  136 U/L ()  H  10/29/21  12:23    


 


Lipase  271 U/L ()   10/30/21  06:44    








Home Medications: 








Omeprazole [Prilosec] 40 mg PO DAILY 03/28/21 


Promethazine HCl 25 mg PO Q6H PRN 03/28/21 


calcitrioL [Rocaltrol] 1 tab PO DAILY 03/28/21 


traMADol HCL [Ultram*] 50 mg PO Q8H PRN 03/28/21 


Multivitamin with Iron [Daily Vitamin + Iron] 1 each PO DAILY #90 tablet 

03/30/21 


Cranberry 500 mg PO DAILY 04/21/21 


Hydralazine HCl 100 mg PO DAILY 04/21/21 


Levothyroxine Sodium [Unithroid] 75 mcg PO DAILY 04/21/21 





Followup: 


Arnel Morrow MD [Primary Care Provider] - 


Time spent managing pt's care (in minutes): 60

## 2021-10-31 NOTE — PN
Subjective:  Ms. Smith came in with acute on chronic systolic congestive heart failure, acute pio
l failure.  Echocardiogram showed an ejection fraction of 26%, mild mitral regurgitation, small-to-mo
derate pericardial effusion, pulmonary hypertension with right ventricular pressure of 55% to 60% mmH
g.  She remains in atrial fibrillation at a rate of 0.5 cc a minute.  I would increase her amiodarone
 dose.  Ms. Smith had multiorgan failure with congestive heart failure, liver failure, renal failu
re, resistant atrial fibrillation, slightly hypotensive.  I would really make effort to try to transf
er to the congestive heart failure team in [place].  She may need some kind of left ventricular evelyn
t device help.  Nephrology is following her case.  I think she is going to be dialyzed, which would h
elp as far as her fluid management is concerned.  Again, if she does not convert as far as atrial fib
rillation is concern, I will consider direct current cardioversion as well as I stated earlier, I thi
nk she will need to be transferred to a tertiary care center for better heart and kidney failure supp
ort.  Case was discussed with Dr. Herr.  We will continue to follow her.





NB/TODD

DD:  10/31/2021 08:22:44Voice ID:  [id number]

DT:  10/31/2021 09:24:01Report ID:  [id number]

## 2021-10-31 NOTE — CON
Date of Consultation:  10/27/2021



Reason For Consultation:  The patient was admitted on 10/20/2021 to Dr. Herr with congestive heart f
ailure.  I saw the patient on 10/27/2021.



History Of Present Illness:  Ms. Smith is a 76-year-old woman who just left the [facility] in [place] after being admitted over there for congestive heart failure.  She had 
end-stage renal disease, gallstones.  She was on dialysis in the past.  She has hypertension.  She ha
s kidney stones.  She has a history of gastroesophageal reflux disease.  She has a history of low eje
ction fraction that was unknown, but when she came into the hospital here, she was basically hypotens
naveed and she was in rapid atrial fibrillation at a rate of 130.  Initial blood pressure was 122/85.  W
hen I was consulted, IV amiodarone was started after a bolus at 0.5 cc a minute and she remained in a
trial fibrillation, but she became hypotensive and is requiring some Levophed.  Echocardiogram was pe
nding at the time that I saw her.



Past Medical History:  As stated above.



Allergies:  CODEINE.



Review of Systems:

Negative.



Social History:  Negative.



Family History:  Negative.



Medications At Home:  Include aspirin, levothyroxine, gabapentin, omeprazole, Lasix, metoprolol, and 
spironolactone.



Physical Examination:

Vital Signs:  Listed earlier.  Afebrile. 

HEENT:  Negative. 

Neck:  Supple.  No bruit. 

Chest:  Reveals bibasilar rales. 

Cardiac:  Revealed atrial fibrillation. 

Abdomen:  Benign. 

Extremities:  Revealed 1+ edema.



Diagnostic Data:  Showed a creatinine of 3.19, hemoglobin 8.7.  Her white count was 11.7.  Her potass
ium was 3.1.  AST was 1470, ALT was 1624.  Her BNP was elevated.  Her amylase was elevated.  Her lipa
se was elevated.  Chest x-ray showed atelectasis versus pneumonia versus pleural effusion.  EKG showe
d atrial fibrillation with rapid ventricular response.



Impression And Plan:  

1.Acute on chronic systolic congestive heart failure.

2.Acute atrial fibrillation, new onset.

3.Renal failure, acute.

4.History of hypertension.

5.Hypothyroidism.

6.Hypotension, requiring Levophed. 

I think we will need to obtain an echocardiogram before making further decision.  If the amiodarone d
id not work, we will consider direct current cardioversion.  Nephrology is involved in her care.  She
 may need dialysis for us to be able to take care of her heart.  She really should be eventually on c
arvedilol, Aldactone, Lasix.  I am not so sure she has had an invasive cardiac workup as far as her c
oronary anatomy is concerned.  We will continue to follow her and we will see what her echocardiogram
 shows.





NB/TODD

DD:  10/31/2021 08:17:39Voice ID:  [id number]

DT:  10/31/2021 12:27:03Report ID:  [id number]

## 2021-10-31 NOTE — P.PN
Subjective


Date of Service: 10/31/21


Chief Complaint: ENRIQUE, new onset A. fib





Physical Examination





- Vital Signs


Temperature: 97.2 F


Blood Pressure: 113/52


Pulse: 69


Respirations: 16


Pulse Ox (%): 97





Assessment And Plan





- Plan





# ENRIQUE 2/2 CRS1, on CKD


No sig renal recovery


HD received yesterday


No acute indication for HD today


Strict I/O


Monitor renal panel





# Acute on chronic systolic HF c/b cardiogenic shock


LVEF 15-20%; LHC pending


On lifeVest


Start inotrope via dobutamine or milrinone gtt if MAP drops below 65





# Ischemic hepatopathy


Monitor





# Abdominal pain


? mesenteric ischemia


No clear acute pathology on CT A/P non-contrast


Lactate wnl


Amylase & lipase elevated


On empiric abx





# Afib w/ RVR


Rate controlled


Per Cardiology





# Dispo


Transfer to [facility] when bed available, for further advanced HF therapies





Physician Review Additional Text: 





Problem list


Atrial fibrillation with RVR, new onset


Acute on chronic CHF exacerbation, systolic; with LifeVest


ENRIQUE on CKD.  Previously needed dialysis temporarily


Hypertension


Hypothyroidism


GERD


Neuropathy





Patient notes heart rate improved with metoprolol in the ED


Patient takes 25 mg metoprolol twice daily at home, will increase to 50 mg twice

daily


Cardiology consulted


Blood pressure borderline, metoprolol with hold parameters


Start Eliquis


Chest x-ray concerning for volume overload, no significant lower extremity edema

on exam


Nephrology consulted in the ER, recommended diuresis with Lasix, renal 

ultrasound ordered


Suspect this is more cardiorenal syndrome


Trend labs


Telemetry





VTE: Eliquis


Code: Full


Dispo: Anticipate DC home in 2-3 days

## 2021-11-01 LAB — HCV RNA SPEC NAA+PROBE-LOG#: <1.18 LOG IU/ML

## 2022-02-02 LAB
BUN BLD-MCNC: 54 MG/DL (ref 7–18)
GLUCOSE SERPLBLD-MCNC: 111 MG/DL (ref 74–106)
HCT VFR BLD CALC: 37.3 % (ref 36–45)
LYMPHOCYTES # SPEC AUTO: 1.2 K/UL (ref 0.7–4.9)
MORPHOLOGY BLD-IMP: (no result)
PMV BLD: 9.2 FL (ref 7.6–11.3)
POTASSIUM SERPL-SCNC: 3.4 MMOL/L (ref 3.5–5.1)
RBC # BLD: 4.21 M/UL (ref 3.86–4.86)

## 2022-02-03 ENCOUNTER — HOSPITAL ENCOUNTER (OUTPATIENT)
Dept: HOSPITAL 97 - OR | Age: 77
Discharge: HOME | End: 2022-02-03
Attending: SURGERY
Payer: COMMERCIAL

## 2022-02-03 VITALS — SYSTOLIC BLOOD PRESSURE: 168 MMHG | DIASTOLIC BLOOD PRESSURE: 53 MMHG

## 2022-02-03 VITALS — OXYGEN SATURATION: 100 % | TEMPERATURE: 97 F

## 2022-02-03 DIAGNOSIS — Z45.2: Primary | ICD-10-CM

## 2022-02-03 DIAGNOSIS — N18.6: ICD-10-CM

## 2022-02-03 DIAGNOSIS — Z20.822: ICD-10-CM

## 2022-02-03 PROCEDURE — 88300 SURGICAL PATH GROSS: CPT

## 2022-02-03 PROCEDURE — 85025 COMPLETE CBC W/AUTO DIFF WBC: CPT

## 2022-02-03 PROCEDURE — 0JPT0WZ REMOVAL OF TOTALLY IMPLANTABLE VASCULAR ACCESS DEVICE FROM TRUNK SUBCUTANEOUS TISSUE AND FASCIA, OPEN APPROACH: ICD-10-PCS

## 2022-02-03 PROCEDURE — 36415 COLL VENOUS BLD VENIPUNCTURE: CPT

## 2022-02-03 PROCEDURE — 36590 REMOVAL TUNNELED CV CATH: CPT

## 2022-02-03 PROCEDURE — 80048 BASIC METABOLIC PNL TOTAL CA: CPT

## 2022-02-03 NOTE — OP
Date of Procedure:  02/03/2022



Surgeon:  Yves Ackerman MD



Assistant:  Kelechi Contreras, surgical assist certified.



Preoperative Diagnosis:  History of renal failure, status post right chest tube catheter.



Postoperative Diagnosis:  History of renal failure, status post right chest tube catheter.



Procedure:  Removal of right chest tube catheter.



Estimated Blood Loss:  Minimal.



Specimen:  Chest tube catheter.



Finding:  As above.



Anesthesia:  MAC.



Complications:  None.



Disposition:  The patient tolerated the procedure in stable condition and taken to Recovery in good g
eneral condition.



Procedure In Detail:  The patient was brought to the OR and placed in supine position.  MAC anesthesi
a was begun.  The patient was prepped and draped in usual sterile fashion.  Marcaine 0 5% infiltrated
 locally.  The cuff of the catheter was very close to the epidermis.  Ellipse of skin around the inse
rtion site was made.  Subcutaneous tissue was divided and the cuff was pulled towards the wound and e
xcised and it took a little bit of the skin in the right base of the neck as well because it was clos
e to the epidermis and left approximately 1.5 cm defect.  Subsequently, both wounds were irrigated.  
Bleeding controlled with cautery.  The catheter was sent for identification only and then 3-0 chromic
 used to reapproximate subcutaneous tissue and 4-0 nylon used to close the skin.  Sterile dressing ap
plied.  The patient was awakened and taken to Recovery in good general condition.



Discharge Note:  The patient will go to Day Surgery and home when stable.



Disposition:  Home.



Condition:  Stable.



Discharge Instructions:  Resume home medications and diet.  Activity as tolerated.  No heavy lifting.
  Remove outer dressing in 2 days.  Shower.  Neosporin to wound and gauze and Band-Aid as well.  Foll
owup in my office in 2 weeks.  Call for appointment.  Ultracet 1 tablet p.o. q.4 p.r.n.





AS/MODL

DD:  02/03/2022 08:28:43Voice ID:  [id number]

DT:  02/03/2022 08:53:03Report ID:  [id number]

## 2022-11-20 ENCOUNTER — HOSPITAL ENCOUNTER (INPATIENT)
Dept: HOSPITAL 97 - ER | Age: 77
LOS: 2 days | Discharge: HOME | DRG: 280 | End: 2022-11-22
Attending: INTERNAL MEDICINE | Admitting: INTERNAL MEDICINE
Payer: COMMERCIAL

## 2022-11-20 VITALS — BODY MASS INDEX: 32.1 KG/M2

## 2022-11-20 DIAGNOSIS — Z95.0: ICD-10-CM

## 2022-11-20 DIAGNOSIS — Z90.710: ICD-10-CM

## 2022-11-20 DIAGNOSIS — C64.9: ICD-10-CM

## 2022-11-20 DIAGNOSIS — Z20.822: ICD-10-CM

## 2022-11-20 DIAGNOSIS — N18.4: ICD-10-CM

## 2022-11-20 DIAGNOSIS — E03.9: ICD-10-CM

## 2022-11-20 DIAGNOSIS — K74.60: ICD-10-CM

## 2022-11-20 DIAGNOSIS — I50.23: ICD-10-CM

## 2022-11-20 DIAGNOSIS — R31.29: ICD-10-CM

## 2022-11-20 DIAGNOSIS — I13.0: Primary | ICD-10-CM

## 2022-11-20 DIAGNOSIS — I21.A1: ICD-10-CM

## 2022-11-20 DIAGNOSIS — I27.20: ICD-10-CM

## 2022-11-20 DIAGNOSIS — K76.0: ICD-10-CM

## 2022-11-20 DIAGNOSIS — Z79.899: ICD-10-CM

## 2022-11-20 DIAGNOSIS — Z88.5: ICD-10-CM

## 2022-11-20 DIAGNOSIS — Z79.890: ICD-10-CM

## 2022-11-20 DIAGNOSIS — Z79.01: ICD-10-CM

## 2022-11-20 DIAGNOSIS — I48.20: ICD-10-CM

## 2022-11-20 LAB
ALBUMIN SERPL BCP-MCNC: 3.9 G/DL (ref 3.4–5)
ALP SERPL-CCNC: 510 U/L (ref 45–117)
ALT SERPL W P-5'-P-CCNC: 87 U/L (ref 12–78)
AST SERPL W P-5'-P-CCNC: 66 U/L (ref 15–37)
BUN BLD-MCNC: 50 MG/DL (ref 7–18)
GLUCOSE SERPLBLD-MCNC: 127 MG/DL (ref 74–106)
HCT VFR BLD CALC: 34.6 % (ref 36–45)
INR BLD: 1.28
LYMPHOCYTES # SPEC AUTO: 0.7 K/UL (ref 0.7–4.9)
MAGNESIUM SERPL-MCNC: 2.7 MG/DL (ref 1.8–2.4)
MCV RBC: 90 FL (ref 80–100)
PMV BLD: 9.5 FL (ref 7.6–11.3)
POTASSIUM SERPL-SCNC: 4.3 MMOL/L (ref 3.5–5.1)
RBC # BLD: 3.85 M/UL (ref 3.86–4.86)
SARS-COV-2 RNA RESP QL NAA+PROBE: NEGATIVE
TROPONIN I SERPL HS-MCNC: 41.4 PG/ML (ref ?–58.9)

## 2022-11-20 PROCEDURE — 81003 URINALYSIS AUTO W/O SCOPE: CPT

## 2022-11-20 PROCEDURE — 36415 COLL VENOUS BLD VENIPUNCTURE: CPT

## 2022-11-20 PROCEDURE — 83735 ASSAY OF MAGNESIUM: CPT

## 2022-11-20 PROCEDURE — 87040 BLOOD CULTURE FOR BACTERIA: CPT

## 2022-11-20 PROCEDURE — 80053 COMPREHEN METABOLIC PANEL: CPT

## 2022-11-20 PROCEDURE — 96374 THER/PROPH/DIAG INJ IV PUSH: CPT

## 2022-11-20 PROCEDURE — 99285 EMERGENCY DEPT VISIT HI MDM: CPT

## 2022-11-20 PROCEDURE — 80048 BASIC METABOLIC PNL TOTAL CA: CPT

## 2022-11-20 PROCEDURE — 76705 ECHO EXAM OF ABDOMEN: CPT

## 2022-11-20 PROCEDURE — 83880 ASSAY OF NATRIURETIC PEPTIDE: CPT

## 2022-11-20 PROCEDURE — 83605 ASSAY OF LACTIC ACID: CPT

## 2022-11-20 PROCEDURE — 93005 ELECTROCARDIOGRAM TRACING: CPT

## 2022-11-20 PROCEDURE — 96375 TX/PRO/DX INJ NEW DRUG ADDON: CPT

## 2022-11-20 PROCEDURE — 85025 COMPLETE CBC W/AUTO DIFF WBC: CPT

## 2022-11-20 PROCEDURE — 84145 PROCALCITONIN (PCT): CPT

## 2022-11-20 PROCEDURE — 94640 AIRWAY INHALATION TREATMENT: CPT

## 2022-11-20 PROCEDURE — 71045 X-RAY EXAM CHEST 1 VIEW: CPT

## 2022-11-20 PROCEDURE — 0240U: CPT

## 2022-11-20 PROCEDURE — 85610 PROTHROMBIN TIME: CPT

## 2022-11-20 PROCEDURE — 81015 MICROSCOPIC EXAM OF URINE: CPT

## 2022-11-20 PROCEDURE — 84484 ASSAY OF TROPONIN QUANT: CPT

## 2022-11-20 PROCEDURE — 80076 HEPATIC FUNCTION PANEL: CPT

## 2022-11-20 RX ADMIN — APIXABAN SCH MG: 2.5 TABLET, FILM COATED ORAL at 21:00

## 2022-11-20 RX ADMIN — Medication SCH ML: at 21:00

## 2022-11-20 NOTE — ER
Nurse's Notes                                                                                     

 [facility]                                                                 

Name: Sydney Smith                                                                             

Age: 77 yrs                                                                                       

Sex: Female                                                                                       

: 1945                                                                                   

MRN: [id number]                                                                                   

Arrival Date: 2022                                                                          

Time: 14:58                                                                                       

Account#: [id number]                                                                            

Bed 14                                                                                            

Private MD:                                                                                       

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       

  failure;Dyspnea;Hypertensive heart and chronic kidney disease with heart failure and            

  stage 1 through stage 4 chronic kidney disease, or unspecified chronic kidney disease           

                                                                                                  

Presentation:                                                                                     

                                                                                             

15:08 Chief complaint: Patient states: Cough, congestion, runny nose, sore throat, headache,  ph  

      Spo2 92% in triage, labored breathing noted. Coronavirus screen: Vaccine status:            

      Patient reports receiving the 2nd dose of the covid vaccine. Ebola Screen: No symptoms      

      or risks identified at this time. Initial Sepsis Screen: Does the patient meet any 2        

      criteria? No. Patient's initial sepsis screen is negative. Does the patient have a          

      suspected source of infection? Yes: Productive cough/pneumonia. Risk Assessment: Do you     

      want to hurt yourself or someone else? Patient reports no desire to harm self or others.    

15:08 Method Of Arrival: Wheelchair                                                           ph  

15:08 Acuity: AYLA 2                                                                           ph  

17:09 Onset of symptoms was 2022.                                                db  

                                                                                                  

Triage Assessment:                                                                                

15:40 General: Appears distressed, uncomfortable, Behavior is anxious, restless. Respiratory: db  

      Reports shortness of breath.                                                                

                                                                                                  

Historical:                                                                                       

- Allergies:                                                                                      

16:21 Codeine;                                                                                db  

- Home Meds:                                                                                      

17:13 Eliquis 2.5 mg oral tab 1 tab three times a day [Active]; metoprolol succinate 25 mg    db  

      oral Tb24 2 tabs once daily [Active]; amiodarone 200 mg Oral tab 1 tab 2 times per day      

      [Active]; omeprazole 40 mg Oral cpDR 1 cap once daily [Active]; levothyroxine 75 mcg        

      cap 1 cap once daily [Active]; gabapentin 600 mg Oral tab 1 tab daily [Active];             

      furosemide 80 mg Oral tab 1 tab once daily [Active]; Centrum Silver Women 8 mg iron-400     

      mcg-300 mcg Oral tab daily [Active]; hydralazine 50 mg Oral tab 1 tab three times a day     

      [Active];                                                                                   

- PMHx:                                                                                           

16:21 ESRD; GALLSTONES; Hx of Dialysis but no longer needs dialysis.; Hypertension; Kidney    db  

      stones;                                                                                     

                                                                                                  

- Immunization history:: Adult Immunizations unknown.                                             

- Social history:: Smoking status: Patient denies any tobacco usage or history of.                

                                                                                                  

                                                                                                  

Screenin:21 Abuse screen: Denies threats or abuse. Denies injuries from another. Nutritional        db  

      screening: No deficits noted. Tuberculosis screening: No symptoms or risk factors           

      identified. Fall Risk None identified. No fall in past 12 months (0 pts). No secondary      

      diagnosis (0 pts). No IV (0 pts). Ambulatory Aid- Gait- Normal/Bed Rest/Wheelchair (0       

      pts) Mental Status- Oriented to own ability (0 pts). Total Stack Fall Scale indicates       

      No Risk (0-24 pts).                                                                         

                                                                                                  

Assessment:                                                                                       

15:20 Reassessment: Patient and/or family updated on plan of care and expected duration. Pain db  

      level reassessed. patient states started with SOB since yesterday. Appears SOB.             

      General: Appears distressed, uncomfortable, Behavior is cooperative, anxious. Pain:         

      Denies pain. Neuro: No deficits noted. Level of Consciousness is awake, alert, obeys        

      commands, Oriented to person, place, time, Moves all extremities. Speech is normal,         

      Facial symmetry appears normal, Pupils are PERRLA. Cardiovascular: No deficits noted.       

      Respiratory: Airway is patent Respiratory effort is even, labored, Respiratory pattern      

      is regular, symmetrical, Breath sounds are coarse bilaterally. Onset: The                   

      symptoms/episode began/occurred yesterday. GI: No deficits noted. No signs and/or           

      symptoms were reported involving the gastrointestinal system. : No deficits noted. No     

      signs and/or symptoms were reported regarding the genitourinary system. EENT: No            

      deficits noted. No signs and/or symptoms were reported regarding the EENT system. Derm:     

      No deficits noted. No signs and/or symptoms reported regarding the dermatologic system.     

      Musculoskeletal: No deficits noted. No signs and/or symptoms reported regarding the         

      musculoskeletal system.                                                                     

15:40 Reassessment: patient assisted to bedside commode.                                      db  

16:30 Reassessment: No changes from previously documented assessment. Patient and/or family   db  

      updated on plan of care and expected duration. Pain level reassessed. Neuro: No             

      deficits noted. Level of Consciousness is awake, alert, obeys commands. Cardiovascular:     

      Rhythm is ventricular pacer. Respiratory: Reports shortness of breath.                      

17:02 Reassessment: Patient appears in no apparent distress at this time. patient assisted to db  

      bedside commode.                                                                            

18:35 Reassessment: Patient appears in no apparent distress at this time. assisted patient    db  

      back to bed from bedside commode. urine specimen collected.                                 

19:30 General: Appears distressed, Behavior is cooperative. Pain: Denies pain. Neuro: Level   ha1 

      of Consciousness is awake, alert, obeys commands, Oriented to person, place, time,          

      situation. Cardiovascular: Patient's skin is warm and dry. Respiratory: Airway is           

      patent Trachea midline Respiratory effort is even, labored, Respiratory pattern is          

      tachypnea Breath sounds are coarse bilaterally. GI: No signs and/or symptoms were           

      reported involving the gastrointestinal system. Abdomen is flat, non-distended, Bowel       

      sounds present X 4 quads. : No deficits noted. No signs and/or symptoms were reported     

      regarding the genitourinary system. EENT: No deficits noted. No signs and/or symptoms       

      were reported regarding the EENT system. Derm: Skin is normal. Musculoskeletal:             

      Circulation, motion, and sensation intact. Swelling present in right leg and left leg.      

                                                                                                  

Vital Signs:                                                                                      

15:08  / 86; Pulse 65; Resp 24; Temp 98.0; Pulse Ox 92% on R/A;                         ph  

15:30  / 78; Pulse 95; Resp 32; Pulse Ox 95% on 2 lpm NC;                               db  

16:30  / 91; Pulse 81; Resp 28; Pulse Ox 100% on Nebulizer Mask;                        db  

17:00  / 73; Pulse 75; Resp 32; Pulse Ox 100% on 2 lpm NC;                              db  

18:30  / 71; Pulse 68; Resp 24; Pulse Ox 95% on 2 lpm NC;                               db  

19:30  / 83; Pulse 65; Resp 23 S; Pulse Ox 96% on 2 lpm NC;                             ha1 

                                                                                                  

Vitals:                                                                                           

16:30 Cardiac Rhythm Assessment Paced.                                                          

                                                                                                  

ED Course:                                                                                        

14:58 Patient arrived in ED.                                                                  mr  

15:12 Triage completed.                                                                       ph  

15:13 Magdi Bentely PA is PHCP.                                                                cp  

15:13 Magdi Rivers MD is Attending Physician.                                             cp  

15:42 Jonelle Conklin, RN is Primary Nurse.                                                  db  

15:59 XRAY Chest (1 view) In Process Unspecified.                                             EDMS

16:19 Missed attempt(s): 20 gauge in right antecubital area.                                  db  

16:19 Missed attempt(s): 22 gauge in left antecubital area.                                   db  

16:35 Inserted saline lock: 20 gauge in right antecubital area, using aseptic technique.      db  

      Blood collected. Sono IV.                                                                   

17:01 Patient has correct armband on for positive identification. Placed in gown. Bed in low  db  

      position. Call light in reach. Side rails up X 1. Client placed on continuous cardiac       

      and pulse oximetry monitoring. NIBP monitoring applied. Warm blanket given.                 

17:10 Arm band placed on right wrist.                                                         db  

18:19 Liver Only In Process Unspecified.                                                      EDMS

18:59 Carlos Maurice MD is Hospitalizing Provider.                                            cp  

22:23 Primary Nurse role handed off by Jonelle Conklin RN                                     

23:20 Ricardo Steele RN is Primary Nurse.                                                 ke1 

                                                                                                  

Administered Medications:                                                                         

15:48 Drug: Albuterol - atroVENT (ipratropium) (3:1) (2.5 mg - 0.5 mg) 3 ml Route: Nebulizer; db  

17:17 Follow up: Response: No adverse reaction                                                db  

16:40 Drug: Lasix (furosemide) 40 mg Route: IVP; Site: right antecubital;                     db  

17:17 Follow up: Response: No adverse reaction                                                db  

19:11 Follow up: Response: No adverse reaction                                                db  

19:58 Drug: hydrALAZINE 10 mg Route: IVP; Site: right antecubital;                            ha1 

                                                                                                  

                                                                                                  

Medication:                                                                                       

16:21 VIS not applicable for this client.                                                     db  

                                                                                                  

Outcome:                                                                                          

19:00 Decision to Hospitalize by Provider.                                                    cp  

                                                                                             

08:59 Patient left the ED.                                                                    tw2 

                                                                                                  

Signatures:                                                                                       

Dispatcher [employer]                           EDMS                                                 

Jacquie Tavera                                                   

Altagracia Narvaez RN                      RN   Magdi Mendosa PA PA   cp                                                   

Asya White RN                          RN   tw2                                                  

Samantha Lazo                                                                                    

Ricardo Steele RN RN   keKalyn Schumacher RN RN   haJonelle Houston RN RN   db                                                   

                                                                                                  

Corrections: (The following items were deleted from the chart)                                    

                                                                                             

17:03 15:20 Reassessment: Patient and/or family updated on plan of care and expected          db  

      duration. Pain level reassessed. Patient is alert, oriented x 3, equal unlabored            

      respirations, skin warm/dry/pink. patient states started with SOB since yesterday.          

      Appears SOB db                                                                              

                                                                                                  

**************************************************************************************************

## 2022-11-20 NOTE — EDPHYS
Physician Documentation                                                                           

 [facility]                                                                 

Name: Sydney Smith                                                                             

Age: 77 yrs                                                                                       

Sex: Female                                                                                       

: 1945                                                                                   

MRN: [id number]                                                                                   

Arrival Date: 2022                                                                          

Time: 14:58                                                                                       

Account#: [id number]                                                                            

Bed 14                                                                                            

Private MD:                                                                                       

ED Physician Magdi Rivers                                                                      

HPI:                                                                                              

                                                                                             

15:30 This 77 yrs old  Female presents to ER via Wheelchair with complaints of Low    cp  

      O2,89, High Blood Pressure, Chest Congestion, Sinus Congestion.                             

15:30 The patient or guardian reports cough, difficulty breathing, congestion.                cp  

15:30 Onset: The symptoms/episode began/occurred yesterday. Associated signs and symptoms:    cp  

      Pertinent positives: shortness of breath, Pertinent negatives: chest pain, fever,           

      vomiting. Severity of symptoms: in the emergency department the symptoms are unchanged      

      despite home interventions.                                                                 

                                                                                                  

Historical:                                                                                       

- Allergies:                                                                                      

16:21 Codeine;                                                                                db  

- Home Meds:                                                                                      

17:13 Eliquis 2.5 mg oral tab 1 tab three times a day [Active]; metoprolol succinate 25 mg    db  

      oral Tb24 2 tabs once daily [Active]; amiodarone 200 mg Oral tab 1 tab 2 times per day      

      [Active]; omeprazole 40 mg Oral cpDR 1 cap once daily [Active]; levothyroxine 75 mcg        

      cap 1 cap once daily [Active]; gabapentin 600 mg Oral tab 1 tab daily [Active];             

      furosemide 80 mg Oral tab 1 tab once daily [Active]; Centrum Silver Women 8 mg iron-400     

      mcg-300 mcg Oral tab daily [Active]; hydralazine 50 mg Oral tab 1 tab three times a day     

      [Active];                                                                                   

- PMHx:                                                                                           

16:21 ESRD; GALLSTONES; Hx of Dialysis but no longer needs dialysis.; Hypertension; Kidney    db  

      stones;                                                                                     

                                                                                                  

- Immunization history:: Adult Immunizations unknown.                                             

- Social history:: Smoking status: Patient denies any tobacco usage or history of.                

                                                                                                  

                                                                                                  

ROS:                                                                                              

15:35 Constitutional: Negative for body aches, chills, fever, poor PO intake.                 cp  

15:35 Eyes: Negative for injury, pain, redness, and discharge.                                cp  

15:35 Respiratory: Positive for cough, shortness of breath, chest congestion.                 cp  

15:35 ENT: Negative for drainage from ear(s), ear pain, difficulty swallowing, difficulty     cp  

      handling secretions.                                                                        

15:35 Cardiovascular: Negative for chest pain, edema.                                             

15:35 Abdomen/GI: Negative for abdominal pain, vomiting, diarrhea, constipation.                  

15:35 Skin: Negative for rash.                                                                    

15:35 Neuro: Negative for altered mental status, headache, syncope, weakness.                     

15:35 All other systems are negative.                                                             

                                                                                                  

Exam:                                                                                             

15:33 ECG was reviewed by the Attending Physician.                                            cp  

15:40 Constitutional: The patient appears in no acute distress, alert, awake,                 cp  

      non-diaphoretic, non-toxic, well developed, well nourished.                                 

15:40 Head/Face:  Normocephalic, atraumatic.                                                  cp  

15:40 Eyes: Periorbital structures: appear normal, Conjunctiva: normal, no exudate, no            

      injection, Sclera: no appreciated abnormality, Lids and lashes: appear normal,              

      bilaterally.                                                                                

15:40 ENT: External ear(s): are unremarkable, Ear canal(s): are normal, clear, TM's:              

      dullness, bilaterally, Nose: is normal, Mouth: Lips: moist, Oral mucosa: moist,             

      Posterior pharynx: Airway: no evidence of obstruction, patent.                              

15:40 Neck: ROM/movement: is normal, is supple, without pain, no range of motions                 

      limitations, no meningismus, Lymph nodes: no appreciated lymphadenopathy.                   

15:40 Chest/axilla: Inspection: normal.                                                           

15:40 Cardiovascular: Rate: normal, Rhythm: regular, Edema: ankle edema, that is mild, JVD:       

      is not appreciated.                                                                         

15:40 Respiratory: the patient does not display signs of respiratory distress,  Respirations:     

      normal, no use of accessory muscles, Breath sounds: decreased breath sounds, that are       

      mild, throughout, stridor, is not appreciated, wheezing: is not appreciated.                

15:40 Abdomen/GI: Inspection: abdomen appears normal, Bowel sounds: active, all quadrants,        

      Palpation: abdomen is soft and non-tender, in all quadrants.                                

15:40 Skin: cellulitis, is not appreciated, no rash present.                                      

15:40 Neuro: Orientation: to person, place \T\ time. Mentation: is normal, Motor: moves all       

      fours, strength is normal, Sensation: is normal.                                            

15:40 Back: pain, is absent, ROM is normal.                                                   cp  

                                                                                                  

Vital Signs:                                                                                      

15:08  / 86; Pulse 65; Resp 24; Temp 98.0; Pulse Ox 92% on R/A;                         ph  

15:30  / 78; Pulse 95; Resp 32; Pulse Ox 95% on 2 lpm NC;                               db  

16:30  / 91; Pulse 81; Resp 28; Pulse Ox 100% on Nebulizer Mask;                        db  

17:00  / 73; Pulse 75; Resp 32; Pulse Ox 100% on 2 lpm NC;                              db  

18:30  / 71; Pulse 68; Resp 24; Pulse Ox 95% on 2 lpm NC;                               db  

19:30  / 83; Pulse 65; Resp 23 S; Pulse Ox 96% on 2 lpm NC;                             ha1 

                                                                                                  

MDM:                                                                                              

15:13 Patient medically screened.                                                             cp  

18:33 Data reviewed: vital signs, nurses notes, lab test result(s), EKG, radiologic studies,  cp  

      plain films, ultrasound.                                                                    

18:33 Test interpretation: by ED physician or midlevel provider: ECG, plain radiologic        cp  

      studies. Counseling: I had a detailed discussion with the patient and/or guardian           

      regarding: the historical points, exam findings, and any diagnostic results supporting      

      the discharge/admit diagnosis, lab results, radiology results, the need for further         

      work-up and treatment in the hospital. Response to treatment: the patient's symptoms        

      have mildly improved after treatment.                                                       

18:45 Physician consultation: Jose Carlos BRENNAN regarding admission, to the telemetry unit.    cp  

      patient's condition.                                                                        

                                                                                                  

                                                                                             

15:25 Order name: Basic Metabolic Panel; Complete Time: 17:39                                   

                                                                                             

17:40 Interpretation: Normal except: GLUC 127; BUN 50; CRE 2.53; GFR 19.                        

                                                                                             

15:25 Order name: CBC with Diff; Complete Time: 17:10                                           

                                                                                             

17:10 Interpretation: Normal except: WBC 13.80; RBC 3.85; HGB 11.2; HCT 34.6; ; KYLEIGH%   cp  

      86.5; LYM% 5.4; NEUT A 11.9.                                                                

                                                                                             

15:25 Order name: LFT's; Complete Time: 17:39                                                   

                                                                                             

17:40 Interpretation: Normal except: AST 66; ALT 87; ; BILIT 1.7; BILID 0.9; TP 9.0;   cp  

      GLOB 5.1; A/G 0.8.                                                                          

                                                                                             

15:25 Order name: Magnesium; Complete Time: 17:39                                               

                                                                                             

17:40 Interpretation: Abnormal: MG 2.7.                                                       cp  

                                                                                             

15:25 Order name: NT PRO-BNP; Complete Time: 17:39                                              

                                                                                             

17:40 Interpretation: Abnormal: NT PRO-BNP 70311.                                             cp  

                                                                                             

15:25 Order name: PT-INR; Complete Time: 17:10                                                  

                                                                                             

17:41 Interpretation: Abnormal: PT 14.1.                                                        

                                                                                             

15:25 Order name: Troponin HS; Complete Time: 17:39                                             

                                                                                             

15:25 Order name: COVID-19/FLU A+B; Complete Time: 17:10                                        

                                                                                             

15:26 Order name: Lactate w/ 2H reflex if indic.; Complete Time: 17:10                          

                                                                                             

15:26 Order name: Procalcitonin; Complete Time: 17:47                                           

                                                                                             

15:26 Order name: Blood Culture Adult (2)                                                       

                                                                                             

18:10 Order name: Urine Microscopic Only; Complete Time: 21:02                                  

                                                                                             

18:41 Order name: Urine Dipstick-Ancillary                                                    EDMS

                                                                                             

23:51 Order name: Troponin High Sensitivity; Complete Time: 23:52                             EDMS

                                                                                             

15:25 Order name: XRAY Chest (1 view); Complete Time: 16:34                                     

                                                                                             

16:35 Interpretation: Report review.                                                            

                                                                                             

15:25 Order name: EKG; Complete Time: 15:26                                                     

                                                                                             

15:25 Order name: Cardiac monitoring; Complete Time: 16:23                                      

                                                                                             

15:25 Order name: EKG - Nurse/Tech; Complete Time: 16:23                                        

                                                                                             

15:25 Order name: IV Saline Lock; Complete Time: 17:00                                          

                                                                                             

15:25 Order name: Labs collected and sent; Complete Time: 17:00                                 

                                                                                             

15:25 Order name: O2 Per Protocol; Complete Time: 16:23                                         

                                                                                             

15:25 Order name: O2 Sat Monitoring; Complete Time: 16:23                                       

                                                                                             

18:19 Order name: Liver Only; Complete Time: 18:28                                            EDMS

                                                                                             

18:29 Interpretation: Report Reviewed.                                                          

                                                                                             

02:48 Order name: CBC with Automated Diff                                                     EDMS

                                                                                             

03:26 Order name: Comprehensive Metabolic Panel                                               EDMS

                                                                                             

03:26 Order name: Troponin High Sensitivity                                                   EDMS

                                                                                             

17:44 Order name: NPO                                                                         cp  

                                                                                             

18:10 Order name: Urine Dipstick-Ancillary (obtain specimen); Complete Time: 18:54            cp  

                                                                                                  

ECG:                                                                                              

15:33 Rate is 67 beats/min. Rhythm is regular, Sinus Rhythm with Ventricular paced. IL        cp  

      interval is normal. QRS interval is prolonged at 170 msec. QT interval is normal. T         

      waves are Inverted in leads aVL, aVR. Interpreted by me. Reviewed by me.                    

                                                                                                  

Administered Medications:                                                                         

15:48 Drug: Albuterol - atroVENT (ipratropium) (3:1) (2.5 mg - 0.5 mg) 3 ml Route: Nebulizer; db  

17:17 Follow up: Response: No adverse reaction                                                db  

16:40 Drug: Lasix (furosemide) 40 mg Route: IVP; Site: right antecubital;                     db  

17:17 Follow up: Response: No adverse reaction                                                db  

19:11 Follow up: Response: No adverse reaction                                                db  

19:58 Drug: hydrALAZINE 10 mg Route: IVP; Site: right antecubital;                            ha1 

                                                                                                  

                                                                                                  

Disposition Summary:                                                                              

22 19:00                                                                                    

Hospitalization Ordered                                                                           

      Hospitalization Status: Inpatient Admission                                             cp  

      Provider: Carlos Maurice cp  

      Condition: Stable                                                                       cp  

      Problem: an acute exacerbation                                                          cp  

      Symptoms: have improved                                                                 cp  

      Bed/Room Type: Standard                                                                 cp  

      Location: Telemetry/MedSurg (Inpatient)(22 06:18)                                   

      Room Assignment: 219(22 06:18)                                                      

      Diagnosis                                                                                   

        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  

      failure                                                                                     

        - Dyspnea                                                                             cp  

        - Hypertensive heart and chronic kidney disease with heart failure and stage 1        cp  

      through stage 4 chronic kidney disease, or unspecified chronic kidney disease               

      Forms:                                                                                      

        - Medication Reconciliation Form                                                      cp  

        - SBAR form                                                                           cp  

Addendum:                                                                                         

2022                                                                                        

     07:39 Co-signature as Attending Physician, Magdi Rivers MD I agree with the assessment and  c
ha

           plan of care.                                                                          

                                                                                                  

Signatures:                                                                                       

Dispatcher [employer]                           EDMS                                                 

Meka Jimenes RN RN mw Anderson, Corey, MD MD cha Attema, Lee, FNP-C                      FNP-Cla1                                                  

Magdi Bentley PA PA   cp                                                   

Kalyn Leary RN                        RN   ha1                                                  

Jonelle Conklin RN                    RN   db                                                   

                                                                                                  

Corrections: (The following items were deleted from the chart)                                    

                                                                                             

18:19 17:44 Abdomen Limited+US.RAD.BRZ ordered. EDMS                                          EDMS

: 19:00 Telemetry/MedSurg (Inpatient) cp                                                  mw  

: 19:00 cp                                                                                mw  

                                                                                             

06:18  19:39 [facility] ER HOLD mw                                                             mw  

                                                                                             

06:18  19:39 ERHOLD- mw                                                                  mw  

                                                                                             

01:50  15:40 Cardiovascular: Rate: normal, Rhythm: regular, Edema: is not appreciated,   cp  

      JVD: is not appreciated, cp                                                                 

                                                                                                  

**************************************************************************************************

## 2022-11-20 NOTE — RAD REPORT
EXAM DESCRIPTION:  RAD - Chest Single View - 11/20/2022 3:57 pm

 

CLINICAL HISTORY:  SOB

Chest pain.

 

COMPARISON:  Chest Single View dated 10/30/2021; Chest Single View dated 10/30/2021; Chest Single Vie
w dated 10/27/2021; Chest Single View dated 10/27/2021

 

FINDINGS:  Portable technique limits examination quality.

 

Moderate bilateral pulmonary opacities are present probably representing pulmonary edema or pneumonia
. The heart is mildly to moderately enlarged. Dual lead pacer device is present.

## 2022-11-20 NOTE — P.HP
Certification for Inpatient


Patient admitted to: Inpatient


With expected LOS: >2 Midnights


Patient will require the following post-hospital care: None


Practitioner: I am a practitioner with admitting privileges, knowledge of 

patient current condition, hospital course, and medical plan of care.


Services: Services provided to patient in accordance with Admission requirements

found in Title 42 Section 412.3 of the Code of Federal Regulations





<Jose Carlos Oconnell - Last Filed: 11/20/22 21:07>





Patient History


Date of Service: 11/20/22


Reason for admission: CHF exacerbation


History of Present Illness: 





77-year-old female with history of chronic systolic congestive heart failure, 

CKD 4, atrial fibrillation on chronic anticoagulation, hypertension presents 

emergency department for shortness of breath.  She reports increasing shortness 

of breath over the course of the last couple days.  Patient was evaluated in the

emergency department her labs were significant for markedly elevated BNP 28,217 

creatinine 2.53 GFR 19 BUN 50 which is similar to patient's baseline, mild 

elevations in AST, ALT, alk phos, T bili, D bili patient with known fatty liver 

disease.  Patient currently requiring nasal cannula oxygen at 2 L maintain 

saturations greater than 90% chest x-ray was obtained which revealed moderate 

bilateral pulmonary opacities are present probably representing pulmonary edema 

or pneumonia.  The heart is mildly to moderately enlarged.  Dual-lead pacer 

device is present.  In October 2021 patient was admitted here in the hospital 

for CHF exacerbation found to have severely depressed left ventricular ejection 

fraction of 25 to 30% with severe global hypokinesis, small to moderate 

pericardial effusion.  Pulmonary hypertension with right ventricular systolic 

pressures 55 to 60%.  At that time patient was transferred to [facility] in 

[place] for further management, she had a dual-lead pacemaker inserted and has 

since been following up with her cardiologist Dr. Juares.  She reports she has had

significant improvement in her heart failure although she cannot tell me why her

ejection fraction was most recently her heart doctor has been telling her it is 

much improved.  She was given IV Lasix in the emergency department with some 

improvement in her dyspnea.  ED provider was to admit for further evaluation and

management of acute on chronic systolic congestive heart failure.





- Past Medical/Surgical History


Diabetic: No


-: HTN


-: CKD, temporarily needed dialysis


-: CHF, systolic


-: Knee surgery


-: Hysterectomy


-: HD placement


Psychosocial/ Personal History: Patient lives at home with her 





- Family History


  ** Brother


-: Heart disease





  ** Mother


-: Heart disease





  ** Sister


-: Heart disease





- Social History


Smoking Status: Never smoker


Alcohol use: No


CD- Drugs: No


Caffeine use: Yes


Place of Residence: Home





<Jose Carlos Oconnell Sherwin - Last Filed: 11/20/22 21:07>


Date of Service: 11/21/22





<KayleneCarlos campbell - Last Filed: 11/21/22 10:37>


Allergies





codeine Allergy (Verified 02/03/22 06:35)


   Itching





Home Medications: 








Omeprazole [Prilosec] 40 mg PO DAILY 03/28/21 


Multivitamin with Iron [Daily Vitamin + Iron] 1 each PO DAILY #90 tablet 03/30 /21 


Levothyroxine Sodium [Unithroid] 75 mcg PO DAILY 04/21/21 


Amiodarone HCl [Cordarone Tab] 200 mg PO BID 02/02/22 


Apixaban [Eliquis] 2.5 mg PO BID 02/02/22 


Diclofenac Sodium [Voltaren Arthritis Pain] 1 aria TP PRN PRN 02/02/22 


Furosemide [Lasix] 80 mg PO AS DIRECTED 02/02/22 


Furosemide [Lasix] 120 mg PO AS DIRECTED 02/02/22 


Gabapentin [Gralise] 600 mg PO BEDTIME 02/02/22 


Metoprolol Succinate 25 mg PO BID 02/02/22 








Review of Systems


10-point ROS is otherwise unremarkable


Respiratory: Cough, Shortness of Breath





<Jose Carlos Oconnell ROBERT Courtney - Last Filed: 11/20/22 21:07>





Physical Examination





- Physical Exam


General: Alert, In no apparent distress, Oriented x3


HEENT: Atraumatic, PERRLA, Mucous membr. moist/pink, EOMI, Sclerae nonicteric


Neck: Supple, 2+ carotid pulse no bruit, No LAD, Without JVD or thyroid 

abnormality


Respiratory: Diminished, Crackles/rales


Cardiovascular: Regular rate/rhythm (Paced), Normal S1 S2


Capillary refill: <2 Seconds


Gastrointestinal: Normal bowel sounds, No tenderness


Musculoskeletal: No tenderness


Integumentary: No rashes


Neurological: Normal speech, Normal strength at 5/5 x4 extr, Normal tone, Normal

 affect





- Studies


Laboratory Data (last 24 hrs)





11/20/22 16:34: PT 14.1 H, INR 1.28


11/20/22 16:34: WBC 13.80 H, Hgb 11.2 L, Hct 34.6 L, Plt Count 149 L


11/20/22 16:34: Sodium 138, Potassium 4.3, BUN 50 H, Creatinine 2.53 H, Glucose 

127 H, Magnesium 2.7 H, Total Bilirubin 1.7 H, AST 66 H, ALT 87 H, Alkaline 

Phosphatase 510 H








<Jose Carlos Oconnell - Last Filed: 11/20/22 21:07>





- Studies


Laboratory Data (last 24 hrs)





11/20/22 16:34: PT 14.1 H, INR 1.28


11/20/22 16:34: WBC 13.80 H, Hgb 11.2 L, Hct 34.6 L, Plt Count 149 L


11/20/22 16:34: Sodium 138, Potassium 4.3, BUN 50 H, Creatinine 2.53 H, Glucose 

127 H, Magnesium 2.7 H, Total Bilirubin 1.7 H, AST 66 H, ALT 87 H, Alkaline P

hosphatase 510 H





Microbiology Data (last 24 hrs): 








11/20/22 18:21   Blood  - Blood   Anaerobic Blood Culture - Final








<Carlos Maurice - Last Filed: 11/21/22 10:37>





Assessment and Plan





- Plan


Assessment:


Dyspnea, hypoxia secondary to acute on chronic decompensated systolic congestive

 heart failure with pacemaker in place


CKD 4


Atrial fibrillation on chronic anticoagulation therapy


Hypothyroidism


Hypertension


Fatty liver





Plan:


Dyspnea, hypoxia secondary to acute on chronic decompensated systolic congestive

 heart failure with pacemaker in place: Cardiology and nephrology consulted 

patient diuresing well with 40 mg of IV Lasix given in the emergency department.

  She reports her dyspnea has improved slightly she still requiring oxygen 2 L 

per nasal cannula.  We will obtain echocardiogram.  Continue diuresis appreciate

 further input from cardiology and nephrology.


CKD 4: Stable, patient has required dialysis in the past no longer has dialysis 

access in place.  Monitor renal function daily, nephrology consulted for 

assistance with CKD along with need for diuresis.


Atrial fibrillation on chronic anticoagulation therapy: Metoprolol and Eliquis 

continued, patient currently in paced rhythm on monitor.  We will monitor on 

telemetry.  Patient reports taking amiodarone although she was unsure of her 

dose she does have mild elevations in her LFTs, she reports her cardiologist is 

aware of this.


Hypothyroidism: Continue home med


Hypertension: Continue home med


Fatty liver: Monitor LFTs.





DVT PPX: Continue renally dosed Eliquis


Code status: Full


Discharge Plan: Home


Plan to discharge in: 72 Hours





- Advance Directives


Does patient have a Living Will: No


Does patient have a Durable POA for Healthcare: No





- Code Status/Comfort Care


Code Status Assessed: Yes (Full code)


Critical Care: No


Time Spent Managing Pts Care (In Minutes): 70





<Jose Carlos Oconnell - Last Filed: 11/20/22 21:07>


Physician Review: Patient Assessed, Agree with Above Assessment and Plan





<Carlos Maurice - Last Filed: 11/21/22 10:37>

## 2022-11-20 NOTE — XMS REPORT
Continuity of Care Document

                          Created on:2022



Patient:PHIL CALLOWAY

Sex:Female

:1945

External Reference #:[id number]





Demographics







                          Address                   [address]



                                                    [place] [address]

 

                          Home Phone                [phone]

 

                          Work Phone                [phone]

 

                          Mobile Phone              [phone]

 

                          Email Address             [email or web address]

 

                          Preferred Language        English

 

                          Marital Status            Unknown

 

                          Mandaeism Affiliation     Unknown

 

                          Race                      Unknown

 

                          Additional Race(s)        Unavailable



                                                    White



                                                    Unavailable

 

                          Ethnic Group              Unknown









Author







                          Organization              [facility]

t

 

                          Address                   [address]



                                                    [place] [address]

 

                          Phone                     [phone]









Support







                Name            Relationship    Address         Phone

 

                RO, MICHELLE HOFFMAN               [address]  [phone]



                                                [place] [address] 

 

                PAZ CALLOWAY               [address]  [phone]



                                                [place] [address] 

 

                PAZ CALLOWAY SPOU            [address] [phone]



                                                [place] [address] 

 

                RENAN MEEKS [facility]   [phone]



                                                [place] [address] 









Care Team Providers







                    Name                Role                Phone

 

                    LORENA MORROW Primary Care Physician Unavailable

 

                    Kourtney Paris (Brianne) Attending Clinician Unavailable

 

                    Santos Juares        Attending Clinician Unavailable

 

                    Efren Orozco MD Attending Clinician [phone]

 

                    EFREN OROZCO Attending Clinician Unavailable

 

                    JEREMIAH BRUNNER   Attending Clinician Unavailable

 

                    YOSEF REGALADO      Attending Clinician Unavailable

 

                    Ryder Alfonso    Attending Clinician Unavailable

 

                    Eddie Urrutia MD Attending Clinician [phone]

 

                    Mat KELLER, Yudy Vanegas Attending Clinician [phone]

[phone]

 

                    Omar Lam MD Attending Clinician [phone]

 

                    Vivian Samuels MD Attending Clinician [phone]

 

                    Arash Schroeder CRNA Attending Clinician [phone]

[phone]

 

                    Llanes, Andrea      Attending Clinician Unavailable

 

                    Saurabh KELLER, James ODONNELL Attending Clinician [phone]

 

                    Radha Craft MD Attending Clinician [phone]

 

                    Heber Ward MD      Attending Clinician [phone]

 

                    Ricky Brush Attending Clinician [phone]

 

                    Irving KELLER, Lexie       Attending Clinician [phone]

 

                    Davey KELLER, Nathaniel MILLER Attending Clinician [phone]

 

                    Tez May  Attending Clinician [phone]

 

                    Alexsander Toribio MD         Attending Clinician [phone]

 

                    Rajjaida_P              Attending Clinician Unavailable

 

                    Lorena Morrow Admitting Clinician Unavailable

 

                    YOSEF REGALADO      Admitting Clinician Unavailable

 

                    Ryder Alfonso    Admitting Clinician Unavailable

 

                    EDDIE URRUTIA         Admitting Clinician Unavailable

 

                    RADHA CRAFT Admitting Clinician Unavailable

 

                    Erasmo              Admitting Clinician Unavailable









Payers







           Payer Name Policy Type Policy Number Effective Date Expiration Date S ource MEDICARE A B            [id number] 2009 00:00:00            

 

           [facility]            [id number]  2018 00:00:00            







Problems







       Condition Condition Condition Status Onset  Resolution Last   Treating Co

mments 

Source



       Name   Details Category        Date   Date   Treatment Clinician        



                                                 Date                 

 

       Acute  Acute  Disease Active 2021                             [facility]



       decompensa decompensa               0-31                               [facility]



       shahriar heart shahriar heart               00:00:                             [facility]

[place]



       failure failure               00                                 Center

 

       Severe Severe Disease Active                              [facility]



       acute  acute                502                               st



       pancreatit pancreatit               00:00:                             Ho

spita



       is     is                   00                                 l

 

       Complicati Complicati Disease Active                              M

ethodi



       on of  on of                3-05                               st



       vascular vascular               00:00:                             Hospit

a



       dialysis dialysis               00                                 l



       catheter catheter                                                  

 

       Hyponatrem Hyponatrem Disease Active                              M

ethodi



       ia     ia                   3-05                               st



                                   00:00:                             Hospita



                                   00                                 l

 

       Mechanical Mechanical Disease Active                       Overview

: Methodi



       complicati complicati               3-                        [facility]

 st



       on of  on of                00:00:                      g of this Hospita



       vascular vascular               00                          note   l



       dialysis dialysis                                           might be 



       catheter catheter                                           different 



                                                               from the 



                                                               original. 



                                                               Added  



                                                               automatic 



                                                               ally from 



                                                               request 



                                                               for    



                                                               surgery 



                                                               [id number] 

 

       ENRIQUE (acute ENRIQUE (acute Disease Active                                    C

[facility]



       kidney kidney                                                  [facility]



       injury) injury)                                                  [facility]

 

       ESRD (end ESRD (end Disease Active                                    [facility]



       stage  stage                                                   [facility]



       renal  renal                                                   [facility]



       disease) disease)                                                  Center



       on     on                                                      



       dialysis dialysis                                                  







Allergies, Adverse Reactions, Alerts







       Allergy Allergy Status Severity Reaction(s) Onset  Inactive Treating Comm

ents 

Source



       Name   Type                        Date   Date   Clinician        

 

       Codeine Propensi Active        Itching                       Method

i



              ty to                       305                        st



              adverse                      00:00:                      Hospita



              reaction                      00                          l



              s to                                                    



              drug                                                    

 

       Codeine Propensi Active        Itching                       [facility]



              ty to                       2-17                        College



              adverse                      00:00:                      of



              reaction                      00                          [facility]



              s to                                                    e



              drug                                                    

 

       No Known DA     Active U                                   [facility]



       Allergie                             [address]                        [place]



       s                                  00:00:                      [place]



                                          00                          [facility]

 

       NO KNOWN Allergy Active                                           [facility] St



       [facility]                                                         [facility]







Social History







           Social Habit Start Date Stop Date  Quantity   Comments   Source

 

           History SDOH                                             [facility]



           Alcohol Comment                                             [facility]

enter

 

           History SDOH                                             [facility]



           Alcohol Std Drinks                                             Hospit

al

 

           History SDOH                                             [facility]



           Alcohol Binge                                             Hospital

 

           Cigarette  2022                       [facility]

 of



           pack-years 00:00:00   00:00:00                         Medicine

 

           Tobacco use and 2021-10-31 2021-10-31 Never used            [facility]

[facility]



           exposure   00:00:00   00:00:00                         [facility]

 

           Alcohol intake 2021 Lifetime              [facility]



                      00:00:00   00:00:00   non-drinker            Hospital



                                            (finding)             

 

           History SDOH 2021 1                     [facility]



           Alcohol Frequency 00:00:00   00:00:00                         Hospita

l

 

           Sex Assigned At [birth year]                       [facility]



           Birth      00:00:00   00:00:00                         Hospital









                Smoking Status  Start Date      Stop Date       Source

 

                Tobacco smoking consumption unknown                             

    [facility]

 

                Never smoked tobacco                                 [facility]

ege of [facility]







Medications







       Ordered Filled Start  Stop   Current Ordering Indication Dosage Frequency

 Signature

                    Comments            Components          Source



     Medication Medication Date Date Medication? Clinician                (SIG) 

          



     Name Name                                                   

 

     gabapentin            Yes            600mg      Take 600           [facility]

[facility]



     (NEURONTIN)      6-20                               mg by           [facility]



     600 MG      11:20:                               mouth           of



     tablet      15                                 daily.           [facility]



                                                                 e

 

     omeprazole            Yes                      daily.           [facility]

r



     (PRILOSEC)      6-20                                              [facility]



     40 MG      11:20:                                              of



     capsule      15                                                [facility]



                                                                 e

 

     ondansetron      2022- No                       daily as           [facility]

[facility]



     (ZOFRAN) 4      6-20 06-20                          needed.           [facility]

ge



     MG tablet      11:20: 00:00                                         of



               08   :00                                          [facility]



                                                                 e

 

     metoprolol      -0      Yes       [id number] 50mg      Take 1           [facility]

[facility]



     (TOPROL XL)      6-20                               Tablet by           Col

lege



     50 MG XL      00:00:                               mouth           of



     tablet      00                                 every 12           [facility]



                                                  hours.           e

 

     hydrALAZINE      -0      Yes       [id number] 50mg      Take 1           

[place]



     (APRESOLINE      6-20                               Tablet by           Col

lege



     ) 50 MG      00:00:                               mouth           of



     tablet      00                                 every 12           [facility]



                                                  hours.           e

 

     furosemide      -0      Yes            20mg      Take 20 mg           [facility]

[facility]



     (LASIX) 20      3-14                               by mouth           Colle

ge



     MG tablet      00:00:                               two times           of



               00                                 daily.           [facility]



                                                                 e

 

     furosemide      0      Yes            20mg      Take 20 mg           [facility]

[facility]



     (LASIX) 20      3-14                               by mouth           Colle

ge



     MG tablet      00:00:                               two times           of



               00                                 daily.           [facility]



                                                                 e

 

     levothyroxi            Yes                                     [facility]        3-09                                              [place]



     (SYNTHROID)      00:00:                                              of



     75 MCG      00                                                [facility]



     tablet                                                        e

 

     levothyroxi      0      Yes                                     [facility]        3-09                                              [place]



     (SYNTHROID)      00:00:                                              of



     75 MCG      00                                                [facility]



     tablet                                                        e

 

     ELIQUIS 2.5      0      Yes                                     [facility]



     MG TABS      2-21                                              [place]



               00:00:                                              of



               00                                                Medicin



                                                                 e

 

     ELIQUIS 2.5      -0      Yes                                     [place]



     MG TABS      2-21                                              [place]



               00:00:                                              of



               00                                                Medicin



                                                                 e

 

     gabapentin      2021      Yes            600mg      Take 600           [facility]

[facility]



     (NEURONTIN)      1-29                               mg by           [place]



     600 MG      14:06:                               mouth           of



     tablet      23                                 daily.           [facility]



                                                                 e

 

     omeprazole      2021      Yes                      daily.           [facility]

r



     (PRILOSEC)                                                    College



     40 MG      14:06:                                              of



     capsule      23                                                [facility]



                                                                 e

 

     ondansetron      2021      Yes                      daily as           [facility]

[facility]



     (ZOFRAN) 4      -29                               needed.           Colleg

e



     MG tablet      14:06:                                              of



               23                                                [facility]



                                                                 e

 

     gabapentin      2021      Yes            600mg      Take 600           [facility]

[facility]



     (NEURONTIN)      1-29                               mg by           [place]



     600 MG      14:06:                               mouth           of



     tablet      23                                 daily.           [facility]



                                                                 e

 

     omeprazole      2021      Yes                      daily.           [facility]

r



     (PRILOSEC)                                                    College



     40 MG      14:06:                                              of



     capsule      23                                                [facility]



                                                                 e

 

     ondansetron      2021      Yes                      daily as           [facility]

[facility]



     (ZOFRAN) 4      -29                               needed.           Colleg

e



     MG tablet      14:06:                                              of



               23                                                [facility]



                                                                 e

 

     famotidine      2021      Yes            20mg QD   Take 1           [facility]

t



     (PEPCID) 20      -16                               tablet (20           [facility]

[facility]



     MG tablet      00:00:                               mg total)           Med

ical



               00                                 by mouth           Center



                                                  daily.           

 

     amiodarone      2021- No             400mg QD   Take 1           [facility]



     (PACERONE)      1-16 11-16                          tablet           [facility]



     400 MG      00:00: 23:59                          (400 mg           Medical



     tablet      00   :00                           total) by           Center



                                                  mouth           



                                                  daily.           

 

     gabapentin      2021      Yes            600mg QD   Take 600           [facility]

I [facility]



     (NEURONTIN)      1-15                               mg by           [facility]



     600 MG      14:09:                               mouth           Medical



     tablet      12                                 daily.           [place]

 

     levothyroxi      2021      Yes            75ug      Take 75           [facility]



     ne        1-15                               mcg by           [facility]



     (SYNTHROID,      14:09:                               mouth           Medic

al



     LEVOTHROID)      12                                 Every           Center



     75 MCG                                         morning on           



     tablet                                         an empty           



                                                  stomach.           

 

     omeprazole      2021      Yes            40mg QD   Take 40 mg           C

[facility]



     (PriLOSEC)      1-15                               by mouth           [facility]



     40 MG      14:09:                               daily.           Medical



     capsule      12                                                [place]

 

     cetirizine      2021      Yes            10mg      Take 10 mg           C

[facility]



     (ZyrTEC) 10      1-15                               by mouth           [facility]

s



     MG tablet      14:09:                               as needed           Med

ical



               12                                 for            Center



                                                  Allergies.           

 

     multivitami      2021      Yes            1{capsu QD   Take 1           C

HI St



     n capsule      1-15                     le}       capsule by           [facility]

s



               14:09:                               mouth           Medical



               12                                 daily.           [place]

 

     hydrocodone      2021      Yes                      daily as           [facility]

[facility]



     -acetaminop      1-15                               needed.           Colle

ge



     hen (NORCO)      00:00:                                              of



     7.5-325 MG      00                                                [facility]



     per tablet                                                        e

 

     amiodarone      2021      Yes                      daily.           [facility]

r



     (PACERONE)      1-15                                              [facility]



     200 MG      00:00:                                              of



     tablet      00                                                [facility]icin



                                                                 e

 

     hydrocodone      2021      Yes                      daily as           [facility]

[facility]



     -acetaminop      1-15                               needed.           Colle

ge



     hen (NORCO)      00:00:                                              of



     7.5-325 MG      00                                                [facility]



     per tablet                                                        e

 

     amiodarone      2021      Yes                      daily.           [facility]

r



     (PACERONE)      1-15                                              [facility]



     200 MG      00:00:                                              of



     tablet      00                                                [facility]



                                                                 e

 

     amiodarone      2021      Yes                      daily.           [facility]

r



     (PACERONE)      1-15                                              [facility]



     200 MG      00:00:                                              of



     tablet      00                                                [facility]



                                                                 e

 

     hydrocodone      2021      Yes                      daily as           [facility]

ylor



     -acetaminop      -15                               needed.           Colle

ge



     hen (NORCO)      00:00:                                              of



     7.5-325 MG      00                                                [facility]



     per tablet                                                        e

 

     metoprolol      2021- No             25mg      Take 25 mg           

[facility]



     (TOPROL-XL)      -15 11-16                          by mouth           Col

lege



     25 MG XL      00:00: 05:59                          two times           of



     tablet      00   :00                           daily.           [facility]



                                                                 e

 

     metoprolol      2021 No             25mg      Take 25 mg           

[facility]



     (TOPROL-XL)      15 11-16                          by mouth           Col

lege



     25 MG XL      00:00: 05:59                          two times           of



     tablet      00   :00                           daily.           [facility]



                                                                 e

 

     ferrous      2021 No             325mg QD   Take 1           [facility]



     sulfate 325      1-15 11-15                          tablet           [facility]



     (65 FE) MG      00:00: 23:59                          (325 mg           Med

ical



     tablet      00   :00                           total) by           Center



                                                  mouth           



                                                  daily.           

 

     metoprolol      2021 No             25mg Q.5D Take 1           [facility]



     succinate      1-15 11-15                          tablet (25           [facility]

es



     (TOPROL-XL)      00:00: 23:59                          mg total)           

Medical



     25 MG 24 hr      00   :00                           by mouth 2           Ce

nter



     tablet                                         (two)           



                                                  times           



                                                  daily.           

 

     metoprolol      2021 No             25mg      Take 25 mg           

[facility]



     (TOPROL-XL)      -15 06-20                          by mouth           Col

lege



     25 MG XL      00:00: 00:00                          two times           of



     tablet      00   :00                           daily.           [facility]



                                                                 e

 

     Apixaban      2021- No             2.5mg      Take 2.5           [facility]

[facility]



     2.5 MG TABS      -15 12-16                          mg by           Col[facility]

e



               00:00: 05:59                          mouth two           of



               00   :00                           times           [facility]



                                                  daily.           e

 

     apixaban      2021 No             2.5mg Q.5D Take 1           [facility]

t



     (ELIQUIS)      1-15 12-15                          tablet           [facility]



     2.5 mg Tab      00:00: 23:59                          (2.5 mg           Med

ical



     tablet      00   :00                           total) by           Center



                                                  mouth 2           



                                                  (two)           



                                                  times           



                                                  daily for           



                                                  30 days.           

 

     HYDROcodone      2021- No             1{tbl}      Take 1           [facility]

[facility]



     -acetaminop      1-15 11-25                          tablet by           [facility] ([facility]      00:00: 23:59                          mouth           Medic

al



     7.5-325)      00   :00                           every 6           Center



     7.5-325 mg                                         (six)           



     per tablet                                         hours as           



                                                  needed for           



                                                  up to 10           



                                                  days. Max           



                                                  Daily           



                                                  Amount: 4           



                                                  tablets           

 

     levofloxaci      0      Yes                      daily.           [facility]

or



     n                                                       [place]



     (LEVAQUIN)      00:00:                                              of



     250 MG      00                                                [facility]



     tablet                                                        e

 

     levofloxaci      0      Yes                      daily.           [facility]

or



     n                                                       [place]



     (LEVAQUIN)      00:00:                                              of



     250 MG      00                                                [facility]



     tablet                                                        e

 

     levofloxaci      0 - No                       daily.           [place]

[facility]



     n                                                  [place]



     (LEVAQUIN)      00:00: 00:00                                         of



     250 MG      00   :00                                          [facility]



     tablet                                                        e

 

     levothyroxi            Yes            75ug QD   Take 75           [facility]

h[facility]



     ne        5-06                               mcg by           st



     (SYNTHROID)      18:57:                               mouth           [facility]os[facility]

ta



     75 mcg      26                                 daily.           l



     tablet                                                        

 

     omeprazole            Yes            40mg QD   Take 40 mg           M

ethodi



     (PriLOSEC)      5-06                               by mouth           st



     40 MG      18:57:                               daily.           [facility]osp[facility]



     capsule      26                                                l

 

     metoclopram      0      Yes            5mg  [id number] Take 5 mg      

     [facility]



     delgado       5-06                          [id number] by mouth 3           st



     (REGLAN) 5      18:57:                          3D   (three)           Hosp

bill



     MG tablet      26                                 times a           l



                                                  day.           

 

     meclizine      0      Yes            25mg Q.25D Take 25 mg           M

ethodi



     (ANTIVERT)      5-06                               by mouth 4           st



     25 mg      18:57:                               (four)           Hospita



     tablet      26                                 times a           l



                                                  day as           



                                                  needed for           



                                                  dizziness.           

 

     calcitrioL      0      Yes            .25ug Q48H Take 0.25           M

ethodi



     (ROCALTROL)      5-06                               mcg by           st



     0.25 MCG      18:57:                               mouth           Hospita



     capsule      26                                 every           l



                                                  other day.           

 

     traMADoL      2021-0      Yes       [id number] 50mg Q6H  Take 50 mg           [facility]

[facility]



     (ULTRAM) 50      5-06                               by mouth           st



     mg tablet      18:57:                               every 6           Hospi

ta



               26                                 (six)           l



                                                  hours as           



                                                  needed for           



                                                  moderate           



                                                  pain           



                                                  .acute           



                                                  pain.           

 

     promethazin      -0      Yes            25mg Q6H  Take 25 mg           

[facility]



     e         5-06                               by mouth           st



     (PHENERGAN)      18:57:                               every 6           Hos

[facility]



     25 MG      26                                 (six)           l



     tablet                                         hours as           



                                                  needed for           



                                                  nausea or           



                                                  vomiting.           

 

     hydrALAZINE      0      Yes            100mg Q.5D Take 100           M

ethodi



     (APRESOLINE      5-06                               mg by           st



     ) 100 MG      18:57:                               mouth 2           Hospit

a



     tablet      26                                 (two)           l



                                                  times a           



                                                  day.           

 

     levothyroxi      -0      Yes            75ug QD   Take 75           [facility]

hodi



     ne        5-06                               mcg by           st



     (SYNTHROID)      13:57:                               mouth           Hospi

ta



     75 mcg      26                                 daily.           l



     tablet                                                        

 

     omeprazole      0      Yes            40mg QD   Take 40 mg           M

ethodi



     (PriLOSEC)      5-06                               by mouth           st



     40 MG      13:57:                               daily.           Hospita



     capsule      26                                                l

 

     metoclopram      0      Yes            5mg  [id number] Take 5 mg      

     [facility]



     delgado       5-06                          [id number] by mouth 3           st



     (REGLAN) 5      13:57:                          3D   (three)           Hosp

bill



     MG tablet      26                                 times a           l



                                                  day.           

 

     meclizine      0      Yes            25mg Q.25D Take 25 mg           M

ethodi



     (ANTIVERT)      5-06                               by mouth 4           st



     25 mg      13:57:                               (four)           Hospita



     tablet      26                                 times a           l



                                                  day as           



                                                  needed for           



                                                  dizziness.           

 

     calcitrioL      -0      Yes            .25ug Q48H Take 0.25           M

ethodi



     (ROCALTROL)      5-06                               mcg by           st



     0.25 MCG      13:57:                               mouth           Hospita



     capsule      26                                 every           l



                                                  other day.           

 

     traMADoL      -0      Yes       [id number] 50mg Q6H  Take 50 mg           Me

thodi



     (ULTRAM) 50      5-06                               by mouth           st



     mg tablet      13:57:                               every 6           Hospi

ta



               26                                 (six)           l



                                                  hours as           



                                                  needed for           



                                                  moderate           



                                                  pain           



                                                  .acute           



                                                  pain.           

 

     promethazin      -0      Yes            25mg Q6H  Take 25 mg           

[facility]



     e         5-06                               by mouth           st



     (PHENERGAN)      13:57:                               every 6           Hos

[facility]



     25 MG      26                                 (six)           l



     tablet                                         hours as           



                                                  needed for           



                                                  nausea or           



                                                  vomiting.           

 

     hydrALAZINE            Yes            100mg Q.5D Take 100           M

ethodi



     (APRESOLINE      5-06                               mg by           st



     ) 100 MG      13:57:                               mouth 2           Hospit

a



     tablet      26                                 (two)           l



                                                  times a           



                                                  day.           

 

     tramadol            Yes                      as needed.           [place]

[facility]



     (ULTRAM) 50      3-28                                              [facility]



     MG tablet      00:00:                                              of



               00                                                [facility]



                                                                 e

 

     tramadol            Yes                      as needed.           [place]

[facility]



     (ULTRAM) 50      3-28                                              [facility]



     MG tablet      00:00:                                              of



               00                                                [facility]



                                                                 e

 

     tramadol            Yes                      as needed.           [place]

[facility]



     (ULTRAM) 50      3-28                                              [facility]



     MG tablet      00:00:                                              of



               00                                                [facility]



                                                                 e

 

     ondansetron      2021- No             4mg  Q12H Take 4 mg           

[facility]



     (ZOFRAN) 4      3-09 03-08                          by mouth           st



     MG tablet      02:27: 00:00                          every 12           Hos

[facility]



               48   :00                           (twelve)           l



                                                  hours as           



                                                  needed for           



                                                  nausea or           



                                                  vomiting.           

 

     carvediloL      2021- No             12.5mg Q.5D Take 12.5          

 [facility]



     (COREG)      3-09 03-08                          mg by           st



     12.5 MG      02:27: 00:00                          mouth 2           Hospit

a



     tablet      48   :00                           (two)           l



                                                  times a           



                                                  day with           



                                                  meals.           

 

     amoxicillin      2021- No             1{tbl} Q.5D Take 1           M

ethodi



     -pot      3-09 03-08                          tablet by           st



     clavulanate      02:27: 00:00                          mouth 2           Ho

[facility]ita



     (AUGMENTIN)      48   :00                           (two)           l



     250-125 mg                                         times a           



     per tablet                                         day.           

 

     amLODIPine      2021- No             10mg QD   Take 1           [facility]

[facility]



     (NORVASC)      3-09 04-09                          tablet (10           st



     10 mg      00:00: 04:59                          mg total)           Hospit

a



     tablet      00   :00                           by mouth           l



                                                  daily for           



                                                  30 days.           

 

     traMADoL      -2021- No        [id number] 50mg Q8H  Take 50 mg           M

ethodi



     (ULTRAM) 50      3-08 03-08                          by mouth           st



     mg tablet      23:49: 00:00                          every 8           Hosp

bill



               18   :00                           (eight)           l



                                                  hours as           



                                                  needed for           



                                                  moderate           



                                                  pain           



                                                  .acute           



                                                  pain.           

 

     amLODIPine      2021- No             5mg  QD   Take 5 mg           M

ethodi



     (NORVASC) 5      3-08 03-05                          by mouth           st



     mg tablet      17:50: 00:00                          daily.           Hospi

ta



               49   :00                                          l

 

     amoxicillin      2021- No             500mg Q.[id number] Take 500     

      [facility]



     (AMOXIL)      3-08 03-05                     [id number] mg by           st



     500 MG      17:50: 00:00                     3D   mouth 3           Hospita



     capsule      49   :00                           (three)           l



                                                  times a           



                                                  day.           

 

     amoxicillin      2021- No             1{tbl} QD   Take 1           M

ethodi



     -pot      3-08 03-05                          tablet by           st



     clavulanate      17:50: 00:00                          mouth           Hosp

bill



     (AUGMENTIN)      49   :00                           daily.           l



     500-125 mg                                         Disp           



     per tablet                                         21 18           



                                                  day supply           

 

     calcitrioL       No             .25ug QD   Take 0.25           

[facility]



     (ROCALTROL)      3-08 03-05                          mcg by           st



     0.25 MCG      17:50: 00:00                          mouth           [facility]ospita



     capsule      49   :00                           daily.           l

 

     carvediloL      2021- No             12.5mg Q.5D Take 12.5          

 [facility]



     (COREG)      3-08 03-05                          mg by           st



     12.5 MG      17:50: 00:00                          mouth 2           Hospit

a



     tablet      49   :00                           (two)           l



                                                  times a           



                                                  day with           



                                                  meals.           

 

     hydroCHLORO      2021- No             25mg QD   Take 25 mg          

 [facility]



     thiazide      3-08 03-05                          by mouth           st



     (HYDRODIURI      17:50: 00:00                          daily.           Hos

[facility]



     L) 25 MG      49   :00                                          l



     tablet                                                        

 

     losartan      2021- No             100mg QD   Take 100           Met

hodi



     (COZAAR)      3-08 03-05                          mg by           st



     100 MG      17:50: 00:00                          mouth           [facility]ospita



     tablet      49   :00                           daily.           l

 

     metoclopram      2021- No             5mg  Q.25D Take 5 mg          

 [facility]



     delgado       3-08 03-05                          by mouth 4           st



     (REGLAN) 5      17:50: 00:00                          (four)           Hosp

bill



     MG tablet      49   :00                           times a           l



                                                  day.           

 

     metoprolol      2021- No             100mg QD   Take 100           M

ethodi



     succinate      3-08 03-05                          mg by           st



     XL        17:50: 00:00                          mouth           [facility]ospita



     (TOPROL-XL)      49   :00                           daily.           l



     100 mg 24                                                        



     hr tablet                                                        

 

     prochlorper       No             10mg Q8H  Take 10 mg          

 [facility]



     azine      3-08 03-05                          by mouth           st



     (COMPAZINE)      17:50: 00:00                          every 8           Ho

spita



     10 MG      49   :00                           (eight)           l



     tablet                                         hours as           



                                                  needed for           



                                                  nausea or           



                                                  vomiting.           

 

     carvediloL       No             25mg Q.5D Take 1           [facility]

[facility]



     (COREG) 25      3-08 04-08                          tablet (25           st



     MG tablet      00:00: 04:59                          mg total)           Ho

spita



               00   :00                           by mouth 2           l



                                                  (two)           



                                                  times a           



                                                  day with           



                                                  meals for           



                                                  30 days.           

 

     traMADoL              [id number] 50mg Q8H  Take 1           [facility]

[facility]



     (ULTRAM) 50      3-08 03-19                          tablet (50           s

t



     mg tablet      00:00: 04:59                          mg total)           Ho

spita



               00   :00                           by mouth           l



                                                  every 8           



                                                  (eight)           



                                                  hours as           



                                                  needed for           



                                                  moderate           



                                                  pain for           



                                                  up to 10           



                                                  days           



                                                  .acute           



                                                  pain.           







Immunizations







           Ordered Immunization Filled Immunization Date       Status     Commen

ts   Source



           Name       Name                                        

 

           Moderna SARS-CoV-2            2021 Completed             [facility]



           Vaccination            00:00:00                         of Medicine

 

           Moderna SARS-CoV-2            2021 Completed             [facility]



           Vaccination            00:00:00                         of Medicine

 

           Moderna SARS-CoV-2            2021-01-15 Completed             [facility]



           Vaccination            00:00:00                         of Medicine

 

           Moderna SARS-CoV-2            2021-01-15 Completed             [facility]



           Vaccination            00:00:00                         of Medicine







Vital Signs







             Vital Name   Observation Time Observation Value Comments     Source

 

             Systolic blood 2022 16:22:00 187 mm[Hg]                [facility]



             pressure                                            Medicine

 

             Diastolic blood 2022 16:22:00 80 mm[Hg]                 [facility]



             pressure                                            Medicine

 

             Heart rate   2022 16:20:00 50 /min                   [facility]

 

             Body height  2022 16:20:00 162.6 cm                  [facility]

 

             Body weight  2022 16:20:00 82.101 kg                 [facility]

 

             BMI          2022 16:20:00 31.07 kg/m2               [facility]

ollege of



                                                                 Medicine

 

             Oxygen saturation in 2022 16:20:00 96 /min                   

[facility]



             Arterial blood by                                        Medicine



             Pulse oximetry                                        

 

             Diastolic blood 2022 16:28:00 80 mm[Hg]                 [facility]



             pressure                                            Medicine

 

             Heart rate   2022 16:28:00 54 /min                   [facility]

ollege of



                                                                 Medicine

 

             Systolic blood 2022 16:28:00 175 mm[Hg]                [facility]                                            Medicine

 

             Respiratory rate 2022 16:27:00 16 /min                   [facility]

 

             Body height  2022 16:27:00 162.6 cm                  [facility]

ollege of



                                                                 Medicine

 

             Body weight  2022 16:27:00 72.122 kg                 [facility]

ollege of



                                                                 Medicine

 

             BMI          2022 16:27:00 27.29 kg/m2               [facility]

ollege of



                                                                 Medicine

 

             Oxygen saturation in 2022 16:27:00 100 /min                  

[facility]



             Arterial blood by                                        Medicine



             Pulse oximetry                                        

 

             Systolic blood 2021 19:57:00 152 mm[Hg]                [facility]                                            Medicine

 

             Diastolic blood 2021 19:57:00 81 mm[Hg]                 [facility]                                            Medicine

 

             Heart rate   2021 19:57:00 69 /min                   [facility]

ollege of



                                                                 Medicine

 

             Respiratory rate 2021 19:57:00 16 /min                   [facility]

 

             Body height  2021 19:57:00 162.6 cm                  [facility]

ollege of



                                                                 Medicine

 

             Body weight  2021 19:57:00 74.844 kg                 [facility]

ollege of



                                                                 Medicine

 

             BMI          2021 19:57:00 28.32 kg/m2               [facility]

ollege of



                                                                 Medicine

 

             Oxygen saturation in 2021 19:57:00 99 /min                   

[facility] of



             Arterial blood by                                        Medicine



             Pulse oximetry                                        

 

             WEIGHT       2021-11-15 05:33:00 69.3 kg                   

 

             WEIGHT       2021 04:00:00 67.1 kg                   

 

             WEIGHT       2021 13:35:00 61.1 kg                   

 

             WEIGHT       2021 16:22:00 67.2 kg                   

 

             WEIGHT       2021 01:30:00 69.4 kg                   

 

             WEIGHT       2021 21:30:00 71.9 kg                   

 

             WEIGHT       2021 03:14:00 71.895 kg                 

 

             WEIGHT       2021 20:45:00 72.4 kg                   

 

             WEIGHT       2021 18:45:00 74.9 kg                   

 

             WEIGHT       2021 03:44:00 74.889 kg                 

 

             WEIGHT       2021 18:45:00 76 kg                     

 

             WEIGHT       2021 16:45:00 78.5 kg                   

 

             HEIGHT       2021-10-31 11:43:00 162.6 cm                  

 

             WEIGHT       2021-10-31 11:43:00 78.518 kg                 

 

             WEIGHT       2021-11-15 05:33:00 69.3 kg                   

 

             WEIGHT       2021 04:00:00 67.1 kg                   

 

             WEIGHT       2021 13:35:00 61.1 kg                   

 

             WEIGHT       2021 16:22:00 67.2 kg                   

 

             WEIGHT       2021 01:30:00 69.4 kg                   

 

             WEIGHT       2021 21:30:00 71.9 kg                   

 

             WEIGHT       2021 03:14:00 71.895 kg                 

 

             WEIGHT       2021 20:45:00 72.4 kg                   

 

             WEIGHT       2021 18:45:00 74.9 kg                   

 

             WEIGHT       2021 03:44:00 74.889 kg                 

 

             WEIGHT       2021 18:45:00 76 kg                     

 

             WEIGHT       2021 16:45:00 78.5 kg                   

 

             HEIGHT       2021-10-31 11:43:00 162.6 cm                  

 

             WEIGHT       2021-10-31 11:43:00 78.518 kg                 

 

             Oxygen saturation in 2021 13:15:00 95 /min                   

[facility]



             Arterial blood by                                        



             Pulse oximetry                                        

 

             Systolic blood 2021 12:37:48 146 mm[Hg]                [facility]

[facility]



             pressure                                            

 

             Diastolic blood 2021 12:37:48 62 mm[Hg]                 [facility]

[facility]



             pressure                                            

 

             Heart rate   2021 12:37:48 92 /min                   [facility]

\[facility]\""

 

             Body temperature 2021 12:37:48 36.5 Camila                  [facility]

 

             Respiratory rate 2021 12:37:48 16 /min                   [facility]

 

             Body weight  2021 10:48:00 75.932 kg                 [facility]

 

             BMI          2021 10:48:00 28.73 kg/m2               [facility]

 

             Body height  2021 05:05:44 162.6 cm                  [facility]







Procedures







                Procedure       Date / Time     Performing Clinician Source



                                Performed                       

 

                ELECTROCARDIOGRAM COMPLETE 2021 21:28:00 Efren Orozco

[facility]

 

                HC COMPLETE BLD COUNT 2021 10:35:00 Genaro [facility]



                W/AUTO DIFF                     Aziz            

 

                COMPREHENSIVE METABOLIC 2021 10:35:00 Genaro [facility]



                PANEL                           Aziz            

 

                ESTIMATED GFR   2021 10:35:00 Genaro [facility]

ospital



                                                Aziz            

 

                BILIRUBIN DIRECT 2021 10:35:00 [facility]            

 

                PHOSPHORUS LEVEL 2021 10:35:00 Claudine Tilley 

ospital



                                                [place]        

 

                MAGNESIUM LEVEL 2021 10:35:00 Claudine Tilley 

spital



                                                [place]        

 

                SURG[facility]OLOG[facility]      

 

                DE AN ELECTIVE ENDOTRACHEAL 2021 15:51:43 Arash Schroeder

 [facility]



                AIRWAY                          Chris            

 

                CHOLECYSTECTOMY, 2021 15:02:00 Genaro [facility]



                LAPAROSCOPIC                    Aziz            

 

                BASIC METABOLIC PANEL 2021 09:21:00 [facility]      

 

                HEPATIC FUNCTION PANEL 2021 09:21:00 Genaro [facility]



                                                Aziz            

 

                HC COMPLETE BLD [facility]



                W/AUTO DIFF                     Aziz            

 

                TYPE AND SCREEN 2021 09:21:00 Genaro [facility]

ospital



                                                Aziz            

 

                ESTIMATED GFR   2021 09:21:00 Yudy Rivero H

ospital



                                                Romina      

 

                COVID-19 QUALITATIVE RT-PCR 2021 03:20:00 Omar Lam

 [facility]



                                                Aziz            

 

                BASIC METABOLIC PANEL 2021 09:01:00 Ritu Walls [facility]

 

                ESTIMATED GFR   2021 09:01:00 Ritu Walls Ho

spital

 

                PHOSPHORUS LEVEL 2021 09:01:00 Jarek, Claudine Blum [facility]

ospital



                                                [place]        

 

                MAGNESIUM LEVEL 2021 09:01:00 Jarek, Claudine Blum [facility]

spital



                                                [place]        

 

                BASIC METABOLIC PANEL 2021 23:36:00 [facility]



                                                [place]        

 

                MAGNESIUM LEVEL 2021 23:36:00 JarekClaudine Ho

spital



                                                [place]        

 

                PHOSPHORUS LEVEL 2021 23:36:00 Jarek, Claudine [facility]

ospital



                                                [place]        

 

                ALBUMIN LEVEL   2021 23:36:00 Jarek, Claudine Blum [facility]

spital



                                                [place]        

 

                IONIZED CALCIUM 2021 23:36:00 Jarek, Claudine Blum Ho

spital



                                                [place]        

 

                ESTIMATED GFR   2021 23:36:00 Jarek, Claudine Blum [facility]

spital



                                                [place]        

 

                VENIPUNC NEED PHYS SKILL,DX 2021 19:03:00 KAREN Lopez [facility]



                OR RX                                           

 

                BASIC METABOLIC PANEL 2021 09:32:00 Ritu Walls [facility]

 

                PHOSPHORUS LEVEL 2021 09:32:00 Ritu Walls H

ospital

 

                PARATHYROID HORMONE 2021 09:32:00 Ritu Walls [facility]

 

                VITAMIN D 25 HYDROXY LEVEL 2021 09:32:00 Ritu Walls [facility]

 

                ESTIMATED GFR   2021 09:32:00 Ritu Walls Ho

spital

 

                PROTHROMBIN TIME WITH INR 2021 09:32:00 [facility]

 

                MAGNESIUM LEVEL 2021 09:32:00 Claudine Tilley        

 

                XR CHEST 1 VW [facility]ABLE [facility]21:[address] [facility] [facility]

 

                COMPREHENSIVE METABOLIC 2021 09:40:00 [facility]



                PANEL                                           

 

                HC COMPLETE BLD [facility]40[facility]



                W/AUTO DIFF                                     

 

                MAGNESIUM LEVEL 2021 [facility]40:00 [facility]

 

                LIPASE LEVEL    2021 09:40:00 [facility]

 

                AMYLASE LEVEL   2021 09:40:00 [facility]

 

                ESTIMATED GFR   2021 09:40:00 [facility]

 

                HC COMPLETE BLD COUNT 2021 05:27:00 [facility]



                W/AUTO DIFF                                     

 

                COMPREHENSIVE METABOLIC 2021 05:27:00 [facility]



                PANEL                                           

 

                MAGNESIUM LEVEL 2021 05:27:00 [facility]

 

                AMYLASE LEVEL   2021 05:27:00 [facility]

 

                LIPASE LEVEL    2021 05:27:00 [facility]

 

                LIPID PANEL     2021 05:27:00 [facility]

 

                ESTIMATED GFR   2021 05:27:00 [facility]

 

                HEMOGLOBIN A1C  2021 05:27:00 [facility]

 

                OR FL < 1 HOUR  2021 16:36:00 Alexsander Toribio

 

                DE AN ELECTIVE SUPRAGLOTTIC 2021 16:20:38 Tez May [facility]



                AIRWAY                                          

 

                INSERTION, TUNNELED CENTRAL 2021 15:37:00 [facility]



                VENOUS CATHETER WITH PORT,                                 



                WITHOUT FLUOROSCOPIC                                 



                GUIDANCE                                        

 

                HEMODIALYSIS    2021 15:32:43 [facility]

[facility]

 

                PROTHROMBIN TIME WITH INR 2021 10:26:00 Alexsander Toribio        [facility]

 

                PARTIAL THROMBOPLASTIN TIME 2021 10:26:00 [facility]



                (PTT)                                           

 

                BASIC METABOLIC PANEL 2021 10:26:00 [facility]



                                                Jae            

 

                HC COMPLETE BLD COUNT 2021 10:26:00 [facility]



                W/AUTO DIFF                     Jae            

 

                ABO AND RH CONFIRMATION 2021 10:26:00 [facility]

 

                ESTIMATED GFR   2021 10:26:00 [facility]



                                                Jae            

 

                TYPE AND SCREEN 2021 20:17:00 [facility]

[facility]

 

                XR NECK SOFT TISSUE 2021 15:00:00 [facility]

 

                BASIC METABOLIC PANEL 2021 10:05:00 [facility]



                                                Jae            

 

                HC COMPLETE BLD COUNT 2021 10:05:00 [facility]



                W/AUTO DIFF                     Jae            

 

                ESTIMATED GFR   2021 10:05:00 [facility]



                                                Jae            

 

                HEPATITIS B SURFACE ANTIGEN 2021 20:22:00 [facility]

 

                HEPATITIS B SURFACE AB, 2021 20:22:00 [facility]



                QUANTITATIVE                                    

 

                HEMODIALYSIS    2021 19:48:38 [facility]

ospital

 

                XR CHEST 1 VW PORTABLE 2021 05:27:43 Alexsander Toribio        [facility]

 

                AMYLASE LEVEL   2021 03:54:00 Radha Craft [facility]

 

                LIPASE LEVEL    2021 03:54:00 Radha Craft [facility]

 

                COMPREHENSIVE METABOLIC 2021 03:54:00 [facility]



                PANEL                                           

 

                ESTIMATED GFR   2021 03:54:00 [facility]

 

                COVID-19 QUALITATIVE RT-PCR 2021 01:00:00 [facility]

 

                HC COMPLETE BLD COUNT 2021 23:09:00 [facility]



                W/AUTO DIFF                                     

 

                PROTHROMBIN TIME WITH INR 2021 23:09:00 [facility]

 

                PARTIAL THROMBOPLASTIN TIME 2021 23:09:00 [facility]



                (PTT)                                           

 

                BASIC METABOLIC PANEL 2021 23:09:00 [facility]

 

                ESTIMATED GFR   2021 23:09:00 [facility]

 

                ECG 12-LEAD     2021 23:05:40 [facility]

 

                ECG ED PRELIMINARY 2021 22:57:05 [facility]



                INTERPRETATION                                  







Plan of Care







             Planned Activity Planned Date Details      Comments     Source

 

             Future Scheduled 2022   HEPATITIS B VACCINES (1              

[facility]



             Test         10:19:45     of 3 - 3-dose series)              Hospit

al



                                       [code = HEPATITIS B              



                                       VACCINES (1 of 3 -              



                                       3-dose series)]              

 

             Future Scheduled 2022   COVID-19 VACCINE (#1)              Me

[facility]



             Test         10:19:45     [code = COVID-19 VACCINE              Hos

pital



                                       (#1)]                     

 

             Future Scheduled 2022   65+ PNEUMOCOCCAL VACCINE             

 [facility]



             Test         10:19:45     (1 - PCV) [code = 65+              Hospit

al



                                       PNEUMOCOCCAL VACCINE (1              



                                       - PCV)]                   

 

             Future Scheduled 2022   Hepatitis C screening              Me

[facility]ist



             Test         10:19:45     (procedure) [code =              Hospital



                                       [id number]]                

 

             Future Scheduled 2022   SHINGLES VACCINES (1 of              

[facility]



             Test         10:19:45     2) [code = SHINGLES              Hospital



                                       VACCINES (1 of 2)]              

 

             Future Scheduled 2022   INFLUENZA VACCINE [code              

[facility]



             Test         10:19:45     = INFLUENZA VACCINE]              Hospita

l

 

             Future Scheduled 2022   INFLUENZA VACCINE (#1)              C

[facility]



             Test         00:00:00     [code = INFLUENZA              Medical Ce

nter



                                       VACCINE (#1)]              

 

             Future Scheduled 2022   Pneumococcal 65+ (1 -              Ba

[facility]



             Test         11:43:21     PCV) [code =              of Medicine



                                       Pneumococcal 65+ (1 -              



                                       PCV)]                     

 

             Future Scheduled 2022   TETANUS SHOT (ADULT)              [facility]



             Test         11:43:21     [code = TETANUS SHOT              of Medi

cine



                                       (ADULT)]                  

 

             Future Scheduled 2022   BMI FOLLOW UP PLAN [code             

 [facility]



             Test         11:43:21     = BMI FOLLOW UP PLAN]              of Med

icine

 

             Future Scheduled 2022   ZOSTER VACCINE (1 of 2)              

[facility]



             Test         11:43:21     [code = ZOSTER VACCINE              of Me

dicine



                                       (1 of 2)]                 

 

             Future Scheduled 2022   MEDICARE AWV (Initial)              B

[facility]



             Test         11:43:21     [code = MEDICARE AWV              of Medi

cine



                                       (Initial)]                

 

             Future Scheduled 2022   FALL SCREEN [code = FALL             

 [facility]



             Test         11:43:21     SCREEN]                   of Medicine

 

             Future Scheduled 2022   Screening for              [facility]

lege



             Test         11:43:21     osteoporosis (procedure)              of 

Medicine



                                       [code = 101573868]              

 

             Future Scheduled 2022   COVID-19 Vaccine (3 -              Ba

[facility]



             Test         11:43:21     Booster for Moderna              of Medic

ine



                                       series) [code = COVID-19              



                                       Vaccine (3 - Booster for              



                                       Moderna series)]              

 

             Future Scheduled 2022   FLU VACCINE > 6 MONTHS              B

[facility]



             Test         11:43:21     [code = FLU VACCINE > 6              of M

edicine



                                       MONTHS]                   

 

             Future Scheduled 2022   ECHO, COMPLETE [code = Expected:    [facility]

[facility]



             Test         00:00:00     [id number]]       2022,  of Medicine



                                                    Expires:     



                                                    2022   

 

             Future Scheduled 2022   TETANUS SHOT (ADULT)              [facility]



             Test         09:48:32     [code = TETANUS SHOT              of Medi

cine



                                       (ADULT)]                  

 

             Future Scheduled 2022   BMI FOLLOW UP PLAN [code             

 [facility]



             Test         09:48:32     = BMI FOLLOW UP PLAN]              of Med

icine

 

             Future Scheduled 2022   ZOSTER VACCINE (1 of 2)              

[facility]



             Test         09:48:32     [code = ZOSTER VACCINE              of Me

dicine



                                       (1 of 2)]                 

 

             Future Scheduled 2022   MEDICARE AWV (Initial)              B

[facility]



             Test         09:48:32     [code = MEDICARE AWV              of Medi

cine



                                       (Initial)]                

 

             Future Scheduled 2022   FALL SCREEN [code = FALL             

 [facility]



             Test         09:48:32     SCREEN]                   of Medicine

 

             Future Scheduled 2022   Screening for              [facility]

lege



             Test         09:48:32     osteoporosis (procedure)              of 

Medicine



                                       [code = 704868634]              

 

             Future Scheduled 2022   Pneumococcal 65+ (1 of 1             

 [facility]



             Test         09:48:32     - PPSV23) [code =              of Medicin

e



                                       Pneumococcal 65+ (1 of 1              



                                       - PPSV23)]                

 

             Future Scheduled 2022   COVID-19 Vaccine (3 -              Ba

[facility]



             Test         09:48:32     Booster for Moderna              of Medic

ine



                                       series) [code = COVID-19              



                                       Vaccine (3 - Booster for              



                                       Moderna series)]              

 

             Future Scheduled 2022   FLU VACCINE > 6 MONTHS              B

[facility]



             Test         09:48:32     [code = FLU VACCINE > 6              of M

edicine



                                       MONTHS]                   

 

             Future Scheduled 2022   DEPRESSION SCREENING              [facility]



             Test         00:00:00     (12+) [code = DEPRESSION              Med

[facility]



                                       SCREENING (12+)]              

 

             Future Scheduled 2022   FALLS RISK SCREENING              [facility]



             Test         00:00:00     [code = FALLS RISK              Medical C

enter



                                       SCREENING]                

 

             Future Scheduled 2021   Pneumococcal 65+ (1 of 2             

 [facility]



             Test         11:13:21     - PPSV23) [code =              of Medicin

e



                                       Pneumococcal 65+ (1 of 2              



                                       - PPSV23)]                

 

             Future Scheduled 2021   TETANUS SHOT (ADULT)              [facility]



             Test         11:13:21     [code = TETANUS SHOT              of Medi

cine



                                       (ADULT)]                  

 

             Future Scheduled 2021   BMI FOLLOW UP PLAN [code             

 [facility]



             Test         11:13:21     = BMI FOLLOW UP PLAN]              of Med

icine

 

             Future Scheduled 2021   ZOSTER VACCINE (1 of 2)              

[facility]



             Test         11:13:21     [code = ZOSTER VACCINE              of Me

dicine



                                       (1 of 2)]                 

 

             Future Scheduled 2021   MEDICARE AWV (Initial)              B

[facility]



             Test         11:13:21     [code = MEDICARE AWV              of Medi

cine



                                       (Initial)]                

 

             Future Scheduled 2021   FALL SCREEN [code = FALL             

 [facility]



             Test         11:13:21     SCREEN]                   of Medicine

 

             Future Scheduled 2021   Screening for              [facility]

lege



             Test         11:13:21     osteoporosis (procedure)              of 

Medicine



                                       [code = 143470409]              

 

             Future Scheduled 2021   FLU VACCINE > 6 MONTHS              B

[facility]



             Test         11:13:21     [code = FLU VACCINE > 6              of M

edicine



                                       MONTHS]                   

 

             Future Scheduled 2021   COVID-19 Vaccine (3 -              Ba

[facility]



             Test         11:13:21     Booster for Moderna              of Medic

ine



                                       series) [code = COVID-19              



                                       Vaccine (3 - Booster for              



                                       Moderna series)]              

 

             Future Scheduled 2021   ELECTROCARDIOGRAM              [facility]



             Test         15:02:49     COMPLETE [code = 59098]              of M

edicine

 

             Future Scheduled 2010   MEDICARE ANNUAL WELLNESS             

 [facility]



             Test         00:00:00     (YEAR 2 or FIRST YEAR if              [facility]



                                       no IPPE) [code =              



                                       MEDICARE ANNUAL WELLNESS              



                                       (YEAR 2 or FIRST YEAR if              



                                       no IPPE)]                 

 

             Future Scheduled 2009-07-15   PNEUMOCOCCAL 65+ YRS (1              

[facility]



             Test         00:00:00     - PCV) [code =              Medical Cente

r



                                       PNEUMOCOCCAL 65+ YRS (1              



                                       - PCV)]                   

 

             Future Scheduled 1994-07-15   SHINGLES VACCINES (1 of              

[facility]



             Test         00:00:00     2) [code = SHINGLES              [facility] 

Center



                                       VACCINES (1 of 2)]              

 

             Future Scheduled 1963-07-15   DTAP/TDAP/TD VACCINES (1             

 [facility]



             Test         00:00:00     - Tdap) [code =              Medical Cent

er



                                       DTAP/TDAP/TD VACCINES (1              



                                       - Tdap)]                  

 

             Future Scheduled 1956-07-15   Tobacco Cessation              [facility]



             Test         00:00:00     Counseling and Screening              [facility]



                                       (12+) [code = Tobacco              



                                       Cessation Counseling and              



                                       Screening (12+)]              

 

             Future Scheduled 1945-01-15   COVID-19 VACCINE (#1)              [facility]

[facility]



             Test         00:00:00     [code = COVID-19 VACCINE              [facility]



                                       (#1)]                     

 

             Future Scheduled 1944   DXA SCAN [code = DXA              [facility]



             Test         00:00:00     SCAN]                     [facility]

 

             Future Scheduled              65+ PNEUMOCOCCAL VACCINE             

 [facility]



             Test                      (1 of 4 - PCV13) [code =              Hos

pital



                                       65+ PNEUMOCOCCAL VACCINE              



                                       (1 of 4 - PCV13)]              

 

             Future Scheduled              COVID-19 VACCINE (1)              Met

hodist



             Test                      [code = COVID-19 VACCINE              Hos

pital



                                       (1)]                      

 

             Future Scheduled              Hepatitis C screening              Me

[facility]ist



             Test                      (procedure) [code =              Hospital



                                       [id number]]                

 

             Future Scheduled              SHINGLES VACCINES (#1)              M

ethodist



             Test                      [code = SHINGLES              Hospital



                                       VACCINES (#1)]              

 

             Future Scheduled              INFLUENZA VACCINE [code              

[facility]



             Test                      = INFLUENZA VACCINE]              Hospita

l







Encounters







        Start   End     Encounter Admission Attending Care    Care    Encounter 

Source



        Date/Time Date/Time Type    Type    Clinicians Facility Department ID   

   

 

        2022         Inpatient QUAN [place], [facility]     [id number] 

[facility]



        08:20:00                         Kourtney                  84      [facility]

 

        2021         Inpatient QUAN Paris [facility]     [id number] 

[facility]



        00:04:00                         Kourtney                  68      [facility]

 

        2021         Inpatient QUAN Juares,   [facility]    [id number] 

[facility]



        11:30:00                         Santos                  88      [facility]

 

        2022 Office          Cheljaida,  BCM     1.2.840.114 [id number]

17 [facility]



        10:40:00 11:43:56 Visit           Mihail  AMBULATOR 350.1.13.21         

College



                                        Quentin Y       0.2.7.2.686         of



                                                        [phone]         [facility]

corin



                                                        375             e

 

        2022 Outpatient                 [facility]     [id number]

8 [facility]



        09:11:01 10:01:54                                                 Colleg

e



                                                                        of



                                                                        Medicin



                                                                        e

 

        2022 Office          Chelu,  BCM     1.2.840.114 [id number]

68 [facility]



        11:40:00 13:37:44 Visit           Mihail  AMBULATOR 350.1.13.21         

College



                                        Quentin Y       0.2.7.2.686         of



                                                        [phone]         [facility]

corin



                                                        375             e

 

        2021 Office          CHELU,  BCM     1.2.840.114 [id number]

[address] [place] [address]



        13:18:32 12:17:28 Visit           MIHAIL  AMBULATOR 350.1.13.21         

College



                                                Y       0.2.7.2.686         



                                                        [phone]         Medi

corin



                                                        375             e

 

        2021-10-31 2021-11-15 Inpatient UR      VALENTINO BRUNNER    Cardiology 

[id number] [facility]



        11:37:00 14:09:00                 JEREMIAH                           

 

        2021 Outpatient                 BC     BC     [id number]

1 [facility]



        00:00:00 23:59:00                                                 Colleg

e



                                                                        of



                                                                        Medicin



                                                                        e

 

        2021-10-31 2021-10-31 [facility]     [id number]

9 [facility]



        11:37:00 23:59:00                                                 Colleg

e



                                                                        of



                                                                        Medicin



                                                                        e

 

        2021-10-07 2021-10-31 Inpatient QUAN Paris [facility]     [id number]

[id number] [facility]



        09:30:00 00:00:00                 Kourtney                  23      [facility]

 

        2021 Inpatient SHRUTI Alfonso  [facility]   MED     [id number]

72 [facility]



        06:53:00 18:04:00                 Ryder                   26      [facility]

 

        2021 [facility]         Eddie Urrutia 1.2.840.1 [id number]

[id number]

                                        Methodi



        23:35:00 13:57:00 Encounter         Yudy Rivero 10232.1.

1         [address]



                                                3.430.2.7                 Hospit

a



                                                .3.632834                 l



                                                .8                      

 

        2021 Surgery         Genaro 1.2.840.1 [id number]

[id number] 

Methodi



        10:00:00 11:40:00                 Omar Merrill 13595.1.1         [address]



                                                3.430.2.7                 Hospit

a



                                                .3.651791                 l



                                                .8                      

 

        2021 Anesthesia         Vivian Samuels 1.2.840.1 [id number]

[id number] 

[id number]                              Methodi



        10:03:00 11:07:00 Event           Arash Schroeder 13733.1.1       

  [address]



                                                3.430.2.7                 Hospit

a



                                                .3.138094                 l



                                                .8                      

 

        2021 Travel                  1.2.840.1 1.2.662.773 [id number]

[id number] Methodi



        00:00:00 00:00:00                         33479.1.1 350.1.13.43 648     

st



                                                3.430.2.7 0.2.7.3.698         Ho

spita



                                                .3.416567 084.8           l



                                                .8                      

 

        2021 Telephone         Llanes, 1.2.840.1 [id number] 

[id number] Methodi



        00:00:00 00:00:00                 Bob  95716.1.1         [address]



                                                3.430.2.7                 Hospit

a



                                                .3.406650                 l



                                                .8                      

 

        2021 Emergency         James Wiley 1.2.840.1 [id number]

[id number] 

[id number]                              Methodi



        15:57:00 20:25:00                 Radha Craft 91042.1.1        

 [address]



                                        Diab, Heber 3.430.2.7                 Ho

spita



                                        Ricky Brush Jae .3.360031       

          l



                                        Irving, Iti .8                      

 

        2021 Anesthesia         Nathaniel Mariscal V. 1.2.840.1 

[id number] 

[id number]                              Methodi



        09:38:00 11:00:00 Event           Lisethchino Tez AYAKA 34135.1.1         2

[address]     [address]



                                                3.430.2.7                 Hospit

a



                                                .3.145025                 l



                                                .8                      

 

        2021 Surgery         Alexsander Toribio 1.2.840.1 [id number]

74842 Methodi



        09:00:00 10:05:00                         69445.1.1         [address]



                                                3.430.2.7                 Hospit

a



                                                .3.331164                 l



                                                .8                      

 

        2021 Travel                  1.2.840.1 1.2.269.995 [id number]

[id number] Methodi



        00:00:00 00:00:00                         68151.1.1 350.1.13.43 406     

st



                                                3.430.2.7 0.2.7.3.698         Ho

spita



                                                .3.872578 084.8           l



                                                .8                      

 

        2021 Outpatient         Raju_P  MMG     [facility]     [id number]

[address]



        01:28:00 01:28:00                                         0208    da



                                                                        Medical



                                                                        Group







Results







           Test Description Test Time  Test Comments Results    Result Comments 

Source









                    ANTI-MITOCHONDRIAL AB, REFLEX TO TITER 2021-11-15 11:05:20 









                      Test Item  Value      Reference Range Interpretation Comme

nts









             SCAN RESULT (test code = 3490460)                                  

      



SARS-COV2/RT-PCR (HS &amp; REF LABS)2021-11-15 09:33:41





             Test Item    Value        Reference Range Interpretation Comments

 

             SARS-COV2/RT-PCR (test Negative     Not Detected, Negative,        

      



             code = 0604487)              See external report for              



                                       linked test               

 

             SARS-COV-2 PERFORMING LAB [facility] BRETT                             



             (test code = 2861553)                                        



Negative result for this test determines that SARS-CoV-2 RNA was not present in 
the specimen above the Limit of Detection (LOD). However, Negative results do 
not preclude SARS-CoV-2 infection and should not be used as the sole basis for 
treatment or patient management decisions. Negative results must be combined 
with clinical observations, patient history, and epidemiological information. A 
false negative result may occur if a specimen is improperly collected, 
transported or handled. A false negative result should be considered if 
patient's recent exposures or clinical presentation indicate that COVID-19 
(SARS-CoV-2) is likely and diagnostic tests for other causes of illness are 
negative. Re-testing should be considered in cases of suspected false 
negatives.The limit of detection for this assay is 800 copies/mL.This SARS CoV-2
test is a real-time RT-PCR test intended for the qualitative detection of 
nucleic acid from SARS-CoV-2 in a nasopharyngeal swab specimen collected from 
individuals suspected of COVID-19 by their healthcare provider.This test has not
been Food and Drug Administration (FDA) cleared or approved. This is a modified 
version of an approved Emergency Use Authorization (EUA) and is in the process 
of review by the FDA. Once authorized by the FDA, the issued EUA will be 
effective until the declaration that circumstances exist justifying the 
authorization of the emergency use ofin vitro diagnostic tests for detection 
and/or diagnosis of COVID-19 is terminated under Section 564(b)(2) of the Act or
the EUA is revoked under Section 564(g) of the Act.Fact Sheet for Healthcare 
Prov
iders:https://www.[employer]/sites/default/files/product/documents/Fact_Sheet_HC

_Rvfcfhvpt_Vvhe_UDBF-JcQ-6.pdfFact Sheet for Healthcare 
Patients:https://www.[employer]/sites/default/files/product/docume
nts/Xbks_Boulz_Ubqyympx_Rivd_YGWJ-IaC-0.pdfPerforming Laboratory:[facility].[place], [place] [address]POCT-GLUCOSE METER
2021 18:39:33





             Test Item    Value        Reference Range Interpretation Comments

 

             POC-GLUCOSE METER 83 mg/dL                         : TESTED A

T [facility] [address]



             (BEAKER) (test code =                                        ALINE MARLEY [facility],



             1538)                                               [address]:



                                                                 /Techni

lorelei ID =



                                                                 [id number] for Reye s Zenobia



POCT-GLUCOSE [id number] 16:06:53





             Test Item    Value        Reference Range Interpretation Comments

 

             POC-GLUCOSE METER 80 mg/dL                         : TESTED A

T [facility] [address]



             (BEAKER) (test code =                                        ALINE MARLEY [facility],



             1538)                                               [address]:



                                                                 /Techni

lorelei ID =



                                                                 [id number] for Reye s, Erika



BASIC METABOLIC [id number] 05:01:01





             Test Item    Value        Reference Range Interpretation Comments

 

             SODIUM (BEAKER) 135 meq/L    136-145      L            



             (test code = 381)                                        

 

             POTASSIUM (BEAKER) 3.8 meq/L    3.5-5.1                   Specimen 

slightly



             (test code = 379)                                        hemolyzed

 

             CHLORIDE (BEAKER) 102 meq/L                        



             (test code = 382)                                        

 

             CO2 (BEAKER) (test 21 meq/L     22-29        L            



             code = 355)                                         

 

             BLOOD UREA NITROGEN 13 mg/dL     7-21                      



             (BEAKER) (test code                                        



             = 354)                                              

 

             CREATININE (BEAKER) 1.96 mg/dL   0.57-1.25    H            Specimen

 slightly



             (test code = 358)                                        hemolyzed

 

             GLUCOSE RANDOM 87 mg/dL                         



             (BEAKER) (test code                                        



             = 652)                                              

 

             CALCIUM (BEAKER) 8.7 mg/dL    8.4-10.2                  



             (test code = 697)                                        

 

             EGFR (BEAKER) (test 25 mL/min/1.73                           ESTIMA

SHAHRIAR GFR IS



             code = 1092) sq m                                   NOT AS ACCURATE

 AS



                                                                 CREATININE



                                                                 CLEARANCE IN



                                                                 PREDICTING



                                                                 GLOMERULAR



                                                                 FILTRATION RATE

.



                                                                 ESTIMATED GFR I

S



                                                                 NOT APPLICABLE 

FOR



                                                                 DIALYSIS PATIEN

TS.



 ID - LIANNE [facility] (HEMOGRAM ONLY)2021 04:32:58





             Test Item    Value        Reference Range Interpretation Comments

 

             WHITE BLOOD CELL COUNT (BEAKER) 6.7 K/ L     3.5-10.5              

    



             (test code = 775)                                        

 

             RED BLOOD CELL COUNT (BEAKER) 3.20 M/ L    3.93-5.22    L          

  



             (test code = 761)                                        

 

             HEMOGLOBIN (BEAKER) (test code = 8.4 GM/DL    11.2-15.7    L       

     



             410)                                                

 

             HEMATOCRIT (BEAKER) (test code = 29.0 %       34.1-44.9    L       

     



             411)                                                

 

             MEAN CORPUSCULAR VOLUME (BEAKER) 90.6 fL      79.4-94.8            

     



             (test code = 753)                                        

 

             MEAN CORPUSCULAR HEMOGLOBIN 26.3 pg      25.6-32.2                 



             (BEAKER) (test code = 751)                                        

 

             MEAN CORPUSCULAR HEMOGLOBIN CONC 29.0 GM/DL   32.2-35.5    L       

     



             (BEAKER) (test code = 752)                                        

 

             RED CELL DISTRIBUTION WIDTH 19.1 %       11.7-14.4    H            



             (BEAKER) (test code = 412)                                        

 

             PLATELET COUNT (BEAKER) (test 231 K/CU MM  150-450                 

  



             code = 756)                                         

 

             MEAN PLATELET VOLUME (BEAKER) 10.3 fL      9.4-12.3                

  



             (test code = 754)                                        

 

             NUCLEATED RED BLOOD CELLS 0 /100 WBC   0-0                       



             (BEAKER) (test code = 413)                                        



POCT-GLUCOSE [id number] 07:20:32





             Test Item    Value        Reference Range Interpretation Comments

 

             POC-GLUCOSE METER 82 mg/dL                         : TESTED A

T [facility] [address]



             (BEAKER) (test code =                                        [place],



             1538)                                               [id number]:



                                                                 /Techni

lorelei ID =



                                                                 [id number] for Nicole Banegas



BASIC METABOLIC [id number] 05:14:50





             Test Item    Value        Reference Range Interpretation Comments

 

             SODIUM (BEAKER) 131 meq/L    136-145      L            



             (test code = 381)                                        

 

             POTASSIUM (BEAKER) 4.1 meq/L    3.5-5.1                   Specimen 

slightly



             (test code = 379)                                        hemolyzed

 

             CHLORIDE (BEAKER) 98 meq/L                         



             (test code = 382)                                        

 

             CO2 (BEAKER) (test 25 meq/L     22-29                     



             code = 355)                                         

 

             BLOOD UREA NITROGEN 29 mg/dL     7-21         H            



             (BEAKER) (test code                                        



             = 354)                                              

 

             CREATININE (BEAKER) 2.57 mg/dL   0.57-1.25    H            Specimen

 slightly



             (test code = 358)                                        hemolyzed

 

             GLUCOSE RANDOM 86 mg/dL                         



             (BEAKER) (test code                                        



             = 652)                                              

 

             CALCIUM (BEAKER) 8.7 mg/dL    8.4-10.2                  



             (test code = 697)                                        

 

             EGFR (BEAKER) (test 18 mL/min/1.73                           ESTIMA

SHAHRIAR GFR IS



             code = 1092) sq m                                   NOT AS ACCURATE

 AS



                                                                 CREATININE



                                                                 CLEARANCE IN



                                                                 PREDICTING



                                                                 GLOMERULAR



                                                                 FILTRATION RATE

.



                                                                 ESTIMATED GFR I

S



                                                                 NOT APPLICABLE 

FOR



                                                                 DIALYSIS PATIEN

TS.



 ID - DBCBC (HEMOGRAM ONLY)2021 04:51:11





             Test Item    Value        Reference Range Interpretation Comments

 

             WHITE BLOOD CELL COUNT (BEAKER) 7.1 K/ L     3.5-10.5              

    



             (test code = 775)                                        

 

             RED BLOOD CELL COUNT (BEAKER) 3.14 M/ L    3.93-5.22    L          

  



             (test code = 761)                                        

 

             HEMOGLOBIN (BEAKER) (test code = 8.4 GM/DL    11.2-15.7    L       

     



             410)                                                

 

             HEMATOCRIT (BEAKER) (test code = 28.5 %       34.1-44.9    L       

     



             411)                                                

 

             MEAN CORPUSCULAR VOLUME (BEAKER) 90.8 fL      79.4-94.8            

     



             (test code = 753)                                        

 

             MEAN CORPUSCULAR HEMOGLOBIN 26.8 pg      25.6-32.2                 



             (BEAKER) (test code = 751)                                        

 

             MEAN CORPUSCULAR HEMOGLOBIN CONC 29.5 GM/DL   32.2-35.5    L       

     



             (BEAKER) (test code = 752)                                        

 

             RED CELL DISTRIBUTION WIDTH 19.0 %       11.7-14.4    H            



             (BEAKER) (test code = 412)                                        

 

             PLATELET COUNT (BEAKER) (test 214 K/CU MM  150-450                 

  



             code = 756)                                         

 

             MEAN PLATELET VOLUME (BEAKER) 10.4 fL      9.4-12.3                

  



             (test code = 754)                                        

 

             NUCLEATED RED BLOOD CELLS 0 /100 WBC   0-0                       



             (BEAKER) (test code = 413)                                        



BASIC METABOLIC [id number] 05:51:30





             Test Item    Value        Reference Range Interpretation Comments

 

             SODIUM (BEAKER) 136 meq/L    136-145                   



             (test code = 381)                                        

 

             POTASSIUM (BEAKER) 3.7 meq/L    3.5-5.1                   



             (test code = 379)                                        

 

             CHLORIDE (BEAKER) 101 meq/L                        



             (test code = 382)                                        

 

             CO2 (BEAKER) (test 27 meq/L     22-29                     



             code = 355)                                         

 

             BLOOD UREA NITROGEN 17 mg/dL     7-21                      



             (BEAKER) (test code                                        



             = 354)                                              

 

             CREATININE (BEAKER) 1.91 mg/dL   0.57-1.25    H            



             (test code = 358)                                        

 

             GLUCOSE RANDOM 88 mg/dL                         



             (BEAKER) (test code                                        



             = 652)                                              

 

             CALCIUM (BEAKER) 9.0 mg/dL    8.4-10.2                  



             (test code = 697)                                        

 

             EGFR (BEAKER) (test 25 mL/min/1.73                           ESTIMA

SHAHRIAR GFR IS



             code = 1092) sq m                                   NOT AS ACCURATE

 AS



                                                                 CREATININE



                                                                 CLEARANCE IN



                                                                 PREDICTING



                                                                 GLOMERULAR



                                                                 FILTRATION RATE

.



                                                                 ESTIMATED GFR I

S



                                                                 NOT APPLICABLE 

FOR



                                                                 DIALYSIS PATIEN

TS.



 ID - MARICEL [id number] 05:50:23





             Test Item    Value        Reference Range Interpretation Comments

 

             MAGNESIUM (BEAKER) (test code = 1.8 mg/dL    1.6-2.6               

    



             627)                                                



 ID - MARICEL [id number] 05:50:23





             Test Item    Value        Reference Range Interpretation Comments

 

             PHOSPHORUS (BEAKER) (test code = 2.4 mg/dL    2.3-4.7              

     



             604)                                                



 ID - MARICEL GCBC W/PLT COUNT &amp; AUTO [id number] 05:37:19





             Test Item    Value        Reference Range Interpretation Comments

 

             WHITE BLOOD CELL COUNT (BEAKER) 7.5 K/ L     3.5-10.5              

    



             (test code = 775)                                        

 

             RED BLOOD CELL COUNT (BEAKER) 3.15 M/ L    3.93-5.22    L          

  



             (test code = 761)                                        

 

             HEMOGLOBIN (BEAKER) (test code = 8.3 GM/DL    11.2-15.7    L       

     



             410)                                                

 

             HEMATOCRIT (BEAKER) (test code = 28.5 %       34.1-44.9    L       

     



             411)                                                

 

             MEAN CORPUSCULAR VOLUME (BEAKER) 90.5 fL      79.4-94.8            

     



             (test code = 753)                                        

 

             MEAN CORPUSCULAR HEMOGLOBIN 26.3 pg      25.6-32.2                 



             (BEAKER) (test code = 751)                                        

 

             MEAN CORPUSCULAR HEMOGLOBIN CONC 29.1 GM/DL   32.2-35.5    L       

     



             (BEAKER) (test code = 752)                                        

 

             RED CELL DISTRIBUTION WIDTH 19.2 %       11.7-14.4    H            



             (BEAKER) (test code = 412)                                        

 

             PLATELET COUNT (BEAKER) (test 215 K/CU MM  150-450                 

  



             code = 756)                                         

 

             MEAN PLATELET VOLUME (BEAKER) 10.6 fL      9.4-12.3                

  



             (test code = 754)                                        

 

             NUCLEATED RED BLOOD CELLS 0 /100 WBC   0-0                       



             (BEAKER) (test code = 413)                                        

 

             NEUTROPHILS RELATIVE PERCENT 72 %                                  

 



             (BEAKER) (test code = 429)                                        

 

             LYMPHOCYTES RELATIVE PERCENT 11 %                                  

 



             (BEAKER) (test code = 430)                                        

 

             MONOCYTES RELATIVE PERCENT 14 %                                   



             (BEAKER) (test code = 431)                                        

 

             EOSINOPHILS RELATIVE PERCENT 2 %                                   

 



             (BEAKER) (test code = 432)                                        

 

             BASOPHILS RELATIVE PERCENT 1 %                                    



             (BEAKER) (test code = 437)                                        

 

             NEUTROPHILS ABSOLUTE COUNT 5.41 K/ L    1.56-6.13                 



             (BEAKER) (test code = 670)                                        

 

             LYMPHOCYTES ABSOLUTE COUNT 0.84 K/ L    1.18-3.74    L            



             (BEAKER) (test code = 414)                                        

 

             MONOCYTES ABSOLUTE COUNT (BEAKER) 1.03 K/ L    0.24-0.36    H      

      



             (test code = 415)                                        

 

             EOSINOPHILS ABSOLUTE COUNT 0.15 K/ L    0.04-0.36                 



             (BEAKER) (test code = 416)                                        

 

             BASOPHILS ABSOLUTE COUNT (BEAKER) 0.06 K/ L    0.01-0.08           

      



             (test code = 417)                                        

 

             IMMATURE GRANULOCYTES-RELATIVE 1 %          0-1                    

   



             PERCENT (BEAKER) (test code =                                      

  



             2801)                                               



CALCIUM, [id number] 05:20:50





             Test Item    Value        Reference Range Interpretation Comments

 

             CALCIUM IONIZED (BEAKER) (test 1.12 mmol/L  1.12-1.27              

   



             code = 698)                                         

 

             PH, BLOOD (BEAKER) (test code = 7.43                               

    



             1810)                                               



ANG, TUNNELED CATHETER [id number] 13:32:00Reason for Central 
Line/PICC?-&gt;Need for hemodialysis accessReason for exam:-&gt;needs permanent 
access************************************************************[facility] : 1945 Sex: 
F************************************************************FINAL REPORT 
PATIENT ID: [id number] PROCEDURE: Tunneled dialysis catheter placement Procedural 
PersonnelAttending physician(s): Jazmyn Alcaraz physician(s): NoneResident 
physician(s): NoneAdvanBaptist [facility] practice provider(s): None Pre-procedure diagnosis: 
ESRDPost-procedure diagnosis: SameIndication (QCDR): Performance of 
hemodialysisAdditional clinical history: None Complications: No immediate 
complications. IMPRESSION: Insertion of right-sided tunneled dialysis catheter, 
with tip in the expected locationof the cavoatrial junction. Plan: The catheter 
may be used immediately.______________________________
_________________________________ PROCEDURE SUMMARY:- Venous access with 
ultrasound guidance- Tunneled dialysis catheter insertion with fluoroscopic 
guidance- Additional procedure(s): None PROCEDURE DETAILS: Pre-procedureConsent:
Informed consent for the procedure including risks, benefits and alternatives 
was obtained and time-out was performed prior to the procedure.Preparation 
(MIPS): The site wasprepared and draped using all elements of maximal sterile 
barrier technique including sterile gloves, sterile gown, cap, mask, large 
sterile sheet, sterile ultrasound probe cover, hand hygiene and cutaneous 
antisepsis with 2% chlorhexidine. Medical reason for site preparation exception 
(MIPS): Not applicable Anesthesia/sedationLevel of anesthesia/sedation: Moderate
sedation (conscious sedation) 1mg Versed, 50mcg fentanylAnesthesia/sedation 
administered by: Independent trained observer under attending supervision with 
continuous monitoring of the patient\\s level of consciousness and 
physiologic statusTotal intra-service sedation time (minutes): 30 AccessLocal 
anesthesia was administered. The vessel was sonographically evaluated and 
determined to be patent. Real time ultrasound was used to visualize needle entry
into the vessel and a permanent image was stored.Vein accessed (QCDR): Internal 
jugular veinInternal jugular vein patency (QCDR): Patent or otherwise accessible
on at least one sideAccess technique: Micropuncture set with 21 gauge needle 
Catheter placementAn incision was made near the venous access site and the 
catheter was tunneled subcutaneously to the venous access site. The catheter was
advanced via a peel-away sheath into the vein under fluoroscopic guidance. 
Catheter tip location was fluoroscopically verified and a permanent image was 
stored.Catheter placed: Duraflow 2Catheter cuff-to-tip length (cm): 19Catheter 
flush: Heparin (1000 units/mL) ClosureA sterile dressing wasapplied.Access site 
closure technique: Tissue adhesiveCatheter securement technique: Non-absorbable 
suture Radiation DoseFluoroscopy time (minutes): 0.0 Reference air kerma (mGy): 
0.1 Additional DetailsAdditional description of procedure: NoneEquipment 
details: NoneSpecimens removed: NoneEstimated blood loss (mL): Less than 
10Standardized report: [email or web address] AttestationSigner name: 
Jazmyn Ding attest that I was present for the entire procedure. I reviewed the 
stored images and agree with the report as written. Signed: Jazmyn Eastman MDReport
Verified Date/Time: 2021 13:32:05 Electronically signed by: JAZMYN EASTMAN MD
on 2021 01:32 PMCOMPREHENSIVE METABOLIC [id number] 06:10:52





             Test Item    Value        Reference Range Interpretation Comments

 

             TOTAL PROTEIN 7.0 gm/dL    6.0-8.3                   



             (BEAKER) (test code =                                        



             770)                                                

 

             ALBUMIN (BEAKER) 2.9 g/dL     3.5-5.0      L            



             (test code = 1145)                                        

 

             ALKALINE PHOSPHATASE 391 U/L             H            



             (BEAKER) (test code =                                        



             346)                                                

 

             BILIRUBIN TOTAL 2.1 mg/dL    0.2-1.2      H            



             (BEAKER) (test code =                                        



             377)                                                

 

             SODIUM (BEAKER) (test 133 meq/L    136-145      L            



             code = 381)                                         

 

             POTASSIUM (BEAKER) 3.9 meq/L    3.5-5.1                   



             (test code = 379)                                        

 

             CHLORIDE (BEAKER) 100 meq/L                        



             (test code = 382)                                        

 

             CO2 (BEAKER) (test 23 meq/L     22-29                     



             code = 355)                                         

 

             BLOOD UREA NITROGEN 40 mg/dL     7-21         H            



             (BEAKER) (test code =                                        



             354)                                                

 

             CREATININE (BEAKER) 2.53 mg/dL   0.57-1.25    H            



             (test code = 358)                                        

 

             GLUCOSE RANDOM 99 mg/dL                         



             (BEAKER) (test code =                                        



             652)                                                

 

             CALCIUM (BEAKER) 8.8 mg/dL    8.4-10.2                  



             (test code = 697)                                        

 

             AST (SGOT) (BEAKER) 25 U/L       5-34                      



             (test code = 353)                                        

 

             ALT (SGPT) (BEAKER) 21 U/L       6-55                      



             (test code = 347)                                        

 

             EGFR (BEAKER) (test 18 mL/min/1.73                           ESTIMA

SHAHRIAR GFR IS



             code = 1092) sq m                                   NOT AS ACCURATE

 AS



                                                                 CREATININE



                                                                 CLEARANCE IN



                                                                 PREDICTING



                                                                 GLOMERULAR



                                                                 FILTRATION RATE

.



                                                                 ESTIMATED GFR I

S



                                                                 NOT APPLICABLE 

FOR



                                                                 DIALYSIS PATIEN

TS.



 ID - TUNDE [id number] 06:08:31





             Test Item    Value        Reference Range Interpretation Comments

 

             PHOSPHORUS (BEAKER) (test code = 3.4 mg/dL    2.3-4.7              

     



             604)                                                



 ID - TUNDE [id number] 06:08:29





             Test Item    Value        Reference Range Interpretation Comments

 

             MAGNESIUM (BEAKER) (test code = 2.3 mg/dL    1.6-2.6               

    



             627)                                                



 ID - TUNDE MCBC W/PLT COUNT &amp; AUTO [id number] 05:49:12





             Test Item    Value        Reference Range Interpretation Comments

 

             WHITE BLOOD CELL COUNT (BEAKER) 8.4 K/ L     3.5-10.5              

    



             (test code = 775)                                        

 

             RED BLOOD CELL COUNT (BEAKER) 3.26 M/ L    3.93-5.22    L          

  



             (test code = 761)                                        

 

             HEMOGLOBIN (BEAKER) (test code = 8.6 GM/DL    11.2-15.7    L       

     



             410)                                                

 

             HEMATOCRIT (BEAKER) (test code = 28.7 %       34.1-44.9    L       

     



             411)                                                

 

             MEAN CORPUSCULAR VOLUME (BEAKER) 88.0 fL      79.4-94.8            

     



             (test code = 753)                                        

 

             MEAN CORPUSCULAR HEMOGLOBIN 26.4 pg      25.6-32.2                 



             (BEAKER) (test code = 751)                                        

 

             MEAN CORPUSCULAR HEMOGLOBIN CONC 30.0 GM/DL   32.2-35.5    L       

     



             (BEAKER) (test code = 752)                                        

 

             RED CELL DISTRIBUTION WIDTH 18.9 %       11.7-14.4    H            



             (BEAKER) (test code = 412)                                        

 

             PLATELET COUNT (BEAKER) (test 207 K/CU MM  150-450                 

  



             code = 756)                                         

 

             MEAN PLATELET VOLUME (BEAKER) 10.9 fL      9.4-12.3                

  



             (test code = 754)                                        

 

             NUCLEATED RED BLOOD CELLS 0 /100 WBC   0-0                       



             (BEAKER) (test code = 413)                                        

 

             NEUTROPHILS RELATIVE PERCENT 77 %                                  

 



             (BEAKER) (test code = 429)                                        

 

             LYMPHOCYTES RELATIVE PERCENT 7 %                                   

 



             (BEAKER) (test code = 430)                                        

 

             MONOCYTES RELATIVE PERCENT 14 %                                   



             (BEAKER) (test code = 431)                                        

 

             EOSINOPHILS RELATIVE PERCENT 1 %                                   

 



             (BEAKER) (test code = 432)                                        

 

             BASOPHILS RELATIVE PERCENT 1 %                                    



             (BEAKER) (test code = 437)                                        

 

             NEUTROPHILS ABSOLUTE COUNT 6.47 K/ L    1.56-6.13    H            



             (BEAKER) (test code = 670)                                        

 

             LYMPHOCYTES ABSOLUTE COUNT 0.57 K/ L    1.18-3.74    L            



             (BEAKER) (test code = 414)                                        

 

             MONOCYTES ABSOLUTE COUNT (BEAKER) 1.16 K/ L    0.24-0.36    H      

      



             (test code = 415)                                        

 

             EOSINOPHILS ABSOLUTE COUNT 0.12 K/ L    0.04-0.36                 



             (BEAKER) (test code = 416)                                        

 

             BASOPHILS ABSOLUTE COUNT (BEAKER) 0.04 K/ L    0.01-0.08           

      



             (test code = 417)                                        

 

             IMMATURE GRANULOCYTES-RELATIVE 1 %          0-1                    

   



             PERCENT (BEAKER) (test code =                                      

  



             2801)                                               



CALCIUM, [id number] 05:44:37





             Test Item    Value        Reference Range Interpretation Comments

 

             CALCIUM IONIZED (BEAKER) (test 1.14 mmol/L  1.12-1.27              

   



             code = 698)                                         

 

             PH, BLOOD (BEAKER) (test code = 7.37                               

    



             1810)                                               



RAD, CHEST, 2 [id number] 01:00:00Should not raise armsReason for exam:-
&gt;PPM implnatShould this be performed at the bedside?-&gt;No
************************************************************[facility] : 1945 Sex: 
F************************************************************FINAL REPORT 
PATIENT ID: [id number] Chest one view. Clinical history: PPM implant Comparison: 
Chest radiograph 2021. Technique: A single frontal view of the chest was 
obtained. Findings:There is a left-sided pacemaker with leads overlying the 
right atrium, right ventricle and coronary sinus. There is a right IJ central 
venous catheter with tip in the right atrium.There is stable enlargement of the
cardiac silhouette. There are diffuse bilateral airspace opacities, not 
significantly changed. Thereare small bilateral pleural effusions. There is no 
pneumothorax. [facility] Verified Date/Time: 2021 
01:00:54 Electronically signed by: MERLIN HAYES MD on 2021 01:00 
AMPOCT-GLUCOSE METER2021-11-10 19:32:38





             Test Item    Value        Reference Range Interpretation Comments

 

             POC-GLUCOSE METER 105 mg/dL                        : TESTED A

T [facility] [address]



             (MACY) (test code =                                        ALINE MARLEY TOMER RICHARD,



             1538)                                               [id number]:



                                                                 /Techni

lorelei ID



                                                                 = [id number] for



                                                                 Chris



                                                                 (student)Otf



B-TYPE NATRIURETIC FACTOR (BNP)2021-11-10 17:10:17





             Test Item    Value        Reference Range Interpretation Comments

 

             B-TYPE NATRIURETIC PEPTIDE (BEAKER) 970 pg/mL    0-100        H    

        



             (test code = 700)                                        



 ID - BSHEPATIC FUNCTION PANEL2021-11-10 06:54:50





             Test Item    Value        Reference Range Interpretation Comments

 

             TOTAL PROTEIN (BEAKER) (test code = 7.2 gm/dL    6.0-8.3           

        



             770)                                                

 

             ALBUMIN (BEAKER) (test code = 1145) 3.0 g/dL     3.5-5.0      L    

        

 

             BILIRUBIN TOTAL (BEAKER) (test code 2.8 mg/dL    0.2-1.2      H    

        



             = 377)                                              

 

             BILIRUBIN DIRECT (BEAKER) (test 2.1 mg/dL    0.1-0.5      H        

    



             code = 706)                                         

 

             ALKALINE PHOSPHATASE (BEAKER) (test 380 U/L             H    

        



             code = 346)                                         

 

             AST (SGOT) (BEAKER) (test code = 32 U/L       5-34                 

     



             353)                                                

 

             ALT (SGPT) (BEAKER) (test code = 41 U/L       6-55                 

     



             347)                                                



 ID - PIAYA LPHOSPHORUS2021-11-10 06:54:49





             Test Item    Value        Reference Range Interpretation Comments

 

             PHOSPHORUS (BEAKER) (test code = 2.4 mg/dL    2.3-4.7              

     



             604)                                                



 ID - PIAYA LMAGNESIUM2021-11-10 06:54:48





             Test Item    Value        Reference Range Interpretation Comments

 

             MAGNESIUM (BEAKER) (test code = 1.6 mg/dL    1.6-2.6               

    



             627)                                                



 ID - PIAYA LCOMPREHENSIVE METABOLIC PANEL2021-11-10 06:54:47





             Test Item    Value        Reference Range Interpretation Comments

 

             TOTAL PROTEIN 7.2 gm/dL    6.0-8.3                   



             (BEAKER) (test code =                                        



             770)                                                

 

             ALBUMIN (BEAKER) 3.0 g/dL     3.5-5.0      L            



             (test code = 1145)                                        

 

             ALKALINE PHOSPHATASE 380 U/L             H            



             (BEAKER) (test code =                                        



             346)                                                

 

             BILIRUBIN TOTAL 2.8 mg/dL    0.2-1.2      H            



             (BEAKER) (test code =                                        



             377)                                                

 

             SODIUM (BEAKER) (test 135 meq/L    136-145      L            



             code = 381)                                         

 

             POTASSIUM (BEAKER) 4.0 meq/L    3.5-5.1                   



             (test code = 379)                                        

 

             CHLORIDE (BEAKER) 102 meq/L                        



             (test code = 382)                                        

 

             CO2 (BEAKER) (test 22 meq/L     22-29                     



             code = 355)                                         

 

             BLOOD UREA NITROGEN 26 mg/dL     7-21         H            



             (BEAKER) (test code =                                        



             354)                                                

 

             CREATININE (BEAKER) 1.40 mg/dL   0.57-1.25    H            



             (test code = 358)                                        

 

             GLUCOSE RANDOM 84 mg/dL                         



             (BEAKER) (test code =                                        



             652)                                                

 

             CALCIUM (BEAKER) 9.6 mg/dL    8.4-10.2                  



             (test code = 697)                                        

 

             AST (SGOT) (BEAKER) 32 U/L       5-34                      



             (test code = 353)                                        

 

             ALT (SGPT) (BEAKER) 41 U/L       6-55                      



             (test code = 347)                                        

 

             EGFR (BEAKER) (test 36 mL/min/1.73                           ESTIMA

SHAHRIAR GFR IS



             code = 1092) sq m                                   NOT AS ACCURATE

 AS



                                                                 CREATININE



                                                                 CLEARANCE IN



                                                                 PREDICTING



                                                                 GLOMERULAR



                                                                 FILTRATION RATE

.



                                                                 ESTIMATED GFR I

S



                                                                 NOT APPLICABLE 

FOR



                                                                 DIALYSIS PATIEN

TS.



 ID - PIAYA LCBC W/PLT COUNT &amp; AUTO DIFFERENTIAL2021-11-10 06:03:48





             Test Item    Value        Reference Range Interpretation Comments

 

             WHITE BLOOD CELL COUNT (BEAKER) 10.5 K/ L    3.5-10.5              

    



             (test code = 775)                                        

 

             RED BLOOD CELL COUNT (BEAKER) 3.47 M/ L    3.93-5.22    L          

  



             (test code = 761)                                        

 

             HEMOGLOBIN (BEAKER) (test code = 9.2 GM/DL    11.2-15.7    L       

     



             410)                                                

 

             HEMATOCRIT (BEAKER) (test code = 30.3 %       34.1-44.9    L       

     



             411)                                                

 

             MEAN CORPUSCULAR VOLUME (BEAKER) 87.3 fL      79.4-94.8            

     



             (test code = 753)                                        

 

             MEAN CORPUSCULAR HEMOGLOBIN 26.5 pg      25.6-32.2                 



             (BEAKER) (test code = 751)                                        

 

             MEAN CORPUSCULAR HEMOGLOBIN CONC 30.4 GM/DL   32.2-35.5    L       

     



             (BEAKER) (test code = 752)                                        

 

             RED CELL DISTRIBUTION WIDTH 18.7 %       11.7-14.4    H            



             (BEAKER) (test code = 412)                                        

 

             PLATELET COUNT (BEAKER) (test 211 K/CU MM  150-450                 

  



             code = 756)                                         

 

             MEAN PLATELET VOLUME (BEAKER) 10.4 fL      9.4-12.3                

  



             (test code = 754)                                        

 

             NUCLEATED RED BLOOD CELLS 0 /100 WBC   0-0                       



             (BEAKER) (test code = 413)                                        

 

             NEUTROPHILS RELATIVE PERCENT 83 %                                  

 



             (BEAKER) (test code = 429)                                        

 

             LYMPHOCYTES RELATIVE PERCENT 5 %                                   

 



             (BEAKER) (test code = 430)                                        

 

             MONOCYTES RELATIVE PERCENT 10 %                                   



             (BEAKER) (test code = 431)                                        

 

             EOSINOPHILS RELATIVE PERCENT 0 %                                   

 



             (BEAKER) (test code = 432)                                        

 

             BASOPHILS RELATIVE PERCENT 1 %                                    



             (BEAKER) (test code = 437)                                        

 

             NEUTROPHILS ABSOLUTE COUNT 8.71 K/ L    1.56-6.13    H            



             (BEAKER) (test code = 670)                                        

 

             LYMPHOCYTES ABSOLUTE COUNT 0.51 K/ L    1.18-3.74    L            



             (BEAKER) (test code = 414)                                        

 

             MONOCYTES ABSOLUTE COUNT (BEAKER) 1.09 K/ L    0.24-0.36    H      

      



             (test code = 415)                                        

 

             EOSINOPHILS ABSOLUTE COUNT 0.03 K/ L    0.04-0.36    L            



             (BEAKER) (test code = 416)                                        

 

             BASOPHILS ABSOLUTE COUNT (BEAKER) 0.07 K/ L    0.01-0.08           

      



             (test code = 417)                                        

 

             IMMATURE GRANULOCYTES-RELATIVE 1 %          0-1                    

   



             PERCENT (BEAKER) (test code =                                      

  



             2801)                                               



CALCIUM, IONIZED2021-11-10 06:01:46





             Test Item    Value        Reference Range Interpretation Comments

 

             CALCIUM IONIZED (BEAKER) (test 1.17 mmol/L  1.12-1.27              

   



             code = 698)                                         

 

             PH, BLOOD (BEAKER) (test code = 7.40                               

    



             1810)                                               



APTT2021-11-10 00:54:13





             Test Item    Value        Reference Range Interpretation Comments

 

             PARTIAL THROMBOPLASTIN TIME 42.7 seconds 22.5-36.0    H            



             (BEAKER) (test code = 760)                                        



COMPREHENSIVE METABOLIC PANEL2021-11-10 00:49:39





             Test Item    Value        Reference Range Interpretation Comments

 

             TOTAL PROTEIN 6.9 gm/dL    6.0-8.3                   



             (BEAKER) (test code =                                        



             770)                                                

 

             ALBUMIN (BEAKER) 2.9 g/dL     3.5-5.0      L            



             (test code = 1145)                                        

 

             ALKALINE PHOSPHATASE 353 U/L             H            



             (BEAKER) (test code =                                        



             346)                                                

 

             BILIRUBIN TOTAL 2.7 mg/dL    0.2-1.2      H            



             (BEAKER) (test code =                                        



             377)                                                

 

             SODIUM (BEAKER) (test 133 meq/L    136-145      L            



             code = 381)                                         

 

             POTASSIUM (BEAKER) 3.7 meq/L    3.5-5.1                   



             (test code = 379)                                        

 

             CHLORIDE (BEAKER) 100 meq/L                        



             (test code = 382)                                        

 

             CO2 (BEAKER) (test 22 meq/L     22-29                     



             code = 355)                                         

 

             BLOOD UREA NITROGEN 48 mg/dL     7-21         H            



             (BEAKER) (test code =                                        



             354)                                                

 

             CREATININE (BEAKER) 2.65 mg/dL   0.57-1.25    H            



             (test code = 358)                                        

 

             GLUCOSE RANDOM 93 mg/dL                         



             (BEAKER) (test code =                                        



             652)                                                

 

             CALCIUM (BEAKER) 8.5 mg/dL    8.4-10.2                  



             (test code = 697)                                        

 

             AST (SGOT) (BEAKER) 26 U/L       5-34                      



             (test code = 353)                                        

 

             ALT (SGPT) (BEAKER) 40 U/L       6-55                      



             (test code = 347)                                        

 

             EGFR (BEAKER) (test 17 mL/min/1.73                           ESTIMA

SHAHRIAR GFR IS



             code = 1092) sq m                                   NOT AS ACCURATE

 AS



                                                                 CREATININE



                                                                 CLEARANCE IN



                                                                 PREDICTING



                                                                 GLOMERULAR



                                                                 FILTRATION RATE

.



                                                                 ESTIMATED GFR I

S



                                                                 NOT APPLICABLE 

FOR



                                                                 DIALYSIS PATIEN

TS.



 ID - PIJUAN LPHOSPHORUS2021-11-10 00:36:07





             Test Item    Value        Reference Range Interpretation Comments

 

             PHOSPHORUS (BEAKER) (test code = 4.1 mg/dL    2.3-4.7              

     



             604)                                                



 ID - TRAMJUAN LLACTIC ACID, ARTERIAL2021-11-10 00:29:50





             Test Item    Value        Reference Range Interpretation Comments

 

             LACTATE BLOOD ARTERIAL (2) 0.6 mmol/L   0.5-2.2                   



             (BEAKER) (test code = 2874)                                        



 ID - PIJUAN LCALCIUM, [id number] 23:25:35





             Test Item    Value        Reference Range Interpretation Comments

 

             CALCIUM IONIZED (BEAKER) (test 1.04 mmol/L  1.12-1.27    L         

   



             code = 698)                                         

 

             PH, BLOOD (BEAKER) (test code = 7.38                               

    



             1810)                                               



GLUCOSE-STAT [id number] 23:25:34





             Test Item    Value        Reference Range Interpretation Comments

 

             GLUCOSE RANDOM (BEAKER) (test code = 86 mg/dL                

         



             652)                                                



HGB/HCT (H&amp;H) - STAT [id number] 23:25:34





             Test Item    Value        Reference Range Interpretation Comments

 

             HEMOGLOBIN (BEAKER) (test code = 9.3 GM/DL    12.0-15.0    L       

     



             410)                                                

 

             HEMATOCRIT (BEAKER) (test code = 27.0 %       36.0-45.0    L       

     



             411)                                                



POTASSIUM-STAT [id number] 23:25:33





             Test Item    Value        Reference Range Interpretation Comments

 

             POTASSIUM (BEAKER) (test code = 3.5 meq/L    3.6-5.5      L        

    



             379)                                                



SODIUM NA-STAT [id number] 23:25:32





             Test Item    Value        Reference Range Interpretation Comments

 

             SODIUM (BEAKER) (test code = 381) 134 meq/L    136-145      L      

      



BLOOD GAS, [id number] 23:25:31





             Test Item    Value        Reference Range Interpretation Comments

 

             PH ARTERIAL (BEAKER) (test code = 7.38         7.35-7.45           

      



             383)                                                

 

             PCO2 ARTERIAL (BEAKER) (test code 41 mm Hg     35-45               

      



             = 384)                                              

 

             PO2 ARTERIAL (BEAKER) (test code 137 mm Hg    80-90        H       

     



             = 385)                                              

 

             O2 SATURATION ARTERIAL (BEAKER) 98.7 %       96.0-97.0    H        

    



             (test code = 386)                                        

 

             HCO3 ARTERIAL (BEAKER) (test code 24 mmol/L    21-29               

      



             = 388)                                              

 

             BASE EXCESS ARTERIAL (BEAKER) -1.3 mmol/L  -2.0-3.0                

  



             (test code = 387)                                        

 

             PATIENT TEMPERATURE (BEAKER) 37.0                                  

 



             (test code = 1818)                                        

 

             FIO2 (BEAKER) (test code = 1819) 28                                

     



RAD, CHEST, 1 VIEW, NON [id number] 21:43:00Reason for exam:-&gt;confrim line
placementaShould this be performed at the bedside?-&gt;Yes
************************************************************[facility] : 1945 Sex: 
F************************************************************FINAL REPORT 
PATIENT ID: [id number] History: Line placement. Comparison: 10/31/2021 Findings: A
singleview of the chest is submitted. The examination is limited by low lung 
volumes and leftward rotation. A right subclavian CVC tip overlies the 
cavoatrial junction without associated pneumothorax or hematoma. There is stable
enlargement of the cardiac silhouette. There is atherosclerotic ulceration of th
e aorta. A left subclavian, multilead pacemaker has been placed in the interval.
Central vascular engorgement and perihilar interstitial coarsening may be 
exaggerated by low lung volumes are reflect mild pulmonary edema. Patchy 
bibasilar opacities are similar to previous and may reflect a combination of 
atelectasis, scarring and edema. Pneumonitis should be excluded clinically. 
There is no acute bonyabnormality. A tunneled right IJ dialysis catheter remains
in place. Signed: Iain Martinez [facility] Verified Date/Time: 2021 21:43:25
Electronically signed by: IAIN MARTINEZ M.D. on 2021 09:43 PMPOCT-
GLUCOSE [id number] 19:38:27





             Test Item    Value        Reference Range Interpretation Comments

 

             POC-GLUCOSE METER 78 mg/dL                         : TESTED A

T [facility] [address]



             [facility] (test code =                                        ALINE

AYAKA [facility],



             1538)                                               [id number]:



                                                                 /Techni

lorelei ID =



                                                                 [id number] for Roma Grier



[id number] 07:11:31





             Test Item    Value        Reference Range Interpretation Comments

 

             PHOSPHORUS (BEAKER) 4.4 mg/dL    2.3-4.7                   Specimen

 slightly



             (test code = 604)                                        hemolyzed



 ID - TUNDE MCOMPREHENSIVE METABOLIC [id number] 05:07:58





             Test Item    Value        Reference Range Interpretation Comments

 

             TOTAL PROTEIN 6.9 gm/dL    6.0-8.3                   



             (BEAKER) (test code =                                        



             770)                                                

 

             ALBUMIN (BEAKER) 2.9 g/dL     3.5-5.0      L            



             (test code = 1145)                                        

 

             ALKALINE PHOSPHATASE 368 U/L             H            



             (BEAKER) (test code =                                        



             346)                                                

 

             BILIRUBIN TOTAL 2.5 mg/dL    0.2-1.2      H            



             (BEAKER) (test code =                                        



             377)                                                

 

             SODIUM (BEAKER) (test 132 meq/L    136-145      L            



             code = 381)                                         

 

             POTASSIUM (BEAKER) 4.0 meq/L    3.5-5.1                   



             (test code = 379)                                        

 

             CHLORIDE (BEAKER) 97 meq/L            L            



             (test code = 382)                                        

 

             CO2 (BEAKER) (test 25 meq/L     22-29                     



             code = 355)                                         

 

             BLOOD UREA NITROGEN 52 mg/dL     7-21         H            



             (BEAKER) (test code =                                        



             354)                                                

 

             CREATININE (BEAKER) 3.41 mg/dL   0.57-1.25    H            



             (test code = 358)                                        

 

             GLUCOSE RANDOM 86 mg/dL                         



             (BEAKER) (test code =                                        



             652)                                                

 

             CALCIUM (BEAKER) 9.0 mg/dL    8.4-10.2                  



             (test code = 697)                                        

 

             AST (SGOT) (BEAKER) 33 U/L       5-34                      



             (test code = 353)                                        

 

             ALT (SGPT) (BEAKER) 62 U/L       6-55         H            



             (test code = 347)                                        

 

             EGFR (BEAKER) (test 13 mL/min/1.73                           ESTIMA

SHAHRIAR GFR IS



             code = 1092) sq m                                   NOT AS ACCURATE

 AS



                                                                 CREATININE



                                                                 CLEARANCE IN



                                                                 PREDICTING



                                                                 GLOMERULAR



                                                                 FILTRATION RATE

.



                                                                 ESTIMATED GFR I

S



                                                                 NOT APPLICABLE 

FOR



                                                                 DIALYSIS PATIEN

TS.



 ID - TUNDE EPATIC FUNCTION [id number] 04:44:32





             Test Item    Value        Reference Range Interpretation Comments

 

             TOTAL PROTEIN (BEAKER) (test code = 6.9 gm/dL    6.0-8.3           

        



             770)                                                

 

             ALBUMIN (BEAKER) (test code = 1145) 2.9 g/dL     3.5-5.0      L    

        

 

             BILIRUBIN TOTAL (BEAKER) (test code 2.5 mg/dL    0.2-1.2      H    

        



             = 377)                                              

 

             BILIRUBIN DIRECT (BEAKER) (test 1.8 mg/dL    0.1-0.5      H        

    



             code = 706)                                         

 

             ALKALINE PHOSPHATASE (BEAKER) (test 368 U/L             H    

        



             code = 346)                                         

 

             AST (SGOT) (BEAKER) (test code = 33 U/L       5-34                 

     



             353)                                                

 

             ALT (SGPT) (BEAKER) (test code = 62 U/L       6-55         H       

     



             347)                                                



 ID - TUNDE [id number] 04:44:31





             Test Item    Value        Reference Range Interpretation Comments

 

             MAGNESIUM (BEAKER) (test code = 1.6 mg/dL    1.6-2.6               

    



             627)                                                



 ID - TUNDE MPROTHROMBIN [id number] 04:27:06





             Test Item    Value        Reference Range Interpretation Comments

 

             PROTIME (BEAKER) 13.4 seconds 11.9-14.2                 



             (test code = 759)                                        

 

             INR (BEAKER) (test 1.04         See_Comment                [Automat

ed message]



             code = 370)                                         The system [employer]



                                                                 generated this 

result



                                                                 transmitted ref

erence



                                                                 range: <=5.90. 

The



                                                                 reference range

 was



                                                                 not used to int

erpret



                                                                 this result as



                                                                 normal/abnormal

.



RECOMMENDED COUMADIN/WARFARIN INR THERAPY RANGESSTANDARD DOSE: 2.0 - 3.0 
Includes: PROPHYLAXIS for venous thrombosis, systemic embolization; TREATMENT 
for venous thrombosis and/or pulmonary embolus.HIGH RISK: Target INR is 2.5-3.5 
for patients with mechanical heart valves.CALCIUM, [id number] 04:25:06





             Test Item    Value        Reference Range Interpretation Comments

 

             CALCIUM IONIZED (BEAKER) (test 1.08 mmol/L  1.12-1.27    L         

   



             code = 698)                                         

 

             PH, BLOOD (BEAKER) (test code = 7.34                               

    



             1810)                                               



CBC W/PLT COUNT &amp; AUTO [id number] 04:24:11





             Test Item    Value        Reference Range Interpretation Comments

 

             WHITE BLOOD CELL COUNT (BEAKER) 8.2 K/ L     3.5-10.5              

    



             (test code = 775)                                        

 

             RED BLOOD CELL COUNT (BEAKER) 3.33 M/ L    3.93-5.22    L          

  



             (test code = 761)                                        

 

             HEMOGLOBIN (BEAKER) (test code = 8.8 GM/DL    11.2-15.7    L       

     



             410)                                                

 

             HEMATOCRIT (BEAKER) (test code = 28.9 %       34.1-44.9    L       

     



             411)                                                

 

             MEAN CORPUSCULAR VOLUME (BEAKER) 86.8 fL      79.4-94.8            

     



             (test code = 753)                                        

 

             MEAN CORPUSCULAR HEMOGLOBIN 26.4 pg      25.6-32.2                 



             (BEAKER) (test code = 751)                                        

 

             MEAN CORPUSCULAR HEMOGLOBIN CONC 30.4 GM/DL   32.2-35.5    L       

     



             (BEAKER) (test code = 752)                                        

 

             RED CELL DISTRIBUTION WIDTH 18.6 %       11.7-14.4    H            



             (BEAKER) (test code = 412)                                        

 

             PLATELET COUNT (BEAKER) (test 197 K/CU MM  150-450                 

  



             code = 756)                                         

 

             MEAN PLATELET VOLUME (BEAKER) 10.7 fL      9.4-12.3                

  



             (test code = 754)                                        

 

             NUCLEATED RED BLOOD CELLS 0 /100 WBC   0-0                       



             (BEAKER) (test code = 413)                                        

 

             NEUTROPHILS RELATIVE PERCENT 78 %                                  

 



             (BEAKER) (test code = 429)                                        

 

             LYMPHOCYTES RELATIVE PERCENT 9 %                                   

 



             (BEAKER) (test code = 430)                                        

 

             MONOCYTES RELATIVE PERCENT 12 %                                   



             (BEAKER) (test code = 431)                                        

 

             EOSINOPHILS RELATIVE PERCENT 1 %                                   

 



             (BEAKER) (test code = 432)                                        

 

             BASOPHILS RELATIVE PERCENT 0 %                                    



             (BEAKER) (test code = 437)                                        

 

             NEUTROPHILS ABSOLUTE COUNT 6.39 K/ L    1.56-6.13    H            



             (BEAKER) (test code = 670)                                        

 

             LYMPHOCYTES ABSOLUTE COUNT 0.73 K/ L    1.18-3.74    L            



             (BEAKER) (test code = 414)                                        

 

             MONOCYTES ABSOLUTE COUNT (BEAKER) 1.00 K/ L    0.24-0.36    H      

      



             (test code = 415)                                        

 

             EOSINOPHILS ABSOLUTE COUNT 0.05 K/ L    0.04-0.36                 



             (BEAKER) (test code = 416)                                        

 

             BASOPHILS ABSOLUTE COUNT (BEAKER) 0.02 K/ L    0.01-0.08           

      



             (test code = 417)                                        

 

             IMMATURE GRANULOCYTES-RELATIVE 1 %          0-1                    

   



             PERCENT (BEAKER) (test code =                                      

  



             2801)                                               



[id number] 14:32:03





             Test Item    Value        Reference Range Interpretation Comments

 

             PARTIAL THROMBOPLASTIN TIME 46.7 seconds 22.5-36.0    H            



             (BEAKER) (test code = 760)                                        



SARS-COV2/RT-PCR ([facility] [place] [facility])2021 10:15:25





             Test Item    Value        Reference Range Interpretation Comments

 

             SARS-COV2/RT-PCR (test Negative     Not Detected, Negative,        

      



             code = 3254081)              See external report for              



                                       linked test               

 

             SARS-COV-2 PERFORMING LAB [facility] BRETT                             



             (test code = 1432249)                                        



Negative result for this test determines that SARS-CoV-2 RNA was not present in 
the specimen above the Limit of Detection (LOD). However, Negative results do 
not preclude SARS-CoV-2 infection and should not be used as the sole basis for 
treatment or patient management decisions. Negative results must be combined 
with clinical observations, patient history, and epidemiological information. A 
false negative result may occur if a specimen is improperly collected, 
transported or handled. A false negative result should be considered if 
patient's recent exposures or clinical presentation indicate that COVID-19 
(SARS-CoV-2) is likely and diagnostic tests for other causes of illness are 
negative. Re-testing should be considered in cases of suspected false 
negatives.The limit of detection for this assay is 800 copies/mL.This SARS CoV-2
test is a real-time RT-PCR test intended for the qualitative detection of 
nucleic acid from SARS-CoV-2 in a nasopharyngeal swab specimen collected from 
individuals suspected of COVID-19 by their healthcare provider.This test has not
been Food and Drug Administration (FDA) cleared or approved. This is a modified 
version of an approved Emergency Use Authorization (EUA) and is in the process 
of review by the FDA. Once authorized by the FDA, the issued EUA will be 
effective until the declaration that circumstances exist justifying the 
authorization of the emergency use ofin vitro diagnostic tests for detection 
and/or diagnosis of COVID-19 is terminated under Section 564(b)(2) of the Act or
the EUA is revoked under Section 564(g) of the Act.Fact Sheet for Healthcare 
Prov
iders:https://www.[employer]/sites/default/files/product/documents/Fact_Sheet_HC

_Ipoxbtjsb_Wiuy_LNVO-HiD-6.pdfFact Sheet for Healthcare 
Patients:https://www.[employer]/sites/default/files/product/docume
nts/Klej_Vjekb_Uifsefgl_Iqju_PPGO-YiB-2.pdfPerforming Laboratory:[facility].[place], [place] [id number] 06:13:12





             Test Item    Value        Reference Range Interpretation Comments

 

             PARTIAL THROMBOPLASTIN TIME 107.9 seconds 22.5-36.0    H           

 



             (BEAKER) (test code = 760)                                        



BASIC METABOLIC [id number] 05:24:44





             Test Item    Value        Reference Range Interpretation Comments

 

             SODIUM (BEAKER) 131 meq/L    136-145      L            



             (test code = 381)                                        

 

             POTASSIUM (BEAKER) 4.1 meq/L    3.5-5.1                   



             (test code = 379)                                        

 

             CHLORIDE (BEAKER) 96 meq/L            L            



             (test code = 382)                                        

 

             CO2 (BEAKER) (test 23 meq/L     22-29                     



             code = 355)                                         

 

             BLOOD UREA NITROGEN 40 mg/dL     7-21         H            



             (BEAKER) (test code                                        



             = 354)                                              

 

             CREATININE (BEAKER) 3.38 mg/dL   0.57-1.25    H            



             (test code = 358)                                        

 

             GLUCOSE RANDOM 92 mg/dL                         



             (BEAKER) (test code                                        



             = 652)                                              

 

             CALCIUM (BEAKER) 9.5 mg/dL    8.4-10.2                  



             (test code = 697)                                        

 

             EGFR (BEAKER) (test 13 mL/min/1.73                           ESTIMA

SHAHRIAR GFR IS



             code = 1092) sq m                                   NOT AS ACCURATE

 AS



                                                                 CREATININE



                                                                 CLEARANCE IN



                                                                 PREDICTING



                                                                 GLOMERULAR



                                                                 FILTRATION RATE

.



                                                                 ESTIMATED GFR I

S



                                                                 NOT APPLICABLE 

FOR



                                                                 DIALYSIS PATIEN

TS.



 ID - TUNDE EPATIC FUNCTION [id number] 05:18:53





             Test Item    Value        Reference Range Interpretation Comments

 

             TOTAL PROTEIN (BEAKER) (test code = 8.0 gm/dL    6.0-8.3           

        



             770)                                                

 

             ALBUMIN (BEAKER) (test code = 1145) 3.4 g/dL     3.5-5.0      L    

        

 

             BILIRUBIN TOTAL (BEAKER) (test code 2.6 mg/dL    0.2-1.2      H    

        



             = 377)                                              

 

             BILIRUBIN DIRECT (BEAKER) (test 1.8 mg/dL    0.1-0.5      H        

    



             code = 706)                                         

 

             ALKALINE PHOSPHATASE (BEAKER) (test 441 U/L             H    

        



             code = 346)                                         

 

             AST (SGOT) (BEAKER) (test code = 39 U/L       5-34         H       

     



             353)                                                

 

             ALT (SGPT) (BEAKER) (test code = 96 U/L       6-55         H       

     



             347)                                                



 ID - TUNDE MPROTHROMBIN [id number] 04:36:11





             Test Item    Value        Reference Range Interpretation Comments

 

             PROTIME (BEAKER) 13.8 seconds 11.9-14.2                 



             (test code = 759)                                        

 

             INR (BEAKER) (test 1.08         See_Comment                [Automat

ed message]



             code = 370)                                         The system [employer]



                                                                 generated this 

result



                                                                 transmitted ref

erence



                                                                 range: <=5.90. 

The



                                                                 reference range

 was



                                                                 not used to int

erpret



                                                                 this result as



                                                                 normal/abnormal

.



RECOMMENDED COUMADIN/WARFARIN INR THERAPY RANGESSTANDARD DOSE: 2.0 - 3.0 
Includes: PROPHYLAXIS for venous thrombosis, systemic embolization; TREATMENT 
for venous thrombosis and/or pulmonary embolus.HIGH RISK: Target INR is 2.5-3.5 
for patients with mechanical heart valves.CBC (HEMOGRAM ONLY)2021 04:31:09





             Test Item    Value        Reference Range Interpretation Comments

 

             WHITE BLOOD CELL COUNT (BEAKER) 10.9 K/ L    3.5-10.5     H        

    



             (test code = 775)                                        

 

             RED BLOOD CELL COUNT (BEAKER) 3.80 M/ L    3.93-5.22    L          

  



             (test code = 761)                                        

 

             HEMOGLOBIN (BEAKER) (test code = 9.9 GM/DL    11.2-15.7    L       

     



             410)                                                

 

             HEMATOCRIT (BEAKER) (test code = 34.6 %       34.1-44.9            

     



             411)                                                

 

             MEAN CORPUSCULAR VOLUME (BEAKER) 91.1 fL      79.4-94.8            

     



             (test code = 753)                                        

 

             MEAN CORPUSCULAR HEMOGLOBIN 26.1 pg      25.6-32.2                 



             (BEAKER) (test code = 751)                                        

 

             MEAN CORPUSCULAR HEMOGLOBIN CONC 28.6 GM/DL   32.2-35.5    L       

     



             (BEAKER) (test code = 752)                                        

 

             RED CELL DISTRIBUTION WIDTH 18.9 %       11.7-14.4    H            



             (BEAKER) (test code = 412)                                        

 

             PLATELET COUNT (BEAKER) (test 224 K/CU MM  150-450                 

  



             code = 756)                                         

 

             MEAN PLATELET VOLUME (BEAKER) 10.9 fL      9.4-12.3                

  



             (test code = 754)                                        

 

             NUCLEATED RED BLOOD CELLS 0 /100 WBC   0-0                       



             (BEAKER) (test code = 413)                                        



[id number] 18:48:59





             Test Item    Value        Reference Range Interpretation Comments

 

             PARTIAL THROMBOPLASTIN TIME 74.1 seconds 22.5-36.0    H            



             (BEAKER) (test code = 760)                                        



[id number] 08:08:37





             Test Item    Value        Reference Range Interpretation Comments

 

             PARTIAL THROMBOPLASTIN TIME 46.9 seconds 22.5-36.0    H            



             (BEAKER) (test code = 760)                                        



BASIC METABOLIC [id number] 02:28:58





             Test Item    Value        Reference Range Interpretation Comments

 

             SODIUM (BEAKER) 134 meq/L    136-145      L            



             (test code = 381)                                        

 

             POTASSIUM (BEAKER) 4.0 meq/L    3.5-5.1                   



             (test code = 379)                                        

 

             CHLORIDE (BEAKER) 97 meq/L            L            



             (test code = 382)                                        

 

             CO2 (BEAKER) (test 26 meq/L     22-29                     



             code = 355)                                         

 

             BLOOD UREA NITROGEN 24 mg/dL     7-21         H            



             (BEAKER) (test code                                        



             = 354)                                              

 

             CREATININE (BEAKER) 2.87 mg/dL   0.57-1.25    H            



             (test code = 358)                                        

 

             GLUCOSE RANDOM 100 mg/dL                        



             (BEAKER) (test code                                        



             = 652)                                              

 

             CALCIUM (BEAKER) 8.7 mg/dL    8.4-10.2                  



             (test code = 697)                                        

 

             EGFR (BEAKER) (test 16 mL/min/1.73                           ESTIMA

SHAHRIAR GFR IS



             code = 1092) sq m                                   NOT AS ACCURATE

 AS



                                                                 CREATININE



                                                                 CLEARANCE IN



                                                                 PREDICTING



                                                                 GLOMERULAR



                                                                 FILTRATION RATE

.



                                                                 ESTIMATED GFR I

S



                                                                 NOT APPLICABLE 

FOR



                                                                 DIALYSIS PATIEN

TS.



 ID - PIAYA EPATIC FUNCTION [id number] 02:09:30





             Test Item    Value        Reference Range Interpretation Comments

 

             TOTAL PROTEIN (BEAKER) (test code = 7.7 gm/dL    6.0-8.3           

        



             770)                                                

 

             ALBUMIN (BEAKER) (test code = 1145) 3.3 g/dL     3.5-5.0      L    

        

 

             BILIRUBIN TOTAL (BEAKER) (test code 2.6 mg/dL    0.2-1.2      H    

        



             = 377)                                              

 

             BILIRUBIN DIRECT (BEAKER) (test 1.8 mg/dL    0.1-0.5      H        

    



             code = 706)                                         

 

             ALKALINE PHOSPHATASE (BEAKER) (test 436 U/L             H    

        



             code = 346)                                         

 

             AST (SGOT) (BEAKER) (test code = 42 U/L       5-34         H       

     



             353)                                                

 

             ALT (SGPT) (BEAKER) (test code = 121 U/L      6-55         H       

     



             347)                                                



 ID - PIAYA [id number] 01:54:32





             Test Item    Value        Reference Range Interpretation Comments

 

             PARTIAL THROMBOPLASTIN TIME 64.2 seconds 22.5-36.0    H            



             (BEAKER) (test code = 760)                                        



CBC (HEMOGRAM ONLY)2021 01:33:07





             Test Item    Value        Reference Range Interpretation Comments

 

             WHITE BLOOD CELL COUNT (BEAKER) 10.5 K/ L    3.5-10.5              

    



             (test code = 775)                                        

 

             RED BLOOD CELL COUNT (BEAKER) 3.61 M/ L    3.93-5.22    L          

  



             (test code = 761)                                        

 

             HEMOGLOBIN (BEAKER) (test code = 9.5 GM/DL    11.2-15.7    L       

     



             410)                                                

 

             HEMATOCRIT (BEAKER) (test code = 32.3 %       34.1-44.9    L       

     



             411)                                                

 

             MEAN CORPUSCULAR VOLUME (BEAKER) 89.5 fL      79.4-94.8            

     



             (test code = 753)                                        

 

             MEAN CORPUSCULAR HEMOGLOBIN 26.3 pg      25.6-32.2                 



             (BEAKER) (test code = 751)                                        

 

             MEAN CORPUSCULAR HEMOGLOBIN CONC 29.4 GM/DL   32.2-35.5    L       

     



             (BEAKER) (test code = 752)                                        

 

             RED CELL DISTRIBUTION WIDTH 19.3 %       11.7-14.4    H            



             (BEAKER) (test code = 412)                                        

 

             PLATELET COUNT (BEAKER) (test 192 K/CU MM  150-450                 

  



             code = 756)                                         

 

             MEAN PLATELET VOLUME (BEAKER) 10.7 fL      9.4-12.3                

  



             (test code = 754)                                        

 

             NUCLEATED RED BLOOD CELLS 0 /100 WBC   0-0                       



             (BEAKER) (test code = 413)                                        



[id number] 18:41:27





             Test Item    Value        Reference Range Interpretation Comments

 

             PARTIAL THROMBOPLASTIN TIME 97.4 seconds 22.5-36.0    H            



             (BEAKER) (test code = 760)                                        



[id number] 11:41:55





             Test Item    Value        Reference Range Interpretation Comments

 

             ALPHA-1 ANTITRYPSIN (BEAKER) 274.30 mg/dL 90..00 H           

 



             (test code = 502)                                        



 PRANEETH TAY LALPHA FETOPROTEIN (AFP), TUMOR [id number] 11:18:12





             Test Item    Value        Reference Range Interpretation Comments

 

             ALPHA-FETOPROTEIN (BEAKER) (test 9.1 ng/mL    <10.0                

     



             code = 1094)                                        



 PRANEETH TAY [id number] 10:54:49





             Test Item    Value        Reference Range Interpretation Comments

 

             PARTIAL THROMBOPLASTIN TIME 141.7 seconds 22.5-36.0    H           

 



             (BEAKER) (test code = 760)                                        



CALCIUM, [id number] 10:47:24





             Test Item    Value        Reference Range Interpretation Comments

 

             CALCIUM IONIZED (BEAKER) (test 1.09 mmol/L  1.12-1.27    L         

   



             code = 698)                                         

 

             PH, BLOOD (BEAKER) (test code = 7.45                               

    



             1810)                                               



HEPATIC FUNCTION [id number] 06:49:09





             Test Item    Value        Reference Range Interpretation Comments

 

             TOTAL PROTEIN (BEAKER) (test code = 8.5 gm/dL    6.0-8.3      H    

        



             770)                                                

 

             ALBUMIN (BEAKER) (test code = 1145) 3.6 g/dL     3.5-5.0           

        

 

             BILIRUBIN TOTAL (BEAKER) (test code 2.7 mg/dL    0.2-1.2      H    

        



             = 377)                                              

 

             BILIRUBIN DIRECT (BEAKER) (test 1.9 mg/dL    0.1-0.5      H        

    



             code = 706)                                         

 

             ALKALINE PHOSPHATASE (BEAKER) (test 482 U/L             H    

        



             code = 346)                                         

 

             AST (SGOT) (BEAKER) (test code = 53 U/L       5-34         H       

     



             353)                                                

 

             ALT (SGPT) (BEAKER) (test code = 169 U/L      6-55         H       

     



             347)                                                



 ID - TRAMAYA LSpecimen slightly ictericCOMPREHENSIVE METABOLIC PANEL
2021 06:49:08





             Test Item    Value        Reference Range Interpretation Comments

 

             TOTAL PROTEIN 8.5 gm/dL    6.0-8.3      H            



             (BEAKER) (test code =                                        



             770)                                                

 

             ALBUMIN (BEAKER) 3.6 g/dL     3.5-5.0                   



             (test code = 1145)                                        

 

             ALKALINE PHOSPHATASE 482 U/L             H            



             (BEAKER) (test code =                                        



             346)                                                

 

             BILIRUBIN TOTAL 2.7 mg/dL    0.2-1.2      H            



             (BEAKER) (test code =                                        



             377)                                                

 

             SODIUM (BEAKER) (test 135 meq/L    136-145      L            



             code = 381)                                         

 

             POTASSIUM (BEAKER) 3.6 meq/L    3.5-5.1                   



             (test code = 379)                                        

 

             CHLORIDE (BEAKER) 101 meq/L                        



             (test code = 382)                                        

 

             CO2 (BEAKER) (test 20 meq/L     22-29        L            



             code = 355)                                         

 

             BLOOD UREA NITROGEN 10 mg/dL     7-21                      



             (BEAKER) (test code =                                        



             354)                                                

 

             CREATININE (BEAKER) 1.23 mg/dL   0.57-1.25                 



             (test code = 358)                                        

 

             GLUCOSE RANDOM 102 mg/dL                        



             (BEAKER) (test code =                                        



             652)                                                

 

             CALCIUM (BEAKER) 9.4 mg/dL    8.4-10.2                  



             (test code = 697)                                        

 

             AST (SGOT) (BEAKER) 53 U/L       5-34         H            



             (test code = 353)                                        

 

             ALT (SGPT) (BEAKER) 169 U/L      6-55         H            



             (test code = 347)                                        

 

             EGFR (BEAKER) (test 42 mL/min/1.73                           ESTIMA

SHAHRIAR GFR IS



             code = 1092) sq m                                   NOT AS ACCURATE

 AS



                                                                 CREATININE



                                                                 CLEARANCE IN



                                                                 PREDICTING



                                                                 GLOMERULAR



                                                                 FILTRATION RATE

.



                                                                 ESTIMATED GFR I

S



                                                                 NOT APPLICABLE 

FOR



                                                                 DIALYSIS PATIEN

TS.



 ID - JASVIR LSpecimen slightly [id number] 06:49:07





             Test Item    Value        Reference Range Interpretation Comments

 

             MAGNESIUM (BEAKER) (test code = 1.7 mg/dL    1.6-2.6               

    



             627)                                                



 ID - JASVIR [id number] 06:49:07





             Test Item    Value        Reference Range Interpretation Comments

 

             PHOSPHORUS (BEAKER) (test code = 1.7 mg/dL    2.3-4.7      L       

     



             604)                                                



 ID - JASVIR LCBC W/PLT COUNT &amp; AUTO [id number] 06:24:53





             Test Item    Value        Reference Range Interpretation Comments

 

             WHITE BLOOD CELL COUNT (BEAKER) 14.0 K/ L    3.5-10.5     H        

    



             (test code = 775)                                        

 

             RED BLOOD CELL COUNT (BEAKER) 4.67 M/ L    3.93-5.22               

  



             (test code = 761)                                        

 

             HEMOGLOBIN (BEAKER) (test code = 12.1 GM/DL   11.2-15.7            

     



             410)                                                

 

             HEMATOCRIT (BEAKER) (test code = 41.1 %       34.1-44.9            

     



             411)                                                

 

             MEAN CORPUSCULAR VOLUME (BEAKER) 88.0 fL      79.4-94.8            

     



             (test code = 753)                                        

 

             MEAN CORPUSCULAR HEMOGLOBIN 25.9 pg      25.6-32.2                 



             (BEAKER) (test code = 751)                                        

 

             MEAN CORPUSCULAR HEMOGLOBIN CONC 29.4 GM/DL   32.2-35.5    L       

     



             (BEAKER) (test code = 752)                                        

 

             RED CELL DISTRIBUTION WIDTH 19.7 %       11.7-14.4    H            



             (BEAKER) (test code = 412)                                        

 

             PLATELET COUNT (BEAKER) (test 177 K/CU MM  150-450                 

  



             code = 756)                                         

 

             MEAN PLATELET VOLUME (BEAKER) 11.3 fL      9.4-12.3                

  



             (test code = 754)                                        

 

             NUCLEATED RED BLOOD CELLS 0 /100 WBC   0-0                       



             (BEAKER) (test code = 413)                                        

 

             NEUTROPHILS RELATIVE PERCENT 86 %                                  

 



             (BEAKER) (test code = 429)                                        

 

             LYMPHOCYTES RELATIVE PERCENT 5 %                                   

 



             (BEAKER) (test code = 430)                                        

 

             MONOCYTES RELATIVE PERCENT 8 %                                    



             (BEAKER) (test code = 431)                                        

 

             EOSINOPHILS RELATIVE PERCENT 0 %                                   

 



             (BEAKER) (test code = 432)                                        

 

             BASOPHILS RELATIVE PERCENT 0 %                                    



             (BEAKER) (test code = 437)                                        

 

             NEUTROPHILS ABSOLUTE COUNT 11.99 K/ L   1.56-6.13    H            



             (BEAKER) (test code = 670)                                        

 

             LYMPHOCYTES ABSOLUTE COUNT 0.65 K/ L    1.18-3.74    L            



             (BEAKER) (test code = 414)                                        

 

             MONOCYTES ABSOLUTE COUNT (BEAKER) 1.18 K/ L    0.24-0.36    H      

      



             (test code = 415)                                        

 

             EOSINOPHILS ABSOLUTE COUNT 0.03 K/ L    0.04-0.36    L            



             (BEAKER) (test code = 416)                                        

 

             BASOPHILS ABSOLUTE COUNT (BEAKER) 0.04 K/ L    0.01-0.08           

      



             (test code = 417)                                        

 

             IMMATURE GRANULOCYTES-RELATIVE 1 %          0-1                    

   



             PERCENT (BEAKER) (test code =                                      

  



             2801)                                               



[id number] 05:49:37





             Test Item    Value        Reference Range Interpretation Comments

 

             PARTIAL THROMBOPLASTIN TIME 48.7 seconds 22.5-36.0    H            



             (BEAKER) (test code = 760)                                        



MR, ABDOMEN, [id number] 10:29:00Unlisted Reason for Exam - Click Yes and 
Enter Reason Below-&gt;Yes Unlisted Reason for Exam-&gt;Concern for biliary 
obstruction. Elevated liver enzymes. Also asses for underlying cirrhosuis or 
features of portal hypertension Perform MRI triple phase of liver and also MRCP 
for biliary obstruction.
************************************************************[facility] : 1945 Sex: 
F************************************************************FINAL REPORT 
PATIENT ID: [id number] TECHNIQUE: MRI of the abdomen and MRCP WITHOUT and WITH 
intravenous contrast. 3-D volume reconstructions were obtained to evaluate the 
biliary ductal system. INDICATION: 76-year-old woman with elevated liver enzymes
and concern for biliary obstruction. COMPARISON: Abdomen ultrasound 2021. 
FINDINGS:Suboptimal evaluation due to motion artifact on postcontrast sequences.
LOWER THORAX: Cardiomegaly. Large pericardial effusion contains T1 hyperintense 
hemorrhagic/proteinaceous debris. Trace bilateral pleural effusions. Bibasilar 
consolidation opacities, likely atelectasis. LIVER: Questionable subtle nodular 
liver contour. No hepatic steatosis. No definite liver lesion. BILIARY: Prior 
cholecystectomy. Intrahepatic and extrahepatic bile ducts are normal in caliber.
Apparent narrowing in the mid common duct is likely due to mass effect from 
adjacent vessels. No filling defect within the biliary system.SPLEEN: Spleen is 
prominent and measures 13.4 cm in the craniocaudal dimension.PANCREAS: No focal 
mass or ductal dilatation. ADRENALS: No adrenal nodule.KIDNEYS/URETERS: 
Heterogeneous T2 signal intensity of both kidneys; the areas of decreased T2 
signal intensity appear to have relative cortical thinning. No hydronephrosis. 
PERITONEUM/RETROPERITONEUM: No free fluid.LYMPH NODES: No 
lymphadenopathy.VESSELS: Unremarkable. GI TRACT: No distention or wall 
thickening. BONES AND SOFT TISSUES: Degenerative changes of the visualized 
spine. Soft tissues are unremarkable. IMPRESSION:Suboptimal evaluation due to 
motion artifact on postcontrast sequences. Questionable subtle nodular liver 
contour, for which cirrhosis cannot be excluded. No definite liver lesion. No 
biliaryductal dilatation or filling defect. Splenomegaly. Heterogeneous signal 
intensity of both kidneys without discrete mass. Differential considerations 
include multifocal cortical scarring, pyelonephritis, and deposition disease 
such as amyloid. Large debris-containing pericardial effusion. Signed: Yousif Sureo Verified Date/Time: 2021 10:29:06 Reading Location[facility] 
Diagnostic ImagingReading Room - [place] [address] Electronically signed by: YOUSIF SUERO MD on 2021 10:29 [id number] 04:28:09





             Test Item    Value        Reference Range Interpretation Comments

 

             PARTIAL THROMBOPLASTIN TIME 104.4 seconds 22.5-36.0    H           

 



             (BEAKER) (test code = 760)                                        



BASIC METABOLIC [id number] 03:37:17





             Test Item    Value        Reference Range Interpretation Comments

 

             SODIUM (BEAKER) 134 meq/L    136-145      L            



             (test code = 381)                                        

 

             POTASSIUM (BEAKER) 3.9 meq/L    3.5-5.1                   



             (test code = 379)                                        

 

             CHLORIDE (BEAKER) 100 meq/L                        



             (test code = 382)                                        

 

             CO2 (BEAKER) (test 21 meq/L     22-29        L            



             code = 355)                                         

 

             BLOOD UREA NITROGEN 30 mg/dL     7-21         H            



             (BEAKER) (test code                                        



             = 354)                                              

 

             CREATININE (BEAKER) 2.82 mg/dL   0.57-1.25    H            



             (test code = 358)                                        

 

             GLUCOSE RANDOM 104 mg/dL                        



             (BEAKER) (test code                                        



             = 652)                                              

 

             CALCIUM (BEAKER) 8.9 mg/dL    8.4-10.2                  



             (test code = 697)                                        

 

             EGFR (BEAKER) (test 16 mL/min/1.73                           ESTIMA

SHAHRIAR GFR IS



             code = 1092) sq m                                   NOT AS ACCURATE

 AS



                                                                 CREATININE



                                                                 CLEARANCE IN



                                                                 PREDICTING



                                                                 GLOMERULAR



                                                                 FILTRATION RATE

.



                                                                 ESTIMATED GFR I

S



                                                                 NOT APPLICABLE 

FOR



                                                                 DIALYSIS PATIEN

TS.



 ID Joshua ABDALLA WHEPATIC FUNCTION [id number] 03:30:39





             Test Item    Value        Reference Range Interpretation Comments

 

             TOTAL PROTEIN (BEAKER) (test code = 7.4 gm/dL    6.0-8.3           

        



             770)                                                

 

             ALBUMIN (BEAKER) (test code = 1145) 3.2 g/dL     3.5-5.0      L    

        

 

             BILIRUBIN TOTAL (BEAKER) (test code 2.1 mg/dL    0.2-1.2      H    

        



             = 377)                                              

 

             BILIRUBIN DIRECT (BEAKER) (test 1.6 mg/dL    0.1-0.5      H        

    



             code = 706)                                         

 

             ALKALINE PHOSPHATASE (BEAKER) (test 425 U/L             H    

        



             code = 346)                                         

 

             AST (SGOT) (BEAKER) (test code = 63 U/L       5-34         H       

     



             353)                                                

 

             ALT (SGPT) (BEAKER) (test code = 213 U/L      6-55         H       

     



             347)                                                



 ID Joshua ABDALLA WCBC (HEMOGRAM ONLY)2021 03:14:36





             Test Item    Value        Reference Range Interpretation Comments

 

             WHITE BLOOD CELL COUNT (BEAKER) 8.3 K/ L     3.5-10.5              

    



             (test code = 775)                                        

 

             RED BLOOD CELL COUNT (BEAKER) 3.57 M/ L    3.93-5.22    L          

  



             (test code = 761)                                        

 

             HEMOGLOBIN (BEAKER) (test code = 9.5 GM/DL    11.2-15.7    L       

     



             410)                                                

 

             HEMATOCRIT (BEAKER) (test code = 31.0 %       34.1-44.9    L       

     



             411)                                                

 

             MEAN CORPUSCULAR VOLUME (BEAKER) 86.8 fL      79.4-94.8            

     



             (test code = 753)                                        

 

             MEAN CORPUSCULAR HEMOGLOBIN 26.6 pg      25.6-32.2                 



             (BEAKER) (test code = 751)                                        

 

             MEAN CORPUSCULAR HEMOGLOBIN CONC 30.6 GM/DL   32.2-35.5    L       

     



             (BEAKER) (test code = 752)                                        

 

             RED CELL DISTRIBUTION WIDTH 18.7 %       11.7-14.4    H            



             (BEAKER) (test code = 412)                                        

 

             PLATELET COUNT (BEAKER) (test 135 K/CU MM  150-450      L          

  



             code = 756)                                         

 

             MEAN PLATELET VOLUME (BEAKER) 11.3 fL      9.4-12.3                

  



             (test code = 754)                                        

 

             NUCLEATED RED BLOOD CELLS 0 /100 WBC   0-0                       



             (BEAKER) (test code = 413)                                        



MISCELLANEOUS LAB [id number] 15:19:21





             Test Item    Value        Reference Range Interpretation Comments

 

             SCAN RESULT (test code = 4690882)                                  

      



[id number] 14:48:43





             Test Item    Value        Reference Range Interpretation Comments

 

             PARTIAL THROMBOPLASTIN TIME 45.1 seconds 22.5-36.0    H            



             (BEAKER) (test code = 760)                                        



(CELLAVISION MANUAL DIFF)2021 07:24:46





             Test Item    Value        Reference Range Interpretation Comments

 

             NEUTROPHILS - REL 79 %                                   



             (CELLAVISION)(BEAKER) (test code =                                 

       



             2816)                                               

 

             LYMPHOCYTES - REL 7 %                                    



             (CELLAVISION)(BEAKER) (test code =                                 

       



             2817)                                               

 

             MONOCYTES - REL 12 %                                   



             (CELLAVISION)(BEAKER) (test code =                                 

       



             2818)                                               

 

             EOSINOPHILS - REL 2 %                                    



             (CELLAVISION)(BEAKER) (test code =                                 

       



             2819)                                               

 

             NEUTROPHILS - ABS 8.37 K/ul    1.56-6.13    H            



             (CELLAVISION)(BEAKER) (test code =                                 

       



             2830)                                               

 

             LYMPHOCYTES - ABS 0.74 K/ul    1.18-3.74    L            



             (CELLAVISION)(BEAKER) (test code =                                 

       



             2831)                                               

 

             MONOCYTES - ABS 1.27 K/uL    0.24-0.36    H            



             (CELLAVISION)(BEAKER) (test code =                                 

       



             2832)                                               

 

             EOSINOPHILS - ABS 0.21 K/uL    0.04-0.36                 



             (CELLAVISION)(BEAKER) (test code =                                 

       



             2834)                                               

 

             TOTAL COUNTED (BEAKER) (test code = 100                            

        



             1351)                                               

 

             RBC MORPHOLOGY (BEAKER) (test code Normal                          

       



             = 762)                                              

 

             WBC MORPHOLOGY (BEAKER) (test code Normal                          

       



             = 487)                                              

 

             PLT MORPHOLOGY (BEAKER) (test code Normal                          

       



             = 486)                                              



CBC W/PLT COUNT &amp; AUTO [id number] 07:24:44





             Test Item    Value        Reference Range Interpretation Comments

 

             WHITE BLOOD CELL COUNT (BEAKER) 10.6 K/ L    3.5-10.5     H        

    



             (test code = 775)                                        

 

             RED BLOOD CELL COUNT (BEAKER) 3.90 M/ L    3.93-5.22    L          

  



             (test code = 761)                                        

 

             HEMOGLOBIN (BEAKER) (test code = 10.3 GM/DL   11.2-15.7    L       

     



             410)                                                

 

             HEMATOCRIT (BEAKER) (test code = 33.4 %       34.1-44.9    L       

     



             411)                                                

 

             MEAN CORPUSCULAR VOLUME (BEAKER) 85.6 fL      79.4-94.8            

     



             (test code = 753)                                        

 

             MEAN CORPUSCULAR HEMOGLOBIN 26.4 pg      25.6-32.2                 



             (BEAKER) (test code = 751)                                        

 

             MEAN CORPUSCULAR HEMOGLOBIN CONC 30.8 GM/DL   32.2-35.5    L       

     



             (BEAKER) (test code = 752)                                        

 

             RED CELL DISTRIBUTION WIDTH 19.3 %       11.7-14.4    H            



             (BEAKER) (test code = 412)                                        

 

             PLATELET COUNT (BEAKER) (test 124 K/CU MM  150-450      L          

  



             code = 756)                                         

 

             MEAN PLATELET VOLUME (BEAKER) 11.9 fL      9.4-12.3                

  



             (test code = 754)                                        

 

             NUCLEATED RED BLOOD CELLS 0 /100 WBC   0-0                       



             (BEAKER) (test code = 413)                                        



[id number] 07:00:12





             Test Item    Value        Reference Range Interpretation Comments

 

             PARTIAL THROMBOPLASTIN TIME 51.8 seconds 22.5-36.0    H            



             (BEAKER) (test code = 760)                                        



[id number] 06:10:21





             Test Item    Value        Reference Range Interpretation Comments

 

             PARTIAL THROMBOPLASTIN TIME 151.4 seconds 22.5-36.0    HH          

 



             (BEAKER) (test code = 760)                                        



COMPREHENSIVE METABOLIC [id number] 05:53:58





             Test Item    Value        Reference Range Interpretation Comments

 

             TOTAL PROTEIN 8.5 gm/dL    6.0-8.3      H            



             (BEAKER) (test code =                                        



             770)                                                

 

             ALBUMIN (BEAKER) 3.5 g/dL     3.5-5.0                   



             (test code = 1145)                                        

 

             ALKALINE PHOSPHATASE 524 U/L             H            



             (BEAKER) (test code =                                        



             346)                                                

 

             BILIRUBIN TOTAL 2.4 mg/dL    0.2-1.2      H            



             (BEAKER) (test code =                                        



             377)                                                

 

             SODIUM (BEAKER) (test 136 meq/L    136-145                   



             code = 381)                                         

 

             POTASSIUM (BEAKER) 4.1 meq/L    3.5-5.1                   



             (test code = 379)                                        

 

             CHLORIDE (BEAKER) 100 meq/L                        



             (test code = 382)                                        

 

             CO2 (BEAKER) (test 23 meq/L     22-29                     



             code = 355)                                         

 

             BLOOD UREA NITROGEN 21 mg/dL     7-21                      



             (BEAKER) (test code =                                        



             354)                                                

 

             CREATININE (BEAKER) 2.24 mg/dL   0.57-1.25    H            



             (test code = 358)                                        

 

             GLUCOSE RANDOM 100 mg/dL                        



             (BEAKER) (test code =                                        



             652)                                                

 

             CALCIUM (BEAKER) 9.3 mg/dL    8.4-10.2                  



             (test code = 697)                                        

 

             AST (SGOT) (BEAKER) 89 U/L       5-34         H            



             (test code = 353)                                        

 

             ALT (SGPT) (BEAKER) 322 U/L      6-55         H            



             (test code = 347)                                        

 

             EGFR (BEAKER) (test 21 mL/min/1.73                           ESTIMA

SHAHRIAR GFR IS



             code = 1092) sq m                                   NOT AS ACCURATE

 AS



                                                                 CREATININE



                                                                 CLEARANCE IN



                                                                 PREDICTING



                                                                 GLOMERULAR



                                                                 FILTRATION RATE

.



                                                                 ESTIMATED GFR I

S



                                                                 NOT APPLICABLE 

FOR



                                                                 DIALYSIS PATIEN

TS.



 ID - TUNDE EPATIC FUNCTION [id number] 05:43:38





             Test Item    Value        Reference Range Interpretation Comments

 

             TOTAL PROTEIN (BEAKER) (test code = 8.5 gm/dL    6.0-8.3      H    

        



             770)                                                

 

             ALBUMIN (BEAKER) (test code = 1145) 3.5 g/dL     3.5-5.0           

        

 

             BILIRUBIN TOTAL (BEAKER) (test code 2.4 mg/dL    0.2-1.2      H    

        



             = 377)                                              

 

             BILIRUBIN DIRECT (BEAKER) (test 1.8 mg/dL    0.1-0.5      H        

    



             code = 706)                                         

 

             ALKALINE PHOSPHATASE (BEAKER) (test 524 U/L             H    

        



             code = 346)                                         

 

             AST (SGOT) (BEAKER) (test code = 89 U/L       5-34         H       

     



             353)                                                

 

             ALT (SGPT) (BEAKER) (test code = 322 U/L      6-55         H       

     



             347)                                                



 ID - TUNDE [id number] 05:43:37





             Test Item    Value        Reference Range Interpretation Comments

 

             PHOSPHORUS (BEAKER) (test code = 3.6 mg/dL    2.3-4.7              

     



             604)                                                



 ID - TUNDE [id number] 05:43:36





             Test Item    Value        Reference Range Interpretation Comments

 

             MAGNESIUM (BEAKER) (test code = 1.8 mg/dL    1.6-2.6               

    



             627)                                                



 ID - TUNDE MCALCIUM, [id number] 05:33:45





             Test Item    Value        Reference Range Interpretation Comments

 

             CALCIUM IONIZED (BEAKER) (test 1.12 mmol/L  1.12-1.27              

   



             code = 698)                                         

 

             PH, BLOOD (BEAKER) (test code = 7.28                               

    



             1810)                                               



[id number] 05:16:53





             Test Item    Value        Reference Range Interpretation Comments

 

             PARTIAL THROMBOPLASTIN TIME 153.6 seconds 22.5-36.0    HH          

 



             (BEAKER) (test code = 760)                                        



POCT-GLUCOSE [id number] 20:38:56





             Test Item    Value        Reference Range Interpretation Comments

 

             POC-GLUCOSE METER 80 mg/dL                         : TESTED A

T [facility] [address]



             (BEAKER) (test code =                                        [place],



             1538)                                               [address]:



                                                                 /Techni

lorelei ID =



                                                                 [id number] for JEWELL HINTON



COMPREHENSIVE METABOLIC [id number] 05:16:27





             Test Item    Value        Reference Range Interpretation Comments

 

             TOTAL PROTEIN 8.2 gm/dL    6.0-8.3                   



             (BEAKER) (test code =                                        



             770)                                                

 

             ALBUMIN (BEAKER) 3.5 g/dL     3.5-5.0                   



             (test code = 1145)                                        

 

             ALKALINE PHOSPHATASE 558 U/L             H            



             (BEAKER) (test code =                                        



             346)                                                

 

             BILIRUBIN TOTAL 2.4 mg/dL    0.2-1.2      H            



             (BEAKER) (test code =                                        



             377)                                                

 

             SODIUM (BEAKER) (test 134 meq/L    136-145      L            



             code = 381)                                         

 

             POTASSIUM (BEAKER) 3.5 meq/L    3.5-5.1                   



             (test code = 379)                                        

 

             CHLORIDE (BEAKER) 98 meq/L                         



             (test code = 382)                                        

 

             CO2 (BEAKER) (test 21 meq/L     22-29        L            



             code = 355)                                         

 

             BLOOD UREA NITROGEN 30 mg/dL     7-21         H            



             (BEAKER) (test code =                                        



             354)                                                

 

             CREATININE (BEAKER) 2.61 mg/dL   0.57-1.25    H            



             (test code = 358)                                        

 

             GLUCOSE RANDOM 104 mg/dL                        



             (BEAKER) (test code =                                        



             652)                                                

 

             CALCIUM (BEAKER) 8.7 mg/dL    8.4-10.2                  



             (test code = 697)                                        

 

             AST (SGOT) (BEAKER) 128 U/L      5-34         H            



             (test code = 353)                                        

 

             ALT (SGPT) (BEAKER) 435 U/L      6-55         H            



             (test code = 347)                                        

 

             EGFR (BEAKER) (test 18 mL/min/1.73                           ESTIMA

SHAHRIAR GFR IS



             code = 1092) sq m                                   NOT AS ACCURATE

 AS



                                                                 CREATININE



                                                                 CLEARANCE IN



                                                                 PREDICTING



                                                                 GLOMERULAR



                                                                 FILTRATION RATE

.



                                                                 ESTIMATED GFR I

S



                                                                 NOT APPLICABLE 

FOR



                                                                 DIALYSIS PATIEN

TS.



 ID - TUNDE MCALCIUM, [id number] 05:10:03





             Test Item    Value        Reference Range Interpretation Comments

 

             CALCIUM IONIZED (BEAKER) (test 1.03 mmol/L  1.12-1.27    L         

   



             code = 698)                                         

 

             PH, BLOOD (BEAKER) (test code = 7.31                               

    



             1810)                                               



HEPATIC FUNCTION [id number] 05:08:02





             Test Item    Value        Reference Range Interpretation Comments

 

             TOTAL PROTEIN (BEAKER) (test code = 8.2 gm/dL    6.0-8.3           

        



             770)                                                

 

             ALBUMIN (BEAKER) (test code = 1145) 3.5 g/dL     3.5-5.0           

        

 

             BILIRUBIN TOTAL (BEAKER) (test code 2.4 mg/dL    0.2-1.2      H    

        



             = 377)                                              

 

             BILIRUBIN DIRECT (BEAKER) (test 1.9 mg/dL    0.1-0.5      H        

    



             code = 706)                                         

 

             ALKALINE PHOSPHATASE (BEAKER) (test 558 U/L             H    

        



             code = 346)                                         

 

             AST (SGOT) (BEAKER) (test code = 128 U/L      5-34         H       

     



             353)                                                

 

             ALT (SGPT) (BEAKER) (test code = 435 U/L      6-55         H       

     



             347)                                                



 ID - TUNDE [id number] 05:08:01





             Test Item    Value        Reference Range Interpretation Comments

 

             PHOSPHORUS (BEAKER) (test code = 4.0 mg/dL    2.3-4.7              

     



             604)                                                



 ID - TUNDE [id number] 05:08:00





             Test Item    Value        Reference Range Interpretation Comments

 

             MAGNESIUM (BEAKER) (test code = 1.9 mg/dL    1.6-2.6               

    



             627)                                                



 ID - TUNDE [id number] 04:36:18





             Test Item    Value        Reference Range Interpretation Comments

 

             PARTIAL THROMBOPLASTIN TIME 77.6 seconds 22.5-36.0    H            



             (BEAKER) (test code = 760)                                        



CBC W/PLT COUNT &amp; AUTO [id number] 04:28:59





             Test Item    Value        Reference Range Interpretation Comments

 

             WHITE BLOOD CELL COUNT (BEAKER) 10.1 K/ L    3.5-10.5              

    



             (test code = 775)                                        

 

             RED BLOOD CELL COUNT (BEAKER) 3.91 M/ L    3.93-5.22    L          

  



             (test code = 761)                                        

 

             HEMOGLOBIN (BEAKER) (test code = 10.3 GM/DL   11.2-15.7    L       

     



             410)                                                

 

             HEMATOCRIT (BEAKER) (test code = 34.5 %       34.1-44.9            

     



             411)                                                

 

             MEAN CORPUSCULAR VOLUME (BEAKER) 88.2 fL      79.4-94.8            

     



             (test code = 753)                                        

 

             MEAN CORPUSCULAR HEMOGLOBIN 26.3 pg      25.6-32.2                 



             (BEAKER) (test code = 751)                                        

 

             MEAN CORPUSCULAR HEMOGLOBIN CONC 29.9 GM/DL   32.2-35.5    L       

     



             (BEAKER) (test code = 752)                                        

 

             RED CELL DISTRIBUTION WIDTH 19.2 %       11.7-14.4    H            



             (BEAKER) (test code = 412)                                        

 

             PLATELET COUNT (BEAKER) (test 118 K/CU MM  150-450      L          

  



             code = 756)                                         

 

             MEAN PLATELET VOLUME (BEAKER) 10.6 fL      9.4-12.3                

  



             (test code = 754)                                        

 

             NUCLEATED RED BLOOD CELLS 0 /100 WBC   0-0                       



             (BEAKER) (test code = 413)                                        

 

             NEUTROPHILS RELATIVE PERCENT 77 %                                  

 



             (BEAKER) (test code = 429)                                        

 

             LYMPHOCYTES RELATIVE PERCENT 8 %                                   

 



             (BEAKER) (test code = 430)                                        

 

             MONOCYTES RELATIVE PERCENT 12 %                                   



             (BEAKER) (test code = 431)                                        

 

             EOSINOPHILS RELATIVE PERCENT 3 %                                   

 



             (BEAKER) (test code = 432)                                        

 

             BASOPHILS RELATIVE PERCENT 0 %                                    



             (BEAKER) (test code = 437)                                        

 

             NEUTROPHILS ABSOLUTE COUNT 7.84 K/ L    1.56-6.13    H            



             (BEAKER) (test code = 670)                                        

 

             LYMPHOCYTES ABSOLUTE COUNT 0.77 K/ L    1.18-3.74    L            



             (BEAKER) (test code = 414)                                        

 

             MONOCYTES ABSOLUTE COUNT (BEAKER) 1.17 K/ L    0.24-0.36    H      

      



             (test code = 415)                                        

 

             EOSINOPHILS ABSOLUTE COUNT 0.25 K/ L    0.04-0.36                 



             (BEAKER) (test code = 416)                                        

 

             BASOPHILS ABSOLUTE COUNT (BEAKER) 0.03 K/ L    0.01-0.08           

      



             (test code = 417)                                        

 

             IMMATURE GRANULOCYTES-RELATIVE 1 %          0-1                    

   



             PERCENT (BEAKER) (test code =                                      

  



             2801)                                               



COMPREHENSIVE METABOLIC [id number] 07:38:32





             Test Item    Value        Reference Range Interpretation Comments

 

             TOTAL PROTEIN 7.9 gm/dL    6.0-8.3                   



             (BEAKER) (test code =                                        



             770)                                                

 

             ALBUMIN (BEAKER) 3.4 g/dL     3.5-5.0      L            



             (test code = 1145)                                        

 

             ALKALINE PHOSPHATASE 587 U/L             H            



             (BEAKER) (test code =                                        



             346)                                                

 

             BILIRUBIN TOTAL 2.8 mg/dL    0.2-1.2      H            



             (BEAKER) (test code =                                        



             377)                                                

 

             SODIUM (BEAKER) (test 134 meq/L    136-145      L            



             code = 381)                                         

 

             POTASSIUM (BEAKER) 3.5 meq/L    3.5-5.1                   



             (test code = 379)                                        

 

             CHLORIDE (BEAKER) 98 meq/L                         



             (test code = 382)                                        

 

             CO2 (BEAKER) (test 23 meq/L     22-29                     



             code = 355)                                         

 

             ANION GAP (BEAKER) 17 meq/L                               



             (test code = 345)                                        

 

             BLOOD UREA NITROGEN 19 mg/dL     7-21                      



             (BEAKER) (test code =                                        



             354)                                                

 

             CREATININE (BEAKER) 2.00 mg/dL   0.57-1.25    H            



             (test code = 358)                                        

 

             BUN/CREATININE RATIO 9.5                                    



             (BEAKER) (test code =                                        



             1800)                                               

 

             GLUCOSE RANDOM 97 mg/dL                         



             (BEAKER) (test code =                                        



             652)                                                

 

             CALCIUM (BEAKER) 7.6 mg/dL    8.4-10.2     L            



             (test code = 697)                                        

 

             AST (SGOT) (BEAKER) 208 U/L      5-34         H            



             (test code = 353)                                        

 

             ALT (SGPT) (BEAKER) 569 U/L      6-55         H            



             (test code = 347)                                        

 

             EGFR (BEAKER) (test 24 mL/min/1.73                           ESTIMA

SHAHRIAR GFR IS



             code = 1092) sq m                                   NOT AS ACCURATE

 AS



                                                                 CREATININE



                                                                 CLEARANCE IN



                                                                 PREDICTING



                                                                 GLOMERULAR



                                                                 FILTRATION RATE

.



                                                                 ESTIMATED GFR I

S



                                                                 NOT APPLICABLE 

FOR



                                                                 DIALYSIS PATIEN

TS.



 ID - TUNDE MSpecimen slightly [id number] 07:33:14





             Test Item    Value        Reference Range Interpretation Comments

 

             PHOSPHORUS (BEAKER) (test code = 2.4 mg/dL    2.3-4.7              

     



             604)                                                



 ID - TUNDE MHEPATIC FUNCTION [id number] 07:33:14





             Test Item    Value        Reference Range Interpretation Comments

 

             TOTAL PROTEIN (BEAKER) (test code = 7.9 gm/dL    6.0-8.3           

        



             770)                                                

 

             ALBUMIN (BEAKER) (test code = 1145) 3.4 g/dL     3.5-5.0      L    

        

 

             BILIRUBIN TOTAL (BEAKER) (test code 2.8 mg/dL    0.2-1.2      H    

        



             = 377)                                              

 

             BILIRUBIN DIRECT (BEAKER) (test 2.2 mg/dL    0.1-0.5      H        

    



             code = 706)                                         

 

             ALKALINE PHOSPHATASE (BEAKER) (test 587 U/L             H    

        



             code = 346)                                         

 

             AST (SGOT) (BEAKER) (test code = 208 U/L      5-34         H       

     



             353)                                                

 

             ALT (SGPT) (BEAKER) (test code = 569 U/L      6-55         H       

     



             347)                                                



 ID - TUNDE Kiddecimen andrade [id number] 07:33:13





             Test Item    Value        Reference Range Interpretation Comments

 

             MAGNESIUM (BEAKER) (test code = 1.9 mg/dL    1.6-2.6               

    



             627)                                                



 ID - TUNDE [id number] 05:47:30





             Test Item    Value        Reference Range Interpretation Comments

 

             PARTIAL THROMBOPLASTIN TIME 64.9 seconds 22.5-36.0    H            



             (BEAKER) (test code = 760)                                        



CBC W/PLT COUNT &amp; AUTO [id number] 05:45:36





             Test Item    Value        Reference Range Interpretation Comments

 

             WHITE BLOOD CELL COUNT (BEAKER) 9.3 K/ L     3.5-10.5              

    



             (test code = 775)                                        

 

             RED BLOOD CELL COUNT (BEAKER) 3.68 M/ L    3.93-5.22    L          

  



             (test code = 761)                                        

 

             HEMOGLOBIN (BEAKER) (test code = 9.7 GM/DL    11.2-15.7    L       

     



             410)                                                

 

             HEMATOCRIT (BEAKER) (test code = 32.0 %       34.1-44.9    L       

     



             411)                                                

 

             MEAN CORPUSCULAR VOLUME (BEAKER) 87.0 fL      79.4-94.8            

     



             (test code = 753)                                        

 

             MEAN CORPUSCULAR HEMOGLOBIN 26.4 pg      25.6-32.2                 



             (BEAKER) (test code = 751)                                        

 

             MEAN CORPUSCULAR HEMOGLOBIN CONC 30.3 GM/DL   32.2-35.5    L       

     



             (BEAKER) (test code = 752)                                        

 

             RED CELL DISTRIBUTION WIDTH 18.8 %       11.7-14.4    H            



             (BEAKER) (test code = 412)                                        

 

             PLATELET COUNT (BEAKER) (test 139 K/CU MM  150-450      L          

  



             code = 756)                                         

 

             MEAN PLATELET VOLUME (BEAKER) 10.3 fL      9.4-12.3                

  



             (test code = 754)                                        

 

             NUCLEATED RED BLOOD CELLS 0 /100 WBC   0-0                       



             (BEAKER) (test code = 413)                                        

 

             NEUTROPHILS RELATIVE PERCENT 80 %                                  

 



             (BEAKER) (test code = 429)                                        

 

             LYMPHOCYTES RELATIVE PERCENT 8 %                                   

 



             (BEAKER) (test code = 430)                                        

 

             MONOCYTES RELATIVE PERCENT 10 %                                   



             (BEAKER) (test code = 431)                                        

 

             EOSINOPHILS RELATIVE PERCENT 2 %                                   

 



             (BEAKER) (test code = 432)                                        

 

             BASOPHILS RELATIVE PERCENT 0 %                                    



             (BEAKER) (test code = 437)                                        

 

             NEUTROPHILS ABSOLUTE COUNT 7.40 K/ L    1.56-6.13    H            



             (BEAKER) (test code = 670)                                        

 

             LYMPHOCYTES ABSOLUTE COUNT 0.71 K/ L    1.18-3.74    L            



             (BEAKER) (test code = 414)                                        

 

             MONOCYTES ABSOLUTE COUNT (BEAKER) 0.92 K/ L    0.24-0.36    H      

      



             (test code = 415)                                        

 

             EOSINOPHILS ABSOLUTE COUNT 0.15 K/ L    0.04-0.36                 



             (BEAKER) (test code = 416)                                        

 

             BASOPHILS ABSOLUTE COUNT (BEAKER) 0.02 K/ L    0.01-0.08           

      



             (test code = 417)                                        

 

             IMMATURE GRANULOCYTES-RELATIVE 1 %          0-1                    

   



             PERCENT (BEAKER) (test code =                                      

  



             2801)                                               



CALCIUM, [id number] 05:29:50





             Test Item    Value        Reference Range Interpretation Comments

 

             CALCIUM IONIZED (BEAKER) (test 0.91 mmol/L  1.12-1.27    L         

   



             code = 698)                                         

 

             PH, BLOOD (BEAKER) (test code = 7.36                               

    



             1810)                                               



[id number] 20:26:01





             Test Item    Value        Reference Range Interpretation Comments

 

             PARTIAL THROMBOPLASTIN TIME 84.6 seconds 22.5-36.0    H            



             (BEAKER) (test code = 760)                                        



[id number] 14:48:25





             Test Item    Value        Reference Range Interpretation Comments

 

             PARTIAL THROMBOPLASTIN TIME 85.3 seconds 22.5-36.0    H            



             (BEAKER) (test code = 760)                                        



SARS-COV2/RT-PCR ([facility] [place] [facility])2021 12:31:15





             Test Item    Value        Reference Range Interpretation Comments

 

             SARS-COV2/RT-PCR (test Negative     Not Detected, Negative,        

      



             code = 1746878)              See external report for              



                                       linked test               

 

             SARS-COV-2 PERFORMING LAB [facility]                             



             (test code = 7939286)                                        



Negative result for this test determines that SARS-CoV-2 RNA was not present in 
the specimen above the Limit of Detection (LOD). However, Negative results do 
not preclude SARS-CoV-2 infection and should not be used as the sole basis for 
treatment or patient management decisions. Negative results must be combined 
with clinical observations, patient history, and epidemiological information. A 
false negative result may occur if a specimen is improperly collected, 
transported or handled. A false negative result should be considered if 
patient's recent exposures or clinical presentation indicate that COVID-19 
(SARS-CoV-2) is likely and diagnostic tests for other causes of illness are 
negative. Re-testing should be considered in cases of suspected false 
negatives.The limit of detection for this assay is 800 copies/mL.This SARS CoV-2
test is a real-time RT-PCR test intended for the qualitative detection of 
nucleic acid from SARS-CoV-2 in a nasopharyngeal swab specimen collected from 
individuals suspected of COVID-19 by their healthcare provider.This test has not
been Food and Drug Administration (FDA) cleared or approved. This is a modified 
version of an approved Emergency Use Authorization (EUA) and is in the process 
of review by the FDA. Once authorized by the FDA, the issued EUA will be 
effective until the declaration that circumstances exist justifying the 
authorization of the emergency use ofin vitro diagnostic tests for detection 
and/or diagnosis of COVID-19 is terminated under Section 564(b)(2) of the Act or
the EUA is revoked under Section 564(g) of the Act.Fact Sheet for Healthcare 
Prov
iders:https://www.[employer]/sites/default/files/product/documents/Fact_Sheet_HC

_Ehpvhofnl_Sznk_RWDU-RrF-3.pdfFact Sheet for Healthcare 
Patients:https://www.[employer]/sites/default/files/product/docume
nts/Vjmy_Hhmyt_Dsiuwabq_Plvu_LKFW-VmX-0.pdfPerforming Laboratory:[facility] [place], [place] [address]X/S, ABDOMINAL, COMPLETE
2021 12:31:00Reason for exam:-&gt;Evaluate both liver and kidneys
************************************************************[facility]  : 1945 Sex: 
F************************************************************FINAL REPORT 
PATIENT ID: [id number] TECHNIQUE: Grayscale ultrasound of the abdomen. INDICATION:
Evaluate both liver and kidneys. COMPARISON: None. FINDINGS: MIDLINE 
VASCULATURE: The visualized inferior vena cava is unremarkable. The maximum 
visualized aortic diameter is 2 cm. LIVER: There is a questionable nodular liver
contour. No focal lesions. The main portal vein is patent and measures 1.1 cm in
diameter. BILIARY:Gallbladder: Not visualizedCommon bile duct measures 0.5 cm, 
within normal limits. No intrahepatic biliary ductal dilatation. PANCREAS: 
Incompletely visualized due to overlying bowel gas. The partially visualized 
pancreatic body is normal. SPLEEN: No splenomegaly. The spleen measures 12.8 cm 
in length. PERITONEUM: No free fluid. KIDNEYS: Normal in size bilaterally. No 
hydronephrosis. No sonographically evident solid mass lesion. IMPRESSION: 1.The 
liver contour is questionably nodular. This could be due to cirrhosis. 2.The 
sonographic appearance of the kidneys is normal. Signed: Mynor Shore 
Verified Date/Time: 2021 12:31:19 Electronically signed by: Peyton CEDENO 2021 12:31 PMANTI-NUCLEAR ANTIBODY (MAYITO)2021 09:35:17





             Test Item    Value        Reference Range Interpretation Comments

 

             ANTI-NUCLEAR ANTIBODY (MAYITO) (BEAKER) Negative     Negative         

         



             (test code = 418)                                        



Test performed by IFA method.Test performed by IFA method.POCT-GLUCOSE METER
2021 09:31:24





             Test Item    Value        Reference Range Interpretation Comments

 

             POC-GLUCOSE METER 106 mg/dL                        : TESTED A

T [facility] [address]



             (BEAKER) (test code =                                        [place],



             1538)                                               [id number]:



                                                                 /Techni

lorelei ID



                                                                 = [id number] for LYNN LOPEZ



HEMOGLOBIN [id number] 09:24:34





             Test Item    Value        Reference Range Interpretation Comments

 

             HEMOGLOBIN A1C (BEAKER) (test code = 6.1 %        4.3-6.1          

         



             368)                                                



[id number] 07:45:01





             Test Item    Value        Reference Range Interpretation Comments

 

             PARTIAL THROMBOPLASTIN TIME 54.4 seconds 22.5-36.0    H            



             (BEAKER) (test code = 760)                                        



HEPATITIS A ANTIBODY, [id number] 07:14:26





             Test Item    Value        Reference Range Interpretation Comments

 

             HEPATITIS A IGG ANTIBODY (BEAKER) Reactive     Nonreactive  A      

      



             (test code = 2797)                                        



 ID - DBHEPATIC FUNCTION [id number] 07:03:51





             Test Item    Value        Reference Range Interpretation Comments

 

             TOTAL PROTEIN (BEAKER) 8.1 gm/dL    6.0-8.3                   Speci

men slightly



             (test code = 770)                                        hemolyzed

 

             ALBUMIN (BEAKER) (test 3.5 g/dL     3.5-5.0                   Speci

men slightly



             code = 1145)                                        hemolyzed

 

             BILIRUBIN TOTAL 3.4 mg/dL    0.2-1.2      H            Specimen sli

ghtly



             (BEAKER) (test code =                                        hemoly

zed



             377)                                                

 

             BILIRUBIN DIRECT 2.5 mg/dL    0.1-0.5      H            Specimen sl

ightly



             (BEAKER) (test code =                                        hemoly

zed



             706)                                                

 

             ALKALINE PHOSPHATASE 643 U/L             H            



             (BEAKER) (test code =                                        



             346)                                                

 

             AST (SGOT) (BEAKER) 355 U/L      5-34         H            Specimen

 slightly



             (test code = 353)                                        hemolyzed

 

             ALT (SGPT) (BEAKER) 739 U/L      6-55         H            Specimen

 slightly



             (test code = 347)                                        hemolyzed



 ID - DBSpecimen slightly ictericCOMPREHENSIVE METABOLIC [id number]
06:18:53





             Test Item    Value        Reference Range Interpretation Comments

 

             TOTAL PROTEIN 8.1 gm/dL    6.0-8.3                   Specimen sligh

tly



             (BEAKER) (test code =                                        hemoly

zed



             770)                                                

 

             ALBUMIN (BEAKER) 3.5 g/dL     3.5-5.0                   Specimen sl

ightly



             (test code = 1145)                                        hemolyzed

 

             ALKALINE PHOSPHATASE 643 U/L             H            



             (BEAKER) (test code =                                        



             346)                                                

 

             BILIRUBIN TOTAL 3.4 mg/dL    0.2-1.2      H            Specimen sli

ghtly



             (BEAKER) (test code =                                        hemoly

zed



             377)                                                

 

             SODIUM (BEAKER) (test 133 meq/L    136-145      L            



             code = 381)                                         

 

             POTASSIUM (BEAKER) 3.8 meq/L    3.5-5.1                   Specimen 

slightly



             (test code = 379)                                        hemolyzed

 

             CHLORIDE (BEAKER) 97 meq/L            L            



             (test code = 382)                                        

 

             CO2 (BEAKER) (test 24 meq/L     22-29                     



             code = 355)                                         

 

             BLOOD UREA NITROGEN 47 mg/dL     7-21         H            



             (BEAKER) (test code =                                        



             354)                                                

 

             CREATININE (BEAKER) 3.62 mg/dL   0.57-1.25    H            Specimen

 slightly



             (test code = 358)                                        hemolyzed

 

             GLUCOSE RANDOM 108 mg/dL           H            



             (BEAKER) (test code =                                        



             652)                                                

 

             CALCIUM (BEAKER) 9.1 mg/dL    8.4-10.2                  



             (test code = 697)                                        

 

             AST (SGOT) (BEAKER) 355 U/L      5-34         H            Specimen

 slightly



             (test code = 353)                                        hemolyzed

 

             ALT (SGPT) (BEAKER) 739 U/L      6-55         H            Specimen

 slightly



             (test code = 347)                                        hemolyzed

 

             EGFR (BEAKER) (test 12 mL/min/1.73                           ESTIMA

SHAHRIAR GFR IS



             code = 1092) sq m                                   NOT AS ACCURATE

 AS



                                                                 CREATININE



                                                                 CLEARANCE IN



                                                                 PREDICTING



                                                                 GLOMERULAR



                                                                 FILTRATION RATE

.



                                                                 ESTIMATED GFR I

S



                                                                 NOT APPLICABLE 

FOR



                                                                 DIALYSIS PATIEN

TS.



 ID - DBSpecimen slightly ictericHEPATITIS B SURFACE [id number] 
06:18:46





             Test Item    Value        Reference Range Interpretation Comments

 

             HEPATITIS B SURFACE ANTIBODY < mIU/mL     <8.0                     

 



             (BEAKER) (test code = 647)                                        



 ID - DBHEPATITIS B CORE ANTIBODY, [id number] 06:04:50





             Test Item    Value        Reference Range Interpretation Comments

 

             HEPATITIS B CORE TOTAL ANTIBODY Nonreactive  Nonreactive           

    



             (BEAKER) (test code = 497)                                        



 ID - DBHEPATITIS C [id number] 06:04:49





             Test Item    Value        Reference Range Interpretation Comments

 

             HEPATITIS C ANTIBODY (BEAKER) Nonreactive  Nonreactive             

  



             (test code = 367)                                        



 ID - DBHEPATITIS B SURFACE [id number] 06:04:48





             Test Item    Value        Reference Range Interpretation Comments

 

             HEPATITIS B SURFACE ANTIGEN (2) Nonreactive  Nonreactive           

    



             (BEAKER) (test code = 2585)                                        



Specimen is considered negative for HBsAg.HIV-1 ANTIGEN WITH HIV-1/2 ANTIBODY
2021 05:59:05





             Test Item    Value        Reference Range Interpretation Comments

 

             HIV-1 ANTIGEN WITH HIV 1\T\2 Nonreactive  Nonreactive              

 



             ANTIBODY (2) (BEAKER) (test code                                   

     



             = 2586)                                             



 ID - DBC-REACTIVE [id number] 05:54:16





             Test Item    Value        Reference Range Interpretation Comments

 

             C-REACTIVE PROTEIN (BEAKER) (test 8.74 mg/dL   0.00-0.50    H      

      



             code = 676)                                         



 ID - [id number] 05:54:15





             Test Item    Value        Reference Range Interpretation Comments

 

             AMYLASE (BEAKER) (test 114 U/L                          Speci

men slightly



             code = 349)                                         hemolyzed



 ID - DBSpecimen slightly [id number] 05:54:15





             Test Item    Value        Reference Range Interpretation Comments

 

             LIPASE (BEAKER) (test code = 749) 45 U/L       8-78                

      



 ID - DBSpecimen slightly [id number] 05:54:14





             Test Item    Value        Reference Range Interpretation Comments

 

             MAGNESIUM (BEAKER) 1.9 mg/dL    1.6-2.6                   Specimen 

slightly



             (test code = 627)                                        hemolyzed



 ID - [id number] 05:54:14





             Test Item    Value        Reference Range Interpretation Comments

 

             PHOSPHORUS (BEAKER) 3.4 mg/dL    2.3-4.7                   Specimen

 slightly



             (test code = 604)                                        hemolyzed



 ID - DBCBC W/PLT COUNT &amp; AUTO [id number] 05:19:04





             Test Item    Value        Reference Range Interpretation Comments

 

             WHITE BLOOD CELL COUNT (BEAKER) 11.1 K/ L    3.5-10.5     H        

    



             (test code = 775)                                        

 

             RED BLOOD CELL COUNT (BEAKER) 3.58 M/ L    3.93-5.22    L          

  



             (test code = 761)                                        

 

             HEMOGLOBIN (BEAKER) (test code = 9.5 GM/DL    11.2-15.7    L       

     



             410)                                                

 

             HEMATOCRIT (BEAKER) (test code = 30.7 %       34.1-44.9    L       

     



             411)                                                

 

             MEAN CORPUSCULAR VOLUME (BEAKER) 85.8 fL      79.4-94.8            

     



             (test code = 753)                                        

 

             MEAN CORPUSCULAR HEMOGLOBIN 26.5 pg      25.6-32.2                 



             (BEAKER) (test code = 751)                                        

 

             MEAN CORPUSCULAR HEMOGLOBIN CONC 30.9 GM/DL   32.2-35.5    L       

     



             (BEAKER) (test code = 752)                                        

 

             RED CELL DISTRIBUTION WIDTH 18.6 %       11.7-14.4    H            



             (BEAKER) (test code = 412)                                        

 

             PLATELET COUNT (BEAKER) (test 151 K/CU MM  150-450                 

  



             code = 756)                                         

 

             MEAN PLATELET VOLUME (BEAKER) 10.5 fL      9.4-12.3                

  



             (test code = 754)                                        

 

             NUCLEATED RED BLOOD CELLS 0 /100 WBC   0-0                       



             (BEAKER) (test code = 413)                                        

 

             NEUTROPHILS RELATIVE PERCENT 79 %                                  

 



             (BEAKER) (test code = 429)                                        

 

             LYMPHOCYTES RELATIVE PERCENT 8 %                                   

 



             (BEAKER) (test code = 430)                                        

 

             MONOCYTES RELATIVE PERCENT 9 %                                    



             (BEAKER) (test code = 431)                                        

 

             EOSINOPHILS RELATIVE PERCENT 3 %                                   

 



             (BEAKER) (test code = 432)                                        

 

             BASOPHILS RELATIVE PERCENT 0 %                                    



             (BEAKER) (test code = 437)                                        

 

             NEUTROPHILS ABSOLUTE COUNT 8.79 K/ L    1.56-6.13    H            



             (BEAKER) (test code = 670)                                        

 

             LYMPHOCYTES ABSOLUTE COUNT 0.84 K/ L    1.18-3.74    L            



             (BEAKER) (test code = 414)                                        

 

             MONOCYTES ABSOLUTE COUNT (BEAKER) 1.03 K/ L    0.24-0.36    H      

      



             (test code = 415)                                        

 

             EOSINOPHILS ABSOLUTE COUNT 0.34 K/ L    0.04-0.36                 



             (BEAKER) (test code = 416)                                        

 

             BASOPHILS ABSOLUTE COUNT (BEAKER) 0.02 K/ L    0.01-0.08           

      



             (test code = 417)                                        

 

             IMMATURE GRANULOCYTES-RELATIVE 1 %          0-1                    

   



             PERCENT (BEAKER) (test code =                                      

  



             2801)                                               



CALCIUM, [id number] 05:16:00





             Test Item    Value        Reference Range Interpretation Comments

 

             CALCIUM IONIZED (BEAKER) (test 1.07 mmol/L  1.12-1.27    L         

   



             code = 698)                                         

 

             PH, BLOOD (BEAKER) (test code = 7.33                               

    



             1810)                                               



[id number] 00:56:30





             Test Item    Value        Reference Range Interpretation Comments

 

             PARTIAL THROMBOPLASTIN TIME 51.5 seconds 22.5-36.0    H            



             (BEAKER) (test code = 760)                                        



HEPATIC FUNCTION PANEL2021-10-31 19:22:51





             Test Item    Value        Reference Range Interpretation Comments

 

             TOTAL PROTEIN (BEAKER) (test code = 7.2 gm/dL    6.0-8.3           

        



             770)                                                

 

             ALBUMIN (BEAKER) (test code = 1145) 3.2 g/dL     3.5-5.0      L    

        

 

             BILIRUBIN TOTAL (BEAKER) (test code 4.0 mg/dL    0.2-1.2      H    

        



             = 377)                                              

 

             BILIRUBIN DIRECT (BEAKER) (test 3.1 mg/dL    0.1-0.5      H        

    



             code = 706)                                         

 

             ALKALINE PHOSPHATASE (BEAKER) (test 665 U/L             H    

        



             code = 346)                                         

 

             AST (SGOT) (BEAKER) (test code = 511 U/L      5-34         H       

     



             353)                                                

 

             ALT (SGPT) (BEAKER) (test code = 925 U/L      6-55         H       

     



             347)                                                



 ID - HERB WOperator ID - DBSpecimen slightly ictericTSH/FREE T4 IF 
INDICATED2021-10-31 18:47:04





             Test Item    Value        Reference Range Interpretation Comments

 

             THYROID STIMULATING HORMONE 3.745 uIU/mL 0.350-4.940               



             (BEAKER) (test code = 772)                                        



 ID - HERB WVITAMIN B12 AND FOLATE2021-10-31 18:47:04





             Test Item    Value        Reference Range Interpretation Comments

 

             VITAMIN B12  1531 pg/mL   213-816      H            



             (BEAKER) (test code                                        



             = 774)                                              

 

             FOLATE (BEAKER) 15.90 ng/mL  See_Comment                [Automated 

message]



             (test code = 362)                                        The system

 which



                                                                 generated this 

result



                                                                 transmitted ref

erence



                                                                 range: >=7.00. 

The



                                                                 reference range

 was not



                                                                 used to interpr

et this



                                                                 result as



                                                                 normal/abnormal

.



 ID - HERB [id number] 18:47:03





             Test Item    Value        Reference Range Interpretation Comments

 

             FERRITIN (BEAKER) (test code = 488.15 ng/mL 5..00  H         

   



             361)                                                



 ID - HERB [id number] 18:28:38





             Test Item    Value        Reference Range Interpretation Comments

 

             PARTIAL THROMBOPLASTIN TIME 34.1 seconds 22.5-36.0                 



             (BEAKER) (test code = 760)                                        



PROTHROMBIN TIME/[id number] 18:28:02





             Test Item    Value        Reference Range Interpretation Comments

 

             PROTIME (BEAKER) 15.0 seconds 11.9-14.2    H            



             (test code = 759)                                        

 

             INR (BEAKER) (test 1.20         See_Comment                [Automat

ed message]



             code = 370)                                         The system [employer]



                                                                 generated this 

result



                                                                 transmitted ref

erence



                                                                 range: <=5.90. 

The



                                                                 reference range

 was



                                                                 not used to int

erpret



                                                                 this result as



                                                                 normal/abnormal

.



RECOMMENDED COUMADIN/WARFARIN INR THERAPY RANGESSTANDARD DOSE: 2.0 - 3.0 
Includes: PROPHYLAXIS for venous thrombosis, systemic embolization; TREATMENT 
for venous thrombosis and/or pulmonary embolus.HIGH RISK: Target INR is 2.5-3.5 
for patients with mechanical heart valves.BASIC METABOLIC PANEL2021-10-31 
18:15:05





             Test Item    Value        Reference Range Interpretation Comments

 

             SODIUM (BEAKER) 133 meq/L    136-145      L            



             (test code = 381)                                        

 

             POTASSIUM (BEAKER) 3.8 meq/L    3.5-5.1                   



             (test code = 379)                                        

 

             CHLORIDE (BEAKER) 97 meq/L            L            



             (test code = 382)                                        

 

             CO2 (BEAKER) (test 24 meq/L     22-29                     



             code = 355)                                         

 

             BLOOD UREA NITROGEN 44 mg/dL     7-21         H            



             (BEAKER) (test code                                        



             = 354)                                              

 

             CREATININE (BEAKER) 3.42 mg/dL   0.57-1.25    H            



             (test code = 358)                                        

 

             GLUCOSE RANDOM 147 mg/dL           H            



             (BEAKER) (test code                                        



             = 652)                                              

 

             CALCIUM (BEAKER) 8.6 mg/dL    8.4-10.2                  



             (test code = 697)                                        

 

             EGFR (BEAKER) (test 13 mL/min/1.73                           ESTIMA

SHAHRIAR GFR IS



             code = 1092) sq m                                   NOT AS ACCURATE

 AS



                                                                 CREATININE



                                                                 CLEARANCE IN



                                                                 PREDICTING



                                                                 GLOMERULAR



                                                                 FILTRATION RATE

.



                                                                 ESTIMATED GFR I

S



                                                                 NOT APPLICABLE 

FOR



                                                                 DIALYSIS PATIEN

TS.



 ID - HERB WSpecimen slightly ictericCBC W/PLT COUNT &amp; AUTO 
DIFFERENTIAL2021-10-31 18:13:04





             Test Item    Value        Reference Range Interpretation Comments

 

             WHITE BLOOD CELL COUNT (BEAKER) 10.2 K/ L    3.5-10.5              

    



             (test code = 775)                                        

 

             RED BLOOD CELL COUNT (BEAKER) 3.27 M/ L    3.93-5.22    L          

  



             (test code = 761)                                        

 

             HEMOGLOBIN (BEAKER) (test code = 8.8 GM/DL    11.2-15.7    L       

     



             410)                                                

 

             HEMATOCRIT (BEAKER) (test code = 28.1 %       34.1-44.9    L       

     



             411)                                                

 

             MEAN CORPUSCULAR VOLUME (BEAKER) 85.9 fL      79.4-94.8            

     



             (test code = 753)                                        

 

             MEAN CORPUSCULAR HEMOGLOBIN 26.9 pg      25.6-32.2                 



             (BEAKER) (test code = 751)                                        

 

             MEAN CORPUSCULAR HEMOGLOBIN CONC 31.3 GM/DL   32.2-35.5    L       

     



             (BEAKER) (test code = 752)                                        

 

             RED CELL DISTRIBUTION WIDTH 18.4 %       11.7-14.4    H            



             (BEAKER) (test code = 412)                                        

 

             PLATELET COUNT (BEAKER) (test 141 K/CU MM  150-450      L          

  



             code = 756)                                         

 

             MEAN PLATELET VOLUME (BEAKER) 10.1 fL      9.4-12.3                

  



             (test code = 754)                                        

 

             NUCLEATED RED BLOOD CELLS 0 /100 WBC   0-0                       



             (BEAKER) (test code = 413)                                        

 

             NEUTROPHILS RELATIVE PERCENT 83 %                                  

 



             (BEAKER) (test code = 429)                                        

 

             LYMPHOCYTES RELATIVE PERCENT 6 %                                   

 



             (BEAKER) (test code = 430)                                        

 

             MONOCYTES RELATIVE PERCENT 7 %                                    



             (BEAKER) (test code = 431)                                        

 

             EOSINOPHILS RELATIVE PERCENT 3 %                                   

 



             (BEAKER) (test code = 432)                                        

 

             BASOPHILS RELATIVE PERCENT 0 %                                    



             (BEAKER) (test code = 437)                                        

 

             NEUTROPHILS ABSOLUTE COUNT 8.50 K/ L    1.56-6.13    H            



             (BEAKER) (test code = 670)                                        

 

             LYMPHOCYTES ABSOLUTE COUNT 0.61 K/ L    1.18-3.74    L            



             (BEAKER) (test code = 414)                                        

 

             MONOCYTES ABSOLUTE COUNT (BEAKER) 0.73 K/ L    0.24-0.36    H      

      



             (test code = 415)                                        

 

             EOSINOPHILS ABSOLUTE COUNT 0.26 K/ L    0.04-0.36                 



             (BEAKER) (test code = 416)                                        

 

             BASOPHILS ABSOLUTE COUNT (BEAKER) 0.01 K/ L    0.01-0.08           

      



             (test code = 417)                                        

 

             IMMATURE GRANULOCYTES-RELATIVE 1 %          0-1                    

   



             PERCENT (BEAKER) (test code =                                      

  



             2801)                                               



IRON, TIBC, % SAT. (WITHOUT FERRITIN)2021-10-31 18:11:07





             Test Item    Value        Reference Range Interpretation Comments

 

             IRON (BEAKER) (test code = 547) 21.0 ug/dL   40.0-160.0   L        

    

 

             TOTAL IRON BINDING CAPACITY 244 ug/dL    250-450      L            



             (BEAKER) (test code = 769)                                        

 

             IRON % SATURATION (2) (BEAKER) 9 %          20-55        L         

   



             (test code = 2590)                                        



 PRANEETH [id number] 18:11:00





             Test Item    Value        Reference Range Interpretation Comments

 

             PHOSPHORUS (BEAKER) (test code = 3.1 mg/dL    2.3-4.7              

     



             604)                                                



 ID - HERB WLIPID [id number] 18:11:00





             Test Item    Value        Reference Range Interpretation Comments

 

             TRIGLYCERIDES (BEAKER) (test code = 163 mg/dL                      

        



             540)                                                

 

             CHOLESTEROL (BEAKER) (test code = 132 mg/dL                        

      



             631)                                                

 

             HDL CHOLESTEROL (BEAKER) (test code 11 mg/dL                       

        



             = 976)                                              

 

             LDL CHOLESTEROL CALCULATED (BEAKER) 88 mg/dL                       

        



             (test code = 633)                                        



Triglyceride Reference Range: Low Risk &lt;150 Borderline 150-199 High Risk 200-
499 Very High Risk &gt;=500Cholesterol Reference Range: Low Risk &lt;200 
Borderline 200-239 High Risk &gt;240HDL Cholesterol Reference Range: Low Risk 
&gt;=60 High Risk &lt;40LDL Cholesterol Reference Range: Optimal &lt;100 Near 
Optimal 100-129 Borderline 130-159 High 160-189 Very High &gt;=190  ID -
HERB WSpecimen slightly ictericMAGNESIUM2021-10-31 18:10:59





             Test Item    Value        Reference Range Interpretation Comments

 

             MAGNESIUM (BEAKER) (test code = 2.0 mg/dL    1.6-2.6               

    



             627)                                                



 ID - HERB WRAD, CHEST, 1 VIEW, NON DEPT2021-10-31 18:10:00Reason for 
exam:-&gt;chfShould this be performed at the bedside?-&gt;Yes
************************************************************[facility] : 1945 Sex: 
F************************************************************FINAL REPORT 
PATIENT ID: [id number] TECHNIQUE: One view of the chest. INDICATION: 76-year-old 
woman with congestive heart failure. COMPARISON: None. FINDINGS: LINES/TUBES: 
Right subclavian dual lumen catheter terminates over the expected region of the 
right atrium. LUNGS: Airspace opacities in the left midlung zone and bilateral 
lower lung zones. PLEURA: Suspected small bilateral pleural effusions. No p
neumothorax. HEART AND MEDIASTINUM: Markedly prominent cardiac silhouette. 
Atherosclerotic calcifications in the thoracic aorta. BONES AND SOFT TISSUES: 
Unremarkable. IMPRESSION:Markedly prominent cardiac silhouette. Underlying 
pericardial effusion cannot be excluded. Bilateral airspace opacities may r
epresent atelectasis, however pneumonia cannot be excluded. Suspected small 
bilateral pleural effusions. Signed: Yousif Sueroeport Verified 
Date/Time: 10/31/2021 18:10:08 Reading Location: [address] Transitional 
Reading Room Electronically signed by: YOUSIF SUERO MD on 10/31/2021 
06:10 PMHIGH SENSITIVITY TROPONIN -10-31 18:08:28





             Test Item    Value        Reference Range Interpretation Comments

 

             HIGH SENSITIVITY 8 pg/ml      See_Comment                [Automated

 message]



             TROPONIN I (test code =                                        The 

system which



             [id number])                                            generated this 

result



                                                                 transmitted ref

erence



                                                                 range: <=17. Th

e



                                                                 reference range

 was not



                                                                 used to interpr

et this



                                                                 result as



                                                                 normal/abnormal

.



 ID Joshua ESTEVEZ WThe  STAT High Sensitivity Troponin-I results 
should be used in conjunction with other diagnostic information such as ECG, 
clinical observations and information, and patient symptoms to aid in the 
diagnosis of MI.B-TYPE NATRIURETIC FACTOR (BNP)2021-10-31 18:07:35





             Test Item    Value        Reference Range Interpretation Comments

 

             B-TYPE NATRIURETIC PEPTIDE (BEAKER) 590 pg/mL    0-100        H    

        



             (test code = 700)                                        



 ID Joshua ESTEVEZ WBASIC METABOLIC PANEL2021-10-07 10:36:00





             Test Item    Value        Reference Range Interpretation Comments

 

             SODIUM (test code = NA) 136 mEq/L    134-147      N            

 

             POTASSIUM (test code = 4.7 mEq/L    3.4-5.0      N            



             K)                                                  

 

             CHLORIDE (test code = 100 mEq/L    100-108      N            



             CL)                                                 

 

             CARBON DIOXIDE (test 25 mEq/l     21-33        N            



             code = CO2)                                         

 

             ANION GAP (test code = 16           0-20         N            



             GAP)                                                

 

             GLUCOSE (test code = 97 mg/dL            N            



             GLU)                                                

 

             BLOOD UREA NITROGEN 67 mg/dL     7-18         H            



             (test code = BUN)                                        

 

             GLOMERULAR FILTRATION 17.8         70-80        L            Units 

of measure =



             RATE (test code = GFR)                                        ml/mi

n/1.73 m2

 

             CREATININE (test code = 2.6 mg/dL    0.6-1.3      H            



             CREAT)                                              

 

             CALCIUM (test code = 9.7 mg/dL    8.0-10.5     N            



             CA)                                                 



Indication for Test: Osteopenia/Bone Dis RiskLIPID PROFILE (CORONARY RISK)
2021-10-07 10:36:00





             Test Item    Value        Reference Range Interpretation Comments

 

             TRIGLYCERIDES (test 163 mg/dL           H            



             code = TRIG)                                        

 

             CHOLESTEROL (test 164 mg/dL    <200                      



             code = CHOL)                                        

 

             CHOLESTEROL/HDL 3.48 RATIO   3.27-4.44    N            RISK ASSOCIA

SHAHRIAR WITH



             RATIO (test code =                                        CHOL/HDL 

RATIOS: RISK



             CHOLHDL)                                            MALE FEMALE1/2 

AVERAGE



                                                                 3.43 3.27AVERAG

E 4.97



                                                                 4.442X AVERAGE 

9.55



                                                                 7.053X AVERAGE 

23.39



                                                                 11.04 NOTE THAT

 THE



                                                                 REFERENCE VALUE

 IS



                                                                 RELATEDTO RISK 

LEVELS



                                                                 AS RECOMMENDED 

BY THE



                                                                 NATL.HEART, HAYDE

G, AND



                                                                 BLOOD INST.

 

             HDL CHOLESTEROL 47.1 mg/dL   39-96        N            



             (test code = HDL)                                        

 

             LIPOPROTEIN LDL 88.2 mg/dL   0-100        N            <100 OPTIMAL

100-129



             (test code = LDL)                                        NEAR OPTIM

AL/ABOVE



                                                                 [id number]



                                                                 BXHFZNYABW989-8

89



                                                                 HIGH>CG=627 ANDREEA

Y



                                                                 HIGH*Guidelines



                                                                 provided by the



                                                                 National Ch[employer]

terol



                                                                 [employer]

m Adult



                                                                 Treatment Panel

 III



Indication for Test: Osteopenia/Bone Dis RiskVITAMIN D 25-HYDROXY2021-10-07 
10:36:00





             Test Item    Value        Reference Range Interpretation Comments

 

             VITAMIN D 25-HYDROXY (test code = 42.3 ng/mL          N      

      



             VITD25)                                             



Indication for Test: Osteopenia/Bone Dis RsjkUCPE9U%2021-10-07 10:29:00





             Test Item    Value        Reference Range Interpretation Comments

 

             HGBA1C% (test code = HGBA1C%) 5.3 %A1C     4.8-6.0      N          

  



B-TYPE NATRIURETIC [id number] 11:16:00





             Test Item    Value        Reference Range Interpretation Comments

 

             B-TYPE NATRIURETIC PEPTIDE (test 597.0 PG/ML  0-100        H       

     



             code = BNP)                                         



BASIC METABOLIC PANEL2021-10-05 10:58:00





             Test Item    Value        Reference Range Interpretation Comments

 

             SODIUM (test code = NA) 136 mEq/L    134-147      N            

 

             POTASSIUM (test code = 4.3 mEq/L    3.4-5.0      N            



             K)                                                  

 

             CHLORIDE (test code = 98 mEq/L     100-108      L            



             CL)                                                 

 

             CARBON DIOXIDE (test 28 mEq/l     21-33        N            



             code = CO2)                                         

 

             ANION GAP (test code = 15           0-20         N            



             GAP)                                                

 

             GLUCOSE (test code = 100 mg/dL           N            



             GLU)                                                

 

             BLOOD UREA NITROGEN 59 mg/dL     7-18         H            



             (test code = BUN)                                        

 

             GLOMERULAR FILTRATION 15.7         70-80        L            Units 

of measure =



             RATE (test code = GFR)                                        ml/mi

n/1.73 m2

 

             CREATININE (test code = 2.9 mg/dL    0.6-1.3      H            



             CREAT)                                              

 

             CALCIUM (test code = 10.2 mg/dL   8.0-10.5     N            



             CA)                                                 



MAGNESIUM2021-10-05 10:58:00





             Test Item    Value        Reference Range Interpretation Comments

 

             MAGNESIUM (test code = MAG) 2.31 mg/dL   1.80-2.40    N            



BASIC METABOLIC [id number] 11:31:00





             Test Item    Value        Reference Range Interpretation Comments

 

             SODIUM (test code = NA) 136 mEq/L    134-147      N            

 

             POTASSIUM (test code = 3.2 mEq/L    3.4-5.0      L            



             K)                                                  

 

             CHLORIDE (test code = 93 mEq/L     100-108      L            



             CL)                                                 

 

             CARBON DIOXIDE (test 34 mEq/l     21-33        H            



             code = CO2)                                         

 

             ANION GAP (test code = 12           0-20         N            



             GAP)                                                

 

             GLUCOSE (test code = 126 mg/dL           H            



             GLU)                                                

 

             BLOOD UREA NITROGEN 46 mg/dL     7-18         H            



             (test code = BUN)                                        

 

             GLOMERULAR FILTRATION 19.6         70-80        L            Units 

of measure =



             RATE (test code = GFR)                                        ml/mi

n/1.73 m2

 

             CREATININE (test code = 2.4 mg/dL    0.6-1.3      H            



             CREAT)                                              

 

             CALCIUM (test code = 9.0 mg/dL    8.0-10.5     N            



             CA)                                                 



[id number] 11:31:00





             Test Item    Value        Reference Range Interpretation Comments

 

             MAGNESIUM (test code = MAG) 1.91 mg/dL   1.80-2.40    N            



BASIC METABOLIC [id number] 11:31:00





             Test Item    Value        Reference Range Interpretation Comments

 

             SODIUM (test code = TEST NOT     134-147      N            ****Amen

ded



             NA)          PERFORMED mEq/L                           report. Disr

egard



                                                                 previous



                                                                 result/results*

***



                                                                 Previously



                                                                 reported result

:



                                                                 136 mEq/LEdited



                                                                 by: G.LAB.LCG o

n



                                                                 21: 1120: NA



                                                                 previously



                                                                 reported as: 13

6



                                                                 mEq/L

 

             POTASSIUM (test TEST NOT     3.4-5.0                   ****Amended



             code = K)    PERFORMED mEq/L                           report. Disr

egard



                                                                 previous



                                                                 result/results*

***



                                                                 Previously



                                                                 reported result

:



                                                                 3.9 mEq/LEdited



                                                                 by: G.LAB.LCG o

n



                                                                 21: 1121: K



                                                                 previously



                                                                 reported as: 3.

9 D



                                                                 mEq/L

 

             CHLORIDE (test code TEST NOT     100-108      L            ****Amen

ded



             = CL)        PERFORMED mEq/L                           report. Disr

egard



                                                                 previous



                                                                 result/results*

***



                                                                 Previously



                                                                 reported result

:



                                                                 98 mEq/LEdited 

by:



                                                                 [facility] [place] [facility]



                                                                 [facility]: 1122: CL



                                                                 previously



                                                                 reported as: 98

 L



                                                                 mEq/L

 

             CARBON DIOXIDE TEST NOT     21-33        N            ****Amended



             (test code = CO2) PERFORMED mEq/l                           report.

 Disregard



                                                                 previous



                                                                 result/results*

***



                                                                 Previously



                                                                 reported result

:



                                                                 28 mEq/lEdited 

by:



                                                                 DIAMONDG on



                                                                 21: 1122: CO2



                                                                 previously



                                                                 reported as: 28



                                                                 mEq/l

 

             ANION GAP (test TEST NOT     0-20         N            ****Amended



             code = GAP)  PERFORMED                              report. Disrega

rd



                                                                 previous



                                                                 result/results*

***



                                                                 Previously



                                                                 reported result

:



                                                                 14 Edited by:



                                                                 [facility] [place] [facility]



                                                                 21: 1123:  ANIO

N



                                                                 GAP previously



                                                                 reported as: 14

 

             GLUCOSE (test code TEST NOT            H            ****Amend

ed



             = GLU)       PERFORMED mg/dL                           report. Disr

egard



                                                                 previous



                                                                 result/results*

***



                                                                 Previously



                                                                 reported result

:



                                                                 164 mg/dLEdited



                                                                 by: G.LAB.G o

n



                                                                 21: 1124: GLU



                                                                 previously



                                                                 reported as: 16

4



                                                                 DH mg/dL

 

             BLOOD UREA NITROGEN TEST NOT     7-18         H            ****Amen

ded



             (test code = BUN) PERFORMED mg/dL                           report.

 Disregard



                                                                 previous



                                                                 result/results*

***



                                                                 Previously



                                                                 reported result

:



                                                                 24 mg/dLEdited 

by:



                                                                 ANGELITO on



                                                                 21: 1124: BUN



                                                                 previously



                                                                 reported as: 24

 DH



                                                                 mg/dL

 

             GLOMERULAR   TEST NOT     70-80        L            ****Amended



             FILTRATION RATE PERFORMED                              report. Disr

egard



             (test code = GFR)                                        previous



                                                                 result/results*

***



                                                                 Previously



                                                                 reported result

:



                                                                 43.6 Edited by:



                                                                 ANGELITO on



                                                                 21: 1126: GFR



                                                                 previously



                                                                 reported as: 43

.6



                                                                 L

 

             CREATININE (test TEST NOT     0.6-1.3                   ****Amended



             code = CREAT) PERFORMED mg/dL                           report. Dis

regard



                                                                 previous



                                                                 result/results*

***



                                                                 Previously



                                                                 reported result

:



                                                                 1.2 mg/dLEdited



                                                                 by: G.LAB.LCG o

n



                                                                 21:[id number] 1127: CREAT



                                                                 previously



                                                                 reported as: 1.

2 D



                                                                 mg/dL

 

             CALCIUM (test code TEST NOT     8.0-10.5     N            ****Amend

ed



             = CA)        PERFORMED mg/dL                           report. Disr

egard



                                                                 previous



                                                                 result/results*

***



                                                                 Previously



                                                                 reported result

:



                                                                 9.3 mg/dLEdited



                                                                 by: G.LAB.LCG o

n



                                                                 21: 1128: CA



                                                                 previously



                                                                 reported as: 9.

3



                                                                 mg/dL

 

             ESTIMATED CREAT TEST NOT                               



             CLEARANCE (test PERFORMED mL/min                           



             code = ECRCL)                                        



[id number] 11:31:00





             Test Item    Value        Reference Range Interpretation Comments

 

             MAGNESIUM (test TEST NOT PERFORMED 1.80-2.40    N            ****Am

ended



             code = MAG)  mg/dL                                  report. Disrega

rd



                                                                 previous



                                                                 result/results*

***



                                                                 Previously



                                                                 reported result

:



                                                                 1.88 mg/dLEdite

d



                                                                 by: G.LAB.LC o

n



                                                                 [id number] 1129: MAG



                                                                 previously



                                                                 reported as: 1.

88



                                                                 mg/dL



CBC W/AUTO [id number] 08:35:00





             Test Item    Value        Reference Range Interpretation Comments

 

             WHITE BLOOD CELL (test code = 8.0 x10 3/uL 4.5-11.0     N          

  



             WBC)                                                

 

             RED BLOOD CELL (test code = 3.75 x10 6/uL 3.54-5.02    N           

 



             RBC)                                                

 

             HEMOGLOBIN (test code = HGB) 10.3 g/dL    11.0-15.0    L           

 

 

             HEMATOCRIT (test code = HCT) 34.2 %       33.0-45.0    N           

 

 

             MEAN CELL VOLUME (test code = 91.2 fL      81.0-99.0    N          

  



             MCV)                                                

 

             MEAN CELL HGB (test code = MCH) 27.5 pg      27.0-33.0    N        

    

 

             MEAN CELL HGB CONCETRATION 30.1 g/dL    33.0-37.0    L            



             (test code = MCHC)                                        

 

             RED CELL DISTRIBUTION WIDTH CV 16.1 %       11.5-14.5    H         

   



             (test code = RDW)                                        

 

             PLATELET COUNT (test code = 216 x10 3/uL 150-400      N            



             PLT)                                                

 

             NEUTROPHIL % (test code = NT%) 76.9 %       56.0-77.0    N         

   

 

             LYMPHOCYTE % (test code = LY%) 10.3 %       14.0-32.0    L         

   

 

             NEUTROPHIL # (test code = NT#) 6.17 x10 3/uL 2.0-7.6      N        

    

 

             LYMPHOCYTE # (test code = LY#) 0.83 x10 3/uL 1.0-3.8      L        

    

 

             MANUAL DIFF REQUIRED (test code NO                                 

    



             = MDIFF)                                            

 

             RED CELL DISTRIBUTION WIDTH SD 53.5 fL      37.0-54.0    N         

   



             (test code = RDW-SD)                                        

 

             MEAN PLATELET VOLUME (test code 11.2 fL      7.0-9.0      H        

    



             = MPV)                                              

 

             IMMATURE GRANULOCYTE % (test 0.4 %        0.0-2.0      N           

 



             code = IG%)                                         

 

             MONOCYTE % (test code = MO%) 8.7 %        4.8-9.0      N           

 

 

             EOSINOPHIL % (test code = EO%) 3.1 %        0.3-3.7      N         

   

 

             BASOPHIL % (test code = BA%) 0.6 %        0.0-2.0      N           

 

 

             NUCLEATED RBC % (test code = 0.0 %        0-0          N           

 



             NRBC%)                                              

 

             IMMATURE GRANULOCYTE # (test 0.03 x10 3/uL 0.00-0.03    N          

  



             code = IG#)                                         

 

             MONOCYTE # (test code = MO#) 0.70 x10 3/uL 0.1-0.8      N          

  

 

             EOSINOPHIL # (test code = EO#) 0.25 x10 3/uL 0.0-0.2      H        

    

 

             BASOPHIL # (test code = BA#) 0.05 x10 3/uL 0.0-0.2      N          

  

 

             NUCLEATED RBC # (test code = 0.00 x10 3/uL 0.0-0.1      N          

  



             NRBC#)                                              



BASIC METABOLIC [id number] 08:32:00





             Test Item    Value        Reference Range Interpretation Comments

 

             SODIUM (test code = NA) 138 mEq/L    134-147      N            

 

             POTASSIUM (test code = 3.2 mEq/L    3.4-5.0      L            



             K)                                                  

 

             CHLORIDE (test code = 95 mEq/L     100-108      L            



             CL)                                                 

 

             CARBON DIOXIDE (test 31 mEq/l     21-33        N            



             code = CO2)                                         

 

             ANION GAP (test code = 16           0-20         N            



             GAP)                                                

 

             GLUCOSE (test code = 73 mg/dL            N            



             GLU)                                                

 

             BLOOD UREA NITROGEN 41 mg/dL     7-18         H            



             (test code = BUN)                                        

 

             GLOMERULAR FILTRATION 21.6         70-80        L            Units 

of measure =



             RATE (test code = GFR)                                        ml/mi

n/1.73 m2

 

             CREATININE (test code = 2.2 mg/dL    0.6-1.3      H            



             CREAT)                                              

 

             CALCIUM (test code = 9.4 mg/dL    8.0-10.5     N            



             CA)                                                 



BASIC METABOLIC [id number] 07:01:00





             Test Item    Value        Reference Range Interpretation Comments

 

             SODIUM (test code = NA) 139 mEq/L    134-147      N            

 

             POTASSIUM (test code = 3.4 mEq/L    3.4-5.0      N            



             K)                                                  

 

             CHLORIDE (test code = 100 mEq/L    100-108      N            



             CL)                                                 

 

             CARBON DIOXIDE (test 28 mEq/l     21-33        N            



             code = CO2)                                         

 

             ANION GAP (test code = 15           0-20         N            



             GAP)                                                

 

             GLUCOSE (test code = 96 mg/dL            N            



             GLU)                                                

 

             BLOOD UREA NITROGEN 31 mg/dL     7-18         H            



             (test code = BUN)                                        

 

             GLOMERULAR FILTRATION 24.2         70-80        L            Units 

of measure =



             RATE (test code = GFR)                                        ml/mi

n/1.73 m2

 

             CREATININE (test code = 2.0 mg/dL    0.6-1.3      H            



             CREAT)                                              

 

             CALCIUM (test code = 9.1 mg/dL    8.0-10.5     N            



             CA)                                                 



CBC W/AUTO [id number] 06:41:00





             Test Item    Value        Reference Range Interpretation Comments

 

             WHITE BLOOD CELL (test code = 8.4 x10 3/uL 4.5-11.0     N          

  



             WBC)                                                

 

             RED BLOOD CELL (test code = 3.54 x10 6/uL 3.54-5.02    N           

 



             RBC)                                                

 

             HEMOGLOBIN (test code = HGB) 9.7 g/dL     11.0-15.0    L           

 

 

             HEMATOCRIT (test code = HCT) 31.8 %       33.0-45.0    L           

 

 

             MEAN CELL VOLUME (test code = 89.8 fL      81.0-99.0    N          

  



             MCV)                                                

 

             MEAN CELL HGB (test code = MCH) 27.4 pg      27.0-33.0    N        

    

 

             MEAN CELL HGB CONCETRATION 30.5 g/dL    33.0-37.0    L            



             (test code = MCHC)                                        

 

             RED CELL DISTRIBUTION WIDTH CV 16.2 %       11.5-14.5    H         

   



             (test code = RDW)                                        

 

             PLATELET COUNT (test code = 198 x10 3/uL 150-400      N            



             PLT)                                                

 

             NEUTROPHIL % (test code = NT%) 76.0 %       56.0-77.0    N         

   

 

             LYMPHOCYTE % (test code = LY%) 10.5 %       14.0-32.0    L         

   

 

             NEUTROPHIL # (test code = NT#) 6.37 x10 3/uL 2.0-7.6      N        

    

 

             LYMPHOCYTE # (test code = LY#) 0.88 x10 3/uL 1.0-3.8      L        

    

 

             MANUAL DIFF REQUIRED (test code NO                                 

    



             = MDIFF)                                            

 

             RED CELL DISTRIBUTION WIDTH SD 53.0 fL      37.0-54.0    N         

   



             (test code = RDW-SD)                                        

 

             MEAN PLATELET VOLUME (test code 10.2 fL      7.0-9.0      H        

    



             = MPV)                                              

 

             IMMATURE GRANULOCYTE % (test 0.2 %        0.0-2.0      N           

 



             code = IG%)                                         

 

             MONOCYTE % (test code = MO%) 9.5 %        4.8-9.0      H           

 

 

             EOSINOPHIL % (test code = EO%) 3.2 %        0.3-3.7      N         

   

 

             BASOPHIL % (test code = BA%) 0.6 %        0.0-2.0      N           

 

 

             NUCLEATED RBC % (test code = 0.0 %        0-0          N           

 



             NRBC%)                                              

 

             IMMATURE GRANULOCYTE # (test 0.02 x10 3/uL 0.00-0.03    N          

  



             code = IG#)                                         

 

             MONOCYTE # (test code = MO#) 0.80 x10 3/uL 0.1-0.8      N          

  

 

             EOSINOPHIL # (test code = EO#) 0.27 x10 3/uL 0.0-0.2      H        

    

 

             BASOPHIL # (test code = BA#) 0.05 x10 3/uL 0.0-0.2      N          

  

 

             NUCLEATED RBC # (test code = 0.00 x10 3/uL 0.0-0.1      N          

  



             NRBC#)                                              



BASIC METABOLIC [id number] 08:19:00





             Test Item    Value        Reference Range Interpretation Comments

 

             SODIUM (test code = NA) 140 mEq/L    134-147      N            

 

             POTASSIUM (test code = 3.4 mEq/L    3.4-5.0      N            



             K)                                                  

 

             CHLORIDE (test code = 104 mEq/L    100-108      N            



             CL)                                                 

 

             CARBON DIOXIDE (test 23 mEq/l     21-33        N            



             code = CO2)                                         

 

             ANION GAP (test code = 16           0-20         N            



             GAP)                                                

 

             GLUCOSE (test code = 82 mg/dL                         



             GLU)                                                

 

             BLOOD UREA NITROGEN 31 mg/dL     7-18         H            



             (test code = BUN)                                        

 

             GLOMERULAR FILTRATION 22.8         70-80        L            Units 

of measure =



             RATE (test code = GFR)                                        ml/mi

n/1.73 m2

 

             CREATININE (test code = 2.1 mg/dL    0.6-1.3      H            



             CREAT)                                              

 

             CALCIUM (test code = 9.1 mg/dL    8.0-10.5     N            



             CA)                                                 



CBC W/AUTO [id number] 07:24:00





             Test Item    Value        Reference Range Interpretation Comments

 

             WHITE BLOOD CELL (test code = 7.8 x10 3/uL 4.5-11.0     N          

  



             WBC)                                                

 

             RED BLOOD CELL (test code = 3.51 x10 6/uL 3.54-5.02    L           

 



             RBC)                                                

 

             HEMOGLOBIN (test code = HGB) 9.9 g/dL     11.0-15.0    L           

 

 

             HEMATOCRIT (test code = HCT) 32.1 %       33.0-45.0    L           

 

 

             MEAN CELL VOLUME (test code = 91.5 fL      81.0-99.0    N          

  



             MCV)                                                

 

             MEAN CELL HGB (test code = MCH) 28.2 pg      27.0-33.0    N        

    

 

             MEAN CELL HGB CONCETRATION 30.8 g/dL    33.0-37.0    L            



             (test code = MCHC)                                        

 

             RED CELL DISTRIBUTION WIDTH CV 16.3 %       11.5-14.5    H         

   



             (test code = RDW)                                        

 

             RED CELL DISTRIBUTION WIDTH SD 54.1 fL      37.0-54.0    H         

   



             (test code = RDW-SD)                                        

 

             PLATELET COUNT (test code = 202 x10 3/uL 150-400      N            



             PLT)                                                

 

             MEAN PLATELET VOLUME (test code 10.8 fL      7.0-9.0      H        

    



             = MPV)                                              

 

             NEUTROPHIL % (test code = NT%) 71.4 %       56.0-77.0    N         

   

 

             IMMATURE GRANULOCYTE % (test 0.4 %        0.0-2.0      N           

 



             code = IG%)                                         

 

             LYMPHOCYTE % (test code = LY%) 12.5 %       14.0-32.0    L         

   

 

             MONOCYTE % (test code = MO%) 11.1 %       4.8-9.0      H           

 

 

             EOSINOPHIL % (test code = EO%) 3.8 %        0.3-3.7      H         

   

 

             BASOPHIL % (test code = BA%) 0.8 %        0.0-2.0      N           

 

 

             NUCLEATED RBC % (test code = 0.0 %        0-0          N           

 



             NRBC%)                                              

 

             NEUTROPHIL # (test code = NT#) 5.57 x10 3/uL 2.0-7.6      N        

    

 

             IMMATURE GRANULOCYTE # (test 0.03 x10 3/uL 0.00-0.03    N          

  



             code = IG#)                                         

 

             LYMPHOCYTE # (test code = LY#) 0.98 x10 3/uL 1.0-3.8      L        

    

 

             MONOCYTE # (test code = MO#) 0.87 x10 3/uL 0.1-0.8      H          

  

 

             EOSINOPHIL # (test code = EO#) 0.30 x10 3/uL 0.0-0.2      H        

    

 

             BASOPHIL # (test code = BA#) 0.06 x10 3/uL 0.0-0.2      N          

  

 

             NUCLEATED RBC # (test code = 0.00 x10 3/uL 0.0-0.1      N          

  



             NRBC#)                                              

 

             MANUAL DIFF REQUIRED (test code NO                                 

    



             = MDIFF)                                            



BASIC METABOLIC [id number] 13:19:00





             Test Item    Value        Reference Range Interpretation Comments

 

             SODIUM (test code = NA) 139 mEq/L    134-147      N            

 

             POTASSIUM (test code = 3.5 mEq/L    3.4-5.0      N            



             K)                                                  

 

             CHLORIDE (test code = 103 mEq/L    100-108      N            



             CL)                                                 

 

             CARBON DIOXIDE (test 25 mEq/l     21-33        N            



             code = CO2)                                         

 

             ANION GAP (test code = 15           0-20         N            



             GAP)                                                

 

             GLUCOSE (test code = 141 mg/dL           H            



             GLU)                                                

 

             BLOOD UREA NITROGEN 34 mg/dL     7-18         H            



             (test code = BUN)                                        

 

             GLOMERULAR FILTRATION 22.8         70-80        L            Units 

of measure =



             RATE (test code = GFR)                                        ml/mi

n/1.73 m2

 

             CREATININE (test code = 2.1 mg/dL    0.6-1.3      H            



             CREAT)                                              

 

             CALCIUM (test code = 9.1 mg/dL    8.0-10.5     N            



             CA)                                                 



[id number] 13:19:00





             Test Item    Value        Reference Range Interpretation Comments

 

             MAGNESIUM (test code = MAG) 1.86 mg/dL   1.80-2.40    N            



CBC W/AUTO [id number] 08:26:00





             Test Item    Value        Reference Range Interpretation Comments

 

             WHITE BLOOD CELL (test code = 7.8 x10 3/uL 4.5-11.0     N          

  



             WBC)                                                

 

             RED BLOOD CELL (test code = 3.61 x10 6/uL 3.54-5.02    N           

 



             RBC)                                                

 

             HEMOGLOBIN (test code = HGB) 9.8 g/dL     11.0-15.0    L           

 

 

             HEMATOCRIT (test code = HCT) 32.5 %       33.0-45.0    L           

 

 

             MEAN CELL VOLUME (test code = 90.0 fL      81.0-99.0    N          

  



             MCV)                                                

 

             MEAN CELL HGB (test code = MCH) 27.1 pg      27.0-33.0    N        

    

 

             MEAN CELL HGB CONCETRATION 30.2 g/dL    33.0-37.0    L            



             (test code = MCHC)                                        

 

             RED CELL DISTRIBUTION WIDTH CV 16.5 %       11.5-14.5    H         

   



             (test code = RDW)                                        

 

             RED CELL DISTRIBUTION WIDTH SD 53.4 fL      37.0-54.0    N         

   



             (test code = RDW-SD)                                        

 

             PLATELET COUNT (test code = 217 x10 3/uL 150-400      N            



             PLT)                                                

 

             MEAN PLATELET VOLUME (test code 10.9 fL      7.0-9.0      H        

    



             = MPV)                                              

 

             NEUTROPHIL % (test code = NT%) 72.1 %       56.0-77.0    N         

   

 

             IMMATURE GRANULOCYTE % (test 0.3 %        0.0-2.0      N           

 



             code = IG%)                                         

 

             LYMPHOCYTE % (test code = LY%) 12.4 %       14.0-32.0    L         

   

 

             MONOCYTE % (test code = MO%) 9.5 %        4.8-9.0      H           

 

 

             EOSINOPHIL % (test code = EO%) 4.9 %        0.3-3.7      H         

   

 

             BASOPHIL % (test code = BA%) 0.8 %        0.0-2.0      N           

 

 

             NUCLEATED RBC % (test code = 0.0 %        0-0          N           

 



             NRBC%)                                              

 

             NEUTROPHIL # (test code = NT#) 5.60 x10 3/uL 2.0-7.6      N        

    

 

             IMMATURE GRANULOCYTE # (test 0.02 x10 3/uL 0.00-0.03    N          

  



             code = IG#)                                         

 

             LYMPHOCYTE # (test code = LY#) 0.96 x10 3/uL 1.0-3.8      L        

    

 

             MONOCYTE # (test code = MO#) 0.74 x10 3/uL 0.1-0.8      N          

  

 

             EOSINOPHIL # (test code = EO#) 0.38 x10 3/uL 0.0-0.2      H        

    

 

             BASOPHIL # (test code = BA#) 0.06 x10 3/uL 0.0-0.2      N          

  

 

             NUCLEATED RBC # (test code = 0.00 x10 3/uL 0.0-0.1      N          

  



             NRBC#)                                              

 

             MANUAL DIFF REQUIRED (test code NO                                 

    



             = MDIFF)                                            



CBC W/AUTO [id number] 08:33:00





             Test Item    Value        Reference Range Interpretation Comments

 

             WHITE BLOOD CELL (test code = 10.6 x10 3/uL 4.5-11.0     N         

   



             WBC)                                                

 

             RED BLOOD CELL (test code = 3.96 x10 6/uL 3.54-5.02    N           

 



             RBC)                                                

 

             HEMOGLOBIN (test code = HGB) 11.0 g/dL    11.0-15.0    N           

 

 

             HEMATOCRIT (test code = HCT) 35.9 %       33.0-45.0    N           

 

 

             MEAN CELL VOLUME (test code = 90.7 fL      81.0-99.0    N          

  



             MCV)                                                

 

             MEAN CELL HGB (test code = MCH) 27.8 pg      27.0-33.0    N        

    

 

             MEAN CELL HGB CONCETRATION 30.6 g/dL    33.0-37.0    L            



             (test code = MCHC)                                        

 

             RED CELL DISTRIBUTION WIDTH CV 16.0 %       11.5-14.5    H         

   



             (test code = RDW)                                        

 

             RED CELL DISTRIBUTION WIDTH SD 52.3 fL      37.0-54.0    N         

   



             (test code = RDW-SD)                                        

 

             PLATELET COUNT (test code = 244 x10 3/uL 150-400      N            



             PLT)                                                

 

             MEAN PLATELET VOLUME (test code 11.1 fL      7.0-9.0      H        

    



             = MPV)                                              

 

             NEUTROPHIL % (test code = NT%) 75.2 %       56.0-77.0    N         

   

 

             IMMATURE GRANULOCYTE % (test 0.4 %        0.0-2.0      N           

 



             code = IG%)                                         

 

             LYMPHOCYTE % (test code = LY%) 10.9 %       14.0-32.0    L         

   

 

             MONOCYTE % (test code = MO%) 9.3 %        4.8-9.0      H           

 

 

             EOSINOPHIL % (test code = EO%) 3.4 %        0.3-3.7      N         

   

 

             BASOPHIL % (test code = BA%) 0.8 %        0.0-2.0      N           

 

 

             NUCLEATED RBC % (test code = 0.0 %        0-0          N           

 



             NRBC%)                                              

 

             NEUTROPHIL # (test code = NT#) 7.99 x10 3/uL 2.0-7.6      H        

    

 

             IMMATURE GRANULOCYTE # (test 0.04 x10 3/uL 0.00-0.03    H          

  



             code = IG#)                                         

 

             LYMPHOCYTE # (test code = LY#) 1.16 x10 3/uL 1.0-3.8      N        

    

 

             MONOCYTE # (test code = MO#) 0.99 x10 3/uL 0.1-0.8      H          

  

 

             EOSINOPHIL # (test code = EO#) 0.36 x10 3/uL 0.0-0.2      H        

    

 

             BASOPHIL # (test code = BA#) 0.08 x10 3/uL 0.0-0.2      N          

  

 

             NUCLEATED RBC # (test code = 0.00 x10 3/uL 0.0-0.1      N          

  



             NRBC#)                                              

 

             MANUAL DIFF REQUIRED (test code NO                                 

    



             = MDIFF)                                            



BASIC METABOLIC [id number] 07:57:00





             Test Item    Value        Reference Range Interpretation Comments

 

             SODIUM (test code = NA) 139 mEq/L    134-147      N            

 

             POTASSIUM (test code = 3.7 mEq/L    3.4-5.0      N            



             K)                                                  

 

             CHLORIDE (test code = 106 mEq/L    100-108      N            



             CL)                                                 

 

             CARBON DIOXIDE (test 20 mEq/l     21-33        L            



             code = CO2)                                         

 

             ANION GAP (test code = 16           0-20         N            



             GAP)                                                

 

             GLUCOSE (test code = 81 mg/dL            N            



             GLU)                                                

 

             BLOOD UREA NITROGEN 30 mg/dL     7-18         H            



             (test code = BUN)                                        

 

             GLOMERULAR FILTRATION 24.2         70-80        L            Units 

of measure =



             RATE (test code = GFR)                                        ml/mi

n/1.73 m2

 

             CREATININE (test code = 2.0 mg/dL    0.6-1.3      H            



             CREAT)                                              

 

             CALCIUM (test code = 9.4 mg/dL    8.0-10.5     N            



             CA)                                                 



- XR CHEST 1 -37-19 00:00:00
********************************************************[facility]ACK[facility] : 1945 Sex: 
F************************************************************ FAX: Ryder Christianson -[phone] [place]:  St: ADM FAX: Santos Aguiar -[phone] FAX: Lorena Stiles [phone] 
------------------------------------------------------------------------------- 
Name: PHIL CALLOWAY [facility] : 1945 Age/S: 76/F [address] [place] [address] Unit #: [id number] Loc: G.C[address] [place] [address] Phys: 
Ryder Alfonso DO Acct: [id number] Dis Date: Status: ADM IN  PHONE #: 
[phone] Exam Date: 2021 FAX #: [phone]: chf EXAMS: 
CPT CODE: [id number] XR CHEST 1 V 90131 PROCEDURE INFORMATION: Exam: XR Chest 
Examdate and time: 2021 9:12 PM Age: 76 years old Clinical indication: 
Condition or disease; Lung condition and disease; Other: Chf TECHNIQUE: Imaging 
protocol: XR of the chest. Views: 1 view. COMPARISON: DX XR CHEST 2 V 3/12/2015 
9:34 AM FINDINGS: Lungs: There is increased vascular congestion. Thereare linear
 densities in the left mid lung and right lower lung. No acute consolidation. 
Pleural spaces: No pleural effusion. No pneumothorax. Heart/Mediastinum: There 
is moderate cardiomegaly. Vasculature: Aortic arch calcifications. Bones/joints:
No acute abnormality. IMPRESSION: 1. Cardiomegaly andvascular congestion 
compatible with CHF.  2. Mild atelectasis in left mid lung and right lower lung.
** Electronically Signed by HAIR Paige ** ** on 2021 at 0559  
** Reported and signed by: Esteban Paige M.D. CC: Ryder Alfonso DO; 
Santos Juares MD; Lorena Morrow  Technologist: RT Nitza(AYAKA) 
Trnscrd Date/Time/By: 2021 (0559) : By: [email or web address] Orig Print D/T: S: 
2021 (0559) PAGE 1 Signed ReportB-TYPE NATRIURETIC [id number] 
17:41:00





             Test Item    Value        Reference Range Interpretation Comments

 

             B-TYPE NATRIURETIC PEPTIDE (test 1908.0 PG/ML 0-100        H       

     



             code = BNP)                                         



BASIC METABOLIC [id number] 17:24:00





             Test Item    Value        Reference Range Interpretation Comments

 

             SODIUM (test code = NA) 138 mEq/L    134-147      N            

 

             POTASSIUM (test code = 3.5 mEq/L    3.4-5.0      N            



             K)                                                  

 

             CHLORIDE (test code = 106 mEq/L    100-108      N            



             CL)                                                 

 

             CARBON DIOXIDE (test 21 mEq/l     21-33        N            



             code = CO2)                                         

 

             ANION GAP (test code = 15           0-20         N            



             GAP)                                                

 

             GLUCOSE (test code = 100 mg/dL           N            



             GLU)                                                

 

             BLOOD UREA NITROGEN 34 mg/dL     7-18         H            



             (test code = BUN)                                        

 

             GLOMERULAR FILTRATION 24.2         70-80        L            Units 

of measure =



             RATE (test code = GFR)                                        ml/mi

n/1.73 m2

 

             CREATININE (test code = 2.0 mg/dL    0.6-1.3      H            



             CREAT)                                              

 

             CALCIUM (test code = 8.8 mg/dL    8.0-10.5     N            



             CA)                                                 



[id number] 17:24:00





             Test Item    Value        Reference Range Interpretation Comments

 

             MAGNESIUM (test code = MAG) 2.01 mg/dL   1.80-2.40    N            



CBC W/AUTO [id number] 17:16:00





             Test Item    Value        Reference Range Interpretation Comments

 

             WHITE BLOOD CELL (test code = 8.6 x10 3/uL 4.5-11.0     N          

  



             WBC)                                                

 

             RED BLOOD CELL (test code = 3.85 x10 6/uL 3.54-5.02    N           

 



             RBC)                                                

 

             HEMOGLOBIN (test code = HGB) 10.8 g/dL    11.0-15.0    L           

 

 

             HEMATOCRIT (test code = HCT) 34.7 %       33.0-45.0    N           

 

 

             MEAN CELL VOLUME (test code = 90.1 fL      81.0-99.0    N          

  



             MCV)                                                

 

             MEAN CELL HGB (test code = MCH) 28.1 pg      27.0-33.0    N        

    

 

             MEAN CELL HGB CONCETRATION 31.1 g/dL    33.0-37.0    L            



             (test code = MCHC)                                        

 

             RED CELL DISTRIBUTION WIDTH CV 16.1 %       11.5-14.5    H         

   



             (test code = RDW)                                        

 

             PLATELET COUNT (test code = 245 x10 3/uL 150-400      N            



             PLT)                                                

 

             NEUTROPHIL % (test code = NT%) 78.2 %       56.0-77.0    H         

   

 

             LYMPHOCYTE % (test code = LY%) 10.4 %       14.0-32.0    L         

   

 

             NEUTROPHIL # (test code = NT#) 6.74 x10 3/uL 2.0-7.6      N        

    

 

             LYMPHOCYTE # (test code = LY#) 0.90 x10 3/uL 1.0-3.8      L        

    

 

             MANUAL DIFF REQUIRED (test code NO                                 

    



             = MDIFF)                                            

 

             RED CELL DISTRIBUTION WIDTH SD 51.2 fL      37.0-54.0    N         

   



             (test code = RDW-SD)                                        

 

             MEAN PLATELET VOLUME (test code 10.4 fL      7.0-9.0      H        

    



             = MPV)                                              

 

             IMMATURE GRANULOCYTE % (test 0.5 %        0.0-2.0      N           

 



             code = IG%)                                         

 

             MONOCYTE % (test code = MO%) 7.8 %        4.8-9.0      N           

 

 

             EOSINOPHIL % (test code = EO%) 2.2 %        0.3-3.7      N         

   

 

             BASOPHIL % (test code = BA%) 0.9 %        0.0-2.0      N           

 

 

             NUCLEATED RBC % (test code = 0.0 %        0-0          N           

 



             NRBC%)                                              

 

             IMMATURE GRANULOCYTE # (test 0.04 x10 3/uL 0.00-0.03    H          

  



             code = IG#)                                         

 

             MONOCYTE # (test code = MO#) 0.67 x10 3/uL 0.1-0.8      N          

  

 

             EOSINOPHIL # (test code = EO#) 0.19 x10 3/uL 0.0-0.2      N        

    

 

             BASOPHIL # (test code = BA#) 0.08 x10 3/uL 0.0-0.2      N          

  

 

             NUCLEATED RBC # (test code = 0.00 x10 3/uL 0.0-0.1      N          

  



             NRBC#)                                              



Surgical pathology [id number] 17:08:01





             Test Item    Value        Reference Range Interpretation Comments

 

             Case number (test code = [id number]                           



             [id number])                                            

 

             Surgical pathology See link below for                           



             report (test code = PDF Lab Report                           



             2255)                                               

 

             Result status (test code This is Final Report                      

     



             = [id number])   for [id number]                           



[facility]2021-05-05 15:51:43Arash Schroeder CRNA 2021 
10:52 AMAirway Location: OR Performed by: CRNA/AAAuthorized by:Vivian Samuels MD Urgency: ElectiveDifficult Airway: No Preoxygenated with 100% O2: Yes 
C-spinePrecautions Maintained Throughout: Yes Mask Ventilation: Not 
attemptedFinal Airway Type: Endotracheal airwayFinal Endotracheal Airway: 
ETTCuffed: Yes Technique Used: Direct laryngoscopyDevices/Methods Used in 
Placement: Intubating styletBlade Type: MillerLaryngoscope 
Blade/Videolaryngoscope Blade Size: 2ETT Size (mm): 7.0Cuff at minimum occlusion
pressure: Yes Measured from: GumsETT to Gums (cm): 20Placement Verified by: CO2 
detection, direct visualization and equal breath sounds Laryngoscopic view:Grade
I - full view of glottisRapid Sequence Induction (RSI): Yes Modified RSI: No 
Number of Attempts at Approach: 1VENIPUNC NEED PHYS SKILL,DX OR [id number] 
19:03:00Scotty Lopez RN 5/3/2021 2:15 PMMidline Insertion Date/Time: 
5/3/2021 2:03 PMPerformed by: Scotty Lopez, RNAuthorized by: Eddie Urrutia MD Consent: Consent obtained: Written Consentgiven by: Patient Risks 
discussed: Arterial puncture, incorrect placement, nerve damage, bleeding, in
fection, superficial thrombus and deep vein thrombus Alternatives discussed: No 
treatment and delayed treatmentUniversal protocol: Procedure explained and 
questions answered to patient or proxy's satisfaction: yes Relevant documents 
present and verified: yes Test results available and properly labeled: yes 
Imaging studies available: yes Required blood products, implants, devices, and 
special equipment available: yes Site/side marked: yes Immediately prior to 
procedure, a time out was called: yes Patient identity confirmed: Verbally with 
patient, arm band, provided demographic data and hospital-assigned 
identification numberPre-procedure details: Hand hygiene: Hand hygiene performed
prior to insertion Sterile barrier technique: All elements of maximal sterile 
technique followed Skin preparation: ChloraPrep Skin preparation agent: Dried 
prior to procedure Anesthesia (see MAR for exact dosages): Anesthesia method: 
Local infiltration Local anesthetic: Lidocaine 1% w/o epi Route administered: 
SubcutaneousMidLine Placement Details (Will create an LDA): Extremity 
Circumference Upper (cm): 36 Extremity Circumference Site: 36 Patient position: 
Flat Vessel Size (mm): 3 Indication: Poor venous access Location: Left brachial 
Device Type: Non-valved Catheter Lumen(s): Single lumen Catheter size: 3 Fr Cath
eter to vein ratio: 36MidLine Characteristics: Catheter Brand: [employer] External 
Catheter Length (cm): 0Internal Catheter Length (cm): 12 Total Catheter Length 
(cm): 12 Catheter Lot Number: [id number] Catheter Expiration Date: 2022 
Micro-Introducer Lot Number: Procedure details: Landmarks identified: yes 
Ultrasound guidance: yes Sterile ultrasound techniques: Sterile gel and sterile 
probe covers wereused Number of attempts: 1 Number of MidLine kits used during 
procedure: 1 Extra guide wire required?: No Purpose of procedure: Midline 
Placement Patency/Placement: Flushes without difficulty, flushed with 10 mL 
normal saline, positive blood return, injection cap placed and ultrasound 
MidLine placed utilizing ultrasound-guided Modified Seldinger Technique: Yes 
Dressing/Securement: Antimicrobial dressing dry and intact, antimicrobial 
dressing applied and catheter securement device Blood Loss Amount: Less than 20 
mLPost-procedure details: Post-procedure: Dressing applied Patient tolerance of 
procedure: Tolerated well, no immediate complicationsXR Chest 1 Vw Portable
2021 12:31:35 Examination: XR CHEST 1 VW PORTABLE Clinical history: 
pneumonia Comparison: 3/5/2021 IMPRESSION: Lungs are clear and appear unchanged.
Right IJ lines have been removed. No pneumothoraces are identified. There are no
apparent pleural effusions. The cardiomediastinal silhouette and the remainder 
of thechest appear essentially unchanged. [id number]  Interface, Radiology 
Results 2021 7:34 AM CDTFormatting of this note might be 
different from the original.Examination: XR CHEST 1 VW PORTABLEClinical history:
pneumoniaComparison: 3/5/2021IMPRESSION: Lungs are clear and appear unchanged. 
Right IJ lines have been removed.No pneumothoraces are identified. There are no 
apparent pleural effusions.The cardiomediastinal silhouette and the remainder of
the chest appear essentially unchanged.[id number] [place] &[facility]; [facility]
[id number] 19:51:00EXAMINATION: OR [place] &lt; 1 HOUR CLINICAL HISTORY: 
Intraoperative fluoroscopy IMPRESSION:Fluoroscopy was provided. No radiologist 
present. Please see procedure report for discussion of procedure, findings and 
fluoroscopic time. [facility]-[id number] Interface, Radiology Results 2021 1:54 PMCSTFormatting of this note might be different from the 
original.EXAMINATION: [place] &lt; 1 HOURCLINICAL HISTORY: Intraoperative 
fluoroscopyIMPRESSION:Fluoroscopy was provided. No radiologist present. Please 
see procedure report for discussion of procedure, findings and fluoroscopic 
time.[facility]-[id number] 16:20:38Tez May 3/8/2021 10:20 AMAirway Location: OR Performed by: CRNA/AAAnesthesiologist: 
Nathaniel Mariscal MDResident/CRNA/AA: Tez May RAuthorized by: 
Nathaniel Mariscal MD Urgency: ElectiveDifficult Airway: No Preoxygenated 
with 100% O2: Yes C-spine Precautions Maintained Throughout: Yes Mask 
Ventilation: Not attemptedFinal Airway Type: Supraglottic airwayFinal LMA: 
UniqueLMA Size: 4Number of Attempts at Approach: 1 Soft Tissue Intact, able to 
ventilate with no leak and minimal peak inspiratory pressuresXR Neck Soft Tissue
2021 16:15:23EXAMINATION: XR NECK SOFT TISSUE CLINICAL HISTORY: location 
of the dialysis cath COMPARISON: None IMPRESSION: 2 views were obtained. Right 
jugular dialysis catheter with tips in the superior vena cava.No prevertebral 
soft tissue thickening. There is 1 to 2 mm degenerative anterolisthesis of C3 on
C4,C4 on C5, and C5 on C6, with mild disc height loss at C4-5 and C5-6 with 
small anterior endplate osteophytes. [id number] Interface, Radiology Results 
2021 10:18 AM CSTFormatting of this note might be different 
from the original.EXAMINATION: XR NECK SOFT TISSUECLINICAL HISTORY: location of 
the dialysis cathCOMPARISON: NoneIMPRESSION:2 views were obtained.Right jugular 
dialysis catheter with tips in the superior vena cava.No prevertebral soft 
tissue thickening.There is 1 to 2 mm degenerative anterolisthesis of C3 on C4, 
C4 on C5, and C5 on C6, with mild disc height loss at C4-5 and C5-6 with small 
anterior endplate osteophytes.[id number] [id number]
23:45:47





             Test Item    Value        Reference Range Interpretation Comments

 

             Ventricular rate (test                                        



             code = 253)                                         

 

             Atrial rate (test code =                                        



             255)                                                

 

             DE interval (test code =                                        



             266)                                                

 

             QRSD interval (test code                                        



             = 260)                                              

 

             QT interval (test code =                                        



             264)                                                

 

             QTC interval (test code                                        



             = 265)                                              

 

             P axis 1 (test code =                                        



             267)                                                

 

             QRS axis 1 (test code =                                        



             268)                                                

 

             T wave axis (test code =                                        



             270)                                                

 

             EKG impression (test Unusual P axis,                           



             code = 273)  possible ectopic                           



                          atrial rhythm-Left                           



                          bundle branch                           



                          block-No previous                           



                          ECGs                                   



                          available-Electronica                           



                          lly Signed By Smith MD, Toussaint ()                           



                          on 3/5/2021 5:45:46                           



                          PM                                     



[facility] - Not an [id number] 
22:57:05





             Test Item    Value        Reference Range Interpretation Comments

 

             FREIDA (test code = FREIDA) James Wiley III, MD 3/6/2021 1:43                           



                          [facility] ED Preliminary                           



                          Interpretation - Not                           



                          an OrderPerformed by:                           



                          James Wiley III, MDAuthorized by:                           



                          James Wiley III, MD ECG reviewed                           



                          by ED Physician in the                           



                          absence of a                           



                          cardiologist: yes                           



                          Interpretation:                           



                          Interpretation:                           



                          abnormal Quality:                           



                          Tracing quality:                           



                          Limited by                             



                          artifactRate: ECG                           



                          rate: 88 ECG rate                           



                          assessment: normal                           



                          Rhythm: Rhythm: sinus                           



                          rhythm Ectopy: Ectopy:                           



                          none QRS: QRS axis:                           



                          Normal QRS intervals:                           



                          WideConduction:                           



                          Conduction: abnormal                           



                          Abnormal conduction:                           



                          complete LBBB ST                           



                          segments: ST segments:                           



                          Non-specificT waves: T                           



                          waves: non-specific                           

 

             Lab Interpretation Abnormal                               



             (test code = 16380-9)                                        



[facility]

## 2022-11-20 NOTE — RAD REPORT
EXAM DESCRIPTION:  US - Liver Only - 11/20/2022 6:18 pm

 

CLINICAL HISTORY:  abnormal liver enzymes

 

COMPARISON:  Liver Only dated 10/28/2021

 

FINDINGS:  The liver demonstrates a nodular contour most compatible with mild cirrhosisNo focal liver
 lesion or intrahepatic biliary dilatation.No evidence of portal vein thrombosis.

 

Spleen is normal sized measuring 9 cm.

 

IMPRESSION:  Nodular contour of the liver is noted most compatible with mild cirrhosis.

## 2022-11-21 LAB
ALBUMIN SERPL BCP-MCNC: 3.3 G/DL (ref 3.4–5)
ALP SERPL-CCNC: 406 U/L (ref 45–117)
ALT SERPL W P-5'-P-CCNC: 68 U/L (ref 12–78)
AST SERPL W P-5'-P-CCNC: 45 U/L (ref 15–37)
BUN BLD-MCNC: 52 MG/DL (ref 7–18)
GLUCOSE SERPLBLD-MCNC: 116 MG/DL (ref 74–106)
HCT VFR BLD CALC: 30 % (ref 36–45)
LYMPHOCYTES # SPEC AUTO: 0.7 K/UL (ref 0.7–4.9)
MCV RBC: 89.1 FL (ref 80–100)
PMV BLD: 9.3 FL (ref 7.6–11.3)
POTASSIUM SERPL-SCNC: 3.9 MMOL/L (ref 3.5–5.1)
RBC # BLD: 3.36 M/UL (ref 3.86–4.86)
TROPONIN I SERPL HS-MCNC: 206.4 PG/ML (ref ?–58.9)

## 2022-11-21 RX ADMIN — Medication SCH ML: at 21:06

## 2022-11-21 RX ADMIN — METOPROLOL TARTRATE SCH MG: 25 TABLET ORAL at 05:42

## 2022-11-21 RX ADMIN — APIXABAN SCH MG: 2.5 TABLET, FILM COATED ORAL at 21:06

## 2022-11-21 RX ADMIN — METOPROLOL TARTRATE SCH MG: 25 TABLET ORAL at 17:07

## 2022-11-21 RX ADMIN — Medication SCH ML: at 09:00

## 2022-11-21 RX ADMIN — APIXABAN SCH MG: 2.5 TABLET, FILM COATED ORAL at 11:23

## 2022-11-21 RX ADMIN — ALBUMIN (HUMAN) SCH MG: 5 SOLUTION INTRAVENOUS at 17:07

## 2022-11-21 RX ADMIN — ALBUMIN (HUMAN) SCH MG: 5 SOLUTION INTRAVENOUS at 09:00

## 2022-11-21 NOTE — CON
Date of Consultation:  11/21/2022



Reason For Consultation:  Congestive heart failure.



History Of Present Illness:  Ms. Smith is 77.  Has had a history of pacemaker, chronic renal disea
se.  At 1 point, she had dialysis, but not anymore.  She has hypertension.  She has chronic atrial fi
brillation.  She has had a pacemaker.  She came in with shortness of breath, PND, orthopnea, pedal ed
ema.  No palpitation.  No syncope.  Denied any fever or chills.  Denied any chest pain, nausea, vomit
ing, diaphoresis.  Was found to have a creatinine of 2.53.  Troponin is 206.  BNP is 28,000.  Pacemak
er by EKG and chest x-ray shows CHF.  She is already feeling better after IV Lasix.



Allergies:  TO CODEINE.



Review of Systems:

Negative.



Social History:  Negative.



Family History:  Negative.



Medications:  Include amiodarone, Eliquis, Lasix, metoprolol, Synthroid, and Prilosec.



Physical Examination:

General:  Ms. Smith was in no acute distress. 

Vital Signs:  Stable, afebrile.  Paced rhythm. 

HEENT:  Negative. 

Neck:  Supple with no bruit. 

Chest:  Revealed rales both bases. 

Cardiac:  Revealed a pacemaker.  No murmurs, gallops, or rubs. 

Abdomen:  Obese. 

Extremities:  Revealed 2+ edema.



Diagnostic Data:  As stated earlier.



Impression And Plan:  

1.Acute on chronic diastolic congestive heart failure.

2.Chronic renal disease.

3.History of pacemaker.

4.History of atrial fibrillation, on amiodarone and Eliquis.

5.Hypertension, poorly controlled.

6.Elevated troponin and BNP, secondary to congestive heart failure.  We will plan to continue presen
t regimen, diurese gently.  Dr. Walls follow her from a Nephrology standpoint.  Obtain a 2D echoca
rdiogram.  We will continue to follow.





NB/TODD

DD:  11/21/2022 12:45:01Voice ID:  [id number]

DT:  11/21/2022 18:41:26Report ID:  [id number]

## 2022-11-21 NOTE — EKG
Test Date:    2022-11-20               Test Time:    15:27:34

Technician:   ZULY                                    

                                                     

MEASUREMENT RESULTS:                                       

Intervals:                                           

Rate:         68                                     

PA:           138                                    

QRSD:         184                                    

QT:           500                                    

QTc:          531                                    

Axis:                                                

P:            46                                     

PA:           138                                    

QRS:          -51                                    

T:            98                                     

                                                     

INTERPRETIVE STATEMENTS:                                       

                                                     

*** Suspect unspecified pacemaker failure

Atrial-sensed ventricular-paced rhythm

Abnormal ECG

Compared to ECG 11/20/2022 15:26:41

No significant changes



Electronically Signed On 11-21-22 15:28:16 CST by Luis Alberto Flaherty

## 2022-11-21 NOTE — P.PN
Subjective


Date of Service: 11/21/22


Chief Complaint: CHF exacerbation


No acute events overnight. She reports that her shortness of breath is 

persistent, and worse with lying flat. She denies any chest pain or 

palpitations.





Review of Systems


10-point ROS is otherwise unremarkable


Respiratory: Shortness of Breath


Cardiovascular: Orthopnea, Edema





Physical Examination





- Vital Signs


Temperature: 98.7 F


Blood Pressure: 149/67


Pulse: 62


Respirations: 20


Pulse Ox (%): 95





- Physical Exam


General: Alert, In no apparent distress, Oriented x3


HEENT: Atraumatic, PERRLA, Mucous membr. moist/pink, EOMI, Sclerae nonicteric


Neck: Supple, JVD distended (minimal)


Respiratory: Crackles/rales (bibasilar)


Cardiovascular: Normal pulses, Regular rate/rhythm, Normal S1 S2, No gallops, No

rubs, No murmurs, Edema (1-2+ BLE)


Gastrointestinal: Normal bowel sounds, Soft and benign, Non-distended, No 

tenderness, No rebound, No guarding


Musculoskeletal: No clubbing


Integumentary: No rashes


Neurological: Normal speech, Cranial nerves 3-12 intact, Normal affect





- Studies


Laboratory Data (last 24 hrs)





11/20/22 16:34: PT 14.1 H, INR 1.28


11/20/22 16:34: WBC 13.80 H, Hgb 11.2 L, Hct 34.6 L, Plt Count 149 L


11/20/22 16:34: Sodium 138, Potassium 4.3, BUN 50 H, Creatinine 2.53 H, Glucose 

127 H, Magnesium 2.7 H, Total Bilirubin 1.7 H, AST 66 H, ALT 87 H, Alkaline Phos

phatase 510 H





Microbiology Data (last 24 hrs): 








11/20/22 18:21   Blood  - Blood   Anaerobic Blood Culture - Final








Assessment And Plan





- Plan


# Acute on Chronic Decompensated Systolic Congestive Heart Failure with Reduced 

Ejection Fraction


- Consult Cardiology - recommendations appreciated 


- Ordered transthoracic echocardiogram 


   - TTE (10/28/2021) = "severely depressed left ventricular ejection fraction 

of 25-30%. severe global hypokinesis. mild tricuspid regurgitation, mild mitral 

regurgitation. small to moderate pericardial effusion. pulmonary hypertension 

with right ventricular systolic pressure 55-60% mmHg."


- NT-Pro BNP = 28,217


- Chest x-ray = "moderate bilateral pulmonary opacities are present probably 

representing pulmonary edema or pneumonia. The heart is mildly to moderately 

enlarged. Dual lead pacer device is present."


- Diuresis with IV furosemide for today 


- Continue home metoprolol


- Daily weights 


- Strict I/O 


- Cardiac diet, 1.5 L fluid restriction, 2 g Na restriction





# Chronic Atrial Fibrillation s/p PPM


# Hypertension


- Continue home metoprolol, apixaban


- Amiodarone held due to mild LFT elevations





# Liver Cirrhosis


- Unknown etiology - MANCERA vs amiodarone-induced?


- Liver ultrasound = "nodular contour of the liver is noted most compatible with

mild cirrhosis."


- Monitor LFTs





# Chronic Kidney Disease Stage IV


- Creatinine = 2.53 -> 2.55 (near baseline) 


- Urinalysis = trace leukocyte esterase, 11-20 RBCs, 1+ protein 


- Monitor creatinine and urine output 


   - If worsening, obtain renal ultrasound 


- Renally dose medications





# Hypothyroidism


- Continue home levothyroxine








Carlos Maurice M.D.

## 2022-11-21 NOTE — EKG
Test Date:    2022-11-20               Test Time:    15:26:41

Technician:   ZULY                                    

                                                     

MEASUREMENT RESULTS:                                       

Intervals:                                           

Rate:         67                                     

TX:           146                                    

QRSD:         170                                    

QT:           490                                    

QTc:          517                                    

Axis:                                                

P:            63                                     

TX:           146                                    

QRS:          -55                                    

T:            109                                    

                                                     

INTERPRETIVE STATEMENTS:                                       

                                                     

*** Poor data quality, interpretation may be adversely affected

Atrial-sensed ventricular-paced rhythm

Abnormal ECG

Compared to ECG 10/30/2021 22:00:39

Sinus rhythm no longer present

Atrial premature complex(es) no longer present

Left-axis deviation no longer present

Left bundle-branch block no longer present



Electronically Signed On 11-21-22 15:28:19 CST by Luis Alberto Flaherty

## 2022-11-22 VITALS — OXYGEN SATURATION: 94 %

## 2022-11-22 VITALS — TEMPERATURE: 98 F

## 2022-11-22 VITALS — SYSTOLIC BLOOD PRESSURE: 172 MMHG | DIASTOLIC BLOOD PRESSURE: 65 MMHG

## 2022-11-22 LAB
ALBUMIN SERPL BCP-MCNC: 3.3 G/DL (ref 3.4–5)
ALP SERPL-CCNC: 360 U/L (ref 45–117)
ALT SERPL W P-5'-P-CCNC: 53 U/L (ref 12–78)
AST SERPL W P-5'-P-CCNC: 28 U/L (ref 15–37)
BUN BLD-MCNC: 63 MG/DL (ref 7–18)
GLUCOSE SERPLBLD-MCNC: 96 MG/DL (ref 74–106)
HCT VFR BLD CALC: 30.8 % (ref 36–45)
LYMPHOCYTES # SPEC AUTO: 0.8 K/UL (ref 0.7–4.9)
MCV RBC: 89.5 FL (ref 80–100)
PMV BLD: 9.4 FL (ref 7.6–11.3)
POTASSIUM SERPL-SCNC: 3.4 MMOL/L (ref 3.5–5.1)
RBC # BLD: 3.45 M/UL (ref 3.86–4.86)

## 2022-11-22 RX ADMIN — METOPROLOL TARTRATE SCH MG: 25 TABLET ORAL at 05:36

## 2022-11-22 RX ADMIN — ALBUMIN (HUMAN) SCH MG: 5 SOLUTION INTRAVENOUS at 08:55

## 2022-11-22 RX ADMIN — Medication SCH ML: at 08:55

## 2022-11-22 RX ADMIN — APIXABAN SCH MG: 2.5 TABLET, FILM COATED ORAL at 08:55

## 2022-11-22 NOTE — PN
The patient was admitted with chronic renal disease, acute on chronic diastolic congestive heart fail
ure history of atrial fibrillation on amiodarone, Eliquis, elevated troponin, history of pacemaker.  
Echocardiogram which was pending has not been done.  Dr. Walls is following the patient.  Case was
 discussed with Dr. Maurice.  Her blood pressure remains elevated at 172/65.  She is still in a paced r
hythm.  Creatinine is 2.66, which is slightly worse than when she first came in.  Her troponin had tr
ended down from 206 to 108.  I still think she needs to have an outpatient echocardiogram and probabl
y a Lexiscan, but I am comfortable with her going home on her present regimen.  I will see her in the
 office soon.





NB/TODD

DD:  11/22/2022 09:08:24Voice ID:  [id number]

DT:  11/22/2022 15:00:35Report ID:  [id number]

## 2023-03-08 ENCOUNTER — HOSPITAL ENCOUNTER (INPATIENT)
Dept: HOSPITAL 97 - ER | Age: 78
LOS: 6 days | Discharge: HOME | DRG: 674 | End: 2023-03-14
Attending: INTERNAL MEDICINE | Admitting: HOSPITALIST
Payer: COMMERCIAL

## 2023-03-08 VITALS — BODY MASS INDEX: 32.3 KG/M2

## 2023-03-08 DIAGNOSIS — N18.6: ICD-10-CM

## 2023-03-08 DIAGNOSIS — R31.29: ICD-10-CM

## 2023-03-08 DIAGNOSIS — N17.0: Primary | ICD-10-CM

## 2023-03-08 DIAGNOSIS — E03.9: ICD-10-CM

## 2023-03-08 DIAGNOSIS — E05.80: ICD-10-CM

## 2023-03-08 DIAGNOSIS — Z88.5: ICD-10-CM

## 2023-03-08 DIAGNOSIS — Z79.890: ICD-10-CM

## 2023-03-08 DIAGNOSIS — Z79.899: ICD-10-CM

## 2023-03-08 DIAGNOSIS — Z20.822: ICD-10-CM

## 2023-03-08 DIAGNOSIS — N39.0: ICD-10-CM

## 2023-03-08 DIAGNOSIS — I50.22: ICD-10-CM

## 2023-03-08 DIAGNOSIS — I48.20: ICD-10-CM

## 2023-03-08 DIAGNOSIS — M19.90: ICD-10-CM

## 2023-03-08 DIAGNOSIS — Z99.2: ICD-10-CM

## 2023-03-08 DIAGNOSIS — I27.20: ICD-10-CM

## 2023-03-08 DIAGNOSIS — Z95.810: ICD-10-CM

## 2023-03-08 DIAGNOSIS — I25.10: ICD-10-CM

## 2023-03-08 DIAGNOSIS — E87.6: ICD-10-CM

## 2023-03-08 DIAGNOSIS — E78.5: ICD-10-CM

## 2023-03-08 DIAGNOSIS — Z90.710: ICD-10-CM

## 2023-03-08 DIAGNOSIS — I13.2: ICD-10-CM

## 2023-03-08 DIAGNOSIS — D63.1: ICD-10-CM

## 2023-03-08 DIAGNOSIS — K21.9: ICD-10-CM

## 2023-03-08 DIAGNOSIS — Z79.01: ICD-10-CM

## 2023-03-08 DIAGNOSIS — T38.1X5A: ICD-10-CM

## 2023-03-08 DIAGNOSIS — B96.20: ICD-10-CM

## 2023-03-08 DIAGNOSIS — E87.1: ICD-10-CM

## 2023-03-08 LAB
BUN BLD-MCNC: 128 MG/DL (ref 7–18)
GLUCOSE SERPLBLD-MCNC: 143 MG/DL (ref 74–106)
HCT VFR BLD CALC: 39.3 % (ref 36–45)
LYMPHOCYTES # SPEC AUTO: 0.8 K/UL (ref 0.7–4.9)
MCV RBC: 86.7 FL (ref 80–100)
PMV BLD: 9.9 FL (ref 7.6–11.3)
POTASSIUM SERPL-SCNC: 3.4 MMOL/L (ref 3.5–5.1)
RBC # BLD: 4.53 M/UL (ref 3.86–4.86)

## 2023-03-08 PROCEDURE — 87186 SC STD MICRODIL/AGAR DIL: CPT

## 2023-03-08 PROCEDURE — 81001 URINALYSIS AUTO W/SCOPE: CPT

## 2023-03-08 PROCEDURE — 80061 LIPID PANEL: CPT

## 2023-03-08 PROCEDURE — 90935 HEMODIALYSIS ONE EVALUATION: CPT

## 2023-03-08 PROCEDURE — 36415 COLL VENOUS BLD VENIPUNCTURE: CPT

## 2023-03-08 PROCEDURE — 83970 ASSAY OF PARATHORMONE: CPT

## 2023-03-08 PROCEDURE — 83540 ASSAY OF IRON: CPT

## 2023-03-08 PROCEDURE — 82607 VITAMIN B-12: CPT

## 2023-03-08 PROCEDURE — 71045 X-RAY EXAM CHEST 1 VIEW: CPT

## 2023-03-08 PROCEDURE — 93005 ELECTROCARDIOGRAM TRACING: CPT

## 2023-03-08 PROCEDURE — 84466 ASSAY OF TRANSFERRIN: CPT

## 2023-03-08 PROCEDURE — 84100 ASSAY OF PHOSPHORUS: CPT

## 2023-03-08 PROCEDURE — 87086 URINE CULTURE/COLONY COUNT: CPT

## 2023-03-08 PROCEDURE — 86704 HEP B CORE ANTIBODY TOTAL: CPT

## 2023-03-08 PROCEDURE — 87077 CULTURE AEROBIC IDENTIFY: CPT

## 2023-03-08 PROCEDURE — 83735 ASSAY OF MAGNESIUM: CPT

## 2023-03-08 PROCEDURE — 84550 ASSAY OF BLOOD/URIC ACID: CPT

## 2023-03-08 PROCEDURE — 85044 MANUAL RETICULOCYTE COUNT: CPT

## 2023-03-08 PROCEDURE — 99285 EMERGENCY DEPT VISIT HI MDM: CPT

## 2023-03-08 PROCEDURE — 87811 SARS-COV-2 COVID19 W/OPTIC: CPT

## 2023-03-08 PROCEDURE — 87340 HEPATITIS B SURFACE AG IA: CPT

## 2023-03-08 PROCEDURE — 80069 RENAL FUNCTION PANEL: CPT

## 2023-03-08 PROCEDURE — 87088 URINE BACTERIA CULTURE: CPT

## 2023-03-08 PROCEDURE — 80053 COMPREHEN METABOLIC PANEL: CPT

## 2023-03-08 PROCEDURE — 83880 ASSAY OF NATRIURETIC PEPTIDE: CPT

## 2023-03-08 PROCEDURE — 83690 ASSAY OF LIPASE: CPT

## 2023-03-08 PROCEDURE — 84443 ASSAY THYROID STIM HORMONE: CPT

## 2023-03-08 PROCEDURE — 85025 COMPLETE CBC W/AUTO DIFF WBC: CPT

## 2023-03-08 PROCEDURE — 84439 ASSAY OF FREE THYROXINE: CPT

## 2023-03-08 PROCEDURE — 94760 N-INVAS EAR/PLS OXIMETRY 1: CPT

## 2023-03-08 PROCEDURE — 80048 BASIC METABOLIC PNL TOTAL CA: CPT

## 2023-03-08 PROCEDURE — 86706 HEP B SURFACE ANTIBODY: CPT

## 2023-03-08 NOTE — XMS REPORT
Continuity of Care Document

                            Created on:2023



Patient:PHIL CALLOWAY

Sex:Female

:1945

External Reference #:[id number]





Demographics







                          Address                   [address]



                                                    [place] [address]

 

                          Home Phone                [phone]

 

                          Mobile Phone              [phone]

 

                          Email Address             [email or web address]

 

                          Preferred Language        English

 

                          Marital Status            Unknown

 

                          Caodaism Affiliation     Unknown

 

                          Race                      Unknown

 

                          Additional Race(s)        Unavailable



                                                    White



                                                    Unavailable

 

                          Ethnic Group              Unknown









Author







                          Organization              [facility]

t

 

                          Address                   [address]



                                                    [place] [address]

 

                          Phone                     [phone]









Support







                Name            Relationship    Address         Phone

 

                MICHELLE RO               [address]  [phone]



                                                [place] [address] 

 

                ELLI PAZ ASCENCIO               [address]  [phone]



                                                [place] [address] 









Care Team Providers







                    Name                Role                Phone

 

                    JENNY LORENATOBI PINO Primary Care Physician Unavailable

 

                    Efren Orozco MD Attending Clinician [phone]

 

                    EFREN OROZCO Attending Clinician Unavailable

 

                    JEREMIAH BRUNNER   Attending Clinician Unavailable

 

                    YOSEF REGALADO      Attending Clinician Unavailable

 

                    Eddie Urrutia MD Attending Clinician [phone]

 

                    Yudy Rivero MD Attending Clinician [phone]

 

                    Omar Lam MD Attending Clinician [phone]

 

                    Vivian Samuels MD Attending Clinician [phone]

 

                    Arash Schroeder CRNA Attending Clinician [phone]

[phone]

 

                    Llanes, Andrea      Attending Clinician Unavailable

 

                    Saurabh KELLER, James ODONNELL Attending Clinician [phone]

 

                    Radha Craft MD Attending Clinician [phone]

 

                    Heber Ward MD      Attending Clinician [phone]

 

                    Ricky Brush Attending Clinician [phone]

 

                    Lexie Villatoro MD       Attending Clinician [phone]

 

                    Nathaniel Mariscal MD Attending Clinician [phone]

 

                    Tez May  Attending Clinician [phone]

 

                    Alexsander Toribio MD         Attending Clinician [phone]

 

                    Raven_SONU              Attending Clinician Unavailable

 

                    YOSEF REGALADO      Admitting Clinician Unavailable

 

                    EDDIE URRUTIA         Admitting Clinician Unavailable

 

                    RADHA CRAFT Admitting Clinician Unavailable

 

                    Erasmo              Admitting Clinician Unavailable









Payers







           Payer Name Policy Type Policy Number Effective Date Expiration Date KAREN cameron

 

           MEDICARE A B            [id number] 2009 00:00:00            

 

           [place] LUCINA JIMENES            [id number]  2018 00:00:00            







Problems







       Condition Condition Condition Status Onset  Resolution Last   Treating Co

mments 

Source



       Name   Details Category        Date   Date   Treatment Clinician        



                                                 Date                 

 

       Acute  Acute  Disease Active 2021                             [facility]



       decompensa decompensa               0-31                               [facility]

[facility]



       shahriar heart shahriar heart               00:00:                             Medi

[place]



       failure failure               00                                 Center

 

       Severe Severe Disease Active                              [facility]



       acute  acute                



       pancreatit pancreatit               00:00:                             Ho

spita



       is     is                   00                                 l

 

       Complicati Complicati Disease Active                              M

ethodi



       on of  on of                3-05                               st



       vascular vascular               00:00:                             Hospit

a



       dialysis dialysis               00                                 l



       catheter catheter                                                  

 

       Hyponatrem Hyponatrem Disease Active                              M

ethodi



       ia     ia                   3-05                               st



                                   00:00:                             Hospita



                                   00                                 l

 

       Mechanical Mechanical Disease Active                       Overview

: Methodi



       complicati complicati               3-05                        [facility]

 st



       on of  on of                00:00:                      g of this Hospita



       vascular vascular               00                          note   l



       dialysis dialysis                                           might be 



       catheter catheter                                           different 



                                                               from the 



                                                               original. 



                                                               Added  



                                                               automatic 



                                                               ally from 



                                                               request 



                                                               for    



                                                               surgery 



                                                               [id number] 

 

       ENRIQUE (acute ENRIQUE (acute Disease Active                                    C

[facility]



       kidney kidney                                                  [facility]



       injury) injury)                                                  Medical



                                                                      Center

 

       ESRD (end ESRD (end Disease Active                                    [facility]



       stage  stage                                                   [facility]



       renal  renal                                                   Medical



       disease) disease)                                                  Center



       on     on                                                      



       dialysis dialysis                                                  







Allergies, Adverse Reactions, Alerts







       Allergy Allergy Status Severity Reaction(s) Onset  Inactive Treating Comm

ents 

Source



       Name   Type                        Date   Date   Clinician        

 

       Codeine Propensi Active        Itching                       Method

i



              ty to                       [address]



              adverse                      00:00:                      Hospita



              reaction                      00                          l



              s to                                                    



              drug                                                    

 

       Codeine Propensi Active        Itching                       [facility]



              ty to                       [address]



              adverse                      00:00:                      of



              reaction                      00                          Medicin



              s to                                                    e



              drug                                                    

 

       NO KNOWN Allergy Active                                           [facility]







Social History







           Social Habit Start Date Stop Date  Quantity   Comments   Source

 

           History SDOH                                             [facility]



           Alcohol Comment                                             [facility] C

enter

 

           History SDOH                                             [facility]



           Alcohol Std Drinks                                             Hospit

al

 

           History SDOH                                             [facility]



           Alcohol Binge                                             Hospital

 

           Cigarette  2022                       [facility]

 of



           pack-years 00:00:00   00:00:00                         Medicine

 

           Tobacco use and 2021-10-31 2021-10-31 Never used            [facility]

[facility]



           exposure   00:00:00   00:00:00                         [facility] Center

 

           Alcohol intake 2021 Lifetime              [facility]



                      00:00:00   00:00:00   non-drinker            Hospital



                                            (finding)             

 

           History SDOH 2021 1                     [facility]



           Alcohol Frequency 00:00:00   00:00:00                         Hospita

l

 

           Sex Assigned At [birth year]                       [facility]



           Birth      00:00:00   00:00:00                         Hospital









                Smoking Status  Start Date      Stop Date       Source

 

                Tobacco smoking consumption unknown                             

    [facility]

 

                Never smoked tobacco                                 [facility]

[facility]







Medications







       Ordered Filled Start  Stop   Current Ordering Indication Dosage Frequency

 Signature

                    Comments            Components          Source



     Medication Medication Date Date Medication? Clinician                (SIG) 

          



     Name Name                                                   

 

     gabapentin            Yes            600mg      Take 600           [facility]

[facility]



     (NEURONTIN)      6-20                               mg by           [facility]



     600 MG      11:20:                               mouth           of



     tablet      15                                 daily.           [facility]



                                                                 e

 

     omeprazole            Yes                      daily.           [facility]

r



     (PRILOSEC)      6-20                                              [facility]



     40 MG      11:20:                                              of



     capsule      15                                                [facility]icin



                                                                 e

 

     ondansetron      2022- No                       daily as           [facility]

[facility]



     (ZOFRAN) 4      6-20 06-20                          needed.           [facility]

ge



     MG tablet      11:20: 00:00                                         of



               08   :00                                          [facility]icin



                                                                 e

 

     metoprolol            Yes       [id number] 50mg      Take 1           [facility]

[facility]



     (TOPROL XL)      6-20                               Tablet by           Col

lege



     50 MG XL      00:00:                               mouth           of



     tablet      00                                 every 12           [facility]



                                                  hours.           e

 

     hydrALAZINE            Yes       [id number] 50mg      Take 1           

[facility]



     (APRESOLINE      6-20                               Tablet by           Col

lege



     ) 50 MG      00:00:                               mouth           of



     tablet      00                                 every 12           [facility]



                                                  hours.           e

 

     furosemide            Yes            20mg      Take 20 mg           [facility]

[facility]



     (LASIX) 20      3-14                               by mouth           Colle

ge



     MG tablet      00:00:                               two times           of



               00                                 daily.           [facility]



                                                                 e

 

     furosemide            Yes            20mg      Take 20 mg           [facility]

aylor



     (LASIX) 20      3-14                               by mouth           Colle

ge



     MG tablet      00:00:                               two times           of



               00                                 daily.           [facility]



                                                                 e

 

     levothyroxi      0      Yes                                     [facility]        3-09                                              [place]



     (SYNTHROID)      00:00:                                              of



     75 MCG      00                                                [facility]icin



     tablet                                                        e

 

     levothyroxi      0      Yes                                     [facility]        3-09                                              [place]



     (SYNTHROID)      00:00:                                              of



     75 MCG      00                                                [facility]icin



     tablet                                                        e

 

     ELIQUIS 2.5      0      Yes                                     [facility]



     MG TABS      2-21                                              [place]



               00:00:                                              of



               00                                                Medicin



                                                                 e

 

     ELIQUIS 2.5      0      Yes                                     [facility]



     MG TABS      2-21                                              [place]



               00:00:                                              of



               00                                                Medicin



                                                                 e

 

     gabapentin      2021      Yes            600mg      Take 600           [facility]

[facility]



     (NEURONTIN)      1-29                               mg by           [facility]



     600 MG      14:06:                               mouth           of



     tablet      23                                 daily.           [facility]



                                                                 e

 

     omeprazole      2021      Yes                      daily.           [facility]

r



     (PRILOSEC)      1-29                                              [facility]



     40 MG      14:06:                                              of



     capsule      23                                                [facility]icin



                                                                 e

 

     ondansetron      2021      Yes                      daily as           [facility]

[facility]



     (ZOFRAN) 4      -                               needed.           Colleg

e



     MG tablet      14:06:                                              of



               23                                                Medicin



                                                                 e

 

     gabapentin      2021      Yes            600mg      Take 600           [facility]

[facility]



     (NEURONTIN)      1-29                               mg by           [place]



     600 MG      14:06:                               mouth           of



     tablet      23                                 daily.           [facility]



                                                                 e

 

     omeprazole      2021      Yes                      daily.           [facility]

r



     (PRILOSEC)      1-29                                              [facility]



     40 MG      14:06:                                              of



     capsule      23                                                [facility]icin



                                                                 e

 

     ondansetron      2021      Yes                      daily as           [facility]

yl[facility]



     (ZOFRAN) 4      -                               needed.           Colleg

e



     MG tablet      14:06:                                              of



               23                                                Medicin



                                                                 e

 

     famotidine      2021      Yes            20mg QD   Take 1           [facility]

[facility]



     (PEPCID) 20      1-16                               tablet (20           [facility]

[facility]



     MG tablet      00:00:                               mg total)           Med

ical



               00                                 by mouth           Center



                                                  daily.           

 

     famotidine      2021      Yes            20mg QD   Take 1           [facility]

[facility]



     (PEPCID) 20      1-16                               tablet (20           [facility]

[facility]



     MG tablet      00:00:                               mg total)           Med

ical



               00                                 by mouth           Center



                                                  daily.           

 

     famotidine      2021      Yes            20mg QD   Take 1           [facility]

t



     (PEPCID) 20      1-16                               tablet (20           [facility]

[facility]



     MG tablet      00:00:                               mg total)           Med

ical



               00                                 by mouth           Center



                                                  daily.           

 

     famotidine      2021      Yes            20mg QD   Take 1           [facility]

t



     (PEPCID) 20      1-16                               tablet (20           [facility]

[facility]



     MG tablet      00:00:                               mg total)           Med

ical



               00                                 by mouth           Center



                                                  daily.           

 

     amiodarone      2021- No             400mg QD   Take 1           [facility]



     (PACERONE)      -16 -16                          tablet           [facility]



     400 MG      00:00: 23:59                          (400 mg           Medical



     tablet      00   :00                           total) by           Center



                                                  mouth           



                                                  daily.           

 

     amiodarone      2021- No             400mg QD   Take 1           [facility]



     (PACERONE)      -16 -16                          tablet           [facility]



     400 MG      00:00: 23:59                          (400 mg           Medical



     tablet      00   :00                           total) by           Center



                                                  mouth           



                                                  daily.           

 

     amiodarone      2021- No             400mg QD   Take 1           [facility]



     (PACERONE)      -16 -16                          tablet           [facility]



     400 MG      00:00: 23:59                          (400 mg           Medical



     tablet      00   :00                           total) by           Center



                                                  mouth           



                                                  daily.           

 

     amiodarone      2021- No             400mg QD   Take 1           [facility]



     (PACERONE)      -16 -16                          tablet           [facility]



     400 MG      00:00: 23:59                          (400 mg           Medical



     tablet      00   :00                           total) by           Center



                                                  mouth           



                                                  daily.           

 

     gabapentin      2021      Yes            600mg QD   Take 600           [facility]

[facility]



     (NEURONTIN)      1-15                               mg by           [facility]



     600 MG      14:09:                               mouth           Medical



     tablet      12                                 daily.           [place]

 

     levothyroxi      2021      Yes            75ug      Take 75           [facility]



     ne        1-15                               mcg by           [facility]



     (SYNTHROID,      14:09:                               mouth           Medic

al



     LEVOTHROID)      12                                 Every           Center



     75 MCG                                         morning on           



     tablet                                         an empty           



                                                  stomach.           

 

     omeprazole      2021      Yes            40mg QD   Take 40 mg           C

HI [facility]



     (PriLOSEC)      1-15                               by mouth           [facility]



     40 MG      14:09:                               daily.           Medical



     capsule      12                                                [place]

 

     cetirizine      2021      Yes            10mg      Take 10 mg           C

[facility]



     (ZyrTEC) 10      1-15                               by mouth           [facility]

s



     MG tablet      14:09:                               as needed           Med

ical



               12                                 for            Center



                                                  Allergies.           

 

     multivitami      2021      Yes            1{capsu QD   Take 1           C

HI St



     n capsule      1-15                     le}       capsule by           [facility]

s



               14:09:                               mouth           Medical



               12                                 daily.           Center

 

     gabapentin      2021      Yes            600mg QD   Take 600           [facility]

[facility]



     (NEURONTIN)      1-15                               mg by           [facility]



     600 MG      14:09:                               mouth           Medical



     tablet      12                                 daily.           Center

 

     levothyroxi      2021      Yes            75ug      Take 75           [facility]



     ne        1-15                               mcg by           [facility]



     (SYNTHROID,      14:09:                               mouth           Medic

al



     LEVOTHROID)      12                                 Every           Center



     75 MCG                                         morning on           



     tablet                                         an empty           



                                                  stomach.           

 

     omeprazole      2021      Yes            40mg QD   Take 40 mg           [facility]

[facility]



     (PriLOSEC)      1-15                               by mouth           [facility]



     40 MG      14:09:                               daily.           [facility]



     capsule      12                                                Center

 

     cetirizine      2021      Yes            10mg      Take 10 mg           [facility]

[facility]



     (ZyrTEC) 10      1-15                               by mouth           [facility]

[facility]



     MG tablet      14:09:                               as needed           Med

ical



               12                                 for            Center



                                                  Allergies.           

 

     multivitami      2021      Yes            1{capsu QD   Take 1           [facility]

[facility]



     n capsule      1-15                     le}       capsule by           [facility]

[facility]



               14:09:                               mouth           Medical



               12                                 daily.           Center

 

     gabapentin      2021      Yes            600mg QD   Take 600           [facility]



     (NEURONTIN)      1-15                               mg by           [facility]



     600 MG      14:09:                               mouth           [facility]



     tablet      12                                 daily.           Center

 

     levothyroxi      2021      Yes            75ug      Take 75           [facility]



     ne        1-15                               mcg by           [facility]



     (SYNTHROID,      14:09:                               mouth           Medic

al



     LEVOTHROID)      12                                 Every           Center



     75 MCG                                         morning on           



     tablet                                         an empty           



                                                  stomach.           

 

     omeprazole      2021      Yes            40mg QD   Take 40 mg           [facility]

[facility]



     (PriLOSEC)      1-15                               by mouth           [facility]



     40 MG      14:09:                               daily.           [facility]



     capsule      12                                                Center

 

     cetirizine      2021      Yes            10mg      Take 10 mg           [facility]

[facility]



     (ZyrTEC) 10      1-15                               by mouth           [facility]

[facility]



     MG tablet      14:09:                               as needed           Med

ical



               12                                 for            Center



                                                  Allergies.           

 

     multivitami      2021      Yes            1{capsu QD   Take 1           [facility]

[facility]



     n capsule      1-15                     le}       capsule by           [facility]



               14:09:                               mouth           [facility]



               12                                 daily.           Center

 

     gabapentin      2021      Yes            600mg QD   Take 600           [facility]



     (NEURONTIN)      1-15                               mg by           [facility]



     600 MG      14:09:                               mouth           [facility]



     tablet      12                                 daily.           Center

 

     levothyroxi      2021      Yes            75ug      Take 75           [facility]



     ne        1-15                               mcg by           [facility]



     (SYNTHROID,      14:09:                               mouth           Medic

al



     LEVOTHROID)      12                                 Every           Center



     75 MCG                                         morning on           



     tablet                                         an empty           



                                                  stomach.           

 

     omeprazole      2021      Yes            40mg QD   Take 40 mg           [facility]

[facility]



     (PriLOSEC)      1-15                               by mouth           [facility]



     40 MG      14:09:                               daily.           Medical



     capsule      12                                                Center

 

     cetirizine      2021      Yes            10mg      Take 10 mg           [facility]

[facility]



     (ZyrTEC) 10      1-15                               by mouth           [facility]

[facility]



     MG tablet      14:09:                               as needed           Med

ical



               12                                 for            Center



                                                  Allergies.           

 

     multivitami      2021      Yes            1{capsu QD   Take 1           [facility]

[facility]



     n capsule      1-15                     le}       capsule by           [facility]

[facility]



               14:09:                               mouth           Medical



               12                                 daily.           Center

 

     hydrocodone      2021      Yes                      daily as           [facility]

[facility]



     -acetaminop      1-15                               needed.           [facility]

ge



     hen (NORCO)      00:00:                                              of



     7.5-325 MG      00                                                [facility]



     per tablet                                                        e

 

     amiodarone      2021      Yes                      daily.           [facility]

r



     (PACERONE)      1-15                                              [facility]



     200 MG      00:00:                                              of



     tablet      00                                                [facility]



                                                                 e

 

     hydrocodone      2021      Yes                      daily as           [facility]

[facility]



     -acetaminop      1-15                               needed.           [facility]

ge



     hen (NORCO)      00:00:                                              of



     7.5-325 MG      00                                                [facility]



     per tablet                                                        e

 

     amiodarone      2021      Yes                      daily.           [facility]

r



     (PACERONE)      1-15                                              [facility]



     200 MG      00:00:                                              of



     tablet      00                                                [facility]



                                                                 e

 

     amiodarone      2021      Yes                      daily.           [facility]

r



     (PACERONE)      1-15                                              [facility]



     200 MG      00:00:                                              of



     tablet      00                                                [facility]



                                                                 e

 

     hydrocodone      2021      Yes                      daily as           [facility]

[facility]



     -acetaminop      1-15                               needed.           [facility]

ge



     hen (NORCO)      00:00:                                              of



     7.5-325 MG      00                                                [facility]



     per tablet                                                        e

 

     metoprolol      2021- No             25mg      Take 25 mg           

[facility]



     (TOPROL-XL)      1-15 11-16                          by mouth           Col

lege



     25 MG XL      00:00: 05:59                          two times           of



     tablet      00   :00                           daily.           [facility]



                                                                 e

 

     metoprolol      2021- No             25mg      Take 25 mg           

[facility]



     (TOPROL-XL)      1-15 11-16                          by mouth           [facility]

lege



     25 MG XL      00:00: 05:59                          two times           of



     tablet      00   :00                           daily.           [facility]



                                                                 e

 

     ferrous      2021- No             325mg QD   Take 1           [facility]



     sulfate 325      1-15 11-15                          tablet           [facility]



     (65 FE) MG      00:00: 23:59                          (325 mg           Med

ical



     tablet      00   :00                           total) by           Center



                                                  mouth           



                                                  daily.           

 

     metoprolol      2021- No             25mg Q.5D Take 1           [facility]



     succinate      1-15 11-15                          tablet (25           [facility]

[facility]



     (TOPROL-XL)      00:00: 23:59                          mg total)           

Medical



     25 MG 24 hr      00   :00                           by mouth 2           Ce

nter



     tablet                                         (two)           



                                                  times           



                                                  daily.           

 

     ferrous      2021- No             325mg QD   Take 1           [facility]



     sulfate 325      1-15 11-15                          tablet           [facility]



     (65 FE) MG      00:00: 23:59                          (325 mg           Med

ical



     tablet      00   :00                           total) by           Center



                                                  mouth           



                                                  daily.           

 

     metoprolol      2021- No             25mg Q.5D Take 1           [facility]



     succinate      1-15 11-15                          tablet (25           [facility]

[facility]



     (TOPROL-XL)      00:00: 23:59                          mg total)           

Medical



     25 MG 24 hr      00   :00                           by mouth 2           Ce

nter



     tablet                                         (two)           



                                                  times           



                                                  daily.           

 

     ferrous      2021 No             325mg QD   Take 1           [facility]



     sulfate 325      1-15 11-15                          tablet           [facility]



     (65 FE) MG      00:00: 23:59                          (325 mg           Med

ical



     tablet      00   :00                           total) by           Center



                                                  mouth           



                                                  daily.           

 

     metoprolol      2021 No             25mg Q.5D Take 1           [facility]



     succinate      1-15 11-15                          tablet (25           [facility]

es



     (TOPROL-XL)      00:00: 23:59                          mg total)           

Medical



     25 MG 24 hr      00   :00                           by mouth 2           Ce

nter



     tablet                                         (two)           



                                                  times           



                                                  daily.           

 

     ferrous      2021 No             325mg QD   Take 1           [facility]



     sulfate 325      1-15 11-15                          tablet           [facility]



     (65 FE) MG      00:00: 23:59                          (325 mg           Med

ical



     tablet      00   :00                           total) by           Center



                                                  mouth           



                                                  daily.           

 

     metoprolol      2021-1 2022- No             25mg Q.5D Take 1           [facility]



     succinate      1-15 11-15                          tablet (25           [facility]

es



     (TOPROL-XL)      00:00: 23:59                          mg total)           

Medical



     25 MG 24 hr      00   :00                           by mouth 2           Ce

nter



     tablet                                         (two)           



                                                  times           



                                                  daily.           

 

     metoprolol      2021 No             25mg      Take 25 mg           

[facility]



     (TOPROL-XL)      1-15 06-20                          by mouth           Col

lege



     25 MG XL      00:00: 00:00                          two times           of



     tablet      00   :00                           daily.           [facility]



                                                                 e

 

     Apixaban      2021- No             2.5mg      Take 2.5           [facility]

[facility]



     2.5 MG TABS      1-15 12-16                          mg by           Colleg

e



               00:00: 05:59                          mouth two           of



               00   :00                           times           [facility]



                                                  daily.           e

 

     apixaban      2021 No             2.5mg Q.5D Take 1           [facility]

[facility]



     (ELIQUIS)      1-15 12-15                          tablet           [facility]



     2.5 mg Tab      00:00: 23:59                          (2.5 mg           Med

ical



     tablet      00   :00                           total) by           Center



                                                  mouth 2           



                                                  (two)           



                                                  times           



                                                  daily for           



                                                  30 days.           

 

     HYDROcodone      2021 No             1{tbl}      Take 1           [facility]

[facility]



     -acetaminop      1-15 11-25                          tablet by           [facility] ([facility]CO      00:00: 23:59                          mouth           Medic

al



     7.5-325)      00   :00                           every 6           Center



     7.5-325 mg                                         (six)           



     per tablet                                         hours as           



                                                  needed for           



                                                  up to 10           



                                                  days. Max           



                                                  Daily           



                                                  Amount: 4           



                                                  tablets           

 

     levofloxaci      0      Yes                      daily.           [facility]

[facility]



     n                                                       [facility]



     (LEVAQUIN)      00:00:                                              of



     250 MG      00                                                [facility]



     tablet                                                        e

 

     levofloxaci      -0      Yes                      daily.           [facility]

[facility]



     n                                                       [facility]



     (LEVAQUIN)      00:00:                                              of



     250 MG      00                                                [facility]



     tablet                                                        e

 

     levofloxaci      -0 - No                       daily.           [place]

[facility]



     n                                                  [facility]



     (LEVAQUIN)      00:00: 00:00                                         of



     250 MG      00   :00                                          [facility]



     tablet                                                        e

 

     levothyroxi      -0      Yes            75ug QD   Take 75           [facility]

[facility]



     ne        5-06                               mcg by           st



     (SYNTHROID)      18:57:                               mouth           [facility]

ta



     75 mcg      26                                 daily.           l



     tablet                                                        

 

     omeprazole      2021-0      Yes            40mg QD   Take 40 mg           M

ethodi



     (PriLOSEC)      5-06                               by mouth           st



     40 MG      18:57:                               daily.           [facility]



     capsule      26                                                l

 

     metoclopram      -0      Yes            5mg  [id number] Take 5 mg      

     [facility]



     [facility]       5-06                          [id number] by mouth 3           st



     (REGLAN) 5      18:57:                          3D   (three)           Hosp

bill



     MG tablet      26                                 times a           l



                                                  day.           

 

     meclizine      -0      Yes            25mg Q.25D Take 25 mg           M

ethodi



     (ANTIVERT)      5-06                               by mouth 4           st



     25 mg      18:57:                               (four)           [facility]



     tablet      26                                 times a           l



                                                  day as           



                                                  needed for           



                                                  dizziness.           

 

     calcitrioL      -0      Yes            .25ug Q48H Take 0.25           M

ethodi



     (ROCALTROL)      5-06                               mcg by           st



     0.25 MCG      18:57:                               mouth           [facility]



     capsule      26                                 every           l



                                                  other day.           

 

     traMADoL      -0      Yes       [id number] 50mg Q6H  Take 50 mg           [facility]

[facility]



     (ULTRAM) 50      5-06                               by mouth           st



     mg tablet      18:57:                               every 6           [facility]

ta



               26                                 (six)           l



                                                  hours as           



                                                  needed for           



                                                  moderate           



                                                  pain           



                                                  .acute           



                                                  pain.           

 

     promethazin      0      Yes            25mg Q6H  Take 25 mg           

[facility]



     e         5-06                               by mouth           st



     (PHENERGAN)      18:57:                               every 6           [facility]

[facility]



     25 MG      26                                 (six)           l



     tablet                                         hours as           



                                                  needed for           



                                                  nausea or           



                                                  vomiting.           

 

     hydrALAZINE      0      Yes            100mg Q.5D Take 100           M

ethodi



     (APRESOLINE      5-06                               mg by           st



     ) 100 MG      18:57:                               mouth 2           Hospit

a



     tablet      26                                 (two)           l



                                                  times a           



                                                  day.           

 

     levothyroxi      -0      Yes            75ug QD   Take 75           [facility]

[facility]



     ne        5-06                               mcg by           st



     (SYNTHROID)      13:57:                               mouth           [facility]

ta



     75 mcg      26                                 daily.           l



     tablet                                                        

 

     omeprazole      0      Yes            40mg QD   Take 40 mg           M

ethodi



     (PriLOSEC)      5-06                               by mouth           st



     40 MG      13:57:                               daily.           [facility]



     capsule      26                                                l

 

     metoclopram      -0      Yes            5mg  [id number] Take 5 mg      

     [facility]



     [facility]       5-06                          [id number] by mouth 3           st



     (REGLAN) 5      13:57:                          3D   (three)           Hosp

bill



     MG tablet      26                                 times a           l



                                                  day.           

 

     meclizine      -0      Yes            25mg Q.25D Take 25 mg           M

ethodi



     (ANTIVERT)      5-06                               by mouth 4           st



     25 mg      13:57:                               (four)           [facility]



     tablet      26                                 times a           l



                                                  day as           



                                                  needed for           



                                                  dizziness.           

 

     calcitrioL      -0      Yes            .25ug Q48H Take 0.25           M

ethodi



     (ROCALTROL)      5-06                               mcg by           st



     0.25 MCG      13:57:                               mouth           [facility]



     capsule      26                                 every           l



                                                  other day.           

 

     traMADoL      -0      Yes       [id number] 50mg Q6H  Take 50 mg           [facility]

[facility]



     (ULTRAM) 50      5-06                               by mouth           st



     mg tablet      13:57:                               every 6           [facility]

ta



               26                                 (six)           l



                                                  hours as           



                                                  needed for           



                                                  moderate           



                                                  pain           



                                                  .acute           



                                                  pain.           

 

     promethazin      0      Yes            25mg Q6H  Take 25 mg           

[facility]



     e         5-06                               by mouth           st



     (PHENERGAN)      13:57:                               every 6           H[facility]

[facility]



     25 MG      26                                 (six)           l



     tablet                                         hours as           



                                                  needed for           



                                                  nausea or           



                                                  vomiting.           

 

     hydrALAZINE      0      Yes            100mg Q.5D Take 100           M

ethodi



     (APRESOLINE      5-06                               mg by           st



     ) 100 MG      13:57:                               mouth 2           Hospit

a



     tablet      26                                 (two)           l



                                                  times a           



                                                  day.           

 

     levothyroxi      0      Yes            75ug QD   Take 75           Met

hodi



     ne        5-06                               mcg by           st



     (SYNTHROID)      13:57:                               mouth           H[facility]

ta



     75 mcg      26                                 daily.           l



     tablet                                                        

 

     omeprazole      0      Yes            40mg QD   Take 40 mg           M

ethodi



     (PriLOSEC)      5-06                               by mouth           st



     40 MG      13:57:                               daily.           [facility]



     capsule      26                                                l

 

     metoclopram      0      Yes            5mg  [id number] Take 5 mg      

     [facility]



     delgado       5-06                          [id number] by mouth 3           st



     (REGLAN) 5      13:57:                          3D   (three)           Hosp

bill



     MG tablet      26                                 times a           l



                                                  day.           

 

     meclizine      -0      Yes            25mg Q.25D Take 25 mg           M

ethodi



     (ANTIVERT)      5-06                               by mouth 4           st



     25 mg      13:57:                               (four)           [facility]



     tablet      26                                 times a           l



                                                  day as           



                                                  needed for           



                                                  dizziness.           

 

     calcitrioL      -0      Yes            .25ug Q48H Take 0.25           M

ethodi



     (ROCALTROL)      5-06                               mcg by           st



     0.25 MCG      13:57:                               mouth           [facility]



     capsule      26                                 every           l



                                                  other day.           

 

     traMADoL      -0      Yes       [id number] 50mg Q6H  Take 50 mg           Me

[facility]odi



     (ULTRAM) 50      5-06                               by mouth           st



     mg tablet      13:57:                               every 6           [facility]

ta



               26                                 (six)           l



                                                  hours as           



                                                  needed for           



                                                  moderate           



                                                  pain           



                                                  .acute           



                                                  pain.           

 

     promethazin      -0      Yes            25mg Q6H  Take 25 mg           

[facility]



     e         5-06                               by mouth           st



     (PHENERGAN)      13:57:                               every 6           Hos

[facility]



     25 MG      26                                 (six)           l



     tablet                                         hours as           



                                                  needed for           



                                                  nausea or           



                                                  vomiting.           

 

     hydrALAZINE      -0      Yes            100mg Q.5D Take 100           M

ethodi



     (APRESOLINE      5-06                               mg by           st



     ) 100 MG      13:57:                               mouth 2           Hospit

a



     tablet      26                                 (two)           l



                                                  times a           



                                                  day.           

 

     levothyroxi      -0      Yes            75ug QD   Take 75           Met

hodi



     ne        5-06                               mcg by           st



     (SYNTHROID)      13:57:                               mouth           [facility]

ta



     75 mcg      26                                 daily.           l



     tablet                                                        

 

     omeprazole      0      Yes            40mg QD   Take 40 mg           M

ethodi



     (PriLOSEC)      5-06                               by mouth           st



     40 MG      13:57:                               daily.           [facility]



     capsule      26                                                l

 

     metoclopram      0      Yes            5mg  [id number] Take 5 mg      

     [facility]



     delgado       5-06                          [id number] by mouth 3           st



     (REGLAN) 5      13:57:                          3D   (three)           Hosp

bill



     MG tablet      26                                 times a           l



                                                  day.           

 

     meclizine      0      Yes            25mg Q.25D Take 25 mg           M

ethodi



     (ANTIVERT)      5-06                               by mouth 4           st



     25 mg      13:57:                               (four)           [facility]



     tablet      26                                 times a           l



                                                  day as           



                                                  needed for           



                                                  dizziness.           

 

     calcitrioL      -0      Yes            .25ug Q48H Take 0.25           M

ethodi



     (ROCALTROL)      5-06                               mcg by           st



     0.25 MCG      13:57:                               mouth           [facility]



     capsule      26                                 every           l



                                                  other day.           

 

     traMADoL      -0      Yes       [id number] 50mg Q6H  Take 50 mg           Me

thodi



     (ULTRAM) 50      5-06                               by mouth           st



     mg tablet      13:57:                               every 6           [facility]

ta



               26                                 (six)           l



                                                  hours as           



                                                  needed for           



                                                  moderate           



                                                  pain           



                                                  .acute           



                                                  pain.           

 

     promethazin      -0      Yes            25mg Q6H  Take 25 mg           

[facility]



     e         5-06                               by mouth           st



     (PHENERGAN)      13:57:                               every 6           Hos

[facility]



     25 MG      26                                 (six)           l



     tablet                                         hours as           



                                                  needed for           



                                                  nausea or           



                                                  vomiting.           

 

     hydrALAZINE            Yes            100mg Q.5D Take 100           M

ethodi



     (APRESOLINE      5-06                               mg by           st



     ) 100 MG      13:57:                               mouth 2           Hospit

a



     tablet      26                                 (two)           l



                                                  times a           



                                                  day.           

 

     tramadol            Yes                      as needed.           [place]

[facility]



     (ULTRAM) 50      3-28                                              [facility]



     MG tablet      00:00:                                              of



               00                                                [facility]



                                                                 e

 

     tramadol            Yes                      as needed.           [place]

[facility]



     (ULTRAM) 50      3-28                                              [facility]



     MG tablet      00:00:                                              of



               00                                                [facility]



                                                                 e

 

     tramadol            Yes                      as needed.           [place]

meaghan



     (ULTRAM) 50      3-28                                              [facility]



     MG tablet      00:00:                                              of



               00                                                [facility]icin



                                                                 e

 

     ondansetron      2021- No             4mg  Q12H Take 4 mg           

[facility]



     (ZOFRAN) 4      3-09 03-08                          by mouth           st



     MG tablet      02:27: 00:00                          every 12           Hos

[facility]



               48   :00                           (twelve)           l



                                                  hours as           



                                                  needed for           



                                                  nausea or           



                                                  vomiting.           

 

     carvediloL      2021- No             12.5mg Q.5D Take 12.5          

 [facility]



     (COREG)      3-09 03-08                          mg by           st



     12.5 MG      02:27: 00:00                          mouth 2           Hospit

a



     tablet      48   :00                           (two)           l



                                                  times a           



                                                  day with           



                                                  meals.           

 

     amoxicillin      2021- No             1{tbl} Q.5D Take 1           M

ethodi



     -pot      3-09 03-08                          tablet by           st



     clavulanate      02:27: 00:00                          mouth 2           Ho

[facility]



     (AUGMENTIN)      48   :00                           (two)           l



     250-125 mg                                         times a           



     per tablet                                         day.           

 

     amLODIPine      2021- No             10mg QD   Take 1           [facility]

[facility]



     (NORVASC)      3-09 04-09                          tablet (10           st



     10 mg      00:00: 04:59                          mg total)           Hospit

a



     tablet      00   :00                           by mouth           l



                                                  daily for           



                                                  30 days.           

 

     traMADoL      2021- No        [id number] 50mg Q8H  Take 50 mg           M

ethodi



     (ULTRAM) 50      3--08                          by mouth           st



     mg tablet      23:49: 00:00                          every 8           Hosp

bill



               18   :00                           (eight)           l



                                                  hours as           



                                                  needed for           



                                                  moderate           



                                                  pain           



                                                  .acute           



                                                  pain.           

 

     amLODIPine      2021- No             5mg  QD   Take 5 mg           M

ethodi



     (NORVASC) 5      3-08 03-05                          by mouth           st



     mg tablet      17:50: 00:00                          daily.           [facility]

ta



               49   :00                                          l

 

     amoxicillin      2021- No             500mg Q.[id number] Take 500     

      [facility]



     (AMOXIL)      3-08 03-05                     [id number] mg by           st



     500 MG      17:50: 00:00                     3D   mouth 3           [facility]osp[facility]



     capsule      49   :00                           (three)           l



                                                  times a           



                                                  day.           

 

     amoxicillin      2021- No             1{tbl} QD   Take 1           M

ethodi



     -pot      3-08 03-05                          tablet by           st



     clavulanate      17:50: 00:00                          mouth           Hosp

bill



     (AUGMENTIN)      49   :00                           daily.           l



     500-125 mg                                         Disp           



     per tablet                                         21 18           



                                                  day supply           

 

     calcitrioL      2021- No             .25ug QD   Take 0.25           

[facility]



     (ROCALTROL)      3-08 03-05                          mcg by           st



     0.25 MCG      17:50: 00:00                          mouth           [facility]



     capsule      49   :00                           daily.           l

 

     carvediloL      2021- No             12.5mg Q.5D Take 12.5          

 [facility]



     (COREG)      3-08 03-05                          mg by           st



     12.5 MG      17:50: 00:00                          mouth 2           Hospit

a



     tablet      49   :00                           (two)           l



                                                  times a           



                                                  day with           



                                                  meals.           

 

     hydroCHLORO      2021- No             25mg QD   Take 25 mg          

 [facility]



     thiazide      3-08 03-05                          by mouth           st



     (HYDRODIURI      17:50: 00:00                          daily.           Hos

[facility]



     L) 25 MG      49   :00                                          l



     tablet                                                        

 

     losartan      2021- No             100mg QD   Take 100           Met

hodi



     (COZAAR)      3-08 03-05                          mg by           st



     100 MG      17:50: 00:00                          mouth           [facility]



     tablet      49   :00                           daily.           l

 

     metoclopram      2021- No             5mg  Q.25D Take 5 mg          

 [facility]



     delgado       3-08 03-05                          by mouth 4           st



     (REGLAN) 5      17:50: 00:00                          (four)           Hosp

bill



     MG tablet      49   :00                           times a           l



                                                  day.           

 

     metoprolol      2021- No             100mg QD   Take 100           M

ethodi



     succinate      3-08 03-05                          mg by           st



     XL        17:50: 00:00                          mouth           [facility]osp[facility]



     (TOPROL-XL)      49   :00                           daily.           l



     100 mg 24                                                        



     hr tablet                                                        

 

     prochlorper       No             10mg Q8H  Take 10 mg          

 [facility]



     azine      3-08 03-05                          by mouth           st



     (COMPAZINE)      17:50: 00:00                          every 8           Ho

spita



     10 MG      49   :00                           (eight)           l



     tablet                                         hours as           



                                                  needed for           



                                                  nausea or           



                                                  vomiting.           

 

     carvediloL       No             25mg Q.5D Take 1           [facility]

[facility]



     (COREG) 25      3-08 04-08                          tablet (25           st



     MG tablet      00:00: 04:59                          mg total)           Ho

spita



               00   :00                           by mouth 2           l



                                                  (two)           



                                                  times a           



                                                  day with           



                                                  meals for           



                                                  30 days.           

 

     traMADoL              [id number] 50mg Q8H  Take 1           [facility]

[facility]



     (ULTRAM) 50      3-08 03-19                          tablet (50           s

t



     mg tablet      00:00: 04:59                          mg total)           Ho

spita



               00   :00                           by mouth           l



                                                  every 8           



                                                  (eight)           



                                                  hours as           



                                                  needed for           



                                                  moderate           



                                                  pain for           



                                                  up to 10           



                                                  days           



                                                  .acute           



                                                  pain.           







Immunizations







           Ordered Immunization Filled Immunization Date       Status     Commen

ts   Source



           Name       Name                                        

 

           Moderna SARS-CoV-2            2021 Completed             [facility]



           Vaccination            00:00:00                         of Medicine

 

           Moderna SARS-CoV-2            2021 Completed             [facility]



           Vaccination            00:00:00                         of Medicine

 

           Moderna SARS-CoV-2            2021-01-15 Completed             [facility]



           Vaccination            00:00:00                         of Medicine

 

           Moderna SARS-CoV-2            2021-01-15 Completed             [facility]



           Vaccination            00:00:00                         of Medicine







Vital Signs







             Vital Name   Observation Time Observation Value Comments     Source

 

             Systolic blood 2022 16:22:00 187 mm[Hg]                [facility]



             pressure                                            Medicine

 

             Diastolic blood 2022 16:22:00 80 mm[Hg]                 [facility]



             pressure                                            Medicine

 

             Heart rate   2022 16:20:00 50 /min                   [facility]

 

             Body height  2022 16:20:00 162.6 cm                  [facility]

 

             Body weight  2022 16:20:00 82.101 kg                 [facility]

[facility]



                                                                 [facility]

 

             BMI          2022 16:20:00 31.07 kg/m2               [facility]

[facility] of



                                                                 Medicine

 

             Oxygen saturation in 2022 16:20:00 96 /min                   

[facility]



             Arterial blood by                                        Medicine



             Pulse oximetry                                        

 

             Diastolic blood 2022 16:28:00 80 mm[Hg]                 [facility]



             pressure                                            Medicine

 

             Heart rate   2022 16:28:00 54 /min                   [facility]

[facility]



                                                                 Medicine

 

             Systolic blood 2022 16:28:00 175 mm[Hg]                [facility]



             pressure                                            Medicine

 

             Respiratory rate 2022 16:27:00 16 /min                   [facility]

 

             Body height  2022 16:27:00 162.6 cm                  [facility]

[facility] of



                                                                 Medicine

 

             Body weight  2022 16:27:00 72.122 kg                 [facility]

[facility] of



                                                                 Medicine

 

             BMI          2022 16:27:00 27.29 kg/m2               [facility]

[facility] of



                                                                 Medicine

 

             Oxygen saturation in 2022 16:27:00 100 /min                  

[facility]



             Arterial blood by                                        Medicine



             Pulse oximetry                                        

 

             Systolic blood 2021 19:57:00 152 mm[Hg]                [facility]



             pressure                                            Medicine

 

             Diastolic blood 2021 19:57:00 81 mm[Hg]                 [facility]



             pressure                                            Medicine

 

             Heart rate   2021 19:57:00 69 /min                   [facility]

[facility] of



                                                                 Medicine

 

             Respiratory rate 2021 19:57:00 16 /min                   [facility]

 

             Body height  2021 19:57:00 162.6 cm                  [facility]

[facility] of



                                                                 Medicine

 

             Body weight  2021 19:57:00 74.844 kg                 [facility]

[facility] of



                                                                 Medicine

 

             BMI          2021 19:57:00 28.32 kg/m2               [facility]

[facility] of



                                                                 Medicine

 

             Oxygen saturation in 2021 19:57:00 99 /min                   

[facility]



             Arterial blood by                                        Medicine



             Pulse oximetry                                        

 

             WEIGHT       2021-11-15 05:33:00 69.3 kg                   

 

             WEIGHT       2021 04:00:00 67.1 kg                   

 

             WEIGHT       2021 13:35:00 61.1 kg                   

 

             WEIGHT       2021 16:22:00 67.2 kg                   

 

             WEIGHT       2021 01:30:00 69.4 kg                   

 

             WEIGHT       2021 21:30:00 71.9 kg                   

 

             WEIGHT       2021 03:14:00 71.895 kg                 

 

             WEIGHT       2021 20:45:00 72.4 kg                   

 

             WEIGHT       2021 18:45:00 74.9 kg                   

 

             WEIGHT       2021 03:44:00 74.889 kg                 

 

             WEIGHT       2021 18:45:00 76 kg                     

 

             WEIGHT       2021 16:45:00 78.5 kg                   

 

             HEIGHT       2021-10-31 11:43:00 162.6 cm                  

 

             WEIGHT       2021-10-31 11:43:00 78.518 kg                 

 

             WEIGHT       2021-11-15 05:33:00 69.3 kg                   

 

             WEIGHT       2021 04:00:00 67.1 kg                   

 

             WEIGHT       2021 13:35:00 61.1 kg                   

 

             WEIGHT       2021 16:22:00 67.2 kg                   

 

             WEIGHT       2021 01:30:00 69.4 kg                   

 

             WEIGHT       2021 21:30:00 71.9 kg                   

 

             WEIGHT       2021 03:14:00 71.895 kg                 

 

             WEIGHT       2021 20:45:00 72.4 kg                   

 

             WEIGHT       2021 18:45:00 74.9 kg                   

 

             WEIGHT       2021 03:44:00 74.889 kg                 

 

             WEIGHT       2021 18:45:00 76 kg                     

 

             WEIGHT       2021 16:45:00 78.5 kg                   

 

             HEIGHT       2021-10-31 11:43:00 162.6 cm                  

 

             WEIGHT       2021-10-31 11:43:00 78.518 kg                 

 

             Oxygen saturation in 2021 13:15:00 95 /min                   

[facility]



             Arterial blood by                                        



             Pulse oximetry                                        

 

             Systolic blood 2021 12:37:48 146 mm[Hg]                [facility]



             pressure                                            

 

             Diastolic blood 2021 12:37:48 62 mm[Hg]                 [facility]



             pressure                                            

 

             Heart rate   2021 12:37:48 92 /min                   [facility]

 

             Body temperature 2021 12:37:48 36.5 Camila                  [facility]

 

             Respiratory rate 2021 12:37:48 16 /min                   [facility]

 

             Body weight  2021 10:48:00 75.932 kg                 [facility]

 

             BMI          2021 10:48:00 28.73 kg/m2               [facility]

 

             Body height  2021 05:05:44 162.6 cm                  [facility]







Procedures







                Procedure       Date / Time     Performing Clinician Source



                                Performed                       

 

                ELECTROCARDIOGRAM COMPLETE 2021 21:28:00 Efren Orozco

[facility]

 

                HC COMPLETE BLD COUNT 2021 10:35:00 Genaro [facility]



                W/AUTO DIFF                     Aziz            

 

                COMPREHENSIVE METABOLIC 2021 10:35:00 [facility]aro [facility]



                PANEL                           Aziz            

 

                ESTIMATED GFR   2021 10:35:00 Genaro [facility]

ospital



                                                Aziz            

 

                BILIRUBIN DIRECT 2021 10:35:00 [facility]            

 

                PHOSPHORUS LEVEL 2021 10:35:00 Claudine Tilley 

ospital



                                                [place]        

 

                MAGNESIUM LEVEL 2021 10:35:00 Claudine Tilley 

spital



                                                [place]        

 

                SURGICAL PATHOLOGY REQUEST 2021 17:50:00 [facility]      

 

                AR AN ELECTIVE ENDOTRACHEAL 2021 15:51:43 Arash Schroeder

 [facility]



                AIRWAY                          Chris            

 

                CHOLECYSTECTOMY, 2021 15:02:00 Genaro [facility]



                LAPAROSCOPIC                    Aziz            

 

                BASIC METABOLIC PANEL 2021 09:21:00 [facility]      

 

                HEPATIC FUN[facility]EL [facility]



                                                Aziz            

 

                HC COMPLETE BLD COUNT [facility] 09:21:[facility] [facility]



                W/AUTO DIFF                     Aziz            

 

                TYPE AND SCREEN 2021 09:21:00 Genaro [facility]

ospital



                                                Aziz            

 

                ESTIMATED GFR   2021 09:21:00 Mat Yudy [facility]

ospital



                                                Romina      

 

                COVID-19 QUALITATIVE RT-PCR 2021 03:20:00 Genaro [facility]



                                                Aziz            

 

                BASIC METABOLIC PANEL 2021 09:01:00 Ritu Walls [facility]

 

                ESTIMATED GFR   2021 09:01:00 Ritu Walls Ho

spital

 

                PHOSPHORUS LEVEL 2021 09:01:00 Jarek, Claudine [facility]

ospital



                                                [place]        

 

                MAGNESIUM LEVEL 2021 09:01:00 Jarek, lCaudine Blum [facility]

spital



                                                [place]        

 

                BASIC METABOLIC PANEL 2021 23:36:00 Jarek, Claudine Lobato

[facility]



                                                [place]        

 

                MAGNESIUM LEVEL 2021 23:36:00 JarekClaudine

spital



                                                [place]        

 

                PHOSPHORUS LEVEL 2021 23:36:00 Jarek, Claudine [facility]

ospital



                                                [place]        

 

                ALBUMIN LEVEL   2021 23:36:00 Jarek, Claudine Lazo

spital



                                                [place]        

 

                IONIZED CALCIUM 2021 23:36:00 Jarek, Claudine Blum [facility]

spital



                                                [place]        

 

                ESTIMATED GFR   2021 23:36:00 Jarek, Claudine Lazo

spital



                                                [place]        

 

                VENIPUNC NEED PHYS SKILL,DX 2021 19:03:00 KAREN Lopez [facility]



                OR RX                                           

 

                BASIC METABOLIC PANEL 2021 09:32:00 Ritu Walls [facility]

 

                PHOSPHORUS LEVEL 2021 09:32:00 Ritu Walls

ospital

 

                PARATHYROID [facility]ORM[facility] 2021 [facility]32[facility]00 [facility]

 

                VITAMIN D 25 HYDROXY LEVEL 2021 09:32:00 Ritu Walls [facility]

 

                ESTIMATED GFR   2021 09:32:00 Alroumoh, Manaf [facility]

spital

 

                [facility]HR[facility]BIN [facility]

 

                MAGNESIUM LEVEL 2021 09:32:00 JarekClaudine



                                                [place]        

 

                XR CHEST 1 VW PORTABLE 2021 11:21:29 [facility]

 

                COMPREHENSIVE METABOLIC 2021 09:40:00 Renan [facility]



                PANEL                                           

 

                HC COMPLETE BLD [facility]40[facility]



                W/AUTO DIFF                                     

 

                MAGNESIUM LEVEL 2021 09:40:00 [facility]

 

                LIPASE LEVEL    2021 09:40:00 [facility]

 

                AMYLASE LEVEL   2021 09:40:00 [facility]

 

                ESTIMATED GFR   2021 09:40:00 [facility]

 

                HC COMPLETE BLD COUNT 2021 05:27:00 [facility]



                W/AUTO DIFF                                     

 

                COMPREHENSIVE METABOLIC 2021 05:27:00 Renan [facility]



                PANEL                                           

 

                MAGNESIUM LEVEL 2021 05:27:00 [facility]

 

                AMYLASE LEVEL   2021 05:27:00 [facility]

 

                LIPASE LEVEL    2021 05:27:00 [facility]

 

                LIPID PANEL     2021 05:27:00 [facility]

 

                ESTIMATED GFR   2021 05:27:00 [facility]

 

                HEMOGLOBIN A1C  2021 05:27:00 [facility]

 

                OR FL < 1 HOUR  2021 16:36:00 Alexsander Toribio

 

                AR AN ELECTIVE SUPRAGLOTTIC 2021 16:20:38 Tez May [facility]



                AIRWAY                                          

 

                INSERTION, TUNNELED CENTRAL 2021 [facility]:37:00 [facility]



                VENOUS CATHETER WITH PORT,                                 



                WITHOUT FLUOROSCOPIC                                 



                GUIDANCE                                        

 

                HEMODIALYSIS    2021 15:32:43 [facility]

[facility]

 

                PROTHROMBIN TIME WITH INR 2021 10:26:00 Malu [facility]

 

                PARTIAL THROMBOPLASTIN TIME 2021 10:26:00 [facility]



                (PTT)                                           

 

                BASIC METABOLIC PANEL 2021 10:26:00 [facility]



                                                Jae            

 

                HC COMPLETE BLD COUNT 2021 10:26:00 [facility]



                W/AUTO DIFF                     Jae            

 

                ABO AND RH CONFIRMATION 2021 10:26:00 [facility]

 

                ESTIMATED GFR   2021 10:26:00 [facility]



                                                Jae            

 

                TYPE AND SCREEN 2021 20:17:00 [facility]

[facility]

 

                XR NECK SOFT TISSUE 2021 15:00:00 [facility]

 

                BASIC METABOLIC PANEL 2021 10:05:00 [facility]



                                                Jae            

 

                HC COMPLETE BLD COUNT 2021 10:05:00 [facility]



                W/AUTO DIFF                     Jae            

 

                ESTIMATED GFR   2021 10:05:00 [facility]



                                                Jae            

 

                HEPATITIS B SURFACE ANTIGEN 2021 20:22:00 [facility]

 

                HEPATITIS B SURFACE AB, 2021 20:22:00 [facility]



                QUANTITATIVE                                    

 

                HEMODIALYSIS    2021 19:48:38 Racheal [facility]

ospital

 

                XR CHEST 1 VW PORTABLE 2021 05:27:43 Malu [facility]

 

                AMYLASE LEVEL   2021 03:54:00 [facility]

 

                LIPASE LEVEL    2021 03:54:00 [facility]

 

                COMPREHENSIVE METABOLIC 2021 03:54:00 [facility]



                PANEL                                           

 

                ESTIMATED GFR   2021 03:54:00 [facility]

 

                COVID-19 QUALITATIVE RT-PCR 2021 01:00:00 [facility]

 

                HC COMPLETE BLD COUNT 2021 23:09:00 [facility]



                W/AUTO DIFF                                     

 

                PROTHROMBIN TIME WITH INR 2021 23:09:00 [facility]

 

                PARTIAL THROMBOPLASTIN TIME 2021 23:09:00 [facility]



                (PTT)                                           

 

                BASIC METABOLIC PANEL 2021 23:09:00 [facility]

 

                ESTIMATED GFR   2021 23:09:00 [facility]

 

                ECG 12-LEAD     2021 23:05:40 [facility]

 

                ECG ED PRELIMINARY 2021 22:57:05 [facility]



                INTERPRETATION                                  







Plan of Care







             Planned Activity Planned Date Details      Comments     Source

 

             Future Scheduled 2023   COVID-19 VACCINE (#1)              Me

[facility]ist



             Test         09:52:29     [code = COVID-19 VACCINE              Hos

pital



                                       (#1)]                     

 

             Future Scheduled 2023   65+ PNEUMOCOCCAL VACCINE             

 [facility]



             Test         09:52:29     (1 - PCV) [code = 65+              Hospit

al



                                       PNEUMOCOCCAL VACCINE (1              



                                       - PCV)]                   

 

             Future Scheduled 2023   Hepatitis C screening              Me

thodist



             Test         09:52:29     (procedure) [code =              Hospital



                                       [id number]]                

 

             Future Scheduled 2023   SHINGLES VACCINES (1 of              

[facility]



             Test         09:52:29     2) [code = SHINGLES              Hospital



                                       VACCINES (1 of 2)]              

 

             Future Scheduled 2023   INFLUENZA VACCINE [code              

[facility]



             Test         09:52:29     = INFLUENZA VACCINE]              Hospita

l

 

             Future Scheduled 2023   DEPRESSION SCREENING              [facility]



             Test         00:00:00     (12+) [code = DEPRESSION              Med

[facility]



                                       SCREENING (12+)]              

 

             Future Scheduled 2023   FALLS RISK SCREENING              [facility]



             Test         00:00:00     [code = FALLS RISK              Medical C

enter



                                       SCREENING]                

 

             Future Scheduled 2022   COVID-19 VACCINE (#1)              Me

thodist



             Test         22:33:07     [code = COVID-19 VACCINE              Hos

pital



                                       (#1)]                     

 

             Future Scheduled 2022   65+ PNEUMOCOCCAL VACCINE             

 [facility]



             Test         22:33:07     (1 - PCV) [code = 65+              Hospit

al



                                       PNEUMOCOCCAL VACCINE (1              



                                       - PCV)]                   

 

             Future Scheduled 2022   Hepatitis C screening              Me

thodist



             Test         22:33:07     (procedure) [code =              Hospital



                                       [id number]]                

 

             Future Scheduled 2022   SHINGLES VACCINES (1 of              

[facility]



             Test         22:33:07     2) [code = SHINGLES              Hospital



                                       VACCINES (1 of 2)]              

 

             Future Scheduled 2022   INFLUENZA VACCINE [code              

[facility]



             Test         22:33:07     = INFLU[facility]ZA VACCINE]              [facility]

[facility]

 

             Future Scheduled 2022   HEPATITIS B VACCINES (1              

[facility]



             Test         10:19:45     of 3 - 3-dose series)              Hospit

al



                                       [code = HEPATITIS B              



                                       VACCINES (1 of 3 -              



                                       3-dose series)]              

 

             Future Scheduled 2022   COVID-19 VACCINE (#1)              Me

thodist



             Test         10:19:45     [code = COVID-19 VACCINE              Hos

pital



                                       (#1)]                     

 

             Future Scheduled 2022   65+ PNEUMOCOCCAL VACCINE             

 [facility]



             Test         10:19:45     (1 - PCV) [code = 65+              Hospit

al



                                       PNEUMOCOCCAL VACCINE (1              



                                       - PCV)]                   

 

             Future Scheduled 2022   Hepatitis C screening              Me

thodist



             Test         10:19:45     (procedure) [code =              Hospital



                                       [id number]]                

 

             Future Scheduled 2022   SHINGLES VACCINES (1 of              

[facility]



             Test         10:19:45     2) [code = SHINGLES              Hospital



                                       VACCINES (1 of 2)]              

 

             Future Scheduled 2022   INFLUENZA VACCINE [code              

[facility]



             Test         10:19:45     = INFLUENZA VACCINE]              Hospita

l

 

             Future Scheduled 2022-10-31   Tobacco Cessation              [facility]



             Test         00:00:00     Counseling and Screening              [facility]



                                       (12+) [code = Tobacco              



                                       Cessation Counseling and              



                                       Screening (12+)]              

 

             Future Scheduled 2022-10-31   Tobacco Cessation              [facility]



             Test         00:00:00     Counseling and Screening              [facility]



                                       (12+) [code = Tobacco              



                                       Cessation Counseling and              



                                       Screening (12+)]              

 

             Future Scheduled 2022-10-31   Tobacco Cessation              [facility]



             Test         00:00:00     Counseling and Screening              [facility]



                                       (12+) [code = Tobacco              



                                       Cessation Counseling and              



                                       Screening (12+)]              

 

             Future Scheduled 2022   INFLUENZA VACCINE (#1)              C

[facility]



             Test         00:00:00     [code = INFLUENZA              Medical Ce

nter



                                       VACCINE (#1)]              

 

             Future Scheduled 2022   INFLUENZA VACCINE (#1)              [facility]

[facility]



             Test         00:00:00     [code = INFLUENZA              Medical Ce

nter



                                       VACCINE (#1)]              

 

             Future Scheduled 2022   INFLUENZA VACCINE (#1)              [facility]

[facility]



             Test         00:00:00     [code = INFLUENZA              Medical Ce

nter



                                       VACCINE (#1)]              

 

             Future Scheduled 2022   INFLUENZA VACCINE (#1)              C

[facility]



             Test         00:00:00     [code = INFLUENZA              Medical Ce

nter



                                       VACCINE (#1)]              

 

             Future Scheduled 2022   Pneumococcal 65+ (1 -              Ba

[facility]



             Test         11:43:21     PCV) [code =              of Medicine



                                       Pneumococcal 65+ (1 -              



                                       PCV)]                     

 

             Future Scheduled 2022   TETANUS SHOT (ADULT)              [facility]



             Test         11:43:21     [code = TETANUS SHOT              of Medi

cine



                                       (ADULT)]                  

 

             Future Scheduled 2022   BMI FOLLOW UP PLAN [code             

 [facility]



             Test         11:43:21     = BMI FOLLOW UP PLAN]              of Med

icine

 

             Future Scheduled 2022   ZOSTER VACCINE (1 of 2)              

[facility]



             Test         11:43:21     [code = ZOSTER VACCINE              of Me

dicine



                                       (1 of 2)]                 

 

             Future Scheduled 2022   MEDICARE AWV (Initial)              [facility]



             Test         11:43:21     [code = MEDICARE AWV              of Medi

cine



                                       (Initial)]                

 

             Future Scheduled 2022   FALL SCREEN [code = FALL             

 [facility]



             Test         11:43:21     SCREEN]                   of Medicine

 

             Future Scheduled 2022   Screening for              [facility]

lege



             Test         11:43:21     osteoporosis (procedure)              of 

Medicine



                                       [code = [id number]]              

 

             Future Scheduled 2022   COVID-19 Vaccine (3 -              Ba

[facility]



             Test         11:43:21     Booster for Moderna              of Medic

ine



                                       series) [code = COVID-19              



                                       Vaccine (3 - Booster for              



                                       Moderna series)]              

 

             Future Scheduled 2022   FLU VACCINE > 6 MONTHS              B

[facility]



             Test         11:43:21     [code = FLU VACCINE > 6              of M

edicine



                                       MONTHS]                   

 

             Future Scheduled 2022   ECHO, COMPLETE [code = Expected:    B

[employer]



             Test         00:00:00     12052]       2022,  of Medicine



                                                    Expires:     



                                                    2022   

 

             Future Scheduled 2022   TETANUS SHOT (ADULT)              [facility]



             Test         09:48:32     [code = TETANUS SHOT              of Medi

cine



                                       (ADULT)]                  

 

             Future Scheduled 2022   BMI FOLLOW UP PLAN [code             

 [facility]



             Test         09:48:32     = BMI FOLLOW UP PLAN]              of Med

icine

 

             Future Scheduled 2022   ZOSTER VACCINE (1 of 2)              

[facility]



             Test         09:48:32     [code = ZOSTER VACCINE              of Me

dicine



                                       (1 of 2)]                 

 

             Future Scheduled 2022   MEDICARE AWV (Initial)              B

[facility]



             Test         09:48:32     [code = MEDICARE AWV              of Medi

cine



                                       (Initial)]                

 

             Future Scheduled 2022   FALL SCREEN [code = FALL             

 [facility]



             Test         09:48:32     SCREEN]                   of Medicine

 

             Future Scheduled 2022   Screening for              [facility]

[facility]



             Test         09:48:32     osteoporosis (procedure)              of 

Medicine



                                       [code = [id number]]              

 

             Future Scheduled 2022   Pneumococcal 65+ (1 of 1             

 [facility]



             Test         09:48:32     - PPSV23) [code =              of Medicin

e



                                       Pneumococcal 65+ (1 of 1              



                                       - PPSV23)]                

 

             Future Scheduled 2022   COVID-19 Vaccine (3 -              Ba

[facility]



             Test         09:48:32     Booster for Moderna              of Medic

ine



                                       series) [code = COVID-19              



                                       Vaccine (3 - Booster for              



                                       Moderna series)]              

 

             Future Scheduled 2022   FLU VACCINE > 6 MONTHS              B

[employer]



             Test         09:48:32     [code = FLU VACCINE > 6              of M

edicine



                                       MONTHS]                   

 

             Future Scheduled 2022   DEPRESSION SCREENING              [facility]



             Test         00:00:00     (12+) [code = DEPRESSION              Med

[facility]



                                       SCREENING (12+)]              

 

             Future Scheduled 2022   FALLS RISK SCREENING              [facility]



             Test         00:00:00     [code = FALLS RISK              Medical C

enter



                                       SCREENING]                

 

             Future Scheduled 2022   DEPRESSION SCREENING              [facility]



             Test         00:00:00     (12+) [code = DEPRESSION              Med

ical Center



                                       SCREENING (12+)]              

 

             Future Scheduled 2022   FALLS RISK SCREENING              [facility]



             Test         00:00:00     [code = FALLS RISK              Medical C

enter



                                       SCREENING]                

 

             Future Scheduled 2022   DEPRESSION SCREENING              [facility]



             Test         00:00:00     (12+) [code = DEPRESSION              Med

ical Center



                                       SCREENING (12+)]              

 

             Future Scheduled 2022   FALLS RISK SCREENING              [facility]



             Test         00:00:00     [code = FALLS RISK              Medical C

enter



                                       SCREENING]                

 

             Future Scheduled 2021   Pneumococcal 65+ (1 of 2             

 [facility]



             Test         11:13:21     - PPSV23) [code =              of Medicin

e



                                       Pneumococcal 65+ (1 of 2              



                                       - PPSV23)]                

 

             Future Scheduled 2021   TETANUS SHOT (ADULT)              [facility]



             Test         11:13:21     [code = TETANUS SHOT              of Medi

cine



                                       (ADULT)]                  

 

             Future Scheduled 2021   BMI FOLLOW UP PLAN [code             

 [facility]



             Test         11:13:21     = BMI FOLLOW UP PLAN]              of Med

icine

 

             Future Scheduled 2021   ZOSTER VACCINE (1 of 2)              

[facility]



             Test         11:13:21     [code = ZOSTER VACCINE              of Me

dicine



                                       (1 of 2)]                 

 

             Future Scheduled 2021   MEDICARE AWV (Initial)              B

[facility]



             Test         11:13:21     [code = MEDICARE AWV              of Medi

cine



                                       (Initial)]                

 

             Future Scheduled 2021   FALL SCREEN [code = FALL             

 [facility]



             Test         11:13:21     SCREEN]                   of Medicine

 

             Future Scheduled 2021   Screening for              [facility]

lege



             Test         11:13:21     osteoporosis (procedure)              of 

Medicine



                                       [code = 179953393]              

 

             Future Scheduled 2021   FLU VACCINE > 6 MONTHS              [facility]



             Test         11:13:21     [code = FLU VACCINE > 6              of M

edicine



                                       MONTHS]                   

 

             Future Scheduled 2021   COVID-19 Vaccine (3 -              Ba

[facility]



             Test         11:13:21     Booster for Moderna              of Medic

ine



                                       series) [code = COVID-19              



                                       Vaccine (3 - Booster for              



                                       Moderna series)]              

 

             Future Scheduled 2021   ELECTROCARDIOGRAM              [facility]



             Test         15:02:49     COMPLETE [code = 72845]              of M

edicine

 

             Future Scheduled 2010   MEDICARE ANNUAL WELLNESS             

 [facility]



             Test         00:00:00     (YEAR 2 or FIRST YEAR if              Med

ical Center



                                       no IPPE) [code =              



                                       MEDICARE ANNUAL WELLNESS              



                                       (YEAR 2 or FIRST YEAR if              



                                       no IPPE)]                 

 

             Future Scheduled 2010   MEDICARE ANNUAL WELLNESS             

 [facility]



             Test         00:00:00     (YEAR 2 or FIRST YEAR if              Med

ical Center



                                       no IPPE) [code =              



                                       MEDICARE ANNUAL WELLNESS              



                                       (YEAR 2 or FIRST YEAR if              



                                       no IPPE)]                 

 

             Future Scheduled 2010   MEDICARE ANNUAL WELLNESS             

 [facility]



             Test         00:00:00     (YEAR 2 or FIRST YEAR if              Med

ical Center



                                       no IPPE) [code =              



                                       MEDICARE ANNUAL WELLNESS              



                                       (YEAR 2 or FIRST YEAR if              



                                       no IPPE)]                 

 

             Future Scheduled 2010   MEDICARE ANNUAL WELLNESS             

 [facility]



             Test         00:00:00     (YEAR 2 or FIRST YEAR if              Med

ical Center



                                       no IPPE) [code =              



                                       MEDICARE ANNUAL WELLNESS              



                                       (YEAR 2 or FIRST YEAR if              



                                       no IPPE)]                 

 

             Future Scheduled 2009-07-15   PNEUMOCOCCAL 65+ YRS (1              

[facility]



             Test         00:00:00     - PCV) [code =              Medical Cente

r



                                       PNEUMOCOCCAL 65+ YRS (1              



                                       - PCV)]                   

 

             Future Scheduled 2009-07-15   PNEUMOCOCCAL 65+ YRS (1              

[facility]



             Test         00:00:00     - PCV) [code =              Medical Cente

r



                                       PNEUMOCOCCAL 65+ YRS (1              



                                       - PCV)]                   

 

             Future Scheduled 2009-07-15   PNEUMOCOCCAL 65+ YRS (1              

[facility]



             Test         00:00:00     - PCV) [code =              Medical Cente

r



                                       PNEUMOCOCCAL 65+ YRS (1              



                                       - PCV)]                   

 

             Future Scheduled 2009-07-15   PNEUMOCOCCAL 65+ YRS (1              

[facility]



             Test         00:00:00     - PCV) [code =              Medical Cente

r



                                       PNEUMOCOCCAL 65+ YRS (1              



                                       - PCV)]                   

 

             Future Scheduled 1994-07-15   SHINGLES VACCINES (1 of              

[facility]



             Test         00:00:00     2) [code = SHINGLES              Medical 

Center



                                       VACCINES (1 of 2)]              

 

             Future Scheduled 1994-07-15   SHINGLES VACCINES (1 of              

[facility]



             Test         00:00:00     2) [code = SHINGLES              Medical 

Center



                                       VACCINES (1 of 2)]              

 

             Future Scheduled 1994-07-15   SHINGLES VACCINES (1 of              

[facility]



             Test         00:00:00     2) [code = SHINGLES              Medical 

Center



                                       VACCINES (1 of 2)]              

 

             Future Scheduled 1994-07-15   SHINGLES VACCINES (1 of              

[facility]



             Test         00:00:00     2) [code = SHINGLES              Medical 

Center



                                       VACCINES (1 of 2)]              

 

             Future Scheduled 1963-07-15   DTAP/TDAP/TD VACCINES (1             

 [facility]



             Test         00:00:00     - Tdap) [code =              Medical Cent

er



                                       DTAP/TDAP/TD VACCINES (1              



                                       - Tdap)]                  

 

             Future Scheduled 1963-07-15   DTAP/TDAP/TD VACCINES (1             

 [facility]



             Test         00:00:00     - Tdap) [code =              Medical Cent

er



                                       DTAP/TDAP/TD VACCINES (1              



                                       - Tdap)]                  

 

             Future Scheduled 1963-07-15   DTAP/TDAP/TD VACCINES (1             

 [facility]



             Test         00:00:00     - Tdap) [code =              Medical Cent

er



                                       DTAP/TDAP/TD VACCINES (1              



                                       - Tdap)]                  

 

             Future Scheduled 1963-07-15   DTAP/TDAP/TD VACCINES (1             

 [facility]



             Test         00:00:00     - Tdap) [code =              Medical Cent

er



                                       DTAP/TDAP/TD VACCINES (1              



                                       - Tdap)]                  

 

             Future Scheduled 1956-07-15   Tobacco Cessation              [facility]



             Test         00:00:00     Counseling and Screening              Med

ical Center



                                       (12+) [code = Tobacco              



                                       Cessation Counseling and              



                                       Screening (12+)]              

 

             Future Scheduled 1945-01-15   COVID-19 VACCINE (#1)              [facility]



             Test         00:00:00     [code = COVID-19 VACCINE              Med

ical Center



                                       (#1)]                     

 

             Future Scheduled 1945-01-15   COVID-19 VACCINE (#1)              [facility]



             Test         00:00:00     [code = COVID-19 VACCINE              Med

ical Center



                                       (#1)]                     

 

             Future Scheduled 1945-01-15   COVID-19 VACCINE (#1)              [facility]



             Test         00:00:00     [code = COVID-19 VACCINE              Med

ical Center



                                       (#1)]                     

 

             Future Scheduled 1945-01-15   COVID-19 VACCINE (#1)              [facility]



             Test         00:00:00     [code = COVID-19 VACCINE              Med

ical Center



                                       (#1)]                     

 

             Future Scheduled 1944   DXA SCAN [code = DXA              [facility]



             Test         00:00:00     SCAN]                     [facility]

 

             Future Scheduled 1944   DXA SCAN [code = DXA              [facility]



             Test         00:00:00     SCAN]                     [facility]

 

             Future Scheduled 1944   DXA SCAN [code = DXA              [facility]



             Test         00:00:00     SCAN]                     [facility]

 

             Future Scheduled 1944   DXA SCAN [code = DXA              [facility]



             Test         00:00:00     SCAN]                     [facility]

 

             Future Scheduled              65+ PNEUMOCOCCAL VACCINE             

 [facility]



             Test                      (1 of 4 - PCV13) [code =              Hos

pital



                                       65+ PNEUMOCOCCAL VACCINE              



                                       (1 of 4 - PCV13)]              

 

             Future Scheduled              COVID-19 VACCINE (1)              Met

hodist



             Test                      [code = COVID-19 VACCINE              Hos

pital



                                       (1)]                      

 

             Future Scheduled              Hepatitis C screening              Me

thodist



             Test                      (procedure) [code =              Hospital



                                       [id number]]                

 

             Future Scheduled              SHINGLES VACCINES (#1)              M

ethodist



             Test                      [code = SHINGLES              Hospital



                                       VACCINES (#1)]              

 

             Future Scheduled              INFLUENZA VACCINE [code              

[facility]



             Test                      = INFLUENZA VACCINE]              Hospita

l







Encounters







        Start   End     Encounter Admission Attending Care    Care    Encounter 

Source



        Date/Time Date/Time Type    Type    Clinicians Facility Department ID   

   

 

        2022 Office          BENITEZ Orozco     [id number] [id number]

17 [facility]



        10:40:00 11:43:56 Visit           Mihail  AMBULATOR 350.1.13.21         

College



                                        Quentin Y       0.2.7.2.686         of



                                                        [phone]         [facility]

corin



                                                        375             e

 

        2022 Outpatient                 [facility]     [id number]

8 [facility]



        09:11:01 10:01:54                                                 Colleg

e



                                                                        of



                                                                        Medicin



                                                                        e

 

        2022 Office          BENITEZ Orozco     1.2.840.114 [id number]

68 [facility]



        11:40:00 13:37:44 Visit           Mihail  AMBULATOR 350.1.13.21         

College



                                        Quentin Y       0.2.7.2.686         of



                                                        [phone]         Medi

corin



                                                        375             e

 

        2021 Office          BENITEZ ROOZCO     1.2.840.114 [id number]

81 [facility]



        13:18:32 12:17:28 Visit           EFREN  AMBULATOR 350.1.13.21         

College



                                                Y       0.2.7.2.686         



                                                        [phone]         Medi

corin



                                                        375             e

 

        2021-10-31 2021-11-15 Inpatient UR      [facility] [id number]

[address] [facility]



        11:37:00 14:09:00                 JEREMIAH                           

 

        2021 [facility]     [id number]

1 [facility]



        00:00:00 23:59:00                                                 Colleg

e



                                                                        of



                                                                        Medicin



                                                                        e

 

        2021-10-31 2021-10-31 [facility]                 [facility]     [id number]

9 [facility]



        11:37:00 23:59:00                                                 Colleg

e



                                                                        of



                                                                        Medicin



                                                                        e

 

        2021 [facility]         RenanEddietanmariella 1.2.840.1 [id number]

[id number]

                                        Methodi



        23:35:00 13:57:00 Encounter         Yudy Rivero 78277.1.

1         [address]



                                                3.430.2.7                 Hospit

a



                                                .3.941381                 l



                                                .8                      

 

        2021 Surgery         Genaro, 1.2.840.1 [id number]

[id number] 

Methodi



        10:00:00 11:40:00                 Omar Merrill 31665.1.1         [address]



                                                3.430.2.7                 Hospit

a



                                                .3.395608                 l



                                                .8                      

 

        2021 Anesthesia         Vivian Samuels. 1.2.840.1 [id number]

[id number] 

[id number]                              Methodi



        10:03:00 11:07:00 Event           Alexandre Arash Chris 16705.1.1       

  [address]



                                                3.430.2.7                 Hospit

a



                                                .3.933319                 l



                                                .8                      

 

        2021 Travel                  1.2.840.1 1.2.035.812 [id number]

[id number] Methodi



        00:00:00 00:00:00                         73394.1.1 350.1.13.43 648     

st



                                                3.430.2.7 0.2.7.3.698         Ho

spita



                                                .3.541725 084.8           l



                                                .8                      

 

        2021 Telephone         Llanes, 1.2.840.1 [id number]

[id number] Methodi



        00:00:00 00:00:00                 Bob  94600.1.1         [address]



                                                3.430.2.7                 Hospit

a



                                                .3.750178                 l



                                                .8                      

 

        2021 Emergency         Saurabh James T. 1.2.840.1 [id number]

[id number] 

[id number]                              Methodi



        15:57:00 20:25:00                 CarenAristara 60413.1.1        

 [address]



                                        [facility] 3.430.2.7                 Ho

spita



                                        Ricky Brushed .[phone]       

          l



                                        Irving, Iti .8                      

 

        2021 Anesthesia         Nathaniel Mariscal V. 1.2.840.1 

[id number] 

[id number]                              Methodi



        09:38:00 11:00:00 Event           eTz May 62927.1.1         2

[address]     st



                                                3.430.2.7                 Hospit

a



                                                .3.485465                 l



                                                .8                      

 

        2021 Surgery         Alexsander Toribio 1.2.840.1 [id number]

[id number] Methodi



        09:00:00 10:05:00                         72333.1.1         [address]



                                                3.430.2.7                 Hospit

a



                                                .3.987134                 l



                                                .8                      

 

        2021 Travel                  1.2.840.1 1.2.996.793 [id number]

[id number] Methodi



        00:00:00 00:00:00                         96498.1.1 350.1.13.43 406     

st



                                                3.430.2.7 0.2.7.3.698         Ho

spita



                                                .3.639056 084.8           l



                                                .8                      

 

        2021 Outpatient         Paulu_P  [facility]     [facility]     [id number]

[address] [place]



        01:28:00 01:28:00                                         0208    da



                                                                        Medical



                                                                        Group







Results







           Test Description Test Time  Test Comments Results    Result Comments 

Source









                    ANTI-MITOCHONDRIAL AB, REFLEX TO TITER 2021-11-15 11:05:20 









                      Test Item  Value      Reference Range Interpretation Comme

nts









             SCAN RESULT (test code = 2346245)                                  

      



SARS-COV2/RT-PCR ([facility])2021-11-15 09:33:41





             Test Item    Value        Reference Range Interpretation Comments

 

             SARS-COV2/RT-PCR (test Negative     Not Detected, Negative,        

      



             code = 2825292)              See external report for              



                                       linked test               

 

             SARS-COV-2 PERFORMING LAB [facility] BRETT                             



             (test code = 3675054)                                        



Negative result for this test determines that SARS-CoV-2 RNA was not present in 
the specimen above the Limit of Detection (LOD). However, Negative results do 
not preclude SARS-CoV-2 infection and should not be used as the sole basis for 
treatment or patient management decisions. Negative results must be combined 
with clinical observations, patient history, and epidemiological information. A 
false negative result may occur if a specimen is improperly collected, 
transported or handled. A false negative result should be considered if 
patient's recent exposures or clinical presentation indicate that COVID-19 
(SARS-CoV-2) is likely and diagnostic tests for other causes of illness are 
negative. Re-testing should be considered in cases of suspected false 
negatives.The limit of detection for this assay is 800 copies/mL.This SARS CoV-2
test is a real-time RT-PCR test intended for the qualitative detection of 
nucleic acid from SARS-CoV-2 in a nasopharyngeal swab specimen collected from 
individuals suspected of COVID-19 by their healthcare provider.This test has not
been Food and Drug Administration (FDA) cleared or approved. This is a modified 
version of an approved Emergency Use Authorization (EUA) and is in the process 
of review by the FDA. Once authorized by the FDA, the issued EUA will be 
effective until the declaration that circumstances exist justifying the 
authorization of the emergency use ofin vitro diagnostic tests for detection 
and/or diagnosis of COVID-19 is terminated under Section 564(b)(2) of the Act or
the EUA is revoked under Section 564(g) of the Act.Fact Sheet for Healthcare 
Prov
iders:https://www.[employer]ova.[employer]Vault/sites/default/files/product/documents/Fact_Sheet_HC

_Vzntzvgsd_Gzho_AXTQ-SnK-9.pdfFact Sheet for Healthcare 
Patients:https://www.Sticher/sites/default/files/product/docume
nts/Bzjb_Kushm_Ephuqvas_Hwew_JHAF-TlK-9.pdfPerforming Laboratory:[facility], [place] [address]POCT-GLUCOSE METER
2021 18:39:33





             Test Item    Value        Reference Range Interpretation Comments

 

             POC-GLUCOSE METER 83 mg/dL                         : TESTED A

T [facility] 6720



             (BEAKER) (test code =                                        ALINE MARLEY [facility],



             1538)                                               [address]:



                                                                 /Techni

lorelei ID =



                                                                 [id number] for Reye s, Erika



POCT-GLUCOSE [id number] 16:06:53





             Test Item    Value        Reference Range Interpretation Comments

 

             POC-GLUCOSE METER 80 mg/dL                         : TESTED A

T [facility] [address]



             (BEAKER) (test code =                                        ALINE MARLEY [facility],



             1538)                                               [address]:



                                                                 /Techni

lorelei ID =



                                                                 [id number] for Reye s, Erika



BASIC METABOLIC [id number] 05:01:01





             Test Item    Value        Reference Range Interpretation Comments

 

             SODIUM (BEAKER) 135 meq/L    136-145      L            



             (test code = 381)                                        

 

             POTASSIUM (BEAKER) 3.8 meq/L    3.5-5.1                   Specimen 

slightly



             (test code = 379)                                        hemolyzed

 

             CHLORIDE (BEAKER) 102 meq/L                        



             (test code = 382)                                        

 

             CO2 (BEAKER) (test 21 meq/L     22-29        L            



             code = 355)                                         

 

             BLOOD UREA NITROGEN 13 mg/dL     7-21                      



             (BEAKER) (test code                                        



             = 354)                                              

 

             CREATININE (BEAKER) 1.96 mg/dL   0.57-1.25    H            Specimen

 slightly



             (test code = 358)                                        hemolyzed

 

             GLUCOSE RANDOM 87 mg/dL                         



             (BEAKER) (test code                                        



             = 652)                                              

 

             CALCIUM (BEAKER) 8.7 mg/dL    8.4-10.2                  



             (test code = 697)                                        

 

             EGFR (BEAKER) (test 25 mL/min/1.73                           ESTIMA

SHAHRIAR GFR IS



             code = 1092) sq m                                   NOT AS ACCURATE

 AS



                                                                 CREATININE



                                                                 CLEARANCE IN



                                                                 PREDICTING



                                                                 GLOMERULAR



                                                                 FILTRATION RATE

.



                                                                 ESTIMATED GFR I

S



                                                                 NOT APPLICABLE 

FOR



                                                                 DIALYSIS PATIEN

TS.



 ID - LIANNE WCBC (HEMOGRAM ONLY)2021 04:32:58





             Test Item    Value        Reference Range Interpretation Comments

 

             WHITE BLOOD CELL COUNT (BEAKER) 6.7 K/ L     3.5-10.5              

    



             (test code = 775)                                        

 

             RED BLOOD CELL COUNT (BEAKER) 3.20 M/ L    3.93-5.22    L          

  



             (test code = 761)                                        

 

             HEMOGLOBIN (BEAKER) (test code = 8.4 GM/DL    11.2-15.7    L       

     



             410)                                                

 

             HEMATOCRIT (BEAKER) (test code = 29.0 %       34.1-44.9    L       

     



             411)                                                

 

             MEAN CORPUSCULAR VOLUME (BEAKER) 90.6 fL      79.4-94.8            

     



             (test code = 753)                                        

 

             MEAN CORPUSCULAR HEMOGLOBIN 26.3 pg      25.6-32.2                 



             (BEAKER) (test code = 751)                                        

 

             MEAN CORPUSCULAR HEMOGLOBIN CONC 29.0 GM/DL   32.2-35.5    L       

     



             (BEAKER) (test code = 752)                                        

 

             RED CELL DISTRIBUTION WIDTH 19.1 %       11.7-14.4    H            



             (BEAKER) (test code = 412)                                        

 

             PLATELET COUNT (BEAKER) (test 231 K/CU MM  150-450                 

  



             code = 756)                                         

 

             MEAN PLATELET VOLUME (BEAKER) 10.3 fL      9.4-12.3                

  



             (test code = 754)                                        

 

             NUCLEATED RED BLOOD CELLS 0 /100 WBC   0-0                       



             (BEAKER) (test code = 413)                                        



POCT-GLUCOSE [id number] 07:20:32





             Test Item    Value        Reference Range Interpretation Comments

 

             POC-GLUCOSE METER 82 mg/dL                         : TESTED A

T [facility] [address]



             (BEAKER) (test code =                                        ALINE [place],



             1538)                                               [address]:



                                                                 /Techni

lorelei ID =



                                                                 [id number] for Nicole Banegas



BASIC METABOLIC [id number] 05:14:50





             Test Item    Value        Reference Range Interpretation Comments

 

             SODIUM (BEAKER) 131 meq/L    136-145      L            



             (test code = 381)                                        

 

             POTASSIUM (BEAKER) 4.1 meq/L    3.5-5.1                   Specimen 

slightly



             (test code = 379)                                        hemolyzed

 

             CHLORIDE (BEAKER) 98 meq/L                         



             (test code = 382)                                        

 

             CO2 (BEAKER) (test 25 meq/L     22-29                     



             code = 355)                                         

 

             BLOOD UREA NITROGEN 29 mg/dL     7-21         H            



             (BEAKER) (test code                                        



             = 354)                                              

 

             CREATININE (BEAKER) 2.57 mg/dL   0.57-1.25    H            Specimen

 slightly



             (test code = 358)                                        hemolyzed

 

             GLUCOSE RANDOM 86 mg/dL                         



             (BEAKER) (test code                                        



             = 652)                                              

 

             CALCIUM (BEAKER) 8.7 mg/dL    8.4-10.2                  



             (test code = 697)                                        

 

             EGFR (BEAKER) (test 18 mL/min/1.73                           ESTIMA

SHAHRIAR GFR IS



             code = 1092) sq m                                   NOT AS ACCURATE

 AS



                                                                 CREATININE



                                                                 CLEARANCE IN



                                                                 PREDICTING



                                                                 GLOMERULAR



                                                                 FILTRATION RATE

.



                                                                 ESTIMATED GFR I

S



                                                                 NOT APPLICABLE 

FOR



                                                                 DIALYSIS PATIEN

TS.



 ID - DBCBC (HEMOGRAM ONLY)2021 04:51:11





             Test Item    Value        Reference Range Interpretation Comments

 

             WHITE BLOOD CELL COUNT (BEAKER) 7.1 K/ L     3.5-10.5              

    



             (test code = 775)                                        

 

             RED BLOOD CELL COUNT (BEAKER) 3.14 M/ L    3.93-5.22    L          

  



             (test code = 761)                                        

 

             HEMOGLOBIN (BEAKER) (test code = 8.4 GM/DL    11.2-15.7    L       

     



             410)                                                

 

             HEMATOCRIT (BEAKER) (test code = 28.5 %       34.1-44.9    L       

     



             411)                                                

 

             MEAN CORPUSCULAR VOLUME (BEAKER) 90.8 fL      79.4-94.8            

     



             (test code = 753)                                        

 

             MEAN CORPUSCULAR HEMOGLOBIN 26.8 pg      25.6-32.2                 



             (BEAKER) (test code = 751)                                        

 

             MEAN CORPUSCULAR HEMOGLOBIN CONC 29.5 GM/DL   32.2-35.5    L       

     



             (BEAKER) (test code = 752)                                        

 

             RED CELL DISTRIBUTION WIDTH 19.0 %       11.7-14.4    H            



             (BEAKER) (test code = 412)                                        

 

             PLATELET COUNT (BEAKER) (test 214 K/CU MM  150-450                 

  



             code = 756)                                         

 

             MEAN PLATELET VOLUME (BEAKER) 10.4 fL      9.4-12.3                

  



             (test code = 754)                                        

 

             NUCLEATED RED BLOOD CELLS 0 /100 WBC   0-0                       



             (BEAKER) (test code = 413)                                        



BASIC METABOLIC [id number] 05:51:30





             Test Item    Value        Reference Range Interpretation Comments

 

             SODIUM (BEAKER) 136 meq/L    136-145                   



             (test code = 381)                                        

 

             POTASSIUM (BEAKER) 3.7 meq/L    3.5-5.1                   



             (test code = 379)                                        

 

             CHLORIDE (BEAKER) 101 meq/L                        



             (test code = 382)                                        

 

             CO2 (BEAKER) (test 27 meq/L     22-29                     



             code = 355)                                         

 

             BLOOD UREA NITROGEN 17 mg/dL     7-21                      



             (BEAKER) (test code                                        



             = 354)                                              

 

             CREATININE (BEAKER) 1.91 mg/dL   0.57-1.25    H            



             (test code = 358)                                        

 

             GLUCOSE RANDOM 88 mg/dL                         



             (BEAKER) (test code                                        



             = 652)                                              

 

             CALCIUM (BEAKER) 9.0 mg/dL    8.4-10.2                  



             (test code = 697)                                        

 

             EGFR (BEAKER) (test 25 mL/min/1.73                           ESTIMA

SHAHRIAR GFR IS



             code = 1092) sq m                                   NOT AS ACCURATE

 AS



                                                                 CREATININE



                                                                 CLEARANCE IN



                                                                 PREDICTING



                                                                 GLOMERULAR



                                                                 FILTRATION RATE

.



                                                                 ESTIMATED GFR I

S



                                                                 NOT APPLICABLE 

FOR



                                                                 DIALYSIS PATIEN

TS.



 ID - MARICEL [id number] 05:50:23





             Test Item    Value        Reference Range Interpretation Comments

 

             MAGNESIUM (BEAKER) (test code = 1.8 mg/dL    1.6-2.6               

    



             627)                                                



 ID - MARICEL [id number] 05:50:23





             Test Item    Value        Reference Range Interpretation Comments

 

             PHOSPHORUS (BEAKER) (test code = 2.4 mg/dL    2.3-4.7              

     



             604)                                                



 ID - MARICEL GCBC W/PLT COUNT &amp; AUTO [id number] 05:37:19





             Test Item    Value        Reference Range Interpretation Comments

 

             WHITE BLOOD CELL COUNT (BEAKER) 7.5 K/ L     3.5-10.5              

    



             (test code = 775)                                        

 

             RED BLOOD CELL COUNT (BEAKER) 3.15 M/ L    3.93-5.22    L          

  



             (test code = 761)                                        

 

             HEMOGLOBIN (BEAKER) (test code = 8.3 GM/DL    11.2-15.7    L       

     



             410)                                                

 

             HEMATOCRIT (BEAKER) (test code = 28.5 %       34.1-44.9    L       

     



             411)                                                

 

             MEAN CORPUSCULAR VOLUME (BEAKER) 90.5 fL      79.4-94.8            

     



             (test code = 753)                                        

 

             MEAN CORPUSCULAR HEMOGLOBIN 26.3 pg      25.6-32.2                 



             (BEAKER) (test code = 751)                                        

 

             MEAN CORPUSCULAR HEMOGLOBIN CONC 29.1 GM/DL   32.2-35.5    L       

     



             (BEAKER) (test code = 752)                                        

 

             RED CELL DISTRIBUTION WIDTH 19.2 %       11.7-14.4    H            



             (BEAKER) (test code = 412)                                        

 

             PLATELET COUNT (BEAKER) (test 215 K/CU MM  150-450                 

  



             code = 756)                                         

 

             MEAN PLATELET VOLUME (BEAKER) 10.6 fL      9.4-12.3                

  



             (test code = 754)                                        

 

             NUCLEATED RED BLOOD CELLS 0 /100 WBC   0-0                       



             (BEAKER) (test code = 413)                                        

 

             NEUTROPHILS RELATIVE PERCENT 72 %                                  

 



             (BEAKER) (test code = 429)                                        

 

             LYMPHOCYTES RELATIVE PERCENT 11 %                                  

 



             (BEAKER) (test code = 430)                                        

 

             MONOCYTES RELATIVE PERCENT 14 %                                   



             (BEAKER) (test code = 431)                                        

 

             EOSINOPHILS RELATIVE PERCENT 2 %                                   

 



             (BEAKER) (test code = 432)                                        

 

             BASOPHILS RELATIVE PERCENT 1 %                                    



             (BEAKER) (test code = 437)                                        

 

             NEUTROPHILS ABSOLUTE COUNT 5.41 K/ L    1.56-6.13                 



             (BEAKER) (test code = 670)                                        

 

             LYMPHOCYTES ABSOLUTE COUNT 0.84 K/ L    1.18-3.74    L            



             (BEAKER) (test code = 414)                                        

 

             MONOCYTES ABSOLUTE COUNT (BEAKER) 1.03 K/ L    0.24-0.36    H      

      



             (test code = 415)                                        

 

             EOSINOPHILS ABSOLUTE COUNT 0.15 K/ L    0.04-0.36                 



             (BEAKER) (test code = 416)                                        

 

             BASOPHILS ABSOLUTE COUNT (BEAKER) 0.06 K/ L    0.01-0.08           

      



             (test code = 417)                                        

 

             IMMATURE GRANULOCYTES-RELATIVE 1 %          0-1                    

   



             PERCENT (BEAKER) (test code =                                      

  



             2801)                                               



CALCIUM, [id number] 05:20:50





             Test Item    Value        Reference Range Interpretation Comments

 

             CALCIUM IONIZED (BEAKER) (test 1.12 mmol/L  1.12-1.27              

   



             code = 698)                                         

 

             PH, BLOOD (BEAKER) (test code = 7.43                               

    



             1810)                                               



ANG, TUNNELED CATHETER [id number] 13:32:00Reason for Central 
Line/PICC?-&gt;Need for hemodialysis accessReason for exam:-&gt;needs permanent 
access************************************************************[facility] : 1945 Sex: 
F************************************************************FINAL REPORT 
PATIENT ID: [id number] PROCEDURE: Tunneled dialysis catheter placement Procedural 
PersonnelAttending physician(s): Jazmyn Alcaraz physician(s): NoneResident 
physician(s): NoneAdvanced practice provider(s): None Pre-procedure diagnosis: 
ESRDPost-procedure diagnosis: SameIndication (QCDR): Performance of 
hemodialysisAdditional clinical history: None Complications: No immediate 
complications. IMPRESSION: Insertion of right-sided tunneled dialysis catheter, 
with tip in the expected locationof the cavoatrial junction. Plan: The catheter 
may be used immediately.______________________________
_________________________________ PROCEDURE SUMMARY:- Venous access with 
ultrasound guidance- Tunneled dialysis catheter insertion with fluoroscopic 
guidance- Additional procedure(s): None PROCEDURE DETAILS: Pre-procedureConsent:
Informed consent for the procedure including risks, benefits and alternatives 
was obtained and time-out was performed prior to the procedure.Preparation 
(MIPS): The site wasprepared and draped using all elements of maximal sterile 
barrier technique including sterile gloves, sterile gown, cap, mask, large 
sterile sheet, sterile ultrasound probe cover, hand hygiene and cutaneous 
antisepsis with 2% chlorhexidine. Medical reason for site preparation exception 
(MIPS): Not applicable Anesthesia/sedationLevel of anesthesia/sedation: Moderate
sedation (conscious sedation) 1mg Versed, 50mcg fentanylAnesthesia/sedation 
administered by: Independent trained observer under attending supervision with 
continuous monitoring of the patient\\s level of consciousness and 
physiologic statusTotal intra-service sedation time (minutes): 30 AccessLocal 
anesthesia was administered. The vessel was sonographically evaluated and 
determined to be patent. Real time ultrasound was used to visualize needle entry
into the vessel and a permanent image was stored.Vein accessed (QCDR): Internal 
jugular veinInternal jugular vein patency (QCDR): Patent or otherwise accessible
on at least one sideAccess technique: Micropuncture set with 21 gauge needle 
Catheter placementAn incision was made near the venous access site and the 
catheter was tunneled subcutaneously to the venous access site. The catheter was
advanced via a peel-away sheath into the vein under fluoroscopic guidance. 
Catheter tip location was fluoroscopically verified and a permanent image was 
stored.Catheter placed: Duraflow 2Catheter cuff-to-tip length (cm): 19Catheter 
flush: Heparin (1000 units/mL) ClosureA sterile dressing wasapplied.Access site 
closure technique: Tissue adhesiveCatheter securement technique: Non-absorbable 
suture Radiation DoseFluoroscopy time (minutes): 0.0 Reference air kerma (mGy): 
0.1 Additional DetailsAdditional description of procedure: NoneEquipment 
details: NoneSpecimens removed: NoneEstimated blood loss (mL): Less than 
10Standardized report: [email or web address] AttestationSigner name: 
Jazmyn Ding attest that I was present for the entire procedure. I reviewed the 
stored images and agree with the report as written. Signed: Jazmyn Eastman MDReport
Verified Date/Time: 2021 13:32:05 Electronically signed by: JAZMYN EASTMAN MD
on 2021 01:32 PMCOMPREHENSIVE METABOLIC [id number] 06:10:52





             Test Item    Value        Reference Range Interpretation Comments

 

             TOTAL PROTEIN 7.0 gm/dL    6.0-8.3                   



             (BEAKER) (test code =                                        



             770)                                                

 

             ALBUMIN (BEAKER) 2.9 g/dL     3.5-5.0      L            



             (test code = 1145)                                        

 

             ALKALINE PHOSPHATASE 391 U/L             H            



             (BEAKER) (test code =                                        



             346)                                                

 

             BILIRUBIN TOTAL 2.1 mg/dL    0.2-1.2      H            



             (BEAKER) (test code =                                        



             377)                                                

 

             SODIUM (BEAKER) (test 133 meq/L    136-145      L            



             code = 381)                                         

 

             POTASSIUM (BEAKER) 3.9 meq/L    3.5-5.1                   



             (test code = 379)                                        

 

             CHLORIDE (BEAKER) 100 meq/L                        



             (test code = 382)                                        

 

             CO2 (BEAKER) (test 23 meq/L     22-29                     



             code = 355)                                         

 

             BLOOD UREA NITROGEN 40 mg/dL     7-21         H            



             (BEAKER) (test code =                                        



             354)                                                

 

             CREATININE (BEAKER) 2.53 mg/dL   0.57-1.25    H            



             (test code = 358)                                        

 

             GLUCOSE RANDOM 99 mg/dL                         



             (BEAKER) (test code =                                        



             652)                                                

 

             CALCIUM (BEAKER) 8.8 mg/dL    8.4-10.2                  



             (test code = 697)                                        

 

             AST (SGOT) (BEAKER) 25 U/L       5-34                      



             (test code = 353)                                        

 

             ALT (SGPT) (BEAKER) 21 U/L       6-55                      



             (test code = 347)                                        

 

             EGFR (BEAKER) (test 18 mL/min/1.73                           ESTIMA

SHAHRIAR GFR IS



             code = 1092) sq m                                   NOT AS ACCURATE

 AS



                                                                 CREATININE



                                                                 CLEARANCE IN



                                                                 PREDICTING



                                                                 GLOMERULAR



                                                                 FILTRATION RATE

.



                                                                 ESTIMATED GFR I

S



                                                                 NOT APPLICABLE 

FOR



                                                                 DIALYSIS PATIEN

TS.



 ID - TUNDE [id number] 06:08:31





             Test Item    Value        Reference Range Interpretation Comments

 

             PHOSPHORUS (BEAKER) (test code = 3.4 mg/dL    2.3-4.7              

     



             604)                                                



 ID - TUNDE [id number] 06:08:29





             Test Item    Value        Reference Range Interpretation Comments

 

             MAGNESIUM (BEAKER) (test code = 2.3 mg/dL    1.6-2.6               

    



             627)                                                



 ID - TUNDE MCBC W/PLT COUNT &amp; AUTO [id number] 05:49:12





             Test Item    Value        Reference Range Interpretation Comments

 

             WHITE BLOOD CELL COUNT (BEAKER) 8.4 K/ L     3.5-10.5              

    



             (test code = 775)                                        

 

             RED BLOOD CELL COUNT (BEAKER) 3.26 M/ L    3.93-5.22    L          

  



             (test code = 761)                                        

 

             HEMOGLOBIN (BEAKER) (test code = 8.6 GM/DL    11.2-15.7    L       

     



             410)                                                

 

             HEMATOCRIT (BEAKER) (test code = 28.7 %       34.1-44.9    L       

     



             411)                                                

 

             MEAN CORPUSCULAR VOLUME (BEAKER) 88.0 fL      79.4-94.8            

     



             (test code = 753)                                        

 

             MEAN CORPUSCULAR HEMOGLOBIN 26.4 pg      25.6-32.2                 



             (BEAKER) (test code = 751)                                        

 

             MEAN CORPUSCULAR HEMOGLOBIN CONC 30.0 GM/DL   32.2-35.5    L       

     



             (BEAKER) (test code = 752)                                        

 

             RED CELL DISTRIBUTION WIDTH 18.9 %       11.7-14.4    H            



             (BEAKER) (test code = 412)                                        

 

             PLATELET COUNT (BEAKER) (test 207 K/CU MM  150-450                 

  



             code = 756)                                         

 

             MEAN PLATELET VOLUME (BEAKER) 10.9 fL      9.4-12.3                

  



             (test code = 754)                                        

 

             NUCLEATED RED BLOOD CELLS 0 /100 WBC   0-0                       



             (BEAKER) (test code = 413)                                        

 

             NEUTROPHILS RELATIVE PERCENT 77 %                                  

 



             (BEAKER) (test code = 429)                                        

 

             LYMPHOCYTES RELATIVE PERCENT 7 %                                   

 



             (BEAKER) (test code = 430)                                        

 

             MONOCYTES RELATIVE PERCENT 14 %                                   



             (BEAKER) (test code = 431)                                        

 

             EOSINOPHILS RELATIVE PERCENT 1 %                                   

 



             (BEAKER) (test code = 432)                                        

 

             BASOPHILS RELATIVE PERCENT 1 %                                    



             (BEAKER) (test code = 437)                                        

 

             NEUTROPHILS ABSOLUTE COUNT 6.47 K/ L    1.56-6.13    H            



             (BEAKER) (test code = 670)                                        

 

             LYMPHOCYTES ABSOLUTE COUNT 0.57 K/ L    1.18-3.74    L            



             (BEAKER) (test code = 414)                                        

 

             MONOCYTES ABSOLUTE COUNT (BEAKER) 1.16 K/ L    0.24-0.36    H      

      



             (test code = 415)                                        

 

             EOSINOPHILS ABSOLUTE COUNT 0.12 K/ L    0.04-0.36                 



             (BEAKER) (test code = 416)                                        

 

             BASOPHILS ABSOLUTE COUNT (BEAKER) 0.04 K/ L    0.01-0.08           

      



             (test code = 417)                                        

 

             IMMATURE GRANULOCYTES-RELATIVE 1 %          0-1                    

   



             PERCENT (BEAKER) (test code =                                      

  



             2801)                                               



CALCIUM, [id number] 05:44:37





             Test Item    Value        Reference Range Interpretation Comments

 

             CALCIUM IONIZED (BEAKER) (test 1.14 mmol/L  1.12-1.27              

   



             code = 698)                                         

 

             PH, BLOOD (BEAKER) (test code = 7.37                               

    



             1810)                                               



RAD, CHEST, 2 [id number] 01:00:00Should not raise armsReason for exam:-
&gt;PPM implnatShould this be performed at the bedside?-&gt;No
************************************************************[facility] : 1945 Sex: 
F************************************************************FINAL REPORT 
PATIENT ID: [id number] Chest one view. Clinical history: PPM implant Comparison: 
Chest radiograph 2021. Technique: A single frontal view of the chest was 
obtained. Findings:There is a left-sided pacemaker with leads overlying the 
right atrium, right ventricle and coronary sinus. There is a right IJ central 
venous catheter with tip in the right atrium.There is stable enlargement of the
cardiac silhouette. There are diffuse bilateral airspace opacities, not 
significantly changed. Thereare small bilateral pleural effusions. There is no 
pneumothorax. Signed: Merlin Hayes Verified Date/Time: 2021 
01:00:54 Electronically signed by: MERLIN HAYES MD on 2021 01:00 
AMPOCT-GLUCOSE METER2021-11-10 19:32:38





             Test Item    Value        Reference Range Interpretation Comments

 

             POC-GLUCOSE METER 105 mg/dL                        : TESTED A

T [facility] [address]



             ([facility]) (test code =                                        ALINE MARLEY [facility],



             1538)                                               [id number]:



                                                                 /Techni

lroelei ID



                                                                 = [id number] for



                                                                 Chris



                                                                 (student)Otf



B-TYPE NATRIURETIC FACTOR (BNP)2021-11-10 17:10:17





             Test Item    Value        Reference Range Interpretation Comments

 

             B-TYPE NATRIURETIC PEPTIDE (BEAKER) 970 pg/mL    0-100        H    

        



             (test code = 700)                                        



 ID - BSHEPATIC FUNCTION PANEL2021-11-10 06:54:50





             Test Item    Value        Reference Range Interpretation Comments

 

             TOTAL PROTEIN (BEAKER) (test code = 7.2 gm/dL    6.0-8.3           

        



             770)                                                

 

             ALBUMIN (BEAKER) (test code = 1145) 3.0 g/dL     3.5-5.0      L    

        

 

             BILIRUBIN TOTAL (BEAKER) (test code 2.8 mg/dL    0.2-1.2      H    

        



             = 377)                                              

 

             BILIRUBIN DIRECT (BEAKER) (test 2.1 mg/dL    0.1-0.5      H        

    



             code = 706)                                         

 

             ALKALINE PHOSPHATASE (BEAKER) (test 380 U/L             H    

        



             code = 346)                                         

 

             AST (SGOT) (BEAKER) (test code = 32 U/L       5-34                 

     



             353)                                                

 

             ALT (SGPT) (BEAKER) (test code = 41 U/L       6-55                 

     



             347)                                                



 ID - JASVIR LPHOSPHORUS2021-11-10 06:54:49





             Test Item    Value        Reference Range Interpretation Comments

 

             PHOSPHORUS (BEAKER) (test code = 2.4 mg/dL    2.3-4.7              

     



             604)                                                



 ID - JASVIR LMAGNESIUM2021-11-10 06:54:48





             Test Item    Value        Reference Range Interpretation Comments

 

             MAGNESIUM (BEAKER) (test code = 1.6 mg/dL    1.6-2.6               

    



             627)                                                



 ID - JASVIR LCOMPREHENSIVE METABOLIC PANEL2021-11-10 06:54:47





             Test Item    Value        Reference Range Interpretation Comments

 

             TOTAL PROTEIN 7.2 gm/dL    6.0-8.3                   



             (BEAKER) (test code =                                        



             770)                                                

 

             ALBUMIN (BEAKER) 3.0 g/dL     3.5-5.0      L            



             (test code = 1145)                                        

 

             ALKALINE PHOSPHATASE 380 U/L             H            



             (BEAKER) (test code =                                        



             346)                                                

 

             BILIRUBIN TOTAL 2.8 mg/dL    0.2-1.2      H            



             (BEAKER) (test code =                                        



             377)                                                

 

             SODIUM (BEAKER) (test 135 meq/L    136-145      L            



             code = 381)                                         

 

             POTASSIUM (BEAKER) 4.0 meq/L    3.5-5.1                   



             (test code = 379)                                        

 

             CHLORIDE (BEAKER) 102 meq/L                        



             (test code = 382)                                        

 

             CO2 (BEAKER) (test 22 meq/L     22-29                     



             code = 355)                                         

 

             BLOOD UREA NITROGEN 26 mg/dL     7-21         H            



             (BEAKER) (test code =                                        



             354)                                                

 

             CREATININE (BEAKER) 1.40 mg/dL   0.57-1.25    H            



             (test code = 358)                                        

 

             GLUCOSE RANDOM 84 mg/dL                         



             (BEAKER) (test code =                                        



             652)                                                

 

             CALCIUM (BEAKER) 9.6 mg/dL    8.4-10.2                  



             (test code = 697)                                        

 

             AST (SGOT) (BEAKER) 32 U/L       5-34                      



             (test code = 353)                                        

 

             ALT (SGPT) (BEAKER) 41 U/L       6-55                      



             (test code = 347)                                        

 

             EGFR (BEAKER) (test 36 mL/min/1.73                           ESTIMA

SHAHRIAR GFR IS



             code = 1092) sq m                                   NOT AS ACCURATE

 AS



                                                                 CREATININE



                                                                 CLEARANCE IN



                                                                 PREDICTING



                                                                 GLOMERULAR



                                                                 FILTRATION RATE

.



                                                                 ESTIMATED GFR I

S



                                                                 NOT APPLICABLE 

FOR



                                                                 DIALYSIS PATIEN

TS.



 ID - PIAYA LCBC W/PLT COUNT &amp; AUTO DIFFERENTIAL2021-11-10 06:03:48





             Test Item    Value        Reference Range Interpretation Comments

 

             WHITE BLOOD CELL COUNT (BEAKER) 10.5 K/ L    3.5-10.5              

    



             (test code = 775)                                        

 

             RED BLOOD CELL COUNT (BEAKER) 3.47 M/ L    3.93-5.22    L          

  



             (test code = 761)                                        

 

             HEMOGLOBIN (BEAKER) (test code = 9.2 GM/DL    11.2-15.7    L       

     



             410)                                                

 

             HEMATOCRIT (BEAKER) (test code = 30.3 %       34.1-44.9    L       

     



             411)                                                

 

             MEAN CORPUSCULAR VOLUME (BEAKER) 87.3 fL      79.4-94.8            

     



             (test code = 753)                                        

 

             MEAN CORPUSCULAR HEMOGLOBIN 26.5 pg      25.6-32.2                 



             (BEAKER) (test code = 751)                                        

 

             MEAN CORPUSCULAR HEMOGLOBIN CONC 30.4 GM/DL   32.2-35.5    L       

     



             (BEAKER) (test code = 752)                                        

 

             RED CELL DISTRIBUTION WIDTH 18.7 %       11.7-14.4    H            



             (BEAKER) (test code = 412)                                        

 

             PLATELET COUNT (BEAKER) (test 211 K/CU MM  150-450                 

  



             code = 756)                                         

 

             MEAN PLATELET VOLUME (BEAKER) 10.4 fL      9.4-12.3                

  



             (test code = 754)                                        

 

             NUCLEATED RED BLOOD CELLS 0 /100 WBC   0-0                       



             (BEAKER) (test code = 413)                                        

 

             NEUTROPHILS RELATIVE PERCENT 83 %                                  

 



             (BEAKER) (test code = 429)                                        

 

             LYMPHOCYTES RELATIVE PERCENT 5 %                                   

 



             (BEAKER) (test code = 430)                                        

 

             MONOCYTES RELATIVE PERCENT 10 %                                   



             (BEAKER) (test code = 431)                                        

 

             EOSINOPHILS RELATIVE PERCENT 0 %                                   

 



             (BEAKER) (test code = 432)                                        

 

             BASOPHILS RELATIVE PERCENT 1 %                                    



             (BEAKER) (test code = 437)                                        

 

             NEUTROPHILS ABSOLUTE COUNT 8.71 K/ L    1.56-6.13    H            



             (BEAKER) (test code = 670)                                        

 

             LYMPHOCYTES ABSOLUTE COUNT 0.51 K/ L    1.18-3.74    L            



             (BEAKER) (test code = 414)                                        

 

             MONOCYTES ABSOLUTE COUNT (BEAKER) 1.09 K/ L    0.24-0.36    H      

      



             (test code = 415)                                        

 

             EOSINOPHILS ABSOLUTE COUNT 0.03 K/ L    0.04-0.36    L            



             (BEAKER) (test code = 416)                                        

 

             BASOPHILS ABSOLUTE COUNT (BEAKER) 0.07 K/ L    0.01-0.08           

      



             (test code = 417)                                        

 

             IMMATURE GRANULOCYTES-RELATIVE 1 %          0-1                    

   



             PERCENT (BEAKER) (test code =                                      

  



             2801)                                               



CALCIUM, IONIZED2021-11-10 06:01:46





             Test Item    Value        Reference Range Interpretation Comments

 

             CALCIUM IONIZED (BEAKER) (test 1.17 mmol/L  1.12-1.27              

   



             code = 698)                                         

 

             PH, BLOOD (BEAKER) (test code = 7.40                               

    



             1810)                                               



APTT2021-11-10 00:54:13





             Test Item    Value        Reference Range Interpretation Comments

 

             PARTIAL THROMBOPLASTIN TIME 42.7 seconds 22.5-36.0    H            



             (BEAKER) (test code = 760)                                        



COMPREHENSIVE METABOLIC PANEL2021-11-10 00:49:39





             Test Item    Value        Reference Range Interpretation Comments

 

             TOTAL PROTEIN 6.9 gm/dL    6.0-8.3                   



             (BEAKER) (test code =                                        



             770)                                                

 

             ALBUMIN (BEAKER) 2.9 g/dL     3.5-5.0      L            



             (test code = 1145)                                        

 

             ALKALINE PHOSPHATASE 353 U/L             H            



             (BEAKER) (test code =                                        



             346)                                                

 

             BILIRUBIN TOTAL 2.7 mg/dL    0.2-1.2      H            



             (BEAKER) (test code =                                        



             377)                                                

 

             SODIUM (BEAKER) (test 133 meq/L    136-145      L            



             code = 381)                                         

 

             POTASSIUM (BEAKER) 3.7 meq/L    3.5-5.1                   



             (test code = 379)                                        

 

             CHLORIDE (BEAKER) 100 meq/L                        



             (test code = 382)                                        

 

             CO2 (BEAKER) (test 22 meq/L     22-29                     



             code = 355)                                         

 

             BLOOD UREA NITROGEN 48 mg/dL     7-21         H            



             (BEAKER) (test code =                                        



             354)                                                

 

             CREATININE (BEAKER) 2.65 mg/dL   0.57-1.25    H            



             (test code = 358)                                        

 

             GLUCOSE RANDOM 93 mg/dL                         



             (BEAKER) (test code =                                        



             652)                                                

 

             CALCIUM (BEAKER) 8.5 mg/dL    8.4-10.2                  



             (test code = 697)                                        

 

             AST (SGOT) (BEAKER) 26 U/L       5-34                      



             (test code = 353)                                        

 

             ALT (SGPT) (BEAKER) 40 U/L       6-55                      



             (test code = 347)                                        

 

             EGFR (BEAKER) (test 17 mL/min/1.73                           ESTIMA

SHAHRIAR GFR IS



             code = 1092) sq m                                   NOT AS ACCURATE

 AS



                                                                 CREATININE



                                                                 CLEARANCE IN



                                                                 PREDICTING



                                                                 GLOMERULAR



                                                                 FILTRATION RATE

.



                                                                 ESTIMATED GFR I

S



                                                                 NOT APPLICABLE 

FOR



                                                                 DIALYSIS PATIEN

TS.



 ID - JASVIR LPHOSPHORUS2021-11-10 00:36:07





             Test Item    Value        Reference Range Interpretation Comments

 

             PHOSPHORUS (BEAKER) (test code = 4.1 mg/dL    2.3-4.7              

     



             604)                                                



 ID - JASVIR LLACTIC ACID, ARTERIAL2021-11-10 00:29:50





             Test Item    Value        Reference Range Interpretation Comments

 

             LACTATE BLOOD ARTERIAL (2) 0.6 mmol/L   0.5-2.2                   



             (BEAKER) (test code = 2874)                                        



 ID - JASVIR LCALCIUM, [id number] 23:25:35





             Test Item    Value        Reference Range Interpretation Comments

 

             CALCIUM IONIZED (BEAKER) (test 1.04 mmol/L  1.12-1.27    L         

   



             code = 698)                                         

 

             PH, BLOOD (BEAKER) (test code = 7.38                               

    



             1810)                                               



GLUCOSE-STAT [id number] 23:25:34





             Test Item    Value        Reference Range Interpretation Comments

 

             GLUCOSE RANDOM (BEAKER) (test code = 86 mg/dL                

         



             652)                                                



HGB/HCT (H&amp;H) - STAT [id number] 23:25:34





             Test Item    Value        Reference Range Interpretation Comments

 

             HEMOGLOBIN (BEAKER) (test code = 9.3 GM/DL    12.0-15.0    L       

     



             410)                                                

 

             HEMATOCRIT (BEAKER) (test code = 27.0 %       36.0-45.0    L       

     



             411)                                                



POTASSIUM-STAT [id number] 23:25:33





             Test Item    Value        Reference Range Interpretation Comments

 

             POTASSIUM (BEAKER) (test code = 3.5 meq/L    3.6-5.5      L        

    



             379)                                                



SODIUM NA-STAT [id number] 23:25:32





             Test Item    Value        Reference Range Interpretation Comments

 

             SODIUM (BEAKER) (test code = 381) 134 meq/L    136-145      L      

      



BLOOD GAS, [id number] 23:25:31





             Test Item    Value        Reference Range Interpretation Comments

 

             PH ARTERIAL (BEAKER) (test code = 7.38         7.35-7.45           

      



             383)                                                

 

             PCO2 ARTERIAL (BEAKER) (test code 41 mm Hg     35-45               

      



             = 384)                                              

 

             PO2 ARTERIAL (BEAKER) (test code 137 mm Hg    80-90        H       

     



             = 385)                                              

 

             O2 SATURATION ARTERIAL (BEAKER) 98.7 %       96.0-97.0    H        

    



             (test code = 386)                                        

 

             HCO3 ARTERIAL (BEAKER) (test code 24 mmol/L    21-29               

      



             = 388)                                              

 

             BASE EXCESS ARTERIAL (BEAKER) -1.3 mmol/L  -2.0-3.0                

  



             (test code = 387)                                        

 

             PATIENT TEMPERATURE (BEAKER) 37.0                                  

 



             (test code = 1818)                                        

 

             FIO2 (BEAKER) (test code = 1819) 28                                

     



RAD, CHEST, 1 VIEW, NON [id number] 21:43:00Reason for exam:-&gt;confrim line
placementaShould this be performed at the bedside?-&gt;Yes
************************************************************[facility] : 1945 Sex: 
F************************************************************FINAL REPORT 
PATIENT ID: [id number] History: Line placement. Comparison: 10/31/2021 Findings: A
singleview of the chest is submitted. The examination is limited by low lung 
volumes and leftward rotation. A right subclavian CVC tip overlies the 
cavoatrial junction without associated pneumothorax or hematoma. There is stable
enlargement of the cardiac silhouette. There is atherosclerotic ulceration of th
e aorta. A left subclavian, multilead pacemaker has been placed in the interval.
Central vascular engorgement and perihilar interstitial coarsening may be 
exaggerated by low lung volumes are reflect mild pulmonary edema. Patchy 
bibasilar opacities are similar to previous and may reflect a combination of 
atelectasis, scarring and edema. Pneumonitis should be excluded clinically. 
There is no acute bonyabnormality. A tunneled right IJ dialysis catheter remains
in place. Signed: Iain Martinez MDReport Verified Date/Time: 2021 21:43:25
Electronically signed by: IAIN MARTINEZ M.D. on 2021 09:43 PMPOCT-
GLUCOSE [id number] 19:38:27





             Test Item    Value        Reference Range Interpretation Comments

 

             POC-GLUCOSE METER 78 mg/dL                         : TESTED A

T [facility] [address]



             (BEAKER) (test code =                                        ALINE MARLEY [facility],



             1538)                                               [id number]:



                                                                 /Techni

lorelei ID =



                                                                 [id number] for Roma Grier



[id number] 07:11:31





             Test Item    Value        Reference Range Interpretation Comments

 

             PHOSPHORUS (BEAKER) 4.4 mg/dL    2.3-4.7                   Specimen

 slightly



             (test code = 604)                                        hemolyzed



 ID - TUNDE MCOMPREHENSIVE METABOLIC [id number] 05:07:58





             Test Item    Value        Reference Range Interpretation Comments

 

             TOTAL PROTEIN 6.9 gm/dL    6.0-8.3                   



             (BEAKER) (test code =                                        



             770)                                                

 

             ALBUMIN (BEAKER) 2.9 g/dL     3.5-5.0      L            



             (test code = 1145)                                        

 

             ALKALINE PHOSPHATASE 368 U/L             H            



             (BEAKER) (test code =                                        



             346)                                                

 

             BILIRUBIN TOTAL 2.5 mg/dL    0.2-1.2      H            



             (BEAKER) (test code =                                        



             377)                                                

 

             SODIUM (BEAKER) (test 132 meq/L    136-145      L            



             code = 381)                                         

 

             POTASSIUM (BEAKER) 4.0 meq/L    3.5-5.1                   



             (test code = 379)                                        

 

             CHLORIDE (BEAKER) 97 meq/L            L            



             (test code = 382)                                        

 

             CO2 (BEAKER) (test 25 meq/L     22-29                     



             code = 355)                                         

 

             BLOOD UREA NITROGEN 52 mg/dL     7-21         H            



             (BEAKER) (test code =                                        



             354)                                                

 

             CREATININE (BEAKER) 3.41 mg/dL   0.57-1.25    H            



             (test code = 358)                                        

 

             GLUCOSE RANDOM 86 mg/dL                         



             (BEAKER) (test code =                                        



             652)                                                

 

             CALCIUM (BEAKER) 9.0 mg/dL    8.4-10.2                  



             (test code = 697)                                        

 

             AST (SGOT) (BEAKER) 33 U/L       5-34                      



             (test code = 353)                                        

 

             ALT (SGPT) (BEAKER) 62 U/L       6-55         H            



             (test code = 347)                                        

 

             EGFR (BEAKER) (test 13 mL/min/1.73                           ESTIMA

SHAHRIAR GFR IS



             code = 1092) sq m                                   NOT AS ACCURATE

 AS



                                                                 CREATININE



                                                                 CLEARANCE IN



                                                                 PREDICTING



                                                                 GLOMERULAR



                                                                 FILTRATION RATE

.



                                                                 ESTIMATED GFR I

S



                                                                 NOT APPLICABLE 

FOR



                                                                 DIALYSIS PATIEN

TS.



 ID - TUNDE MHEPATIC FUNCTION [id number] 04:44:32





             Test Item    Value        Reference Range Interpretation Comments

 

             TOTAL PROTEIN (BEAKER) (test code = 6.9 gm/dL    6.0-8.3           

        



             770)                                                

 

             ALBUMIN (BEAKER) (test code = 1145) 2.9 g/dL     3.5-5.0      L    

        

 

             BILIRUBIN TOTAL (BEAKER) (test code 2.5 mg/dL    0.2-1.2      H    

        



             = 377)                                              

 

             BILIRUBIN DIRECT (BEAKER) (test 1.8 mg/dL    0.1-0.5      H        

    



             code = 706)                                         

 

             ALKALINE PHOSPHATASE (BEAKER) (test 368 U/L             H    

        



             code = 346)                                         

 

             AST (SGOT) (BEAKER) (test code = 33 U/L       5-34                 

     



             353)                                                

 

             ALT (SGPT) (BEAKER) (test code = 62 U/L       6-55         H       

     



             347)                                                



 ID - TUNDE [id number] 04:44:31





             Test Item    Value        Reference Range Interpretation Comments

 

             MAGNESIUM (BEAKER) (test code = 1.6 mg/dL    1.6-2.6               

    



             627)                                                



 ID - TUNDE MPROTHROMBIN [id number] 04:27:06





             Test Item    Value        Reference Range Interpretation Comments

 

             PROTIME (BEAKER) 13.4 seconds 11.9-14.2                 



             (test code = 759)                                        

 

             INR (BEAKER) (test 1.04         See_Comment                [Automat

ed message]



             code = 370)                                         The system [employer]



                                                                 generated this 

result



                                                                 transmitted ref

erence



                                                                 range: <=5.90. 

The



                                                                 reference range

 was



                                                                 not used to int

erpret



                                                                 this result as



                                                                 normal/abnormal

.



RECOMMENDED COUMADIN/WARFARIN INR THERAPY RANGESSTANDARD DOSE: 2.0 - 3.0 
Includes: PROPHYLAXIS for venous thrombosis, systemic embolization; TREATMENT 
for venous thrombosis and/or pulmonary embolus.HIGH RISK: Target INR is 2.5-3.5 
for patients with mechanical heart valves.CALCIUM, [id number] 04:25:06





             Test Item    Value        Reference Range Interpretation Comments

 

             CALCIUM IONIZED (BEAKER) (test 1.08 mmol/L  1.12-1.27    L         

   



             code = 698)                                         

 

             PH, BLOOD (BEAKER) (test code = 7.34                               

    



             1810)                                               



CBC W/PLT COUNT &amp; AUTO [id number] 04:24:11





             Test Item    Value        Reference Range Interpretation Comments

 

             WHITE BLOOD CELL COUNT (BEAKER) 8.2 K/ L     3.5-10.5              

    



             (test code = 775)                                        

 

             RED BLOOD CELL COUNT (BEAKER) 3.33 M/ L    3.93-5.22    L          

  



             (test code = 761)                                        

 

             HEMOGLOBIN (BEAKER) (test code = 8.8 GM/DL    11.2-15.7    L       

     



             410)                                                

 

             HEMATOCRIT (BEAKER) (test code = 28.9 %       34.1-44.9    L       

     



             411)                                                

 

             MEAN CORPUSCULAR VOLUME (BEAKER) 86.8 fL      79.4-94.8            

     



             (test code = 753)                                        

 

             MEAN CORPUSCULAR HEMOGLOBIN 26.4 pg      25.6-32.2                 



             (BEAKER) (test code = 751)                                        

 

             MEAN CORPUSCULAR HEMOGLOBIN CONC 30.4 GM/DL   32.2-35.5    L       

     



             (BEAKER) (test code = 752)                                        

 

             RED CELL DISTRIBUTION WIDTH 18.6 %       11.7-14.4    H            



             (BEAKER) (test code = 412)                                        

 

             PLATELET COUNT (BEAKER) (test 197 K/CU MM  150-450                 

  



             code = 756)                                         

 

             MEAN PLATELET VOLUME (BEAKER) 10.7 fL      9.4-12.3                

  



             (test code = 754)                                        

 

             NUCLEATED RED BLOOD CELLS 0 /100 WBC   0-0                       



             (BEAKER) (test code = 413)                                        

 

             NEUTROPHILS RELATIVE PERCENT 78 %                                  

 



             (BEAKER) (test code = 429)                                        

 

             LYMPHOCYTES RELATIVE PERCENT 9 %                                   

 



             (BEAKER) (test code = 430)                                        

 

             MONOCYTES RELATIVE PERCENT 12 %                                   



             (BEAKER) (test code = 431)                                        

 

             EOSINOPHILS RELATIVE PERCENT 1 %                                   

 



             (BEAKER) (test code = 432)                                        

 

             BASOPHILS RELATIVE PERCENT 0 %                                    



             (BEAKER) (test code = 437)                                        

 

             NEUTROPHILS ABSOLUTE COUNT 6.39 K/ L    1.56-6.13    H            



             (BEAKER) (test code = 670)                                        

 

             LYMPHOCYTES ABSOLUTE COUNT 0.73 K/ L    1.18-3.74    L            



             (BEAKER) (test code = 414)                                        

 

             MONOCYTES ABSOLUTE COUNT (BEAKER) 1.00 K/ L    0.24-0.36    H      

      



             (test code = 415)                                        

 

             EOSINOPHILS ABSOLUTE COUNT 0.05 K/ L    0.04-0.36                 



             (BEAKER) (test code = 416)                                        

 

             BASOPHILS ABSOLUTE COUNT (BEAKER) 0.02 K/ L    0.01-0.08           

      



             (test code = 417)                                        

 

             IMMATURE GRANULOCYTES-RELATIVE 1 %          0-1                    

   



             PERCENT (BEAKER) (test code =                                      

  



             2801)                                               



[id number] 14:32:03





             Test Item    Value        Reference Range Interpretation Comments

 

             PARTIAL THROMBOPLASTIN TIME 46.7 seconds 22.5-36.0    H            



             (BEAKER) (test code = 760)                                        



SARS-COV2/RT-PCR ([facility])2021 10:15:25





             Test Item    Value        Reference Range Interpretation Comments

 

             SARS-COV2/RT-PCR (test Negative     Not Detected, Negative,        

      



             code = 1177779)              See external report for              



                                       linked test               

 

             SARS-COV-2 PERFORMING LAB [facility]                             



             (test code = 0893681)                                        



Negative result for this test determines that SARS-CoV-2 RNA was not present in 
the specimen above the Limit of Detection (LOD). However, Negative results do 
not preclude SARS-CoV-2 infection and should not be used as the sole basis for 
treatment or patient management decisions. Negative results must be combined 
with clinical observations, patient history, and epidemiological information. A 
false negative result may occur if a specimen is improperly collected, 
transported or handled. A false negative result should be considered if 
patient's recent exposures or clinical presentation indicate that COVID-19 
(SARS-CoV-2) is likely and diagnostic tests for other causes of illness are 
negative. Re-testing should be considered in cases of suspected false 
negatives.The limit of detection for this assay is 800 copies/mL.This SARS CoV-2
test is a real-time RT-PCR test intended for the qualitative detection of 
nucleic acid from SARS-CoV-2 in a nasopharyngeal swab specimen collected from 
individuals suspected of COVID-19 by their healthcare provider.This test has not
been Food and Drug Administration (FDA) cleared or approved. This is a modified 
version of an approved Emergency Use Authorization (EUA) and is in the process 
of review by the FDA. Once authorized by the FDA, the issued EUA will be 
effective until the declaration that circumstances exist justifying the 
authorization of the emergency use ofin vitro diagnostic tests for detection 
and/or diagnosis of COVID-19 is terminated under Section 564(b)(2) of the Act or
the EUA is revoked under Section 564(g) of the Act.Fact Sheet for Healthcare 
Prov
iders:https://www.[employer]/sites/default/files/product/documents/Fact_Sheet_HC

_Aoxgultfr_Qklt_QTSL-HjJ-0.pdfFact Sheet for Healthcare 
Patients:https://www.[employer]/sites/default/files/product/docume
nts/Vytj_Onjok_Eijoeblx_Gkvk_WKCE-GqP-2.pdfPerforming Laboratory:[facility], [place] [id number] 06:13:12





             Test Item    Value        Reference Range Interpretation Comments

 

             PARTIAL THROMBOPLASTIN TIME 107.9 seconds 22.5-36.0    H           

 



             (BEAKER) (test code = 760)                                        



BASIC METABOLIC [id number] 05:24:44





             Test Item    Value        Reference Range Interpretation Comments

 

             SODIUM (BEAKER) 131 meq/L    136-145      L            



             (test code = 381)                                        

 

             POTASSIUM (BEAKER) 4.1 meq/L    3.5-5.1                   



             (test code = 379)                                        

 

             CHLORIDE (BEAKER) 96 meq/L            L            



             (test code = 382)                                        

 

             CO2 (BEAKER) (test 23 meq/L     22-29                     



             code = 355)                                         

 

             BLOOD UREA NITROGEN 40 mg/dL     7-21         H            



             (BEAKER) (test code                                        



             = 354)                                              

 

             CREATININE (BEAKER) 3.38 mg/dL   0.57-1.25    H            



             (test code = 358)                                        

 

             GLUCOSE RANDOM 92 mg/dL                         



             (BEAKER) (test code                                        



             = 652)                                              

 

             CALCIUM (BEAKER) 9.5 mg/dL    8.4-10.2                  



             (test code = 697)                                        

 

             EGFR (BEAKER) (test 13 mL/min/1.73                           ESTIMA

SHAHRIAR GFR IS



             code = 1092) sq m                                   NOT AS ACCURATE

 AS



                                                                 CREATININE



                                                                 CLEARANCE IN



                                                                 PREDICTING



                                                                 GLOMERULAR



                                                                 FILTRATION RATE

.



                                                                 ESTIMATED GFR I

S



                                                                 NOT APPLICABLE 

FOR



                                                                 DIALYSIS PATIEN

TS.



 ID - TUNDE MHEPATIC FUNCTION [id number] 05:18:53





             Test Item    Value        Reference Range Interpretation Comments

 

             TOTAL PROTEIN (BEAKER) (test code = 8.0 gm/dL    6.0-8.3           

        



             770)                                                

 

             ALBUMIN (BEAKER) (test code = 1145) 3.4 g/dL     3.5-5.0      L    

        

 

             BILIRUBIN TOTAL (BEAKER) (test code 2.6 mg/dL    0.2-1.2      H    

        



             = 377)                                              

 

             BILIRUBIN DIRECT (BEAKER) (test 1.8 mg/dL    0.1-0.5      H        

    



             code = 706)                                         

 

             ALKALINE PHOSPHATASE (BEAKER) (test 441 U/L             H    

        



             code = 346)                                         

 

             AST (SGOT) (BEAKER) (test code = 39 U/L       5-34         H       

     



             353)                                                

 

             ALT (SGPT) (BEAKER) (test code = 96 U/L       6-55         H       

     



             347)                                                



 ID Joshua GRIFFITHS MPROTHROMBIN [id number] 04:36:11





             Test Item    Value        Reference Range Interpretation Comments

 

             PROTIME (BEAKER) 13.8 seconds 11.9-14.2                 



             (test code = 759)                                        

 

             INR (BEAKER) (test 1.08         See_Comment                [Automat

ed message]



             code = 370)                                         The system [employer]



                                                                 generated this 

result



                                                                 transmitted ref

erence



                                                                 range: <=5.90. 

The



                                                                 reference range

 was



                                                                 not used to int

erpret



                                                                 this result as



                                                                 normal/abnormal

.



RECOMMENDED COUMADIN/WARFARIN INR THERAPY RANGESSTANDARD DOSE: 2.0 - 3.0 
Includes: PROPHYLAXIS for venous thrombosis, systemic embolization; TREATMENT 
for venous thrombosis and/or pulmonary embolus.HIGH RISK: Target INR is 2.5-3.5 
for patients with mechanical heart valves.CBC (HEMOGRAM ONLY)2021 04:31:09





             Test Item    Value        Reference Range Interpretation Comments

 

             WHITE BLOOD CELL COUNT (BEAKER) 10.9 K/ L    3.5-10.5     H        

    



             (test code = 775)                                        

 

             RED BLOOD CELL COUNT (BEAKER) 3.80 M/ L    3.93-5.22    L          

  



             (test code = 761)                                        

 

             HEMOGLOBIN (BEAKER) (test code = 9.9 GM/DL    11.2-15.7    L       

     



             410)                                                

 

             HEMATOCRIT (BEAKER) (test code = 34.6 %       34.1-44.9            

     



             411)                                                

 

             MEAN CORPUSCULAR VOLUME (BEAKER) 91.1 fL      79.4-94.8            

     



             (test code = 753)                                        

 

             MEAN CORPUSCULAR HEMOGLOBIN 26.1 pg      25.6-32.2                 



             (BEAKER) (test code = 751)                                        

 

             MEAN CORPUSCULAR HEMOGLOBIN CONC 28.6 GM/DL   32.2-35.5    L       

     



             (BEAKER) (test code = 752)                                        

 

             RED CELL DISTRIBUTION WIDTH 18.9 %       11.7-14.4    H            



             (BEAKER) (test code = 412)                                        

 

             PLATELET COUNT (BEAKER) (test 224 K/CU MM  150-450                 

  



             code = 756)                                         

 

             MEAN PLATELET VOLUME (BEAKER) 10.9 fL      9.4-12.3                

  



             (test code = 754)                                        

 

             NUCLEATED RED BLOOD CELLS 0 /100 WBC   0-0                       



             (BEAKER) (test code = 413)                                        



[id number] 18:48:59





             Test Item    Value        Reference Range Interpretation Comments

 

             PARTIAL THROMBOPLASTIN TIME 74.1 seconds 22.5-36.0    H            



             (BEAKER) (test code = 760)                                        



[id number] 08:08:37





             Test Item    Value        Reference Range Interpretation Comments

 

             PARTIAL THROMBOPLASTIN TIME 46.9 seconds 22.5-36.0    H            



             (BEAKER) (test code = 760)                                        



BASIC METABOLIC [id number] 02:28:58





             Test Item    Value        Reference Range Interpretation Comments

 

             SODIUM (BEAKER) 134 meq/L    136-145      L            



             (test code = 381)                                        

 

             POTASSIUM (BEAKER) 4.0 meq/L    3.5-5.1                   



             (test code = 379)                                        

 

             CHLORIDE (BEAKER) 97 meq/L            L            



             (test code = 382)                                        

 

             CO2 (BEAKER) (test 26 meq/L     22-29                     



             code = 355)                                         

 

             BLOOD UREA NITROGEN 24 mg/dL     7-21         H            



             (BEAKER) (test code                                        



             = 354)                                              

 

             CREATININE (BEAKER) 2.87 mg/dL   0.57-1.25    H            



             (test code = 358)                                        

 

             GLUCOSE RANDOM 100 mg/dL                        



             (BEAKER) (test code                                        



             = 652)                                              

 

             CALCIUM (BEAKER) 8.7 mg/dL    8.4-10.2                  



             (test code = 697)                                        

 

             EGFR (BEAKER) (test 16 mL/min/1.73                           ESTIMA

SHAHRIAR GFR IS



             code = 1092) sq m                                   NOT AS ACCURATE

 AS



                                                                 CREATININE



                                                                 CLEARANCE IN



                                                                 PREDICTING



                                                                 GLOMERULAR



                                                                 FILTRATION RATE

.



                                                                 ESTIMATED GFR I

S



                                                                 NOT APPLICABLE 

FOR



                                                                 DIALYSIS PATIEN

TS.



 ID - PIJUAN EPATIC FUNCTION [id number] 02:09:30





             Test Item    Value        Reference Range Interpretation Comments

 

             TOTAL PROTEIN (BEAKER) (test code = 7.7 gm/dL    6.0-8.3           

        



             770)                                                

 

             ALBUMIN (BEAKER) (test code = 1145) 3.3 g/dL     3.5-5.0      L    

        

 

             BILIRUBIN TOTAL (BEAKER) (test code 2.6 mg/dL    0.2-1.2      H    

        



             = 377)                                              

 

             BILIRUBIN DIRECT (BEAKER) (test 1.8 mg/dL    0.1-0.5      H        

    



             code = 706)                                         

 

             ALKALINE PHOSPHATASE (BEAKER) (test 436 U/L             H    

        



             code = 346)                                         

 

             AST (SGOT) (BEAKER) (test code = 42 U/L       5-34         H       

     



             353)                                                

 

             ALT (SGPT) (BEAKER) (test code = 121 U/L      6-55         H       

     



             347)                                                



 ID - JASVIR [id number] 01:54:32





             Test Item    Value        Reference Range Interpretation Comments

 

             PARTIAL THROMBOPLASTIN TIME 64.2 seconds 22.5-36.0    H            



             (BEAKER) (test code = 760)                                        



CBC (HEMOGRAM ONLY)2021 01:33:07





             Test Item    Value        Reference Range Interpretation Comments

 

             WHITE BLOOD CELL COUNT (BEAKER) 10.5 K/ L    3.5-10.5              

    



             (test code = 775)                                        

 

             RED BLOOD CELL COUNT (BEAKER) 3.61 M/ L    3.93-5.22    L          

  



             (test code = 761)                                        

 

             HEMOGLOBIN (BEAKER) (test code = 9.5 GM/DL    11.2-15.7    L       

     



             410)                                                

 

             HEMATOCRIT (BEAKER) (test code = 32.3 %       34.1-44.9    L       

     



             411)                                                

 

             MEAN CORPUSCULAR VOLUME (BEAKER) 89.5 fL      79.4-94.8            

     



             (test code = 753)                                        

 

             MEAN CORPUSCULAR HEMOGLOBIN 26.3 pg      25.6-32.2                 



             (BEAKER) (test code = 751)                                        

 

             MEAN CORPUSCULAR HEMOGLOBIN CONC 29.4 GM/DL   32.2-35.5    L       

     



             (BEAKER) (test code = 752)                                        

 

             RED CELL DISTRIBUTION WIDTH 19.3 %       11.7-14.4    H            



             (BEAKER) (test code = 412)                                        

 

             PLATELET COUNT (BEAKER) (test 192 K/CU MM  150-450                 

  



             code = 756)                                         

 

             MEAN PLATELET VOLUME (BEAKER) 10.7 fL      9.4-12.3                

  



             (test code = 754)                                        

 

             NUCLEATED RED BLOOD CELLS 0 /100 WBC   0-0                       



             (BEAKER) (test code = 413)                                        



[id number] 18:41:27





             Test Item    Value        Reference Range Interpretation Comments

 

             PARTIAL THROMBOPLASTIN TIME 97.4 seconds 22.5-36.0    H            



             (BEAKER) (test code = 760)                                        



[id number] 11:41:55





             Test Item    Value        Reference Range Interpretation Comments

 

             ALPHA-1 ANTITRYPSIN (BEAKER) 274.30 mg/dL 90..00 H           

 



             (test code = 502)                                        



 PRANEETH TAY LALPHA FETOPROTEIN (AFP), TUMOR [id number] 11:18:12





             Test Item    Value        Reference Range Interpretation Comments

 

             ALPHA-FETOPROTEIN (BEAKER) (test 9.1 ng/mL    <10.0                

     



             code = 1094)                                        



 ID Joshua TAY [id number] 10:54:49





             Test Item    Value        Reference Range Interpretation Comments

 

             PARTIAL THROMBOPLASTIN TIME 141.7 seconds 22.5-36.0    H           

 



             (BEAKER) (test code = 760)                                        



CALCIUM, [id number] 10:47:24





             Test Item    Value        Reference Range Interpretation Comments

 

             CALCIUM IONIZED (BEAKER) (test 1.09 mmol/L  1.12-1.27    L         

   



             code = 698)                                         

 

             PH, BLOOD (BEAKER) (test code = 7.45                               

    



             1810)                                               



HEPATIC FUNCTION [id number] 06:49:09





             Test Item    Value        Reference Range Interpretation Comments

 

             TOTAL PROTEIN (BEAKER) (test code = 8.5 gm/dL    6.0-8.3      H    

        



             770)                                                

 

             ALBUMIN (BEAKER) (test code = 1145) 3.6 g/dL     3.5-5.0           

        

 

             BILIRUBIN TOTAL (BEAKER) (test code 2.7 mg/dL    0.2-1.2      H    

        



             = 377)                                              

 

             BILIRUBIN DIRECT (BEAKER) (test 1.9 mg/dL    0.1-0.5      H        

    



             code = 706)                                         

 

             ALKALINE PHOSPHATASE (BEAKER) (test 482 U/L             H    

        



             code = 346)                                         

 

             AST (SGOT) (BEAKER) (test code = 53 U/L       5-34         H       

     



             353)                                                

 

             ALT (SGPT) (BEAKER) (test code = 169 U/L      6-55         H       

     



             347)                                                



 ID - PIAYA LSpecimen slightly ictericCOMPREHENSIVE METABOLIC PANEL
2021 06:49:08





             Test Item    Value        Reference Range Interpretation Comments

 

             TOTAL PROTEIN 8.5 gm/dL    6.0-8.3      H            



             (BEAKER) (test code =                                        



             770)                                                

 

             ALBUMIN (BEAKER) 3.6 g/dL     3.5-5.0                   



             (test code = 1145)                                        

 

             ALKALINE PHOSPHATASE 482 U/L             H            



             (BEAKER) (test code =                                        



             346)                                                

 

             BILIRUBIN TOTAL 2.7 mg/dL    0.2-1.2      H            



             (BEAKER) (test code =                                        



             377)                                                

 

             SODIUM (BEAKER) (test 135 meq/L    136-145      L            



             code = 381)                                         

 

             POTASSIUM (BEAKER) 3.6 meq/L    3.5-5.1                   



             (test code = 379)                                        

 

             CHLORIDE (BEAKER) 101 meq/L                        



             (test code = 382)                                        

 

             CO2 (BEAKER) (test 20 meq/L     22-29        L            



             code = 355)                                         

 

             BLOOD UREA NITROGEN 10 mg/dL     7-21                      



             (BEAKER) (test code =                                        



             354)                                                

 

             CREATININE (BEAKER) 1.23 mg/dL   0.57-1.25                 



             (test code = 358)                                        

 

             GLUCOSE RANDOM 102 mg/dL                        



             (BEAKER) (test code =                                        



             652)                                                

 

             CALCIUM (BEAKER) 9.4 mg/dL    8.4-10.2                  



             (test code = 697)                                        

 

             AST (SGOT) (BEAKER) 53 U/L       5-34         H            



             (test code = 353)                                        

 

             ALT (SGPT) (BEAKER) 169 U/L      6-55         H            



             (test code = 347)                                        

 

             EGFR (BEAKER) (test 42 mL/min/1.73                           ESTIMA

SHAHRIAR GFR IS



             code = 1092) sq m                                   NOT AS ACCURATE

 AS



                                                                 CREATININE



                                                                 CLEARANCE IN



                                                                 PREDICTING



                                                                 GLOMERULAR



                                                                 FILTRATION RATE

.



                                                                 ESTIMATED GFR I

S



                                                                 NOT APPLICABLE 

FOR



                                                                 DIALYSIS PATIEN

TS.



 ID - JASVIR LSpecimen slightly [id number] 06:49:07





             Test Item    Value        Reference Range Interpretation Comments

 

             MAGNESIUM (BEAKER) (test code = 1.7 mg/dL    1.6-2.6               

    



             627)                                                



 ID - JAVSIR [id number] 06:49:07





             Test Item    Value        Reference Range Interpretation Comments

 

             PHOSPHORUS (BEAKER) (test code = 1.7 mg/dL    2.3-4.7      L       

     



             604)                                                



 ID - JASVIR LCBC W/PLT COUNT &amp; AUTO [id number] 06:24:53





             Test Item    Value        Reference Range Interpretation Comments

 

             WHITE BLOOD CELL COUNT (BEAKER) 14.0 K/ L    3.5-10.5     H        

    



             (test code = 775)                                        

 

             RED BLOOD CELL COUNT (BEAKER) 4.67 M/ L    3.93-5.22               

  



             (test code = 761)                                        

 

             HEMOGLOBIN (BEAKER) (test code = 12.1 GM/DL   11.2-15.7            

     



             410)                                                

 

             HEMATOCRIT (BEAKER) (test code = 41.1 %       34.1-44.9            

     



             411)                                                

 

             MEAN CORPUSCULAR VOLUME (BEAKER) 88.0 fL      79.4-94.8            

     



             (test code = 753)                                        

 

             MEAN CORPUSCULAR HEMOGLOBIN 25.9 pg      25.6-32.2                 



             (BEAKER) (test code = 751)                                        

 

             MEAN CORPUSCULAR HEMOGLOBIN CONC 29.4 GM/DL   32.2-35.5    L       

     



             (BEAKER) (test code = 752)                                        

 

             RED CELL DISTRIBUTION WIDTH 19.7 %       11.7-14.4    H            



             (BEAKER) (test code = 412)                                        

 

             PLATELET COUNT (BEAKER) (test 177 K/CU MM  150-450                 

  



             code = 756)                                         

 

             MEAN PLATELET VOLUME (BEAKER) 11.3 fL      9.4-12.3                

  



             (test code = 754)                                        

 

             NUCLEATED RED BLOOD CELLS 0 /100 WBC   0-0                       



             (BEAKER) (test code = 413)                                        

 

             NEUTROPHILS RELATIVE PERCENT 86 %                                  

 



             (BEAKER) (test code = 429)                                        

 

             LYMPHOCYTES RELATIVE PERCENT 5 %                                   

 



             (BEAKER) (test code = 430)                                        

 

             MONOCYTES RELATIVE PERCENT 8 %                                    



             (BEAKER) (test code = 431)                                        

 

             EOSINOPHILS RELATIVE PERCENT 0 %                                   

 



             (BEAKER) (test code = 432)                                        

 

             BASOPHILS RELATIVE PERCENT 0 %                                    



             (BEAKER) (test code = 437)                                        

 

             NEUTROPHILS ABSOLUTE COUNT 11.99 K/ L   1.56-6.13    H            



             (BEAKER) (test code = 670)                                        

 

             LYMPHOCYTES ABSOLUTE COUNT 0.65 K/ L    1.18-3.74    L            



             (BEAKER) (test code = 414)                                        

 

             MONOCYTES ABSOLUTE COUNT (BEAKER) 1.18 K/ L    0.24-0.36    H      

      



             (test code = 415)                                        

 

             EOSINOPHILS ABSOLUTE COUNT 0.03 K/ L    0.04-0.36    L            



             (BEAKER) (test code = 416)                                        

 

             BASOPHILS ABSOLUTE COUNT (BEAKER) 0.04 K/ L    0.01-0.08           

      



             (test code = 417)                                        

 

             IMMATURE GRANULOCYTES-RELATIVE 1 %          0-1                    

   



             PERCENT (BEAKER) (test code =                                      

  



             2801)                                               



[id number] 05:49:37





             Test Item    Value        Reference Range Interpretation Comments

 

             PARTIAL THROMBOPLASTIN TIME 48.7 seconds 22.5-36.0    H            



             (BEAKER) (test code = 760)                                        



MR, ABDOMEN, [id number] 10:29:00Unlisted Reason for Exam - Click Yes and 
Enter Reason Below-&gt;Yes Unlisted Reason for Exam-&gt;Concern for biliary 
obstruction. Elevated liver enzymes. Also asses for underlying cirrhosuis or 
features of portal hypertension Perform MRI triple phase of liver and also MRCP 
for biliary obstruction.
************************************************************[facility] : 1945 Sex: 
F************************************************************FINAL REPORT 
PATIENT ID: [id number] TECHNIQUE: MRI of the abdomen and MRCP WITHOUT and WITH 
intravenous contrast. 3-D volume reconstructions were obtained to evaluate the 
biliary ductal system. INDICATION: 76-year-old woman with elevated liver enzymes
and concern for biliary obstruction. COMPARISON: Abdomen ultrasound 2021. 
FINDINGS:Suboptimal evaluation due to motion artifact on postcontrast sequences.
LOWER THORAX: Cardiomegaly. Large pericardial effusion contains T1 hyperintense 
hemorrhagic/proteinaceous debris. Trace bilateral pleural effusions. Bibasilar 
consolidation opacities, likely atelectasis. LIVER: Questionable subtle nodular 
liver contour. No hepatic steatosis. No definite liver lesion. BILIARY: Prior 
cholecystectomy. Intrahepatic and extrahepatic bile ducts are normal in caliber.
Apparent narrowing in the mid common duct is likely due to mass effect from 
adjacent vessels. No filling defect within the biliary system.SPLEEN: Spleen is 
prominent and measures 13.4 cm in the craniocaudal dimension.PANCREAS: No focal 
mass or ductal dilatation. ADRENALS: No adrenal nodule.KIDNEYS/URETERS: 
Heterogeneous T2 signal intensity of both kidneys; the areas of decreased T2 
signal intensity appear to have relative cortical thinning. No hydronephrosis. 
PERITONEUM/RETROPERITONEUM: No free fluid.LYMPH NODES: No 
lymphadenopathy.VESSELS: Unremarkable. GI TRACT: No distention or wall 
thickening. BONES AND SOFT TISSUES: Degenerative changes of the visualized 
spine. Soft tissues are unremarkable. IMPRESSION:Suboptimal evaluation due to 
motion artifact on postcontrast sequences. Questionable subtle nodular liver 
contour, for which cirrhosis cannot be excluded. No definite liver lesion. No 
biliaryductal dilatation or filling defect. Splenomegaly. Heterogeneous signal 
intensity of both kidneys without discrete mass. Differential considerations 
include multifocal cortical scarring, pyelonephritis, and deposition disease 
such as amyloid. Large debris-containing pericardial effusion. Signed: Yousif Suero MDReport Verified Date/Time: 2021 10:29:06 [facility] 
Diagnostic ImagingReading Room - [place] [address] Electronically signed by: YOUSIF SUERO MD on 2021 10:29 [id number] 04:28:09





             Test Item    Value        Reference Range Interpretation Comments

 

             PARTIAL THROMBOPLASTIN TIME 104.4 seconds 22.5-36.0    H           

 



             (BEAKER) (test code = 760)                                        



BASIC METABOLIC [id number] 03:37:17





             Test Item    Value        Reference Range Interpretation Comments

 

             SODIUM (BEAKER) 134 meq/L    136-145      L            



             (test code = 381)                                        

 

             POTASSIUM (BEAKER) 3.9 meq/L    3.5-5.1                   



             (test code = 379)                                        

 

             CHLORIDE (BEAKER) 100 meq/L                        



             (test code = 382)                                        

 

             CO2 (BEAKER) (test 21 meq/L     22-29        L            



             code = 355)                                         

 

             BLOOD UREA NITROGEN 30 mg/dL     7-21         H            



             (BEAKER) (test code                                        



             = 354)                                              

 

             CREATININE (BEAKER) 2.82 mg/dL   0.57-1.25    H            



             (test code = 358)                                        

 

             GLUCOSE RANDOM 104 mg/dL                        



             (BEAKER) (test code                                        



             = 652)                                              

 

             CALCIUM (BEAKER) 8.9 mg/dL    8.4-10.2                  



             (test code = 697)                                        

 

             EGFR (BEAKER) (test 16 mL/min/1.73                           ESTIMA

SHAHRIAR GFR IS



             code = 1092) sq m                                   NOT AS ACCURATE

 AS



                                                                 CREATININE



                                                                 CLEARANCE IN



                                                                 PREDICTING



                                                                 GLOMERULAR



                                                                 FILTRATION RATE

.



                                                                 ESTIMATED GFR I

S



                                                                 NOT APPLICABLE 

FOR



                                                                 DIALYSIS PATIEN

TS.



 PRANEETH ABDALLA WHEPATIC FUNCTION [id number] 03:30:39





             Test Item    Value        Reference Range Interpretation Comments

 

             TOTAL PROTEIN (BEAKER) (test code = 7.4 gm/dL    6.0-8.3           

        



             770)                                                

 

             ALBUMIN (BEAKER) (test code = 1145) 3.2 g/dL     3.5-5.0      L    

        

 

             BILIRUBIN TOTAL (BEAKER) (test code 2.1 mg/dL    0.2-1.2      H    

        



             = 377)                                              

 

             BILIRUBIN DIRECT (BEAKER) (test 1.6 mg/dL    0.1-0.5      H        

    



             code = 706)                                         

 

             ALKALINE PHOSPHATASE (BEAKER) (test 425 U/L             H    

        



             code = 346)                                         

 

             AST (SGOT) (BEAKER) (test code = 63 U/L       5-34         H       

     



             353)                                                

 

             ALT (SGPT) (BEAKER) (test code = 213 U/L      6-55         H       

     



             347)                                                



 PRANEETH ABDALLA WCBC (HEMOGRAM ONLY)2021 03:14:36





             Test Item    Value        Reference Range Interpretation Comments

 

             WHITE BLOOD CELL COUNT (BEAKER) 8.3 K/ L     3.5-10.5              

    



             (test code = 775)                                        

 

             RED BLOOD CELL COUNT (BEAKER) 3.57 M/ L    3.93-5.22    L          

  



             (test code = 761)                                        

 

             HEMOGLOBIN (BEAKER) (test code = 9.5 GM/DL    11.2-15.7    L       

     



             410)                                                

 

             HEMATOCRIT (BEAKER) (test code = 31.0 %       34.1-44.9    L       

     



             411)                                                

 

             MEAN CORPUSCULAR VOLUME (BEAKER) 86.8 fL      79.4-94.8            

     



             (test code = 753)                                        

 

             MEAN CORPUSCULAR HEMOGLOBIN 26.6 pg      25.6-32.2                 



             (BEAKER) (test code = 751)                                        

 

             MEAN CORPUSCULAR HEMOGLOBIN CONC 30.6 GM/DL   32.2-35.5    L       

     



             (BEAKER) (test code = 752)                                        

 

             RED CELL DISTRIBUTION WIDTH 18.7 %       11.7-14.4    H            



             (BEAKER) (test code = 412)                                        

 

             PLATELET COUNT (BEAKER) (test 135 K/CU MM  150-450      L          

  



             code = 756)                                         

 

             MEAN PLATELET VOLUME (BEAKER) 11.3 fL      9.4-12.3                

  



             (test code = 754)                                        

 

             NUCLEATED RED BLOOD CELLS 0 /100 WBC   0-0                       



             (BEAKER) (test code = 413)                                        



MISCELLANEOUS LAB [id number] 15:19:21





             Test Item    Value        Reference Range Interpretation Comments

 

             SCAN RESULT (test code = 3798042)                                  

      



[id number] 14:48:43





             Test Item    Value        Reference Range Interpretation Comments

 

             PARTIAL THROMBOPLASTIN TIME 45.1 seconds 22.5-36.0    H            



             (BEAKER) (test code = 760)                                        



(CELLAVISION MANUAL DIFF)2021 07:24:46





             Test Item    Value        Reference Range Interpretation Comments

 

             NEUTROPHILS - REL 79 %                                   



             (CELLAVISION)(BEAKER) (test code =                                 

       



             2816)                                               

 

             LYMPHOCYTES - REL 7 %                                    



             (CELLAVISION)(BEAKER) (test code =                                 

       



             2817)                                               

 

             MONOCYTES - REL 12 %                                   



             (CELLAVISION)(BEAKER) (test code =                                 

       



             2818)                                               

 

             EOSINOPHILS - REL 2 %                                    



             (CELLAVISION)(BEAKER) (test code =                                 

       



             2819)                                               

 

             NEUTROPHILS - ABS 8.37 K/ul    1.56-6.13    H            



             (CELLAVISION)(BEAKER) (test code =                                 

       



             2830)                                               

 

             LYMPHOCYTES - ABS 0.74 K/ul    1.18-3.74    L            



             (CELLAVISION)(BEAKER) (test code =                                 

       



             2831)                                               

 

             MONOCYTES - ABS 1.27 K/uL    0.24-0.36    H            



             (CELLAVISION)(BEAKER) (test code =                                 

       



             2832)                                               

 

             EOSINOPHILS - ABS 0.21 K/uL    0.04-0.36                 



             (CELLAVISION)(BEAKER) (test code =                                 

       



             2834)                                               

 

             TOTAL COUNTED (BEAKER) (test code = 100                            

        



             1351)                                               

 

             RBC MORPHOLOGY (BEAKER) (test code Normal                          

       



             = 762)                                              

 

             WBC MORPHOLOGY (BEAKER) (test code Normal                          

       



             = 487)                                              

 

             PLT MORPHOLOGY (BEAKER) (test code Normal                          

       



             = 486)                                              



CBC W/PLT COUNT &amp; AUTO [id number] 07:24:44





             Test Item    Value        Reference Range Interpretation Comments

 

             WHITE BLOOD CELL COUNT (BEAKER) 10.6 K/ L    3.5-10.5     H        

    



             (test code = 775)                                        

 

             RED BLOOD CELL COUNT (BEAKER) 3.90 M/ L    3.93-5.22    L          

  



             (test code = 761)                                        

 

             HEMOGLOBIN (BEAKER) (test code = 10.3 GM/DL   11.2-15.7    L       

     



             410)                                                

 

             HEMATOCRIT (BEAKER) (test code = 33.4 %       34.1-44.9    L       

     



             411)                                                

 

             MEAN CORPUSCULAR VOLUME (BEAKER) 85.6 fL      79.4-94.8            

     



             (test code = 753)                                        

 

             MEAN CORPUSCULAR HEMOGLOBIN 26.4 pg      25.6-32.2                 



             (BEAKER) (test code = 751)                                        

 

             MEAN CORPUSCULAR HEMOGLOBIN CONC 30.8 GM/DL   32.2-35.5    L       

     



             (BEAKER) (test code = 752)                                        

 

             RED CELL DISTRIBUTION WIDTH 19.3 %       11.7-14.4    H            



             (BEAKER) (test code = 412)                                        

 

             PLATELET COUNT (BEAKER) (test 124 K/CU MM  150-450      L          

  



             code = 756)                                         

 

             MEAN PLATELET VOLUME (BEAKER) 11.9 fL      9.4-12.3                

  



             (test code = 754)                                        

 

             NUCLEATED RED BLOOD CELLS 0 /100 WBC   0-0                       



             (BEAKER) (test code = 413)                                        



[id number] 07:00:12





             Test Item    Value        Reference Range Interpretation Comments

 

             PARTIAL THROMBOPLASTIN TIME 51.8 seconds 22.5-36.0    H            



             (BEAKER) (test code = 760)                                        



[id number] 06:10:21





             Test Item    Value        Reference Range Interpretation Comments

 

             PARTIAL THROMBOPLASTIN TIME 151.4 seconds 22.5-36.0    HH          

 



             (BEAKER) (test code = 760)                                        



COMPREHENSIVE METABOLIC [id number] 05:53:58





             Test Item    Value        Reference Range Interpretation Comments

 

             TOTAL PROTEIN 8.5 gm/dL    6.0-8.3      H            



             (BEAKER) (test code =                                        



             770)                                                

 

             ALBUMIN (BEAKER) 3.5 g/dL     3.5-5.0                   



             (test code = 1145)                                        

 

             ALKALINE PHOSPHATASE 524 U/L             H            



             (BEAKER) (test code =                                        



             346)                                                

 

             BILIRUBIN TOTAL 2.4 mg/dL    0.2-1.2      H            



             (BEAKER) (test code =                                        



             377)                                                

 

             SODIUM (BEAKER) (test 136 meq/L    136-145                   



             code = 381)                                         

 

             POTASSIUM (BEAKER) 4.1 meq/L    3.5-5.1                   



             (test code = 379)                                        

 

             CHLORIDE (BEAKER) 100 meq/L                        



             (test code = 382)                                        

 

             CO2 (BEAKER) (test 23 meq/L     22-29                     



             code = 355)                                         

 

             BLOOD UREA NITROGEN 21 mg/dL     7-21                      



             (BEAKER) (test code =                                        



             354)                                                

 

             CREATININE (BEAKER) 2.24 mg/dL   0.57-1.25    H            



             (test code = 358)                                        

 

             GLUCOSE RANDOM 100 mg/dL                        



             (BEAKER) (test code =                                        



             652)                                                

 

             CALCIUM (BEAKER) 9.3 mg/dL    8.4-10.2                  



             (test code = 697)                                        

 

             AST (SGOT) (BEAKER) 89 U/L       5-34         H            



             (test code = 353)                                        

 

             ALT (SGPT) (BEAKER) 322 U/L      6-55         H            



             (test code = 347)                                        

 

             EGFR (BEAKER) (test 21 mL/min/1.73                           ESTIMA

SHAHRIAR GFR IS



             code = 1092) sq m                                   NOT AS ACCURATE

 AS



                                                                 CREATININE



                                                                 CLEARANCE IN



                                                                 PREDICTING



                                                                 GLOMERULAR



                                                                 FILTRATION RATE

.



                                                                 ESTIMATED GFR I

S



                                                                 NOT APPLICABLE 

FOR



                                                                 DIALYSIS PATIEN

TS.



 ID - TUNDE EPATIC FUNCTION [id number] 05:43:38





             Test Item    Value        Reference Range Interpretation Comments

 

             TOTAL PROTEIN (BEAKER) (test code = 8.5 gm/dL    6.0-8.3      H    

        



             770)                                                

 

             ALBUMIN (BEAKER) (test code = 1145) 3.5 g/dL     3.5-5.0           

        

 

             BILIRUBIN TOTAL (BEAKER) (test code 2.4 mg/dL    0.2-1.2      H    

        



             = 377)                                              

 

             BILIRUBIN DIRECT (BEAKER) (test 1.8 mg/dL    0.1-0.5      H        

    



             code = 706)                                         

 

             ALKALINE PHOSPHATASE (BEAKER) (test 524 U/L             H    

        



             code = 346)                                         

 

             AST (SGOT) (BEAKER) (test code = 89 U/L       5-34         H       

     



             353)                                                

 

             ALT (SGPT) (BEAKER) (test code = 322 U/L      6-55         H       

     



             347)                                                



 ID - TUNDE [id number] 05:43:37





             Test Item    Value        Reference Range Interpretation Comments

 

             PHOSPHORUS (BEAKER) (test code = 3.6 mg/dL    2.3-4.7              

     



             604)                                                



 ID - TUNDE [id number] 05:43:36





             Test Item    Value        Reference Range Interpretation Comments

 

             MAGNESIUM (BEAKER) (test code = 1.8 mg/dL    1.6-2.6               

    



             627)                                                



 ID - TUNDE MCALCIUM, [id number] 05:33:45





             Test Item    Value        Reference Range Interpretation Comments

 

             CALCIUM IONIZED (BEAKER) (test 1.12 mmol/L  1.12-1.27              

   



             code = 698)                                         

 

             PH, BLOOD (BEAKER) (test code = 7.28                               

    



             1810)                                               



[id number] 05:16:53





             Test Item    Value        Reference Range Interpretation Comments

 

             PARTIAL THROMBOPLASTIN TIME 153.6 seconds 22.5-36.0    HH          

 



             (BEAKER) (test code = 760)                                        



POCT-GLUCOSE [id number] 20:38:56





             Test Item    Value        Reference Range Interpretation Comments

 

             POC-GLUCOSE METER 80 mg/dL                         : TESTED A

T [facility] [address]



             (BEAKER) (test code =                                        [place],



             1538)                                               [id number]:



                                                                 /Techni

lorelei ID =



                                                                 [id number] for JEWELL HINTON



COMPREHENSIVE METABOLIC [id number] 05:16:27





             Test Item    Value        Reference Range Interpretation Comments

 

             TOTAL PROTEIN 8.2 gm/dL    6.0-8.3                   



             (BEAKER) (test code =                                        



             770)                                                

 

             ALBUMIN (BEAKER) 3.5 g/dL     3.5-5.0                   



             (test code = 1145)                                        

 

             ALKALINE PHOSPHATASE 558 U/L             H            



             (BEAKER) (test code =                                        



             346)                                                

 

             BILIRUBIN TOTAL 2.4 mg/dL    0.2-1.2      H            



             (BEAKER) (test code =                                        



             377)                                                

 

             SODIUM (BEAKER) (test 134 meq/L    136-145      L            



             code = 381)                                         

 

             POTASSIUM (BEAKER) 3.5 meq/L    3.5-5.1                   



             (test code = 379)                                        

 

             CHLORIDE (BEAKER) 98 meq/L                         



             (test code = 382)                                        

 

             CO2 (BEAKER) (test 21 meq/L     22-29        L            



             code = 355)                                         

 

             BLOOD UREA NITROGEN 30 mg/dL     7-21         H            



             (BEAKER) (test code =                                        



             354)                                                

 

             CREATININE (BEAKER) 2.61 mg/dL   0.57-1.25    H            



             (test code = 358)                                        

 

             GLUCOSE RANDOM 104 mg/dL                        



             (BEAKER) (test code =                                        



             652)                                                

 

             CALCIUM (BEAKER) 8.7 mg/dL    8.4-10.2                  



             (test code = 697)                                        

 

             AST (SGOT) (BEAKER) 128 U/L      5-34         H            



             (test code = 353)                                        

 

             ALT (SGPT) (BEAKER) 435 U/L      6-55         H            



             (test code = 347)                                        

 

             EGFR (BEAKER) (test 18 mL/min/1.73                           ESTIMA

SHAHRIAR GFR IS



             code = 1092) sq m                                   NOT AS ACCURATE

 AS



                                                                 CREATININE



                                                                 CLEARANCE IN



                                                                 PREDICTING



                                                                 GLOMERULAR



                                                                 FILTRATION RATE

.



                                                                 ESTIMATED GFR I

S



                                                                 NOT APPLICABLE 

FOR



                                                                 DIALYSIS PATIEN

TS.



 ID - TUNDE MCALCIUM, [id number] 05:10:03





             Test Item    Value        Reference Range Interpretation Comments

 

             CALCIUM IONIZED (BEAKER) (test 1.03 mmol/L  1.12-1.27    L         

   



             code = 698)                                         

 

             PH, BLOOD (BEAKER) (test code = 7.31                               

    



             1810)                                               



HEPATIC FUNCTION [id number] 05:08:02





             Test Item    Value        Reference Range Interpretation Comments

 

             TOTAL PROTEIN (BEAKER) (test code = 8.2 gm/dL    6.0-8.3           

        



             770)                                                

 

             ALBUMIN (BEAKER) (test code = 1145) 3.5 g/dL     3.5-5.0           

        

 

             BILIRUBIN TOTAL (BEAKER) (test code 2.4 mg/dL    0.2-1.2      H    

        



             = 377)                                              

 

             BILIRUBIN DIRECT (BEAKER) (test 1.9 mg/dL    0.1-0.5      H        

    



             code = 706)                                         

 

             ALKALINE PHOSPHATASE (BEAKER) (test 558 U/L             H    

        



             code = 346)                                         

 

             AST (SGOT) (BEAKER) (test code = 128 U/L      5-34         H       

     



             353)                                                

 

             ALT (SGPT) (BEAKER) (test code = 435 U/L      6-55         H       

     



             347)                                                



 ID - TUNDE [id number] 05:08:01





             Test Item    Value        Reference Range Interpretation Comments

 

             PHOSPHORUS (BEAKER) (test code = 4.0 mg/dL    2.3-4.7              

     



             604)                                                



 ID - TUNDE [id number] 05:08:00





             Test Item    Value        Reference Range Interpretation Comments

 

             MAGNESIUM (BEAKER) (test code = 1.9 mg/dL    1.6-2.6               

    



             627)                                                



 ID - TUNDE [id number] 04:36:18





             Test Item    Value        Reference Range Interpretation Comments

 

             PARTIAL THROMBOPLASTIN TIME 77.6 seconds 22.5-36.0    H            



             (BEAKER) (test code = 760)                                        



CBC W/PLT COUNT &amp; AUTO [id number] 04:28:59





             Test Item    Value        Reference Range Interpretation Comments

 

             WHITE BLOOD CELL COUNT (BEAKER) 10.1 K/ L    3.5-10.5              

    



             (test code = 775)                                        

 

             RED BLOOD CELL COUNT (BEAKER) 3.91 M/ L    3.93-5.22    L          

  



             (test code = 761)                                        

 

             HEMOGLOBIN (BEAKER) (test code = 10.3 GM/DL   11.2-15.7    L       

     



             410)                                                

 

             HEMATOCRIT (BEAKER) (test code = 34.5 %       34.1-44.9            

     



             411)                                                

 

             MEAN CORPUSCULAR VOLUME (BEAKER) 88.2 fL      79.4-94.8            

     



             (test code = 753)                                        

 

             MEAN CORPUSCULAR HEMOGLOBIN 26.3 pg      25.6-32.2                 



             (BEAKER) (test code = 751)                                        

 

             MEAN CORPUSCULAR HEMOGLOBIN CONC 29.9 GM/DL   32.2-35.5    L       

     



             (BEAKER) (test code = 752)                                        

 

             RED CELL DISTRIBUTION WIDTH 19.2 %       11.7-14.4    H            



             (BEAKER) (test code = 412)                                        

 

             PLATELET COUNT (BEAKER) (test 118 K/CU MM  150-450      L          

  



             code = 756)                                         

 

             MEAN PLATELET VOLUME (BEAKER) 10.6 fL      9.4-12.3                

  



             (test code = 754)                                        

 

             NUCLEATED RED BLOOD CELLS 0 /100 WBC   0-0                       



             (BEAKER) (test code = 413)                                        

 

             NEUTROPHILS RELATIVE PERCENT 77 %                                  

 



             (BEAKER) (test code = 429)                                        

 

             LYMPHOCYTES RELATIVE PERCENT 8 %                                   

 



             (BEAKER) (test code = 430)                                        

 

             MONOCYTES RELATIVE PERCENT 12 %                                   



             (BEAKER) (test code = 431)                                        

 

             EOSINOPHILS RELATIVE PERCENT 3 %                                   

 



             (BEAKER) (test code = 432)                                        

 

             BASOPHILS RELATIVE PERCENT 0 %                                    



             (BEAKER) (test code = 437)                                        

 

             NEUTROPHILS ABSOLUTE COUNT 7.84 K/ L    1.56-6.13    H            



             (BEAKER) (test code = 670)                                        

 

             LYMPHOCYTES ABSOLUTE COUNT 0.77 K/ L    1.18-3.74    L            



             (BEAKER) (test code = 414)                                        

 

             MONOCYTES ABSOLUTE COUNT (BEAKER) 1.17 K/ L    0.24-0.36    H      

      



             (test code = 415)                                        

 

             EOSINOPHILS ABSOLUTE COUNT 0.25 K/ L    0.04-0.36                 



             (BEAKER) (test code = 416)                                        

 

             BASOPHILS ABSOLUTE COUNT (BEAKER) 0.03 K/ L    0.01-0.08           

      



             (test code = 417)                                        

 

             IMMATURE GRANULOCYTES-RELATIVE 1 %          0-1                    

   



             PERCENT (BEAKER) (test code =                                      

  



             2801)                                               



COMPREHENSIVE METABOLIC [id number] 07:38:32





             Test Item    Value        Reference Range Interpretation Comments

 

             TOTAL PROTEIN 7.9 gm/dL    6.0-8.3                   



             (BEAKER) (test code =                                        



             770)                                                

 

             ALBUMIN (BEAKER) 3.4 g/dL     3.5-5.0      L            



             (test code = 1145)                                        

 

             ALKALINE PHOSPHATASE 587 U/L             H            



             (BEAKER) (test code =                                        



             346)                                                

 

             BILIRUBIN TOTAL 2.8 mg/dL    0.2-1.2      H            



             (BEAKER) (test code =                                        



             377)                                                

 

             SODIUM (BEAKER) (test 134 meq/L    136-145      L            



             code = 381)                                         

 

             POTASSIUM (BEAKER) 3.5 meq/L    3.5-5.1                   



             (test code = 379)                                        

 

             CHLORIDE (BEAKER) 98 meq/L                         



             (test code = 382)                                        

 

             CO2 (BEAKER) (test 23 meq/L     22-29                     



             code = 355)                                         

 

             ANION GAP (BEAKER) 17 meq/L                               



             (test code = 345)                                        

 

             BLOOD UREA NITROGEN 19 mg/dL     7-21                      



             (BEAKER) (test code =                                        



             354)                                                

 

             CREATININE (BEAKER) 2.00 mg/dL   0.57-1.25    H            



             (test code = 358)                                        

 

             BUN/CREATININE RATIO 9.5                                    



             (BEAKER) (test code =                                        



             1800)                                               

 

             GLUCOSE RANDOM 97 mg/dL                         



             (BEAKER) (test code =                                        



             652)                                                

 

             CALCIUM (BEAKER) 7.6 mg/dL    8.4-10.2     L            



             (test code = 697)                                        

 

             AST (SGOT) (BEAKER) 208 U/L      5-34         H            



             (test code = 353)                                        

 

             ALT (SGPT) (BEAKER) 569 U/L      6-55         H            



             (test code = 347)                                        

 

             EGFR (BEAKER) (test 24 mL/min/1.73                           ESTIMA

SHAHRIAR GFR IS



             code = 1092) sq m                                   NOT AS ACCURATE

 AS



                                                                 CREATININE



                                                                 CLEARANCE IN



                                                                 PREDICTING



                                                                 GLOMERULAR



                                                                 FILTRATION RATE

.



                                                                 ESTIMATED GFR I

S



                                                                 NOT APPLICABLE 

FOR



                                                                 DIALYSIS PATIEN

TS.



 ID Joshua GRIFFITHS MSpecimen slightly [id number] 07:33:14





             Test Item    Value        Reference Range Interpretation Comments

 

             PHOSPHORUS (BEAKER) (test code = 2.4 mg/dL    2.3-4.7              

     



             604)                                                



 ID Joshua GRIFFITHS MHEPATIC FUNCTION [id number] 07:33:14





             Test Item    Value        Reference Range Interpretation Comments

 

             TOTAL PROTEIN (BEAKER) (test code = 7.9 gm/dL    6.0-8.3           

        



             770)                                                

 

             ALBUMIN (BEAKER) (test code = 1145) 3.4 g/dL     3.5-5.0      L    

        

 

             BILIRUBIN TOTAL (BEAKER) (test code 2.8 mg/dL    0.2-1.2      H    

        



             = 377)                                              

 

             BILIRUBIN DIRECT (BEAKER) (test 2.2 mg/dL    0.1-0.5      H        

    



             code = 706)                                         

 

             ALKALINE PHOSPHATASE (BEAKER) (test 587 U/L             H    

        



             code = 346)                                         

 

             AST (SGOT) (BEAKER) (test code = 208 U/L      5-34         H       

     



             353)                                                

 

             ALT (SGPT) (BEAKER) (test code = 569 U/L      6-55         H       

     



             347)                                                



 ID Joshua GRIFFITHS MSpecimen slightly [id number] 07:33:13





             Test Item    Value        Reference Range Interpretation Comments

 

             MAGNESIUM (BEAKER) (test code = 1.9 mg/dL    1.6-2.6               

    



             627)                                                



 ID - TUNDE [id number] 05:47:30





             Test Item    Value        Reference Range Interpretation Comments

 

             PARTIAL THROMBOPLASTIN TIME 64.9 seconds 22.5-36.0    H            



             (BEAKER) (test code = 760)                                        



CBC W/PLT COUNT &amp; AUTO [id number] 05:45:36





             Test Item    Value        Reference Range Interpretation Comments

 

             WHITE BLOOD CELL COUNT (BEAKER) 9.3 K/ L     3.5-10.5              

    



             (test code = 775)                                        

 

             RED BLOOD CELL COUNT (BEAKER) 3.68 M/ L    3.93-5.22    L          

  



             (test code = 761)                                        

 

             HEMOGLOBIN (BEAKER) (test code = 9.7 GM/DL    11.2-15.7    L       

     



             410)                                                

 

             HEMATOCRIT (BEAKER) (test code = 32.0 %       34.1-44.9    L       

     



             411)                                                

 

             MEAN CORPUSCULAR VOLUME (BEAKER) 87.0 fL      79.4-94.8            

     



             (test code = 753)                                        

 

             MEAN CORPUSCULAR HEMOGLOBIN 26.4 pg      25.6-32.2                 



             (BEAKER) (test code = 751)                                        

 

             MEAN CORPUSCULAR HEMOGLOBIN CONC 30.3 GM/DL   32.2-35.5    L       

     



             (BEAKER) (test code = 752)                                        

 

             RED CELL DISTRIBUTION WIDTH 18.8 %       11.7-14.4    H            



             (BEAKER) (test code = 412)                                        

 

             PLATELET COUNT (BEAKER) (test 139 K/CU MM  150-450      L          

  



             code = 756)                                         

 

             MEAN PLATELET VOLUME (BEAKER) 10.3 fL      9.4-12.3                

  



             (test code = 754)                                        

 

             NUCLEATED RED BLOOD CELLS 0 /100 WBC   0-0                       



             (BEAKER) (test code = 413)                                        

 

             NEUTROPHILS RELATIVE PERCENT 80 %                                  

 



             (BEAKER) (test code = 429)                                        

 

             LYMPHOCYTES RELATIVE PERCENT 8 %                                   

 



             (BEAKER) (test code = 430)                                        

 

             MONOCYTES RELATIVE PERCENT 10 %                                   



             (BEAKER) (test code = 431)                                        

 

             EOSINOPHILS RELATIVE PERCENT 2 %                                   

 



             (BEAKER) (test code = 432)                                        

 

             BASOPHILS RELATIVE PERCENT 0 %                                    



             (BEAKER) (test code = 437)                                        

 

             NEUTROPHILS ABSOLUTE COUNT 7.40 K/ L    1.56-6.13    H            



             (BEAKER) (test code = 670)                                        

 

             LYMPHOCYTES ABSOLUTE COUNT 0.71 K/ L    1.18-3.74    L            



             (BEAKER) (test code = 414)                                        

 

             MONOCYTES ABSOLUTE COUNT (BEAKER) 0.92 K/ L    0.24-0.36    H      

      



             (test code = 415)                                        

 

             EOSINOPHILS ABSOLUTE COUNT 0.15 K/ L    0.04-0.36                 



             (BEAKER) (test code = 416)                                        

 

             BASOPHILS ABSOLUTE COUNT (BEAKER) 0.02 K/ L    0.01-0.08           

      



             (test code = 417)                                        

 

             IMMATURE GRANULOCYTES-RELATIVE 1 %          0-1                    

   



             PERCENT (BEAKER) (test code =                                      

  



             2801)                                               



CALCIUM, [id number] 05:29:50





             Test Item    Value        Reference Range Interpretation Comments

 

             CALCIUM IONIZED (BEAKER) (test 0.91 mmol/L  1.12-1.27    L         

   



             code = 698)                                         

 

             PH, BLOOD (BEAKER) (test code = 7.36                               

    



             1810)                                               



[id number] 20:26:01





             Test Item    Value        Reference Range Interpretation Comments

 

             PARTIAL THROMBOPLASTIN TIME 84.6 seconds 22.5-36.0    H            



             (BEAKER) (test code = 760)                                        



[id number] 14:48:25





             Test Item    Value        Reference Range Interpretation Comments

 

             PARTIAL THROMBOPLASTIN TIME 85.3 seconds 22.5-36.0    H            



             (BEAKER) (test code = 760)                                        



SARS-COV2/RT-PCR ([facility])2021 12:31:15





             Test Item    Value        Reference Range Interpretation Comments

 

             SARS-COV2/RT-PCR (test Negative     Not Detected, Negative,        

      



             code = 7733273)              See external report for              



                                       linked test               

 

             SARS-COV-2 PERFORMING LAB [facility]                             



             (test code = 8343752)                                        



Negative result for this test determines that SARS-CoV-2 RNA was not present in 
the specimen above the Limit of Detection (LOD). However, Negative results do 
not preclude SARS-CoV-2 infection and should not be used as the sole basis for 
treatment or patient management decisions. Negative results must be combined 
with clinical observations, patient history, and epidemiological information. A 
false negative result may occur if a specimen is improperly collected, 
transported or handled. A false negative result should be considered if 
patient's recent exposures or clinical presentation indicate that COVID-19 
(SARS-CoV-2) is likely and diagnostic tests for other causes of illness are 
negative. Re-testing should be considered in cases of suspected false 
negatives.The limit of detection for this assay is 800 copies/mL.This SARS CoV-2
test is a real-time RT-PCR test intended for the qualitative detection of 
nucleic acid from SARS-CoV-2 in a nasopharyngeal swab specimen collected from 
individuals suspected of COVID-19 by their healthcare provider.This test has not
been Food and Drug Administration (FDA) cleared or approved. This is a modified 
version of an approved Emergency Use Authorization (EUA) and is in the process 
of review by the FDA. Once authorized by the FDA, the issued EUA will be 
effective until the declaration that circumstances exist justifying the 
authorization of the emergency use ofin vitro diagnostic tests for detection 
and/or diagnosis of COVID-19 is terminated under Section 564(b)(2) of the Act or
the EUA is revoked under Section 564(g) of the Act.Fact Sheet for Healthcare 
Prov
iders:https://www.[employer]/sites/default/files/product/documents/Fact_Sheet_HC

_Ucvmyyogk_Lsvn_QPTH-SnR-1.pdfFact Sheet for Healthcare 
Patients:https://www.[employer]/sites/default/files/product/docume
nts/Mvfe_Qyuyx_Djbcpnbc_Rupu_SWYK-RwB-0.pdfPerforming Laboratory:[facility].[place], [place] [address]M/S, ABDOMINAL, COMPLETE
2021 12:31:00Reason for exam:-&gt;Evaluate both liver and kidneys
************************************************************[facility] CALL[facility]AYERIC JACKSON : 1945 Sex: 
F************************************************************FINAL REPORT 
PATIENT ID: [id number] TECHNIQUE: Grayscale ultrasound of the abdomen. INDICATION:
Evaluate both liver and kidneys. COMPARISON: None. FINDINGS: MIDLINE 
VASCULATURE: The visualized inferior vena cava is unremarkable. The maximum 
visualized aortic diameter is 2 cm. LIVER: There is a questionable nodular liver
contour. No focal lesions. The main portal vein is patent and measures 1.1 cm in
diameter. BILIARY:Gallbladder: Not visualizedCommon bile duct measures 0.5 cm, 
within normal limits. Nointrahepatic biliary ductal dilatation. PANCREAS: 
Incompletely visualized due to overlying bowel gas. The partially visualized 
pancreatic body is normal. SPLEEN: No splenomegaly. The spleen measures 12.8 cm 
in length. PERITONEUM: No free fluid. KIDNEYS: Normal in size bilaterally. No 
hydronephrosis. No sonographically evident solid mass lesion. IMPRESSION: 1.The 
liver contour is questionably nodular.This could be due to cirrhosis. 2.The 
sonographic appearance of the kidneys is normal. Signed: Jf Shoreort 
Verified Date/Time: 2021 12:31:19 Electronically signed by: JF SHORE MD on 2021 12:31 PMANTI-NUCLEAR ANTIBODY (MAYITO)2021 09:35:17





             Test Item    Value        Reference Range Interpretation Comments

 

             ANTI-NUCLEAR ANTIBODY (MAYITO) (BEAKER) Negative     Negative         

         



             (test code = 418)                                        



Test performed by IFA method.Test performed by IFA method.POCT-GLUCOSE METER
2021 09:31:24





             Test Item    Value        Reference Range Interpretation Comments

 

             POC-GLUCOSE METER 106 mg/dL                        : TESTED A

[facility] [address]



             (BEAKER) (test code =                                        [facility],



             1538)                                               [address]:



                                                                 /Techni

lorelei ID



                                                                 = [id number] for LYNN LOPEZ



HEMOGLOBIN [id number] 09:24:34





             Test Item    Value        Reference Range Interpretation Comments

 

             HEMOGLOBIN A1C (BEAKER) (test code = 6.1 %        4.3-6.1          

         



             368)                                                



[id number] 07:45:01





             Test Item    Value        Reference Range Interpretation Comments

 

             PARTIAL THROMBOPLASTIN TIME 54.4 seconds 22.5-36.0    H            



             (BEAKER) (test code = 760)                                        



HEPATITIS A ANTIBODY, [id number] 07:14:26





             Test Item    Value        Reference Range Interpretation Comments

 

             HEPATITIS A IGG ANTIBODY (BEAKER) Reactive     Nonreactive  A      

      



             (test code = 2797)                                        



 ID - DBHEPATIC FUNCTION [id number] 07:03:51





             Test Item    Value        Reference Range Interpretation Comments

 

             TOTAL PROTEIN (BEAKER) 8.1 gm/dL    6.0-8.3                   Speci

men slightly



             (test code = 770)                                        hemolyzed

 

             ALBUMIN (BEAKER) (test 3.5 g/dL     3.5-5.0                   Speci

men slightly



             code = 1145)                                        hemolyzed

 

             BILIRUBIN TOTAL 3.4 mg/dL    0.2-1.2      H            Specimen sli

ghtly



             (BEAKER) (test code =                                        hemoly

zed



             377)                                                

 

             BILIRUBIN DIRECT 2.5 mg/dL    0.1-0.5      H            Specimen sl

ightly



             (BEAKER) (test code =                                        hemoly

zed



             706)                                                

 

             ALKALINE PHOSPHATASE 643 U/L             H            



             (BEAKER) (test code =                                        



             346)                                                

 

             AST (SGOT) (BEAKER) 355 U/L      5-34         H            Specimen

 slightly



             (test code = 353)                                        hemolyzed

 

             ALT (SGPT) (BEAKER) 739 U/L      6-55         H            Specimen

 slightly



             (test code = 347)                                        hemolyzed



 ID - DBSpecimen slightly ictericCOMPREHENSIVE METABOLIC [id number]
06:18:53





             Test Item    Value        Reference Range Interpretation Comments

 

             TOTAL PROTEIN 8.1 gm/dL    6.0-8.3                   Specimen sligh

tly



             (BEAKER) (test code =                                        hemoly

zed



             770)                                                

 

             ALBUMIN (BEAKER) 3.5 g/dL     3.5-5.0                   Specimen sl

ightly



             (test code = 1145)                                        hemolyzed

 

             ALKALINE PHOSPHATASE 643 U/L             H            



             (BEAKER) (test code =                                        



             346)                                                

 

             BILIRUBIN TOTAL 3.4 mg/dL    0.2-1.2      H            Specimen sli

ghtly



             (BEAKER) (test code =                                        hemoly

zed



             377)                                                

 

             SODIUM (BEAKER) (test 133 meq/L    136-145      L            



             code = 381)                                         

 

             POTASSIUM (BEAKER) 3.8 meq/L    3.5-5.1                   Specimen 

slightly



             (test code = 379)                                        hemolyzed

 

             CHLORIDE (BEAKER) 97 meq/L            L            



             (test code = 382)                                        

 

             CO2 (BEAKER) (test 24 meq/L     22-29                     



             code = 355)                                         

 

             BLOOD UREA NITROGEN 47 mg/dL     7-21         H            



             (BEAKER) (test code =                                        



             354)                                                

 

             CREATININE (BEAKER) 3.62 mg/dL   0.57-1.25    H            Specimen

 slightly



             (test code = 358)                                        hemolyzed

 

             GLUCOSE RANDOM 108 mg/dL           H            



             (BEAKER) (test code =                                        



             652)                                                

 

             CALCIUM (BEAKER) 9.1 mg/dL    8.4-10.2                  



             (test code = 697)                                        

 

             AST (SGOT) (BEAKER) 355 U/L      5-34         H            Specimen

 slightly



             (test code = 353)                                        hemolyzed

 

             ALT (SGPT) (BEAKER) 739 U/L      6-55         H            Specimen

 slightly



             (test code = 347)                                        hemolyzed

 

             EGFR (BEAKER) (test 12 mL/min/1.73                           ESTIMA

SHAHRIAR GFR IS



             code = 1092) sq m                                   NOT AS ACCURATE

 AS



                                                                 CREATININE



                                                                 CLEARANCE IN



                                                                 PREDICTING



                                                                 GLOMERULAR



                                                                 FILTRATION RATE

.



                                                                 ESTIMATED GFR I

S



                                                                 NOT APPLICABLE 

FOR



                                                                 DIALYSIS PATIEN

TS.



 ID - DBSpecimen slightly ictericHEPATITIS B SURFACE [id number] 
06:18:46





             Test Item    Value        Reference Range Interpretation Comments

 

             HEPATITIS B SURFACE ANTIBODY < mIU/mL     <8.0                     

 



             (BEAKER) (test code = 647)                                        



 ID - DBHEPATITIS B CORE ANTIBODY, [id number] 06:04:50





             Test Item    Value        Reference Range Interpretation Comments

 

             HEPATITIS B CORE TOTAL ANTIBODY Nonreactive  Nonreactive           

    



             (BEAKER) (test code = 497)                                        



 ID - DBHEPATITIS C [id number] 06:04:49





             Test Item    Value        Reference Range Interpretation Comments

 

             HEPATITIS C ANTIBODY (BEAKER) Nonreactive  Nonreactive             

  



             (test code = 367)                                        



 ID - DBHEPATITIS B SURFACE [id number] 06:04:48





             Test Item    Value        Reference Range Interpretation Comments

 

             HEPATITIS B SURFACE ANTIGEN (2) Nonreactive  Nonreactive           

    



             (BEAKER) (test code = 2585)                                        



Specimen is considered negative for HBsAg.HIV-1 ANTIGEN WITH HIV-1/2 ANTIBODY
2021 05:59:05





             Test Item    Value        Reference Range Interpretation Comments

 

             HIV-1 ANTIGEN WITH HIV 1\T\2 Nonreactive  Nonreactive              

 



             ANTIBODY (2) (BEAKER) (test code                                   

     



             = 2586)                                             



 ID - DBC-REACTIVE [id number] 05:54:16





             Test Item    Value        Reference Range Interpretation Comments

 

             C-REACTIVE PROTEIN (BEAKER) (test 8.74 mg/dL   0.00-0.50    H      

      



             code = 676)                                         



 ID - [id number] 05:54:15





             Test Item    Value        Reference Range Interpretation Comments

 

             AMYLASE (BEAKER) (test 114 U/L                          Speci

men slightly



             code = 349)                                         hemolyzed



 ID - DBSpecimen slightly [id number] 05:54:15





             Test Item    Value        Reference Range Interpretation Comments

 

             LIPASE (BEAKER) (test code = 749) 45 U/L       8-78                

      



 ID - DBSpecimen slightly [id number] 05:54:14





             Test Item    Value        Reference Range Interpretation Comments

 

             MAGNESIUM (BEAKER) 1.9 mg/dL    1.6-2.6                   Specimen 

slightly



             (test code = 627)                                        hemolyzed



 ID - [id number] 05:54:14





             Test Item    Value        Reference Range Interpretation Comments

 

             PHOSPHORUS (BEAKER) 3.4 mg/dL    2.3-4.7                   Specimen

 slightly



             (test code = 604)                                        hemolyzed



 ID - DBCBC W/PLT COUNT &amp; AUTO [id number] 05:19:04





             Test Item    Value        Reference Range Interpretation Comments

 

             WHITE BLOOD CELL COUNT (BEAKER) 11.1 K/ L    3.5-10.5     H        

    



             (test code = 775)                                        

 

             RED BLOOD CELL COUNT (BEAKER) 3.58 M/ L    3.93-5.22    L          

  



             (test code = 761)                                        

 

             HEMOGLOBIN (BEAKER) (test code = 9.5 GM/DL    11.2-15.7    L       

     



             410)                                                

 

             HEMATOCRIT (BEAKER) (test code = 30.7 %       34.1-44.9    L       

     



             411)                                                

 

             MEAN CORPUSCULAR VOLUME (BEAKER) 85.8 fL      79.4-94.8            

     



             (test code = 753)                                        

 

             MEAN CORPUSCULAR HEMOGLOBIN 26.5 pg      25.6-32.2                 



             (BEAKER) (test code = 751)                                        

 

             MEAN CORPUSCULAR HEMOGLOBIN CONC 30.9 GM/DL   32.2-35.5    L       

     



             (BEAKER) (test code = 752)                                        

 

             RED CELL DISTRIBUTION WIDTH 18.6 %       11.7-14.4    H            



             (BEAKER) (test code = 412)                                        

 

             PLATELET COUNT (BEAKER) (test 151 K/CU MM  150-450                 

  



             code = 756)                                         

 

             MEAN PLATELET VOLUME (BEAKER) 10.5 fL      9.4-12.3                

  



             (test code = 754)                                        

 

             NUCLEATED RED BLOOD CELLS 0 /100 WBC   0-0                       



             (BEAKER) (test code = 413)                                        

 

             NEUTROPHILS RELATIVE PERCENT 79 %                                  

 



             (BEAKER) (test code = 429)                                        

 

             LYMPHOCYTES RELATIVE PERCENT 8 %                                   

 



             (BEAKER) (test code = 430)                                        

 

             MONOCYTES RELATIVE PERCENT 9 %                                    



             (BEAKER) (test code = 431)                                        

 

             EOSINOPHILS RELATIVE PERCENT 3 %                                   

 



             (BEAKER) (test code = 432)                                        

 

             BASOPHILS RELATIVE PERCENT 0 %                                    



             (BEAKER) (test code = 437)                                        

 

             NEUTROPHILS ABSOLUTE COUNT 8.79 K/ L    1.56-6.13    H            



             (BEAKER) (test code = 670)                                        

 

             LYMPHOCYTES ABSOLUTE COUNT 0.84 K/ L    1.18-3.74    L            



             (BEAKER) (test code = 414)                                        

 

             MONOCYTES ABSOLUTE COUNT (BEAKER) 1.03 K/ L    0.24-0.36    H      

      



             (test code = 415)                                        

 

             EOSINOPHILS ABSOLUTE COUNT 0.34 K/ L    0.04-0.36                 



             (BEAKER) (test code = 416)                                        

 

             BASOPHILS ABSOLUTE COUNT (BEAKER) 0.02 K/ L    0.01-0.08           

      



             (test code = 417)                                        

 

             IMMATURE GRANULOCYTES-RELATIVE 1 %          0-1                    

   



             PERCENT (BEAKER) (test code =                                      

  



             2801)                                               



CALCIUM, [id number] 05:16:00





             Test Item    Value        Reference Range Interpretation Comments

 

             CALCIUM IONIZED (BEAKER) (test 1.07 mmol/L  1.12-1.27    L         

   



             code = 698)                                         

 

             PH, BLOOD (BEAKER) (test code = 7.33                               

    



             1810)                                               



[id number] 00:56:30





             Test Item    Value        Reference Range Interpretation Comments

 

             PARTIAL THROMBOPLASTIN TIME 51.5 seconds 22.5-36.0    H            



             (BEAKER) (test code = 760)                                        



HEPATIC FUNCTION PANEL2021-10-31 19:22:51





             Test Item    Value        Reference Range Interpretation Comments

 

             TOTAL PROTEIN (BEAKER) (test code = 7.2 gm/dL    6.0-8.3           

        



             770)                                                

 

             ALBUMIN (BEAKER) (test code = 1145) 3.2 g/dL     3.5-5.0      L    

        

 

             BILIRUBIN TOTAL (BEAKER) (test code 4.0 mg/dL    0.2-1.2      H    

        



             = 377)                                              

 

             BILIRUBIN DIRECT (BEAKER) (test 3.1 mg/dL    0.1-0.5      H        

    



             code = 706)                                         

 

             ALKALINE PHOSPHATASE (BEAKER) (test 665 U/L             H    

        



             code = 346)                                         

 

             AST (SGOT) (BEAKER) (test code = 511 U/L      5-34         H       

     



             353)                                                

 

             ALT (SGPT) (BEAKER) (test code = 925 U/L      6-55         H       

     



             347)                                                



 ID - HERB WOperator ID - DBSpecimen slightly ictericTSH/FREE T4 IF 
INDICATED2021-10-31 18:47:04





             Test Item    Value        Reference Range Interpretation Comments

 

             THYROID STIMULATING HORMONE 3.745 uIU/mL 0.350-4.940               



             (BEAKER) (test code = 772)                                        



 ID - HERB WVITAMIN B12 AND FOLATE2021-10-31 18:47:04





             Test Item    Value        Reference Range Interpretation Comments

 

             VITAMIN B12  1531 pg/mL   213-816      H            



             (BEAKER) (test code                                        



             = 774)                                              

 

             FOLATE (BEAKER) 15.90 ng/mL  See_Comment                [Automated 

message]



             (test code = 362)                                        The system

 which



                                                                 generated this 

result



                                                                 transmitted ref

erence



                                                                 range: >=7.00. 

The



                                                                 reference range

 was not



                                                                 used to interpr

et this



                                                                 result as



                                                                 normal/abnormal

.



 ID - HERB [id number] 18:47:03





             Test Item    Value        Reference Range Interpretation Comments

 

             FERRITIN (BEAKER) (test code = 488.15 ng/mL 5..00  H         

   



             361)                                                



 ID - HERB [id number] 18:28:38





             Test Item    Value        Reference Range Interpretation Comments

 

             PARTIAL THROMBOPLASTIN TIME 34.1 seconds 22.5-36.0                 



             (BEAKER) (test code = 760)                                        



PROTHROMBIN TIME/[id number] 18:28:02





             Test Item    Value        Reference Range Interpretation Comments

 

             PROTIME (BEAKER) 15.0 seconds 11.9-14.2    H            



             (test code = 759)                                        

 

             INR (BEAKER) (test 1.20         See_Comment                [Automat

ed message]



             code = 370)                                         The system [employer]



                                                                 generated this 

result



                                                                 transmitted ref

erence



                                                                 range: <=5.90. 

The



                                                                 reference range

 was



                                                                 not used to int

erpret



                                                                 this result as



                                                                 normal/abnormal

.



RECOMMENDED COUMADIN/WARFARIN INR THERAPY RANGESSTANDARD DOSE: 2.0 - 3.0 
Includes: PROPHYLAXIS for venous thrombosis, systemic embolization; TREATMENT 
for venous thrombosis and/or pulmonary embolus.HIGH RISK: Target INR is 2.5-3.5 
for patients with mechanical heart valves.BASIC METABOLIC PANEL2021-10-31 
18:15:05





             Test Item    Value        Reference Range Interpretation Comments

 

             SODIUM (BEAKER) 133 meq/L    136-145      L            



             (test code = 381)                                        

 

             POTASSIUM (BEAKER) 3.8 meq/L    3.5-5.1                   



             (test code = 379)                                        

 

             CHLORIDE (BEAKER) 97 meq/L            L            



             (test code = 382)                                        

 

             CO2 (BEAKER) (test 24 meq/L     22-29                     



             code = 355)                                         

 

             BLOOD UREA NITROGEN 44 mg/dL     7-21         H            



             (BEAKER) (test code                                        



             = 354)                                              

 

             CREATININE (BEAKER) 3.42 mg/dL   0.57-1.25    H            



             (test code = 358)                                        

 

             GLUCOSE RANDOM 147 mg/dL           H            



             (BEAKER) (test code                                        



             = 652)                                              

 

             CALCIUM (BEAKER) 8.6 mg/dL    8.4-10.2                  



             (test code = 697)                                        

 

             EGFR (BEAKER) (test 13 mL/min/1.73                           ESTIMA

SHAHRIAR GFR IS



             code = 1092) sq m                                   NOT AS ACCURATE

 AS



                                                                 CREATININE



                                                                 CLEARANCE IN



                                                                 PREDICTING



                                                                 GLOMERULAR



                                                                 FILTRATION RATE

.



                                                                 ESTIMATED GFR I

S



                                                                 NOT APPLICABLE 

FOR



                                                                 DIALYSIS PATIEN

TS.



 ID - HERB BARNESpecimeeric slightly ictericCBC W/PLT COUNT &amp; AUTO 
DIFFERENTIAL2021-10-31 18:13:04





             Test Item    Value        Reference Range Interpretation Comments

 

             WHITE BLOOD CELL COUNT (BEAKER) 10.2 K/ L    3.5-10.5              

    



             (test code = 775)                                        

 

             RED BLOOD CELL COUNT (BEAKER) 3.27 M/ L    3.93-5.22    L          

  



             (test code = 761)                                        

 

             HEMOGLOBIN (BEAKER) (test code = 8.8 GM/DL    11.2-15.7    L       

     



             410)                                                

 

             HEMATOCRIT (BEAKER) (test code = 28.1 %       34.1-44.9    L       

     



             411)                                                

 

             MEAN CORPUSCULAR VOLUME (BEAKER) 85.9 fL      79.4-94.8            

     



             (test code = 753)                                        

 

             MEAN CORPUSCULAR HEMOGLOBIN 26.9 pg      25.6-32.2                 



             (BEAKER) (test code = 751)                                        

 

             MEAN CORPUSCULAR HEMOGLOBIN CONC 31.3 GM/DL   32.2-35.5    L       

     



             (BEAKER) (test code = 752)                                        

 

             RED CELL DISTRIBUTION WIDTH 18.4 %       11.7-14.4    H            



             (BEAKER) (test code = 412)                                        

 

             PLATELET COUNT (BEAKER) (test 141 K/CU MM  150-450      L          

  



             code = 756)                                         

 

             MEAN PLATELET VOLUME (BEAKER) 10.1 fL      9.4-12.3                

  



             (test code = 754)                                        

 

             NUCLEATED RED BLOOD CELLS 0 /100 WBC   0-0                       



             (BEAKER) (test code = 413)                                        

 

             NEUTROPHILS RELATIVE PERCENT 83 %                                  

 



             (BEAKER) (test code = 429)                                        

 

             LYMPHOCYTES RELATIVE PERCENT 6 %                                   

 



             (BEAKER) (test code = 430)                                        

 

             MONOCYTES RELATIVE PERCENT 7 %                                    



             (BEAKER) (test code = 431)                                        

 

             EOSINOPHILS RELATIVE PERCENT 3 %                                   

 



             (BEAKER) (test code = 432)                                        

 

             BASOPHILS RELATIVE PERCENT 0 %                                    



             (BEAKER) (test code = 437)                                        

 

             NEUTROPHILS ABSOLUTE COUNT 8.50 K/ L    1.56-6.13    H            



             (BEAKER) (test code = 670)                                        

 

             LYMPHOCYTES ABSOLUTE COUNT 0.61 K/ L    1.18-3.74    L            



             (BEAKER) (test code = 414)                                        

 

             MONOCYTES ABSOLUTE COUNT (BEAKER) 0.73 K/ L    0.24-0.36    H      

      



             (test code = 415)                                        

 

             EOSINOPHILS ABSOLUTE COUNT 0.26 K/ L    0.04-0.36                 



             (BEAKER) (test code = 416)                                        

 

             BASOPHILS ABSOLUTE COUNT (BEAKER) 0.01 K/ L    0.01-0.08           

      



             (test code = 417)                                        

 

             IMMATURE GRANULOCYTES-RELATIVE 1 %          0-1                    

   



             PERCENT (BEAKER) (test code =                                      

  



             2801)                                               



IRON, TIBC, % SAT. (WITHOUT FERRITIN)2021-10-31 18:11:07





             Test Item    Value        Reference Range Interpretation Comments

 

             IRON (BEAKER) (test code = 547) 21.0 ug/dL   40.0-160.0   L        

    

 

             TOTAL IRON BINDING CAPACITY 244 ug/dL    250-450      L            



             (BEAKER) (test code = 769)                                        

 

             IRON % SATURATION (2) (BEAKER) 9 %          20-55        L         

   



             (test code = 2590)                                        



 ID - HERB [id number] 18:11:00





             Test Item    Value        Reference Range Interpretation Comments

 

             PHOSPHORUS (BEAKER) (test code = 3.1 mg/dL    2.3-4.7              

     



             604)                                                



 ID - HERB WLIPID [id number] 18:11:00





             Test Item    Value        Reference Range Interpretation Comments

 

             TRIGLYCERIDES (BEAKER) (test code = 163 mg/dL                      

        



             540)                                                

 

             CHOLESTEROL (BEAKER) (test code = 132 mg/dL                        

      



             631)                                                

 

             HDL CHOLESTEROL (BEAKER) (test code 11 mg/dL                       

        



             = 976)                                              

 

             LDL CHOLESTEROL CALCULATED (BEAKER) 88 mg/dL                       

        



             (test code = 633)                                        



Triglyceride Reference Range: Low Risk &lt;150 Borderline 150-199 High Risk 200-
499 Very High Risk &gt;=500Cholesterol Reference Range: Low Risk &lt;200 
Borderline 200-239 High Risk &gt;240HDL Cholesterol Reference Range: Low Risk 
&gt;=60 High Risk &lt;40LDL Cholesterol Reference Range: Optimal &lt;100 Near 
Optimal 100-129 Borderline 130-159 High 160-189 Very High &gt;=190  ID -
HERB WSpecimen slightly ictericMAGNESIUM2021-10-31 18:10:59





             Test Item    Value        Reference Range Interpretation Comments

 

             MAGNESIUM (DARRENAKER) (test code = 2.0 mg/dL    1.6-2.6               

    



             627)                                                



 ID - HERB WRAD, CHEST, 1 VIEW, NON DEPT2021-10-31 18:10:00Reason for 
exam:-&gt;chfShould this be performed at the bedside?-&gt;Yes
************************************************************[facility] : 1945 Sex: 
F************************************************************FINAL REPORT 
PATIENT ID: [id number] TECHNIQUE: One view of the chest. INDICATION: 76-year-old 
woman with congestive heart failure. COMPARISON: None. FINDINGS: LINES/TUBES: 
Right subclavian dual lumen catheter terminates over the expected region of the 
right atrium. LUNGS: Airspace opacities in the left midlung zone and bilateral 
lower lung zones. PLEURA: Suspected small bilateral pleural effusions. No p
neumothorax. HEART AND MEDIASTINUM: Markedly prominent cardiac silhouette. 
Atherosclerotic calcifications in the thoracic aorta. BONES AND SOFT TISSUES: 
Unremarkable. IMPRESSION:Markedly prominent cardiac silhouette. Underlying 
pericardial effusion cannot be excluded. Bilateral airspace opacities may r
epresent atelectasis, however pneumonia cannot be excluded. Suspected small 
bilateral pleural effusions. Signed: Yousif Sueroepkaterine Verified 
Date/Time: 10/31/2021 18:10:08 Reading Location: [address] Transitional 
Reading Room  Electronically signed by: YOUSIF SUERO MD on 10/31/2021 
06:10 PMHIGH SENSITIVITY TROPONIN -10-31 18:08:28





             Test Item    Value        Reference Range Interpretation Comments

 

             HIGH SENSITIVITY 8 pg/ml      See_Comment                [Automated

 message]



             TROPONIN I (test code =                                        The 

system which



             2081200)                                            generated this 

result



                                                                 transmitted ref

erence



                                                                 range: <=17. Th

e



                                                                 reference range

 was not



                                                                 used to interpr

et this



                                                                 result as



                                                                 normal/abnormal

.



 ID - HERB WThe  STAT High Sensitivity Troponin-I results 
should be used in conjunction with other diagnostic information such as ECG, 
clinical observations and information, and patient symptoms to aid in the 
diagnosis of MI.B-TYPE NATRIURETIC FACTOR (BNP)2021-10-31 18:07:35





             Test Item    Value        Reference Range Interpretation Comments

 

             B-TYPE NATRIURETIC PEPTIDE (BEAKER) 590 pg/mL    0-100        H    

        



             (test code = 700)                                        



 ID Joshua ESTEVEZ WSurgical pathology [id number] 17:08:01





             Test Item    Value        Reference Range Interpretation Comments

 

             Case number (test code = [id number]                           



             [id number])                                            

 

             Surgical pathology See link below for                           



             report (test code = PDF Lab Report                           



             2255)                                               

 

             Result status (test code This is Final Report                      

     



             = [id number])   for [id number]                           



[id number] 15:51:43Arash Schroeder CRNA 2021 
10:52 AMAirway Location: OR Performed by: CRNA/AAAuthorized by:Vivian Samuels MD Urgency: ElectiveDifficult Airway: No Preoxygenated with 100% O2: Yes 
C-spinePrecautions Maintained Throughout: Yes Mask Ventilation: Not 
attemptedFinal Airway Type: Endotracheal airwayFinal Endotracheal Airway: 
ETTCuffed: Yes Technique Used: Direct laryngoscopyDevices/Methods Used in 
Placement: Intubating styletBlade Type: MillerLaryngoscope 
Blade/Videolaryngoscope Blade Size: 2ETT Size (mm): 7.0Cuff at minimum occlusion
pressure: Yes Measured from: GumsETT to Gums (cm): 20Placement Verified by: CO2 
detection, direct visualization and equal breath sounds Laryngoscopic view:Grade
I - full view of glottisRapid Sequence Induction (RSI): Yes Modified RSI: No 
Number of Attempts at Approach: 1VENIPUNC NEED PHYS SKILL,DX OR [id number] 
19:03:00Scotty Lopez RN 5/3/2021 2:15 PMMidline Insertion Date/Time: 
5/3/2021 2:03 PMPerformed by: Scotty Lopez RNAuthorized by: Eddie Urrutia MD Consent: Consent obtained: Written Consentgiven by: Patient Risks 
discussed: Arterial puncture, incorrect placement, nerve damage, bleeding, in
fection, superficial thrombus and deep vein thrombus Alternatives discussed: No 
treatment and delayed treatmentUniversal protocol: Procedure explained and 
questions answered to patient or proxy's satisfaction: yes Relevant documents 
present and verified: yes Test results available and properly labeled: yes 
Imaging studies available: yes Required blood products, implants, devices, and 
special equipment available: yes Site/side marked: yes Immediately prior to 
procedure, a time out was called: yes Patient identity confirmed: Verbally with 
patient, arm band, provided demographic data and hospital-assigned 
identification numberPre-procedure details: Hand hygiene: Hand hygiene performed
prior to insertion Sterile barrier technique: All elements of maximal sterile 
technique followed Skin preparation: ChloraPrep Skin preparation agent: Dried 
prior to procedure Anesthesia (see MAR for exact dosages): Anesthesia method: 
Local infiltration Local anesthetic: Lidocaine 1% w/o epi Route administered: 
SubcutaneousMidLine Placement Details (Will create an LDA): Extremity 
Circumference Upper (cm): 36 Extremity Circumference Site: 36 Patient position: 
Flat Vessel Size (mm):  3 Indication: Poor venous access Location: Left brachial
Device Type: Non-valved Catheter Lumen(s): Single lumen Catheter size: 3 Fr Cat
heter to vein ratio: 36MidLine Characteristics: Catheter Brand: Right On Interactive External 
Catheter Length (cm): 0 Internal Catheter Length (cm): 12 Total Catheter Length 
(cm): 12 Catheter Lot Number: [id number] Catheter Expiration Date: 2022 
Micro-Introducer Lot Number: Procedure details: Landmarks identified:yes 
Ultrasound guidance: yes Sterile ultrasound techniques: Sterile gel and sterile 
probe covers were used Number of attempts: 1 Number of MidLine kits used during 
procedure: 1 Extra guide wire required?: No Purpose of procedure: Midline 
Placement Patency/Placement: Flushes without difficulty, flushedwith 10 mL 
normal saline, positive blood return, injection cap placed and ultrasound 
MidLine placed utilizing ultrasound-guided Modified Seldinger Technique: Yes 
Dressing/Securement: Antimicrobial dressing dry and intact, antimicrobial 
dressing applied and catheter securement device Blood Loss Amount:Less than 20 
mLPost-procedure details: Post-procedure: Dressing applied Patient tolerance of 
procedure: Tolerated well, no immediate complicationsXR Chest 1 Vw Portable
2021 12:31:35 Examination: XR CHEST 1 VW PORTABLE Clinical history: 
pneumonia Comparison: 3/5/2021 IMPRESSION: Lungs are clear and appear unchanged.
Right IJ lines have been removed. No pneumothoraces are identified. There are no
apparent pleural effusions. The cardiomediastinal silhouette and the remainder 
of thechest appear essentially unchanged. [id number]  Interface, Radiology 
Results 2021 7:34 AM CDTFormatting of this note might be 
different from the original.Examination: XR CHEST 1 VW PORTABLEClinical history:
pneumoniaComparison: 3/5/2021IMPRESSION: Lungs are clear and appear unchanged. 
Right IJ lines have been removed.No pneumothoraces are identified. There are no 
apparent pleural effusions.The cardiomediastinal silhouette and the remainder of
the chest appear essentially unchanged.[id number] HospitalOR [place] &lt; 1
[id number] 19:51:00EXAMINATION: OR FL &lt; 1 HOUR CLINICAL HISTORY: 
Intraoperative fluoroscopy IMPRESSION:Fluoroscopy was provided. No radiologist 
present. Please see procedure report for discussion of procedure, findings and 
fluoroscopic time. [facility]-[id number] Interface, Radiology Results Incoming 2021 1:54 PMCSTFormatting of this note might be different from the 
original.EXAMINATION: OR [place] &lt; 1 HOURCLINICAL HISTORY: Intraoperative 
fluoroscopyIMPRESSION:Fluoroscopy was provided. No radiologist present. Please 
see procedure report for discussion of procedure, findings and fluoroscopic 
time.[facility]L-[id number] FzklshwyHgnuor0725-07-09 16:20:38Tez May 
AYAKA 3/8/2021 10:20 AMAirway Location: OR Performed by: CRNA/AAAnesthesiologist: 
Nathaniel Mariscal, MDResident/CRNA/AA: Tez May RAuthorized by: 
Nathaniel Mariscal MD Urgency: ElectiveDifficult Airway: No Preoxygenated 
with 100% O2: Yes C-spine Precautions Maintained Throughout: Yes Mask 
Ventilation: Not attemptedFinal Airway Type: Supraglottic airwayFinal LMA: 
UniqueLMA Size: 4Number of Attempts at Approach: 1 Soft Tissue Intact, able to 
ventilate with no leak and minimal peak inspiratory pressuresXR Neck Soft Tissue
2021 16:15:23EXAMINATION: XR NECK SOFT TISSUE CLINICAL HISTORY: location 
of the dialysis cath COMPARISON: None IMPRESSION: 2 views were obtained. Right 
jugular dialysis catheter with tips in the superior vena cava.No prevertebral 
soft tissue thickening. There is 1 to 2 mm degenerative anterolisthesis of C3 on
C4,C4 on C5, and C5 on C6, with mild disc height loss at C4-5 and C5-6 with 
small anterior endplate osteophytes. [id number] Interface, Radiology Results 
2021 10:18 AM CSTFormatting of this note might be different 
from the original.EXAMINATION: XR NECK SOFT TISSUECLINICAL HISTORY: location of 
the dialysis cathCOMPARISON: NoneIMPRESSION:2 views were obtained.Right jugular 
dialysis catheter with tips in the superior vena cava.No prevertebral soft 
tissue thickening.There is 1 to 2 mm degenerative anterolisthesis of C3 on C4, 
C4 on C5, and C5 on C6, with mild disc height loss at C4-5 and C5-6 with small 
anterior endplate osteophytes.[id number] [id number]
23:45:47





             Test Item    Value        Reference Range Interpretation Comments

 

             Ventricular rate (test                                        



             code = 253)                                         

 

             Atrial rate (test code =                                        



             255)                                                

 

             AR interval (test code =                                        



             266)                                                

 

             QRSD interval (test code                                        



             = 260)                                              

 

             QT interval (test code =                                        



             264)                                                

 

             QTC interval (test code                                        



             = 265)                                              

 

             P axis 1 (test code =                                        



             267)                                                

 

             QRS axis 1 (test code =                                        



             268)                                                

 

             T wave axis (test code =                                        



             270)                                                

 

             EKG impression (test Unusual P axis,                           



             code = 273)  possible ectopic                           



                          atrial rhythm-Left                           



                          bundle branch                           



                          block-No previous                           



                          ECGs                                   



                          available-Electronica                           



                          lly Signed By Smith MD, Toussaint ()                           



                          on 3/5/2021 5:45:46                           



                          PM                                     



[facility] ED Preliminary Interpretation - Not an [id number] 
22:57:05





             Test Item    Value        Reference Range Interpretation Comments

 

             FREIDA (test code = FREIDA) James Wiley III, MD 3/6/2021 1:43                           



                          [facility] ED Preliminary                           



                          Interpretation - Not                           



                          an OrderPerformed by:                           



                          James Wiley III, MDAuthorized by:                           



                          James Wiley III, MD ECG reviewed                           



                          by ED Physician in the                           



                          absence of a                           



                          cardiologist: yes                           



                          Interpretation:                           



                          Interpretation:                           



                          abnormal Quality:                           



                          Tracing quality:                           



                          Limited by                             



                          artifactRate: ECG                           



                          rate: 88 ECG rate                           



                          assessment: normal                           



                          Rhythm: Rhythm: sinus                           



                          rhythm Ectopy: Ectopy:                           



                          none QRS: QRS axis:                           



                          Normal QRS intervals:                           



                          WideConduction:                           



                          Conduction: abnormal                           



                          Abnormal conduction:                           



                          complete LBBB ST                           



                          segments: ST segments:                           



                          Non-specificT waves: T                           



                          waves: non-specific                           

 

             Lab Interpretation Abnormal                               



             (test code = 11464-1)                                        



[facility]

## 2023-03-08 NOTE — P.HP
Certification for Inpatient


Patient admitted to: Inpatient


With expected LOS: >2 Midnights


Patient will require the following post-hospital care: None


Practitioner: I am a practitioner with admitting privileges, knowledge of 

patient current condition, hospital course, and medical plan of care.


Services: Services provided to patient in accordance with Admission requirements

found in Title 42 Section 412.3 of the Code of Federal Regulations





Patient History


Date of Service: 03/08/23


Reason for admission: Renal Failure, Uremia


History of Present Illness: 


Patient is a 77 year old female with chronic kidney disease, hypothyroidism, 

systolic CHF, and hypertension who was sent to the emergency department by Dr. Walls for initiation of dialysis. Patient required HD for 5 months in 2001 

after renal failure secondary to septic shock. Additionally, she required HD for

1 month 2 years ago for acute renal failure but was able to come off. Nephrology

has been monitoring her renal function every 4 weeks since and now her BUN is 

128, cr 5.24, GFR 8. Potassium stable at 3.4. No signs of fluid overload. 

Patient will be admitted for further management, initiation of dialysis. 





Allergies





codeine Allergy (Verified 02/03/22 06:35)


   Itching





Home medications list reviewed: Yes


Home Medications: 








Omeprazole [Prilosec] 40 mg PO DAILY 03/28/21 


Multivitamin with Iron [Daily Vitamin + Iron] 1 each PO DAILY #90 tablet 

03/30/21 


Levothyroxine Sodium [Unithroid] 75 mcg PO DAILY 04/21/21 


Amiodarone HCl [Cordarone*] 200 mg PO BID 02/02/22 


Apixaban [Eliquis *] 2.5 mg PO BID 02/02/22 


Diclofenac Sodium [Voltaren Arthritis Pain] 1 aria TP PRN PRN 02/02/22 


Furosemide [Lasix*] 120 mg PO AS DIRECTED 02/02/22 


Furosemide [Lasix] 80 mg PO AS DIRECTED 02/02/22 


Gabapentin [Gralise] 600 mg PO BEDTIME 02/02/22 


Metoprolol Succinate 25 mg PO BID 02/02/22 








- Past Medical/Surgical History


Diabetic: No


-: HTN


-: CKD5


-: CHF, systolic


-: Knee surgery


-: Hysterectomy


-: HD catheter placement


Psychosocial/ Personal History: Patient lives at home with her 





- Family History


  ** Brother


-: Heart disease





  ** Mother


-: Heart disease





  ** Sister


-: Heart disease





- Social History


Smoking Status: Never smoker


Alcohol use: No


CD- Drugs: No


Caffeine use: Yes


Place of Residence: Home





Review of Systems


Unremarkable





Physical Examination





- Vital Signs


Temperature: 98.2 F


Blood Pressure: 117/69


Pulse: 62


Respirations: 17


Pulse Ox (%): 100





- Physical Exam


General: Alert, In no apparent distress


HEENT: Atraumatic, PERRLA, EOMI, Sclerae nonicteric


Neck: Supple, 2+ carotid pulse no bruit


Respiratory: Clear to auscultation bilaterally, Normal air movement


Cardiovascular: Regular rate/rhythm, Normal S1 S2


Gastrointestinal: Normal bowel sounds, No tenderness


Musculoskeletal: No tenderness


Integumentary: No rashes


Neurological: Normal speech, Normal affect





- Studies


Laboratory Data (last 24 hrs)





03/08/23 21:37: Lipase 30


03/08/23 21:37: Sodium 132 L, Potassium 3.4 L,  H, Creatinine 5.24 H*, 

Glucose 143 H


03/08/23 21:37: WBC 8.90, Hgb 13.1, Hct 39.3, Plt Count 164








Assessment and Plan





- Problems (Diagnosis)


(1) Chronic kidney disease (CKD)


Current Visit: Yes   Status: Chronic   


Qualifiers: 


   Chronic kidney disease stage: stage 5, not on chronic dialysis   Qualified 

Code(s): N18.5 - Chronic kidney disease, stage 5   





(2) CAD (coronary artery disease)


Current Visit: Yes   Status: Chronic   


Qualifiers: 


   Coronary Disease-Associated Artery/Lesion type: native artery   Native vs. 

transplanted heart: native heart   Associated angina: without angina   Qualified

 Code(s): I25.10 - Atherosclerotic heart disease of native coronary artery 

without angina pectoris   





(3) GERD (gastroesophageal reflux disease)


Current Visit: Yes   Status: Chronic   


Qualifiers: 


   Esophagitis presence: esophagitis presence not specified   Qualified Code(s):

 K21.9 - Gastro-esophageal reflux disease without esophagitis   





(4) Hypertension


Current Visit: Yes   Status: Chronic   


Qualifiers: 


   Hypertension type: primary hypertension   Qualified Code(s): I10 - Essential 

(primary) hypertension   





(5) Hypothyroidism


Current Visit: Yes   Status: Chronic   


Qualifiers: 


   Hypothyroidism type: acquired   Qualified Code(s): E03.9 - Hypothyroidism, 

unspecified   





(6) Congestive heart failure (CHF)


Current Visit: Yes   Status: Chronic   


Qualifiers: 


   Heart failure type: systolic   Heart failure chronicity: chronic   Qualified 

Code(s): I50.22 - Chronic systolic (congestive) heart failure   





- Plan


Patient is admitted for initiation of hemodialysis. 


General surgery consulted for placement of tunneled HD catheter.


NPO at midnight. 


Nephrology consult-Dr. Walls. 


Recheck renal function in the morning. Electrolytes stable at this time.


120 mg IV lasix daily per Dr. Walls.


Monitor and replete electrolytes per protocol.


Reconcile and continue home medications.





Discharge Plan: Home


Plan to discharge in: Greater than 2 days





- Advance Directives


Does patient have a Living Will: No


Does patient have a Durable POA for Healthcare: No





- Code Status/Comfort Care


Code Status Assessed: Yes


Code Status: Full Code


Physician Review: Patient Assessed, Agree with Above Assessment and Plan


Critical Care: No


Time Spent Managing Pts Care (In Minutes): 50

## 2023-03-09 LAB
ALBUMIN SERPL BCP-MCNC: 3.9 G/DL (ref 3.4–5)
ALP SERPL-CCNC: 253 U/L (ref 45–117)
ALT SERPL W P-5'-P-CCNC: 25 U/L (ref 13–56)
AST SERPL W P-5'-P-CCNC: 26 U/L (ref 15–37)
BUN BLD-MCNC: 132 MG/DL (ref 7–18)
GLUCOSE SERPLBLD-MCNC: 108 MG/DL (ref 74–106)
HBV SURFACE AB SER-ACNC: 15.93 MIU/ML (ref ?–8)
HCT VFR BLD CALC: 39.4 % (ref 36–45)
HDLC SERPL-MCNC: 51 MG/DL (ref 40–60)
LDLC SERPL CALC-MCNC: 91 MG/DL (ref ?–130)
LYMPHOCYTES # SPEC AUTO: 0.8 K/UL (ref 0.7–4.9)
MAGNESIUM SERPL-MCNC: 2.9 MG/DL (ref 1.6–2.4)
MCV RBC: 87.1 FL (ref 80–100)
NT-PROBNP SERPL-MCNC: 2154 PG/ML (ref ?–450)
PMV BLD: 10.4 FL (ref 7.6–11.3)
POTASSIUM SERPL-SCNC: 3.2 MMOL/L (ref 3.5–5.1)
RBC # BLD: 4.52 M/UL (ref 3.86–4.86)
TSH SERPL DL<=0.05 MIU/L-ACNC: 0.35 UIU/ML (ref 0.36–3.74)

## 2023-03-09 PROCEDURE — 0JH63XZ INSERTION OF TUNNELED VASCULAR ACCESS DEVICE INTO CHEST SUBCUTANEOUS TISSUE AND FASCIA, PERCUTANEOUS APPROACH: ICD-10-PCS

## 2023-03-09 PROCEDURE — 02HV33Z INSERTION OF INFUSION DEVICE INTO SUPERIOR VENA CAVA, PERCUTANEOUS APPROACH: ICD-10-PCS

## 2023-03-09 RX ADMIN — Medication SCH ML: at 09:00

## 2023-03-09 RX ADMIN — DEXTROSE MONOHYDRATE SCH: 50 INJECTION, SOLUTION INTRAVENOUS at 09:00

## 2023-03-09 RX ADMIN — HYDROMORPHONE HYDROCHLORIDE ONE MG: 1 INJECTION, SOLUTION INTRAMUSCULAR; INTRAVENOUS; SUBCUTANEOUS at 15:00

## 2023-03-09 RX ADMIN — ACETAMINOPHEN PRN MG: 500 TABLET, FILM COATED ORAL at 23:30

## 2023-03-09 RX ADMIN — HYDROMORPHONE HYDROCHLORIDE ONE MG: 1 INJECTION, SOLUTION INTRAMUSCULAR; INTRAVENOUS; SUBCUTANEOUS at 15:05

## 2023-03-09 RX ADMIN — HEPARIN SODIUM ONE UNIT: 5000 INJECTION, SOLUTION INTRAVENOUS; SUBCUTANEOUS at 14:27

## 2023-03-09 RX ADMIN — ACETAMINOPHEN PRN MG: 500 TABLET, FILM COATED ORAL at 17:22

## 2023-03-09 RX ADMIN — LIDOCAINE HYDROCHLORIDE ONE ML: 10 INJECTION, SOLUTION EPIDURAL; INFILTRATION; INTRACAUDAL; PERINEURAL at 13:30

## 2023-03-09 RX ADMIN — LIDOCAINE HYDROCHLORIDE ONE ML: 10 INJECTION, SOLUTION EPIDURAL; INFILTRATION; INTRACAUDAL; PERINEURAL at 14:21

## 2023-03-09 RX ADMIN — Medication SCH ML: at 21:00

## 2023-03-09 RX ADMIN — HEPARIN SODIUM ONE UNIT: 5000 INJECTION, SOLUTION INTRAVENOUS; SUBCUTANEOUS at 14:26

## 2023-03-09 NOTE — PREOPCON
Date of Consultation:  03/09/2023



Reason:  Patient needs dialysis.



History Of Present Illness:  Patient is a 77-year-old female with multiple medical problems who has b
een following Dr. Walls for kidney disease several years and she did require temporary dialysis in
 the past and now she is ready for dialysis again with elevated BUN and creatinine.  GFR 8.  I was co
nsulted for tunneled dialysis catheter.  Patient is awake, alert.  No fever or chills.  No sore throa
t, runny nose, cough, headaches, or dizziness.  No chest pain.



Review of Systems:

Otherwise unremarkable.



Past Medical History:  Significant for hypertension; kidney disease, stage 5; CHF, systolic.



Past Surgical History:  Knee surgery, hysterectomy, and placement of hemodialysis catheter.



Allergies:  INCLUDE CODEINE.



Social History:  Patient does not smoke or drink alcohol.



Family History:  Significant for heart disease.  Her vitals are stable.  She is currently afebrile.  
She is awake, alert, and oriented x3.



Physical Examination:

Head and Neck:  No masses. 

Chest:  Clear. 

Heart:  S1, S2.  

Abdomen:  Soft. 

Extremities:  Neurovascularly intact. 

Neuro:  Nonfocal.



Laboratory Data:  White count is 8.9, H and H are 13.1 and 39.3, platelets are 164.  Potassium is 3.4
, BUN is 128, creatinine is 5.24.



Assessment:  A 77-year-old female with acute renal failure.



Plan:  N.p.o., IV antibiotic in to the OR for placement of a tunneled dialysis catheter.  Patient und
erstands the risks, benefits, and alternatives and agrees to procedure.





AS/MODL

DD:  03/09/2023 13:56:05Voice ID:  [id number]

DT:  03/09/2023 16:09:16Report ID:  [id number]

## 2023-03-09 NOTE — RAD REPORT
EXAM DESCRIPTION:  RAD - Chest Single View - 3/9/2023 3:10 pm

 

CLINICAL HISTORY:  Status post Tesio catheter

Chest pain.

 

COMPARISON:  Chest Single View dated 11/20/2022; Chest Single View dated 10/30/2021; Chest Single Vie
w dated 10/30/2021; Chest Single View dated 10/27/2021

 

FINDINGS:  Right-sided venous catheter has been placed. Tip is in the superior vena cava. No postproc
edure pneumothorax.

## 2023-03-09 NOTE — OP
Date of Procedure:  03/09/2023



Surgeon:  Yves Ackerman MD



Preoperative Diagnosis:  Acute renal failure.



Postoperative Diagnosis:  Acute renal failure.



Procedure:  Placement of right IJ Tesio catheter and interpretation of intraoperative fluoroscopy.



Estimated Blood Loss:  Minimal.



Specimen:  None.



Findings:  Normal anatomy.



Anesthesia:  General.



Complications:  None.



Disposition:  The patient tolerated the procedure in stable condition and taken to Recovery in good g
eneral condition.



Description Of Procdure:  The patient was brought to the OR and placed in supine position.  General a
nesthesia begun.  The patient was prepped and draped in the usual sterile fashion.  Lidocaine 1% infi
ltrated locally.  An 18-gauge needle was used to access the right IJ vein.  Guidewire passed and posi
tion confirmed with fluoroscopy.  Then, 1 cm counter incision made on the right anterior chest.  Tunn
eling device was used to tunnel the catheter between the 2 wounds and then Seldinger technique used a
nd tip of the catheter placed in the distal SVC region under fluoroscopy.  Catheter flushed with hepa
rin and packed with heparin with good blood flow and then 3-0 chromic used to reapproximate subcutane
ous tissue and close the skin and 3-0 nylon used to secure the tube to the chest wall.  Sterile 

dressing applied.  The patient was awakened and taken to recovery room in good general condition.





AS/MODL

DD:  03/09/2023 14:48:22Voice ID:  [id number]

DT:  03/09/2023 15:33:31Report ID:  [id number]

## 2023-03-09 NOTE — CON
Date of Consultation:  03/09/2023



Reason For Consultation:  Elevated BUN and creatinine, fluid management.



History Of Present Illness:  This is a 77-year-old female, well known to me from the office with sign
ificant past medical history of hypertension, hyperlipidemia, osteoarthritis, CAD status post PTCA an
d ICD placement, complicated with congestive heart failure, PAD, chronic kidney disease advanced stag
e 5 status post acute kidney injury required dialysis before, weaned from dialysis back in January 20
22.  The patient lately seen by Cardiology and with ambulatory intravascular measurement monitor incl
uding the right heart pressure, found to have elevation in the right heart pressure.  For that reason
, increment in her diuresis has been happening in the last couple of months.  Gradually, her kidney f
unction also started declining after that.  The patient visited with her cardiologist last week.  Las
ix has been increased further to 120 mg and metolazone has been add.  The patient came to the office 
yesterday complaining from fatigue, weakness, nausea without any vomiting.  Lab done as outpatient sh
owed significant elevation in BUN and creatinine above 5 and .  For that reason, we referred t
he patient to the hospital to initiate on renal replacement therapy.  The patient was started on meto
lazone by her cardiologist.  Her sodium has dropped to 132.  The patient was feeling weak.  The patie
nt continued to have shortness of breath and orthopnea even though that she would not have significan
t leg swelling.



Past Medical History:  Includes;

1.Hypertension.

2.Hyperlipidemia.

3.CAD complicated with congestive heart failure, status post ICD and PTCA.

4.PAD.

5.Chronic kidney disease, stage 5, mostly progression to end-stage renal disease.

6.Hyperlipidemia.



Past Surgical History:  Includes;

1.PermCath placement and removal.

2.Cardiac cath.

3.Hysterectomy.

4.Knee surgery.



Family History:  Positive for CAD and hypertension.



Social History:  Lives with .  Denied smoking.  Denied drinking.  Denied drugs abuse.



Home Medications:  Include;

1.Omeprazole.

2.Multivitamin.

3.Levothyroxine.

4.Amiodarone.

5.Eliquis.

6.Lasix 120 mg daily.

7.Metolazone 2.5 daily.

8.Gabapentin.

9.Metoprolol.



Review of Systems:

Head and Neck:  No red eye.  No ear pain. 

GI:  No nausea.  No vomiting. 

:  No polyuria.  No dysuria.  No hematuria. 

Gyn:  No vaginal discharge. 

Respiratory:  Has shortness of breath. 

Cardiovascular:  Has leg swelling. 

Endocrine:  No polydipsia. 

Skin:  No rash. 

Neuro:  Has weakness. 

Musculoskeletal:  Generalized fatigue.



Physical Examination:

Vital Signs:  When I saw the patient; blood pressure 140/62, pulse of 55, afebrile. 

Chest:  Faint rales bilateral base. 

Heart:  S1, S2.  Regular.  Systolic murmur. 

Abdomen:  Soft, nontender. 

Extremity:  Trace edema. 

Neurologic:  Alert.  No focality.  Only tremor.



Laboratory Data:  Sodium 132, potassium 3.2, bicarb 23, , creatinine 5.2, calcium 9.3, phospho
arturo 6.8, magnesium 2.9.  BNP 2154.  TSH 0.3, hemoglobin 13.1.



Current Medications:  The patient on include Tylenol, Lasix 120 daily.



Assessment And Plan:  

1.Acute kidney injury on advanced chronic kidney disease secondary to cardiorenal, over volume with 
uremic symptoms.  I am going to go ahead and proceed with renal replacement therapy.  We will consult
 Surgery for placement of the dialysis catheter and we will follow up the patient.  After replacement
 of the catheter, I am going to go ahead and arrange for outpatient dialysis and we will initiate nickolas
lysis.

2.Hypertension.  We will continue to utilize blood pressure for more diuresis.

3.Anemia of chronic kidney disease.  Hemoglobin above 11.  No need for GISELL.

4.Hyponatremia, will be corrected with dialysis.  It is dilutional secondary to renal and cardiac fa
ilure.

5.Hypokalemia.  I am going to hold on supplement.  The patient is going to be dialyzed on high potas
sium bath and we will follow up for.

6.Diabetes as by primary.

7.Coronary artery disease with congestive heart failure.  We will optimize the fluid status with nickolas
lysis. 

Time spent examining the patient face-to-face, reviewing data lab and radiology, placing order, discu
ssing the case with the patient, discussing the case with the nursing staff and hospitalist with Xiomara shahid more than 65 minutes.





JONY

DD:  03/09/2023 13:23:42Voice ID:  [id number]

DT:  03/09/2023 14:52:56Report ID:  [id number]

## 2023-03-09 NOTE — EKG
Test Date:    2023-03-08               Test Time:    22:01:37

Technician:   AS                                     

                                                     

MEASUREMENT RESULTS:                                       

Intervals:                                           

Rate:         51                                     

IN:           94                                     

QRSD:         258                                    

QT:           736                                    

QTc:          678                                    

Axis:                                                

P:            67                                     

IN:           94                                     

QRS:          -39                                    

T:            92                                     

                                                     

INTERPRETIVE STATEMENTS:                                       

                                                     

*** Poor data quality, interpretation may be adversely affected

Electronic ventricular pacemaker

Compared to ECG 11/20/2022 15:27:34

Atrial-sensed ventricular-paced complex(es) or rhythm no longer present



Electronically Signed On 03-09-23 16:19:26 CST by Luis Alberto Flaherty

## 2023-03-10 LAB
ALBUMIN SERPL BCP-MCNC: 3.5 G/DL (ref 3.4–5)
BUN BLD-MCNC: 141 MG/DL (ref 7–18)
GLUCOSE SERPLBLD-MCNC: 103 MG/DL (ref 74–106)
IRON SERPL-MCNC: 48 UG/DL (ref 50–170)
POTASSIUM SERPL-SCNC: 3.1 MMOL/L (ref 3.5–5.1)
RBC # BLD: 4.23 M/UL (ref 3.86–4.86)
TRANSFERRIN SERPL-MCNC: 213 MG/DL (ref 200–360)
TSH SERPL DL<=0.05 MIU/L-ACNC: 0.23 UIU/ML (ref 0.36–3.74)
URATE SERPL-MCNC: 16.5 MG/DL (ref 2.6–6)

## 2023-03-10 PROCEDURE — 5A1D70Z PERFORMANCE OF URINARY FILTRATION, INTERMITTENT, LESS THAN 6 HOURS PER DAY: ICD-10-PCS

## 2023-03-10 RX ADMIN — ACETAMINOPHEN PRN MG: 500 TABLET, FILM COATED ORAL at 14:58

## 2023-03-10 RX ADMIN — DEXTROSE MONOHYDRATE SCH: 50 INJECTION, SOLUTION INTRAVENOUS at 09:00

## 2023-03-10 RX ADMIN — Medication PRN MG: at 23:10

## 2023-03-10 RX ADMIN — HEPARIN SODIUM PRN UNIT: 1000 INJECTION, SOLUTION INTRAVENOUS; SUBCUTANEOUS at 13:02

## 2023-03-10 RX ADMIN — ACETAMINOPHEN PRN MG: 500 TABLET, FILM COATED ORAL at 23:10

## 2023-03-10 RX ADMIN — Medication SCH: at 09:00

## 2023-03-10 RX ADMIN — HEPARIN SODIUM PRN UNIT: 1000 INJECTION, SOLUTION INTRAVENOUS; SUBCUTANEOUS at 10:57

## 2023-03-10 RX ADMIN — ONDANSETRON PRN MG: 2 INJECTION INTRAMUSCULAR; INTRAVENOUS at 12:12

## 2023-03-10 RX ADMIN — Medication SCH ML: at 21:00

## 2023-03-10 RX ADMIN — Medication PRN MG: at 00:28

## 2023-03-10 NOTE — P.PN
Subjective


Date of Service: 03/10/23


Chief Complaint: Renal Failure, Uremia


Subjective: No new changes, Other (Received HD today.)





Physical Examination





- Vital Signs


Temperature: 96.5 F


Blood Pressure: 136/65


Pulse: 55


Respirations: 14


Pulse Ox (%): 96





- Physical Exam


General: Other (Appears as her stated age)


HEENT: Atraumatic, Normocephalic


Neck: Supple


Respiratory: Other (Symmetric chest expansion)


Cardiovascular: No rubs, No murmurs


Gastrointestinal: Soft and benign, No guarding


Musculoskeletal: No clubbing


Integumentary: No warmth


Neurological: Normal tone


Urinary: Other (No bladder distention)


External genitalia: Deferred


Rectal: Deferred





Assessment And Plan





- Plan





1. Acute kidney injury on advanced chronic kidney disease secondary to 

cardiorenal, over volume 


with uremic symptoms. HD started today.  HD again tomorrow then MWF starting 

next wk.  Outpt HD placement c/o case mngt.


2. Hypertension. We will continue to utilize blood pressure for more diuresis.


3. Anemia of chronic kidney disease. Hemoglobin above 11. No need for GISELL.


4. Hyponatremia, will be corrected with dialysis. It is dilutional secondary to 

renal and cardiac 


failure.


5. Hypokalemia. I am going to hold on supplement. The patient is going to be 

dialyzed on high 


potassium bath and we will follow up for.


6. DM2.  Mngt per primary team.


7. Coronary artery disease with congestive heart failure. We will optimize the 

fluid status with 


dialysis. 


Physician Review: Patient Assessed, Agree with Above Assessment and Plan

## 2023-03-11 LAB
ALBUMIN SERPL BCP-MCNC: 3.5 G/DL (ref 3.4–5)
BUN BLD-MCNC: 85 MG/DL (ref 7–18)
GLUCOSE SERPLBLD-MCNC: 103 MG/DL (ref 74–106)
POTASSIUM SERPL-SCNC: 3.1 MMOL/L (ref 3.5–5.1)

## 2023-03-11 RX ADMIN — ONDANSETRON PRN MG: 2 INJECTION INTRAMUSCULAR; INTRAVENOUS at 00:18

## 2023-03-11 RX ADMIN — ACETAMINOPHEN PRN MG: 500 TABLET, FILM COATED ORAL at 12:19

## 2023-03-11 RX ADMIN — Medication SCH: at 09:00

## 2023-03-11 RX ADMIN — ONDANSETRON PRN MG: 2 INJECTION INTRAMUSCULAR; INTRAVENOUS at 12:19

## 2023-03-11 RX ADMIN — Medication SCH: at 21:00

## 2023-03-11 RX ADMIN — HEPARIN SODIUM PRN UNIT: 1000 INJECTION, SOLUTION INTRAVENOUS; SUBCUTANEOUS at 15:02

## 2023-03-11 RX ADMIN — DEXTROSE MONOHYDRATE SCH: 50 INJECTION, SOLUTION INTRAVENOUS at 09:00

## 2023-03-11 RX ADMIN — HEPARIN SODIUM PRN UNIT: 1000 INJECTION, SOLUTION INTRAVENOUS; SUBCUTANEOUS at 12:30

## 2023-03-11 NOTE — P.PN
Subjective


Date of Service: 03/09/23


Subjective: No new changes, No C/O voiced, Improving





Schedule for hemodialysis access catheter.  Then we will go ahead and arrange 

for outpatient hemodialysis.





Review of Systems


10-point ROS is otherwise unremarkable





Physical Examination





- Vital Signs


Temperature: 97.7 F


Blood Pressure: 126/62


Pulse: 65


Respirations: 18


Pulse Ox (%): 96





- Physical Exam


General: Alert, In no apparent distress, Oriented x3


Respiratory: Clear to auscultation bilaterally, Normal air movement


Cardiovascular: Regular rate/rhythm, Normal S1 S2, No murmurs


Gastrointestinal: Normal bowel sounds, Soft and benign, Non-distended, No 

tenderness


Musculoskeletal: No clubbing, No swelling, No tenderness


Neurological: Sensation intact, Cranial nerves 3-12 intact





- Studies


Medications List Reviewed: Yes





Assessment & Plan





- Problems (Diagnosis)


(1) ESRD (end stage renal disease) on dialysis


Current Visit: No   Status: Acute   





(2) CAD (coronary artery disease)


Current Visit: Yes   Status: Chronic   


Qualifiers: 


   Coronary Disease-Associated Artery/Lesion type: native artery   Native vs. 

transplanted heart: native heart   Associated angina: without angina   Qualified

Code(s): I25.10 - Atherosclerotic heart disease of native coronary artery 

without angina pectoris   





(3) Congestive heart failure (CHF)


Current Visit: Yes   Status: Chronic   


Qualifiers: 


   Heart failure type: systolic   Heart failure chronicity: chronic   Qualified 

Code(s): I50.22 - Chronic systolic (congestive) heart failure   





(4) GERD (gastroesophageal reflux disease)


Current Visit: Yes   Status: Chronic   


Qualifiers: 


   Esophagitis presence: esophagitis presence not specified   Qualified Code(s):

K21.9 - Gastro-esophageal reflux disease without esophagitis   





(5) Hypertension


Current Visit: Yes   Status: Chronic   


Qualifiers: 


   Hypertension type: primary hypertension   Qualified Code(s): I10 - Essential 

(primary) hypertension   





(6) Hypothyroidism


Current Visit: Yes   Status: Chronic   


Qualifiers: 


   Hypothyroidism type: acquired   Qualified Code(s): E03.9 - Hypothyroidism, 

unspecified   





- Plan





1.  Arrange for hemodialysis access catheter


2.  Arrange for outpatient hemodialysis


3.  Hepatitis profile pending


4.  Appreciate nephrology consultation


5.  Gi DVT prophylaxis


Discharge Plan: Home


Plan to discharge in: Greater than 2 days





- Advance Directives


Does patient have a Living Will: No


Does patient have a Durable POA for Healthcare: No





- Code Status/Comfort Care


Code Status: Full Code


Physician Review: Patient Assessed, Agree with Above Assessment and Plan


Critical Care: No


Time Spent Managing PTS Care (In Minutes): 35

## 2023-03-11 NOTE — P.PN
Subjective


Date of Service: 03/11/23


Chief Complaint: Renal Failure, Uremia


Subjective 


Pt with CKD , admitted for CHF exacerbation, started on HD for Uremia 





Today 


feels better 


no edema on Exam 


2nd HD today 


Next HD on mOnday 


outpatient dialysis arrangement 





Physical exam


General: AAOx3, NAD 


HEET: no changes in vision, moist mucous membrane 


neck supple, no elevated JVD 


CHEST; CTAB, no wheezes or rales


HEART : RRR. Normal S1,2 no murmur or rub 


Abd: soft, Nt


Ext: no edema 


Skin : No rash








A/p 


#Acute kidney injury on  chronic kidney disease secondary to cardiorenal, 


Started on HD for Uremia HD today  then MWF starting next wk.  Outpt HD 

placement c/o case mngt.








# Hypertension. We will continue to utilize blood pressure for more diuresis.








# Anemia of chronic kidney disease. Hemoglobin above 11. No need for GISELL.





# Hyponatremia, will be corrected with dialysis. It is dilutional secondary to 

renal and cardiac 


failure.





#DM2.  Mngt per primary team.








#CHF 


euvolemic now 


cont HD 


low salt diet 





Physical Examination





- Vital Signs


Temperature: 97.0 F


Blood Pressure: 169/56


Pulse: 60


Respirations: 16


Pulse Ox (%): 98





Assessment And Plan


Physician Review: Patient Assessed, Agree with Above Assessment and Plan

## 2023-03-11 NOTE — P.PN
Date of Service: 03/11/23





Subjective


headache afte HD; feeling tired. O/w no new complaints





Review of Systems


10-point ROS is otherwise unremarkable





Physical Examination





- Vital Signs


reviewed





- Physical Exam


General: Alert, In no apparent distress, Oriented x3


Respiratory: Clear to auscultation bilaterally, Normal air movement


Cardiovascular: Regular rate/rhythm, Normal S1 S2, No murmurs


Gastrointestinal: Normal bowel sounds, Soft and benign, Non-distended, No 

tenderness


Musculoskeletal: No clubbing, No swelling, No tenderness


Neurological: Sensation intact, Cranial nerves 3-12 intact





Assessment & Plan





- Problems (Diagnosis)


(1) ESRD (end stage renal disease) on dialysis


Current Visit: No   Status: Acute   





(2) CAD (coronary artery disease)


Current Visit: Yes   Status: Chronic   


Qualifiers: 


   Coronary Disease-Associated Artery/Lesion type: native artery   Native vs. 

transplanted heart: native heart   Associated angina: without angina   Qualified

Code(s): I25.10 - Atherosclerotic heart disease of native coronary artery 

without angina pectoris   





(3) Congestive heart failure (CHF)


Current Visit: Yes   Status: Chronic   


Qualifiers: 


   Heart failure type: systolic   Heart failure chronicity: chronic   Qualified 

Code(s): I50.22 - Chronic systolic (congestive) heart failure   





(4) GERD (gastroesophageal reflux disease)


Current Visit: Yes   Status: Chronic   


Qualifiers: 


   Esophagitis presence: esophagitis presence not specified   Qualified Code(s):

K21.9 - Gastro-esophageal reflux disease without esophagitis   





(5) Hypertension


Current Visit: Yes   Status: Chronic   


Qualifiers: 


   Hypertension type: primary hypertension   Qualified Code(s): I10 - Essential 

(primary) hypertension   





(6) Hypothyroidism


Current Visit: Yes   Status: Chronic   


Qualifiers: 


   Hypothyroidism type: acquired   Qualified Code(s): E03.9 - Hypothyroidism, 

unspecified   





- Plan


Cont w/POC as mentioned below:


1.  S/p hemodialysis access catheter


2.  Arrange for outpatient hemodialysis


3.  Hepatitis profile reviewed


4.  Appreciate nephrology consultation


5.  Gi DVT prophylaxis

## 2023-03-11 NOTE — P.PN
Date of Service: 03/10/23





Subjective


Pt is doing well with no new complaints; clinical symptoms continue to improve





Review of Systems


10-point ROS is otherwise unremarkable





Physical Examination





- Vital Signs


reviewed





- Physical Exam


General: Alert, In no apparent distress, Oriented x3


Respiratory: Clear to auscultation bilaterally, Normal air movement


Cardiovascular: Regular rate/rhythm, Normal S1 S2, No murmurs


Gastrointestinal: Normal bowel sounds, Soft and benign, Non-distended, No 

tenderness


Musculoskeletal: No clubbing, No swelling, No tenderness


Neurological: Sensation intact, Cranial nerves 3-12 intact





Assessment & Plan





- Problems (Diagnosis)


(1) ESRD (end stage renal disease) on dialysis


Current Visit: No   Status: Acute   





(2) CAD (coronary artery disease)


Current Visit: Yes   Status: Chronic   


Qualifiers: 


   Coronary Disease-Associated Artery/Lesion type: native artery   Native vs. 

transplanted heart: native heart   Associated angina: without angina   Qualified

Code(s): I25.10 - Atherosclerotic heart disease of native coronary artery 

without angina pectoris   





(3) Congestive heart failure (CHF)


Current Visit: Yes   Status: Chronic   


Qualifiers: 


   Heart failure type: systolic   Heart failure chronicity: chronic   Qualified 

Code(s): I50.22 - Chronic systolic (congestive) heart failure   





(4) GERD (gastroesophageal reflux disease)


Current Visit: Yes   Status: Chronic   


Qualifiers: 


   Esophagitis presence: esophagitis presence not specified   Qualified Code(s):

K21.9 - Gastro-esophageal reflux disease without esophagitis   





(5) Hypertension


Current Visit: Yes   Status: Chronic   


Qualifiers: 


   Hypertension type: primary hypertension   Qualified Code(s): I10 - Essential 

(primary) hypertension   





(6) Hypothyroidism


Current Visit: Yes   Status: Chronic   


Qualifiers: 


   Hypothyroidism type: acquired   Qualified Code(s): E03.9 - Hypothyroidism, 

unspecified   





- Plan


Cont w/POC as mentioned below:


1.  S/p hemodialysis access catheter


2.  Arrange for outpatient hemodialysis


3.  Hepatitis profile reviewed


4.  Appreciate nephrology consultation


5.  Gi DVT prophylaxis

## 2023-03-12 LAB
ALBUMIN SERPL BCP-MCNC: 3.8 G/DL (ref 3.4–5)
ALBUMIN SERPL BCP-MCNC: 3.9 G/DL (ref 3.4–5)
ALP SERPL-CCNC: 251 U/L (ref 45–117)
ALT SERPL W P-5'-P-CCNC: 19 U/L (ref 13–56)
AST SERPL W P-5'-P-CCNC: 26 U/L (ref 15–37)
BUN BLD-MCNC: 48 MG/DL (ref 7–18)
BUN BLD-MCNC: 48 MG/DL (ref 7–18)
GLUCOSE SERPLBLD-MCNC: 97 MG/DL (ref 74–106)
GLUCOSE SERPLBLD-MCNC: 98 MG/DL (ref 74–106)
HCT VFR BLD CALC: 40.7 % (ref 36–45)
LYMPHOCYTES # SPEC AUTO: 1.1 K/UL (ref 0.7–4.9)
MAGNESIUM SERPL-MCNC: 2.4 MG/DL (ref 1.6–2.4)
MCV RBC: 88.1 FL (ref 80–100)
PMV BLD: 10.1 FL (ref 7.6–11.3)
POTASSIUM SERPL-SCNC: 3.5 MMOL/L (ref 3.5–5.1)
POTASSIUM SERPL-SCNC: 3.6 MMOL/L (ref 3.5–5.1)
RBC # BLD: 4.62 M/UL (ref 3.86–4.86)

## 2023-03-12 RX ADMIN — AMIODARONE HYDROCHLORIDE SCH: 200 TABLET ORAL at 08:37

## 2023-03-12 RX ADMIN — Medication SCH ML: at 08:34

## 2023-03-12 RX ADMIN — ACETAMINOPHEN PRN MG: 500 TABLET, FILM COATED ORAL at 00:09

## 2023-03-12 RX ADMIN — METOPROLOL SUCCINATE SCH: 25 TABLET, EXTENDED RELEASE ORAL at 08:36

## 2023-03-12 RX ADMIN — APIXABAN SCH MG: 2.5 TABLET, FILM COATED ORAL at 20:57

## 2023-03-12 RX ADMIN — FAMOTIDINE SCH MG: 20 TABLET, FILM COATED ORAL at 08:32

## 2023-03-12 RX ADMIN — METOPROLOL SUCCINATE SCH MG: 25 TABLET, EXTENDED RELEASE ORAL at 20:57

## 2023-03-12 RX ADMIN — Medication SCH ML: at 22:03

## 2023-03-12 RX ADMIN — GABAPENTIN SCH MG: 300 CAPSULE ORAL at 20:57

## 2023-03-12 RX ADMIN — LEVOTHYROXINE SODIUM SCH MG: 75 TABLET ORAL at 08:32

## 2023-03-12 NOTE — P.PN
Subjective


Date of Service: 03/12/23


Chief Complaint: Renal Failure, Uremia


Subjective 


Pt with CKD , admitted for CHF exacerbation, started on HD for Uremia 





Today 


denied nausea, vomiting , diarrhea or constipation 


no edema on Exam 


2nd HD yesterday 


Next HD on monday 


outpatient dialysis arrangement 





Physical exam


General: AAOx3, NAD 


HEET: no changes in vision, moist mucous membrane 


neck supple, no elevated JVD 


CHEST; CTAB, no wheezes or rales


HEART : RRR. Normal S1,2 no murmur or rub 


Abd: soft, Nt


Ext: no edema 


Skin : No rash








A/p 


#Acute kidney injury on  chronic kidney disease secondary to cardiorenal, 


Started on HD for Uremia HD today  then MWF starting next wk.  Outpt HD 

placement c/o case mngt.








# Hypertension. We will continue to utilize blood pressure for more diuresis.








# Anemia of chronic kidney disease. Hemoglobin above 11. No need for GISELL.





# Hyponatremia, will be corrected with dialysis. It is dilutional secondary to 

renal and cardiac 


failure.





#DM2.  Mngt per primary team.








#CHF 


euvolemic now 


cont HD 


low salt diet 





Physical Examination





- Vital Signs


Temperature: 97.0 F


Blood Pressure: 123/52


Pulse: 68


Respirations: 16


Pulse Ox (%): 99





- Studies


Medications List Reviewed: Yes





Assessment And Plan


Physician Review: Patient Assessed, Agree with Above Assessment and Plan

## 2023-03-13 LAB
ALBUMIN SERPL BCP-MCNC: 3.7 G/DL (ref 3.4–5)
BUN BLD-MCNC: 60 MG/DL (ref 7–18)
GLUCOSE SERPLBLD-MCNC: 108 MG/DL (ref 74–106)
POTASSIUM SERPL-SCNC: 3.5 MMOL/L (ref 3.5–5.1)

## 2023-03-13 RX ADMIN — Medication SCH: at 09:00

## 2023-03-13 RX ADMIN — HEPARIN SODIUM PRN UNIT: 1000 INJECTION, SOLUTION INTRAVENOUS; SUBCUTANEOUS at 18:32

## 2023-03-13 RX ADMIN — HEPARIN SODIUM PRN UNIT: 1000 INJECTION, SOLUTION INTRAVENOUS; SUBCUTANEOUS at 15:57

## 2023-03-13 RX ADMIN — FAMOTIDINE SCH MG: 20 TABLET, FILM COATED ORAL at 09:49

## 2023-03-13 RX ADMIN — GABAPENTIN SCH: 300 CAPSULE ORAL at 20:38

## 2023-03-13 RX ADMIN — LEVOTHYROXINE SODIUM SCH MG: 75 TABLET ORAL at 09:49

## 2023-03-13 RX ADMIN — METOPROLOL SUCCINATE SCH MG: 25 TABLET, EXTENDED RELEASE ORAL at 20:37

## 2023-03-13 RX ADMIN — ACETAMINOPHEN PRN MG: 500 TABLET, FILM COATED ORAL at 03:40

## 2023-03-13 RX ADMIN — AMIODARONE HYDROCHLORIDE SCH MG: 200 TABLET ORAL at 09:49

## 2023-03-13 RX ADMIN — METOPROLOL SUCCINATE SCH MG: 25 TABLET, EXTENDED RELEASE ORAL at 09:48

## 2023-03-13 RX ADMIN — APIXABAN SCH MG: 2.5 TABLET, FILM COATED ORAL at 09:49

## 2023-03-13 RX ADMIN — APIXABAN SCH MG: 2.5 TABLET, FILM COATED ORAL at 20:43

## 2023-03-13 RX ADMIN — Medication SCH ML: at 20:39

## 2023-03-13 NOTE — P.PN
Date of Service: 03/12/23





Subjective


Pt is doing well with no new changes;   Hopefully we can get discharged after 

hemodialysis tomorrow.  No other complaints voiced.





Review of Systems


10-point ROS is otherwise unremarkable





Physical Examination





- Vital Signs


reviewed





- Physical Exam


General: Alert, In no apparent distress, Oriented x3


Respiratory: Clear to auscultation bilaterally, Normal air movement


Cardiovascular: Regular rate/rhythm, Normal S1 S2, No murmurs


Gastrointestinal: Normal bowel sounds, Soft and benign, Non-distended, No 

tenderness


Musculoskeletal: No clubbing, No swelling, No tenderness


Neurological: Sensation intact, Cranial nerves 3-12 intact





Assessment & Plan





- Problems (Diagnosis)


(1) ESRD (end stage renal disease) on dialysis


Current Visit: No   Status: Acute   





(2) CAD (coronary artery disease)


Current Visit: Yes   Status: Chronic   


Qualifiers: 


   Coronary Disease-Associated Artery/Lesion type: native artery   Native vs. 

transplanted heart: native heart   Associated angina: without angina   Qualified

Code(s): I25.10 - Atherosclerotic heart disease of native coronary artery 

without angina pectoris   





(3) Congestive heart failure (CHF)


Current Visit: Yes   Status: Chronic   


Qualifiers: 


   Heart failure type: systolic   Heart failure chronicity: chronic   Qualified 

Code(s): I50.22 - Chronic systolic (congestive) heart failure   





(4) GERD (gastroesophageal reflux disease)


Current Visit: Yes   Status: Chronic   


Qualifiers: 


   Esophagitis presence: esophagitis presence not specified   Qualified Code(s):

K21.9 - Gastro-esophageal reflux disease without esophagitis   





(5) Hypertension


Current Visit: Yes   Status: Chronic   


Qualifiers: 


   Hypertension type: primary hypertension   Qualified Code(s): I10 - Essential 

(primary) hypertension   





(6) Hypothyroidism


Current Visit: Yes   Status: Chronic   


Qualifiers: 


   Hypothyroidism type: acquired   Qualified Code(s): E03.9 - Hypothyroidism, 

unspecified   





- Plan


Cont w/POC as mentioned below:


1.  S/p hemodialysis access catheter


2.  Arrange for outpatient hemodialysis


3.  Hepatitis profile reviewed


4.  Appreciate nephrology consultation


5.  Gi DVT prophylaxis

## 2023-03-13 NOTE — P.PN
Subjective


Date of Service: 03/13/23


Chief Complaint: Renal Failure, Uremia


No acute events overnight. She denies any concerns this morning. She is waiting 

for placement in an outpatient dialysis chair prior to discharge. Plan for 

hemodialysis today.





Review of Systems


10-point ROS is otherwise unremarkable





Physical Examination





- Vital Signs


Temperature: 96.7 F


Blood Pressure: 106/55


Pulse: 53


Respirations: 14


Pulse Ox (%): 94





- Physical Exam


General: Alert, In no apparent distress, Oriented x3


HEENT: Atraumatic, Mucous membr. moist/pink, EOMI, Sclerae nonicteric


Neck: JVD not distended


Respiratory: Clear to auscultation bilaterally, Normal air movement


Cardiovascular: No edema, Regular rate/rhythm, Normal S1 S2, No gallops, No 

rubs, No murmurs


Gastrointestinal: Normal bowel sounds, Soft and benign, Non-distended, No 

tenderness, No rebound, No guarding


Musculoskeletal: No clubbing


Integumentary: No rashes


Neurological: Normal speech, Cranial nerves 3-12 intact, Normal affect





- Studies


Medications List Reviewed: Yes





Assessment And Plan





- Plan


# Chronic Kidney Disease Stage V now progressed to End-Stage Renal Disease 

recently started on Hemodialysis


# Microscopic Hematuria


- Nephrology consulted - recommendations appreciated


   - Initiated on hemodialysis


- Appreciate CM with assistance for outpatient dialysis chair


   - Anticipate discharge once arranged





# Hyperthyroidism secondary to Over-supplementation


# History of Hypothyroidism


- TSH 0.227, Free T4 2.18


- She is asymptomatic


- Discontinue home levothyroxine





# Escherichia Coli Urinary Tract Infection


- Does not meet sepsis criteria


- Treat with a 5 day course of cefdinir (renally dosed)





# Chronic Atrial Fibrillation


- Continue home amiodarone, metoprolol, apixaban





# Coronary Artery Disease


# Chronic Compensated Systolic Congestive Heart Failure (LVEF 25-30 %)


# Pulmonary Hypertension


# Hypertension


- Continue home metoprolol


- Volume status to be managed via hemodialysis





# Gastroesophageal Reflux Disease


- Continue home famotidine








Carlos Maurice M.D.

## 2023-03-14 VITALS — OXYGEN SATURATION: 95 %

## 2023-03-14 VITALS — SYSTOLIC BLOOD PRESSURE: 121 MMHG | TEMPERATURE: 96.7 F | DIASTOLIC BLOOD PRESSURE: 56 MMHG

## 2023-03-14 LAB
ALBUMIN SERPL BCP-MCNC: 3.5 G/DL (ref 3.4–5)
BUN BLD-MCNC: 40 MG/DL (ref 7–18)
GLUCOSE SERPLBLD-MCNC: 127 MG/DL (ref 74–106)
POTASSIUM SERPL-SCNC: 4 MMOL/L (ref 3.5–5.1)

## 2023-03-14 RX ADMIN — Medication PRN MG: at 00:47

## 2023-03-14 RX ADMIN — AMIODARONE HYDROCHLORIDE SCH MG: 200 TABLET ORAL at 09:00

## 2023-03-14 RX ADMIN — APIXABAN SCH MG: 2.5 TABLET, FILM COATED ORAL at 09:00

## 2023-03-14 RX ADMIN — FAMOTIDINE SCH MG: 20 TABLET, FILM COATED ORAL at 09:00

## 2023-03-14 RX ADMIN — ACETAMINOPHEN PRN MG: 500 TABLET, FILM COATED ORAL at 00:46

## 2023-03-14 RX ADMIN — METOPROLOL SUCCINATE SCH MG: 25 TABLET, EXTENDED RELEASE ORAL at 09:00

## 2023-03-14 RX ADMIN — Medication SCH ML: at 09:00

## 2023-03-14 NOTE — P.DS
Admission Date: 03/08/23


Discharge Date: 03/14/23


Disposition: ROUTINE DISCHARGE


Discharge Condition: GOOD


Reason for Admission: Renal Failure, Uremia


Consultations: 


1. Nephrology


2. General Surgery


Procedures: 


- 03/09/2023 - Tunneled Hemodialysis Catheter Placement


Hospital Course: 





DIAGNOSES:


# Chronic Kidney Disease Stage V now progressed to End-Stage Renal Disease 

recently started on Hemodialysis


# Hyperthyroidism secondary to Over-supplementation


# History of Hypothyroidism


# Escherichia Coli Urinary Tract Infection


# Chronic Atrial Fibrillation


# Coronary Artery Disease


# Chronic Compensated Systolic Congestive Heart Failure (LVEF 25-30 %)


# Pulmonary Hypertension


# Hypertension


# Gastroesophageal Reflux Disease


# Microscopic Hematuria





HOSPITAL COURSE:


Ms. Sydney Smith is a 77 year old female with a past medical history 

significant for chronic kidney disease stage V, hypothyroidism, chronic atrial 

fibrillation, coronary artery disease, chronic systolic congestive heart failure

with reduced ejection fraction (LVEF 25-30 %), pulmonary hypertension, and 

hypertension who was admitted to the [facility] on 

03/08/2023 for initiation of hemodialysis.





She was admitted to the Medicine service. Nephrology was consulted and she was 

evaluated by Dr. Walls. He recommended initiation of hemodialysis. General 

Surgery was consulted and she was evaluated by Dr. Ackerman. On 03/09/2023, she 

underwent placement of a tunneled dialysis catheter. She was initiated on hemod

ialysis and did well. She had no issues during her hospitalization. With the 

assistance of case management, an outpatient dialysis chair was arranged at 

[facility]. I spoke with Dr. Walls, who has cleared her for 

discharge.





Incidentally, she was found to have hyperthyroidism on her lab work. This was 

thought to be secondary to oversupplementation of levothyroxine. This medication

was held and she was advised to follow-up with her PCP for repeat thyroid 

function tests in a couple of weeks. Additionally, she was found to have 

microscopic hematuria. She was counseled that this may represent an underlying 

urologic malignancy. She was advised to follow this up with her PCP once her 

urinary tract infection has resolved in about 1 week. She verbalized 

understanding and agreed to schedule this appointment.





Additionally, her rivaroxaban was switched to apixaban. She was counseled to 

make this medication change and a new prescription was sent to her pharmacy.





On 03/14/2023, she was seen on morning rounds and deemed medically stable for 

discharge. She was discharged with instructions to schedule follow-up 

appointments with her PCP (Dr. Morrow) and with Nephrology (Dr. Walls). She

was provided prescriptions for apixaban and cefdinir. She was given the 

opportunity to ask questions and reported no further questions. Furthermore, all

questions were answered to the best of my ability.





A copy of this discharge summary will be sent to the above providers to 

facilitate continuity of care.





Today, I personally spent 25 minutes on her case, of which greater than 50% of 

the time was spent in patient education, counseling, and coordination of care as

described above.








- Physical Exam


General: Alert, In no apparent distress, Oriented x3


HEENT: Atraumatic, Mucous membr. moist/pink, Sclerae nonicteric


Neck: JVD not distended


Respiratory: Clear to auscultation bilaterally, Normal air movement


Cardiovascular: No edema, Regular rate/rhythm, No murmurs


Gastrointestinal: Normal bowel sounds, Soft, Non-distended, No tenderness


Musculoskeletal: No clubbing


Integumentary: No rashes


Neurological: Normal speech, Normal affect


Vital Signs/Physical Exam: 














Temp Pulse Resp BP Pulse Ox


 


 96.7 F L  59   16   121/56 L  96 


 


 03/14/23 12:00  03/14/23 12:00  03/14/23 12:00  03/14/23 12:00  03/14/23 12:00








Laboratory Data at Discharge: 














WBC  8.90 K/uL (4.3-10.9)   03/12/23  05:20    


 


Hgb  13.3 g/dL (12.0-15.0)   03/12/23  05:20    


 


Hct  40.7 % (36.0-45.0)   03/12/23  05:20    


 


Plt Count  141 K/uL (152-406)  L  03/12/23  05:20    


 


Sodium  129 mmol/L (136-145)  L  03/14/23  03:08    


 


Potassium  4.0 mmol/L (3.5-5.1)  D 03/14/23  03:08    


 


BUN  40 mg/dL (7-18)  H  03/14/23  03:08    


 


Creatinine  4.00 mg/dL (0.55-1.02)  H  03/14/23  03:08    


 


Glucose  127 mg/dL ()  H  03/14/23  03:08    


 


Uric Acid  16.5 mg/dL (2.6-6.0)  H  03/10/23  02:52    


 


Phosphorus  2.8 mg/dL (2.5-4.9)   03/14/23  03:08    


 


Magnesium  2.4 mg/dL (1.6-2.4)   03/12/23  05:20    


 


Total Bilirubin  0.5 mg/dL (0.2-1.0)   03/12/23  05:20    


 


AST  26 U/L (15-37)   03/12/23  05:20    


 


ALT  19 U/L (13-56)   03/12/23  05:20    


 


Alkaline Phosphatase  251 U/L ()  H  03/12/23  05:20    


 


Triglycerides  199 mg/dL (<150)  H  03/09/23  01:58    


 


Cholesterol  182 mg/dL (<200)   03/09/23  01:58    


 


HDL Cholesterol  51 mg/dL (40-60)   03/09/23  01:58    


 


Cholesterol/HDL Ratio  3.57   03/09/23  01:58    


 


Lipase  30 U/L (13-75)   03/08/23  21:37    








Home Medications: 








Amiodarone HCl [Cordarone*] 200 mg PO BID 02/02/22 


Gabapentin [Gralise] 600 mg PO BEDTIME 02/02/22 


Metoprolol Succinate 25 mg PO BID 02/02/22 


Famotidine 20 mg PO DAILY 03/09/23 


Apixaban [Eliquis *] 2.5 mg PO BID #60 tab 03/14/23 


Cefdinir [Cefdinir*] 300 mg PO SuMoWeFr@1700 4 Days #2 cap 03/14/23 





New Medications: 


Cefdinir [Cefdinir*] 300 mg PO SuMoWeFr@1700 4 Days #2 cap


Apixaban [Eliquis *] 2.5 mg PO BID #60 tab


Physician Discharge Instructions: 


1. Please call and schedule a follow-up appointment with your PCP (Dr. Morrow)

in 3-5 days


   - Please stop taking rivaroxaban (Xarelto) and start taking apixaban 

(Eliquis)


      - Please have your PCP refill these medications


   - Your thyroid function tests were too high, please discontinue your 

levothyroxine and have your thyroid levels rechecked with your PCP


   - There was blood noted on your urine study. Although this can be seen in 

several conditions, we always want to exclude/evaluate for bladder or kidney 

cancer. Please discuss this with your PCP and you may be referred to Urology for

further evaluation.


2. Please call and schedule a follow-up appointment with your Nephrologist (Dr. Walls) in 5-7 days


Diet: Renal


Activity: Ad ryan


Followup: 


Ritu Walls MD [ACTIVE - CAN ADMIT] - 


Arnel Morrow MD [Primary Care Provider] - 


Time spent managing pt's care (in minutes): 25

## 2023-03-14 NOTE — PN
Date of Progress Note:  03/14/2023



Chief Complaint:  Acute-on-chronic kidney injury secondary to cardiorenal syndrome.



Subjective:  The patient was admitted with congestive heart failure and uremia.  The patient was star
shahriar on dialysis.  She developed severe acute kidney injury due to cardiorenal syndrome and nonoliguri
c ATN.  The patient underwent dialysis with ultrafiltration to control fluid overload and provide man
agement for congestive heart failure.



Review of Systems:

The patient denies nausea or vomiting.  Denies chest pain or palpitations.



Physical Examination:

Lungs:  Clear to auscultation bilaterally. 

Heart:  S1, S2.  

Abdomen:  Soft. 

Extremities:  No edema.



Impression And Plan:  

1.Acute kidney injury on chronic kidney disease secondary to cardiorenal syndrome.  The patient was 
found to have severely elevated azotemia and the patient is started on hemodialysis.  The patient danelle
l continue dialysis 3 times per week.

2.Hypertension.  Continue blood pressure medication.

3.Anemia of chronic kidney disease.  Hemoglobin is about 11.  GISELL is on hold.

4.Hyponatremia, due to volume overload and dilutional and asymptomatic.  Continue low-sodium diet an
d p.o. fluid restriction and continue dialysis to obtain negative fluid balance.

5.Diabetes mellitus.  Continue insulin.  Recommendation as per primary team.





KENNETH/MODL

DD:  03/14/2023 21:22:18Voice ID:  [id number]

DT:  03/14/2023 21:56:32Report ID:  [id number]

## 2023-03-14 NOTE — PN
Date of Progress Note:  03/13/2023



Chief Complaint:  Uremia, chronic kidney disease, congestive heart failure exacerbation, acute kidney
 injury, and cardiorenal syndrome.



Subjective:  The patient initiated on dialysis today.  She underwent dialysis with ultrafiltration.  
The patient denies chest pain or palpitations.  Nausea and vomiting resolved.



Physical Examination:

General:  Not in acute distress. 

Lungs:  Clear to auscultation bilaterally. 

Heart:  S1, S2. 

Abdomen:  Soft. 

Extremities:  No edema.



Impression And Plan:  

1.Acute kidney injury on chronic kidney disease secondary to cardiorenal syndrome.  The patient was 
found to have severely elevated azotemia and the patient is started on dialysis.   is isabela
[facility] outpatient dialysis set up.

2.Hypertension.  Continue blood pressure medication.

3.Anemia of chronic kidney disease.  Hemoglobin is above 11.  GISELL is on hold.

4.Hyponatremia due to volume overload dilutional secondary to renal and cardiac failure.  Continue l
ow-sodium diet and p.o. fluid restriction.  Continue dialysis to obtain negative fluid balance and to
 control azotemia.

5.Diabetes mellitus.  Continue insulin management per primary team.





EB/MODL

DD:  03/13/2023 23:24:35Voice ID:  [id number]

DT:  03/14/2023 00:25:05Report ID:  [id number]

## 2024-09-03 NOTE — ER
Nurse's Notes                                                                                     

 [facility]                                                                 

Name: Sydney Smith                                                                             

Age: 75 yrs                                                                                       

Sex: Female                                                                                       

: 1945                                                                                   

MRN: [id number]                                                                                   

Arrival Date: 2021                                                                          

Time: 09:36                                                                                       

Account#: [id number]                                                                            

Bed 18                                                                                            

Private MD:                                                                                       

Diagnosis: Generalized abdominal pain;Nausea and vomiting                                         

                                                                                                  

Presentation:                                                                                     

                                                                                             

09:37 Chief complaint: EMS states: Pt c/o abdominal and back pain, also reports N/V,          ph  

      hospitalized recently for similar complaint, hx of kidney and gall stones, also has hx      

      of kidney failure and is on dialysis, M,W,F, was not dialyzed today, VSS. Coronavirus       

      screen: Client denies travel out of the [place] in the last 14 days. At this time, the         

      client does not indicate any symptoms associated with coronavirus-19. The client            

      reports previous COVID testing was negative. results are located within the EHR/EMR.        

      Ebola Screen: No symptoms or risks identified at this time. Initial Sepsis Screen: Does     

      the patient meet any 2 criteria? No. Patient's initial sepsis screen is negative. Does      

      the patient have a suspected source of infection? No. Patient's initial sepsis screen       

      is negative. Risk Assessment: Do you want to hurt yourself or someone else? Patient         

      reports no desire to harm self or others. Onset of symptoms was 2021.             

09:37 Method Of Arrival: EMS: [facility]                                                      

09:37 Acuity: AYLA 3                                                                           ph  

                                                                                                  

Historical:                                                                                       

- Allergies:                                                                                      

09:41 Codeine;                                                                                ph  

- PMHx:                                                                                           

09:41 Hypertension; Kidney stones; ESRD; Gallstones; Dialysis, M,W,F;                         ph  

                                                                                                  

- Immunization history:: Adult Immunizations unknown.                                             

- Social history:: Smoking status: Patient denies any tobacco usage or history of.                

                                                                                                  

                                                                                                  

Screenin:41 Abuse screen: Denies threats or abuse. Denies injuries from another. Nutritional        ph  

      screening: No deficits noted. Tuberculosis screening: No symptoms or risk factors           

      identified. Fall Risk None identified.                                                      

                                                                                                  

Assessment:                                                                                       

09:41 General: Appears in no apparent distress. uncomfortable, Behavior is calm, cooperative, ph  

      appropriate for age, Denies fever. Pain: Complains of pain in abdomen Pain radiates to      

      low back area Pain currently is 8 out of 10 on a pain scale. Neuro: Level of                

      Consciousness is awake, alert, obeys commands, Oriented to person, place, time,             

      situation. Cardiovascular: Capillary refill < 3 seconds in bilateral fingers Patient's      

      skin is warm and dry. Dialysis shunt: in the anterior aspect of right upper chest.          

      Respiratory: Airway is patent Respiratory effort is even, unlabored. GI: Abdomen is         

      non-distended, Reports lower abdominal pain, upper abdominal pain, nausea, vomiting,        

      Patient currently denies diarrhea. Derm: Skin is intact, is healthy with good turgor,       

      Skin is pink, warm \T\ dry. Musculoskeletal: Circulation, motion, and sensation intact.     

10:54 Reassessment: Patient appears in no apparent distress at this time. Patient and/or      ph  

      family updated on plan of care and expected duration. Pain level reassessed. Patient is     

      alert, oriented x 3, equal unlabored respirations, skin warm/dry/pink. Pt completed PI      

      contrast at 1050, CT notified.                                                              

11:56 Reassessment: Patient appears in no apparent distress at this time. Patient and/or      ph  

      family updated on plan of care and expected duration. Pain level reassessed. Patient is     

      alert, oriented x 3, equal unlabored respirations, skin warm/dry/pink.                      

13:47 Reassessment: Patient appears in no apparent distress at this time. Patient and/or      ph  

      family updated on plan of care and expected duration. Pain level reassessed. Patient is     

      alert, oriented x 3, equal unlabored respirations, skin warm/dry/pink.                      

                                                                                                  

Vital Signs:                                                                                      

09:37  / 57; Pulse 88; Resp 18; Temp 98.4; Pulse Ox 100% on R/A; Weight 86.18 kg;       ph  

      Height 5 ft. 4 in. (162.56 cm); Pain 8/10;                                                  

10:36  / 63; Pulse 87; Resp 17; Pulse Ox 100% on R/A;                                   ph  

11:57  / 98; Pulse 79; Resp 18; Pulse Ox 100% on R/A;                                   ph  

13:30  / 45; Pulse 97; Resp 18; Pulse Ox 100% on R/A;                                   ph  

14:45  / 50; Pulse 81; Resp 18; Temp 98.0; Pulse Ox 99% on R/A;                         ph  

09:37 Body Mass Index 32.61 (86.18 kg, 162.56 cm)                                             ph  

                                                                                                  

Shaniqua Coma Score:                                                                               

10:22 Eye Response: spontaneous(4). Verbal Response: oriented(5). Motor Response: obeys       [id number] 

      commands(6). Total: 15.                                                                     

                                                                                                  

ED Course:                                                                                        

09:36 Patient arrived in ED.                                                                  em1 

09:37 Altagracia Narvaez RN is Primary Nurse.                                                    ph  

09:38 Brock Cormier PA is PHCP.                                                               [id number] 

09:38 Barber Chambers MD is Attending Physician.                                              [email or web address] 

09:40 Triage completed.                                                                       ph  

09:41 Arm band placed on Patient placed in an exam room, on pulse oximetry.                   ph  

09:41 Patient has correct armband on for positive identification. Bed in low position. Call   ph  

      light in reach. Side rails up X2. Pulse ox on. NIBP on. Door closed. Noise minimized.       

      Warm blanket given.                                                                         

10:20 Inserted saline lock: 22 gauge in right forearm, using aseptic technique.               ph  

13:15 CT Abd/Pelvis - PO Contrast Only Sent.                                                  sv  

13:58 Urine Dipstick--Ancillary (enter results) Sent.                                         sv  

15:04 No provider procedures requiring assistance completed. IV discontinued, intact,         ph  

      bleeding controlled, No redness/swelling at site. Pressure dressing applied.                

                                                                                                  

Administered Medications:                                                                         

10:35 Drug: Zofran (Ondansetron) 4 mg Route: IVP; Site: right forearm;                        ph  

12:13 Follow up: Response: No adverse reaction                                                ph  

10:35 Drug: NS 0.9% 250 ml Route: IV; Rate: bolus; Site: right forearm;                       ph  

12:12 Follow up: Response: No adverse reaction; IV Status: Completed infusion                 ph  

10:36 Drug: morphine 2 mg Route: IVP; Site: right forearm;                                    ph  

12:13 Follow up: Response: No adverse reaction                                                ph  

                                                                                                  

                                                                                                  

Outcome:                                                                                          

14:44 Discharge ordered by MD.                                                                [id number] 

15:04 Discharged to home ambulatory.                                                          ph  

15:04 Condition: good                                                                             

15:04 Discharge instructions given to patient, Instructed on discharge instructions, follow       

      up and referral plans. medication usage, Demonstrated understanding of instructions,        

      follow-up care, medications, Prescriptions given X 2.                                       

15:05 Patient left the ED.                                                                    ph  

                                                                                                  

Signatures:                                                                                       

Leti Ayon, RN                    Ozzy Killian                               em1                                                  

Brock Cormier PA                        PA   [email or web address]                                                  

Altagracia Narvaez RN                      RN   ph                                                   

                                                                                                  

**************************************************************************************************
Yes